# Patient Record
Sex: MALE | Race: WHITE | NOT HISPANIC OR LATINO | Employment: FULL TIME | URBAN - NONMETROPOLITAN AREA
[De-identification: names, ages, dates, MRNs, and addresses within clinical notes are randomized per-mention and may not be internally consistent; named-entity substitution may affect disease eponyms.]

---

## 2017-03-29 ENCOUNTER — APPOINTMENT (OUTPATIENT)
Dept: CT IMAGING | Facility: HOSPITAL | Age: 42
End: 2017-03-29

## 2017-03-29 ENCOUNTER — APPOINTMENT (OUTPATIENT)
Dept: GENERAL RADIOLOGY | Facility: HOSPITAL | Age: 42
End: 2017-03-29

## 2017-03-29 ENCOUNTER — HOSPITAL ENCOUNTER (EMERGENCY)
Facility: HOSPITAL | Age: 42
Discharge: HOME | End: 2017-03-29
Admitting: FAMILY MEDICINE

## 2017-03-29 VITALS
RESPIRATION RATE: 18 BRPM | HEIGHT: 73 IN | DIASTOLIC BLOOD PRESSURE: 82 MMHG | BODY MASS INDEX: 26.51 KG/M2 | WEIGHT: 200 LBS | OXYGEN SATURATION: 97 % | TEMPERATURE: 98 F | SYSTOLIC BLOOD PRESSURE: 127 MMHG | HEART RATE: 61 BPM

## 2017-03-29 DIAGNOSIS — S70.01XA CONTUSION OF RIGHT HIP, INITIAL ENCOUNTER: ICD-10-CM

## 2017-03-29 DIAGNOSIS — S39.012A LUMBAR STRAIN, INITIAL ENCOUNTER: Primary | ICD-10-CM

## 2017-03-29 PROCEDURE — 73502 X-RAY EXAM HIP UNI 2-3 VIEWS: CPT

## 2017-03-29 PROCEDURE — 99283 EMERGENCY DEPT VISIT LOW MDM: CPT

## 2017-03-29 PROCEDURE — 96372 THER/PROPH/DIAG INJ SC/IM: CPT

## 2017-03-29 PROCEDURE — 72131 CT LUMBAR SPINE W/O DYE: CPT

## 2017-03-29 PROCEDURE — 25010000002 ONDANSETRON PER 1 MG: Performed by: NURSE PRACTITIONER

## 2017-03-29 PROCEDURE — 25010000002 HYDROMORPHONE PER 4 MG: Performed by: NURSE PRACTITIONER

## 2017-03-29 PROCEDURE — 25010000002 ORPHENADRINE CITRATE PER 60 MG: Performed by: NURSE PRACTITIONER

## 2017-03-29 RX ORDER — ORPHENADRINE CITRATE 30 MG/ML
60 INJECTION INTRAMUSCULAR; INTRAVENOUS ONCE
Status: COMPLETED | OUTPATIENT
Start: 2017-03-29 | End: 2017-03-29

## 2017-03-29 RX ORDER — OXYCODONE AND ACETAMINOPHEN 7.5; 325 MG/1; MG/1
1 TABLET ORAL EVERY 4 HOURS PRN
Qty: 12 TABLET | Refills: 0 | Status: SHIPPED | OUTPATIENT
Start: 2017-03-29 | End: 2017-08-11

## 2017-03-29 RX ORDER — TIZANIDINE 4 MG/1
4 TABLET ORAL EVERY 8 HOURS PRN
Qty: 15 TABLET | Refills: 0 | Status: SHIPPED | OUTPATIENT
Start: 2017-03-29 | End: 2017-08-11

## 2017-03-29 RX ORDER — ONDANSETRON 2 MG/ML
4 INJECTION INTRAMUSCULAR; INTRAVENOUS ONCE
Status: COMPLETED | OUTPATIENT
Start: 2017-03-29 | End: 2017-03-29

## 2017-03-29 RX ORDER — METHYLPREDNISOLONE 4 MG/1
TABLET ORAL
Qty: 21 TABLET | Refills: 0 | Status: SHIPPED | OUTPATIENT
Start: 2017-03-29 | End: 2017-08-11

## 2017-03-29 RX ADMIN — ORPHENADRINE CITRATE 60 MG: 30 INJECTION INTRAMUSCULAR; INTRAVENOUS at 19:25

## 2017-03-29 RX ADMIN — HYDROMORPHONE HYDROCHLORIDE 1 MG: 1 INJECTION, SOLUTION INTRAMUSCULAR; INTRAVENOUS; SUBCUTANEOUS at 19:26

## 2017-03-29 RX ADMIN — ONDANSETRON 4 MG: 2 INJECTION INTRAMUSCULAR; INTRAVENOUS at 19:25

## 2017-03-30 NOTE — ED PROVIDER NOTES
"Subjective   HPI Comments: Pt presents after tripping and falling off of a trailer while at work today. He states that he hit concrete. He is complaining of pain to right hip and lower lumbar spine. He states that this occurred around 1500 today     Patient is a 41 y.o. male presenting with fall.   History provided by:  Patient   used: No    Fall       Review of Systems   Constitutional: Negative.    HENT: Negative.    Eyes: Negative.    Respiratory: Negative.    Cardiovascular: Negative.    Gastrointestinal: Negative.    Endocrine: Negative.    Genitourinary: Negative.    Musculoskeletal:        Pt presents after falling off of a 3ft trailer and landing on concrete. He states that he is having lower back pain and right hip pain. He states that this occurred around 1500 today. He denies any incont of urine or stool   Skin: Negative.    Allergic/Immunologic: Negative.    Neurological: Negative.    Hematological: Negative.    Psychiatric/Behavioral: Negative.    All other systems reviewed and are negative.      Past Medical History:   Diagnosis Date   • Fracture of lumbar spine 2002 and 2015    lower lumbar 2002; L1 2015       No Known Allergies    Past Surgical History:   Procedure Laterality Date   • FACIAL FRACTURE SURGERY     • SPLENECTOMY         History reviewed. No pertinent family history.    Social History     Social History   • Marital status: Single     Spouse name: N/A   • Number of children: N/A   • Years of education: N/A     Social History Main Topics   • Smoking status: Never Smoker   • Smokeless tobacco: None   • Alcohol use No   • Drug use: No   • Sexual activity: Defer     Other Topics Concern   • None     Social History Narrative   • None       Prior to Admission medications    Not on File       /82  Pulse 61  Temp 98 °F (36.7 °C)  Resp 18  Ht 73\" (185.4 cm)  Wt 200 lb (90.7 kg)  SpO2 97%  BMI 26.39 kg/m2    Objective   Physical Exam   Constitutional: He is oriented " to person, place, and time. He appears well-developed and well-nourished.   HENT:   Head: Normocephalic and atraumatic.   Eyes: Conjunctivae and EOM are normal. Pupils are equal, round, and reactive to light.   Neck: Normal range of motion. Neck supple.   Cardiovascular: Normal rate, regular rhythm, normal heart sounds and intact distal pulses.    Pulmonary/Chest: Effort normal and breath sounds normal.   Abdominal: Soft. Bowel sounds are normal.   Musculoskeletal:   Moderate tenderness on palp of lower lumbar spine. Palp spasm noted. Superficial abrasion to lower back. No stepoff or laxity noted. Foot push pull equal. Tenderness on palp right buttocks and right lateral hip.    Neurological: He is alert and oriented to person, place, and time. He has normal reflexes.   Skin: Skin is warm and dry.   Psychiatric: He has a normal mood and affect. His behavior is normal. Judgment and thought content normal.   Nursing note and vitals reviewed.      Procedures         Lab Results (last 24 hours)     ** No results found for the last 24 hours. **          XR Hip With or Without Pelvis 2 - 3 View Right   Final Result   . Normal exam of the pelvis and right hip.   This report was finalized on 03/29/2017 23:35 by Dr. Lucas Roberts MD.      CT Lumbar Spine Without Contrast   ED Interpretation   Old fx at l1- nothing acute. Bulging disc noted. Per radiologist   reading.      Final Result   1. No evidence of acute fracture. There is a chronic compression   deformity at L1.   2. There is bulging of the disc as well as facet and ligamentous   hypertrophy contributing to mild central and foraminal stenosis as   described above.   This report was finalized on 03/29/2017 21:25 by Dr. Lucas Roberts MD.          ED Course  ED Course   Comment By Time   Pending ct scan report at this time. Radiology called for further KYLER Amador 03/29 2030   Pending ct scan results at this time KYLER Amador 03/29  2050   Reviewed results with pt. States that pain is better now. Butch report completed#[id number]. No signs of suspicious activity noted. Will write for small amt of pain medication for acute pain. Reviewed side effects and potential for abuse. Madalyn Cobb, APRN 03/29 9828          MDM  Number of Diagnoses or Management Options  Contusion of right hip, initial encounter: minor  Lumbar strain, initial encounter: minor     Amount and/or Complexity of Data Reviewed  Tests in the radiology section of CPT®: ordered and reviewed  Independent visualization of images, tracings, or specimens: yes    Patient Progress  Patient progress: stable      Final diagnoses:   Lumbar strain, initial encounter   Contusion of right hip, initial encounter          Madalyn Cobb, APRN  03/31/17 0729

## 2017-08-04 ENCOUNTER — APPOINTMENT (OUTPATIENT)
Dept: CT IMAGING | Facility: HOSPITAL | Age: 42
End: 2017-08-04

## 2017-08-04 ENCOUNTER — HOSPITAL ENCOUNTER (EMERGENCY)
Facility: HOSPITAL | Age: 42
Discharge: HOME | End: 2017-08-04
Admitting: EMERGENCY MEDICINE

## 2017-08-04 VITALS
OXYGEN SATURATION: 100 % | WEIGHT: 200 LBS | DIASTOLIC BLOOD PRESSURE: 78 MMHG | TEMPERATURE: 97.3 F | HEART RATE: 74 BPM | RESPIRATION RATE: 18 BRPM | SYSTOLIC BLOOD PRESSURE: 132 MMHG | HEIGHT: 73 IN | BODY MASS INDEX: 26.51 KG/M2

## 2017-08-04 DIAGNOSIS — IMO0002 LACERATION: ICD-10-CM

## 2017-08-04 DIAGNOSIS — S00.03XA CONTUSION OF SCALP, INITIAL ENCOUNTER: Primary | ICD-10-CM

## 2017-08-04 PROCEDURE — 70450 CT HEAD/BRAIN W/O DYE: CPT

## 2017-08-04 PROCEDURE — 25010000002 HYDROMORPHONE PER 4 MG: Performed by: NURSE PRACTITIONER

## 2017-08-04 PROCEDURE — 99283 EMERGENCY DEPT VISIT LOW MDM: CPT

## 2017-08-04 PROCEDURE — 90471 IMMUNIZATION ADMIN: CPT | Performed by: NURSE PRACTITIONER

## 2017-08-04 PROCEDURE — 25010000002 TDAP 5-2.5-18.5 LF-MCG/0.5 SUSPENSION: Performed by: NURSE PRACTITIONER

## 2017-08-04 PROCEDURE — 25010000002 ONDANSETRON PER 1 MG: Performed by: NURSE PRACTITIONER

## 2017-08-04 PROCEDURE — 96372 THER/PROPH/DIAG INJ SC/IM: CPT

## 2017-08-04 PROCEDURE — 90715 TDAP VACCINE 7 YRS/> IM: CPT | Performed by: NURSE PRACTITIONER

## 2017-08-04 PROCEDURE — 25010000002 EPINEPHRINE PER 0.1 MG: Performed by: NURSE PRACTITIONER

## 2017-08-04 RX ORDER — ONDANSETRON 2 MG/ML
4 INJECTION INTRAMUSCULAR; INTRAVENOUS ONCE
Status: COMPLETED | OUTPATIENT
Start: 2017-08-04 | End: 2017-08-04

## 2017-08-04 RX ORDER — CEPHALEXIN 500 MG/1
500 CAPSULE ORAL 3 TIMES DAILY
Qty: 21 CAPSULE | Refills: 0 | Status: SHIPPED | OUTPATIENT
Start: 2017-08-04 | End: 2017-08-11

## 2017-08-04 RX ORDER — OXYCODONE HYDROCHLORIDE AND ACETAMINOPHEN 5; 325 MG/1; MG/1
1 TABLET ORAL EVERY 4 HOURS PRN
Qty: 12 TABLET | Refills: 0 | Status: SHIPPED | OUTPATIENT
Start: 2017-08-04 | End: 2017-08-11

## 2017-08-04 RX ORDER — LIDOCAINE HYDROCHLORIDE 10 MG/ML
10 INJECTION, SOLUTION INFILTRATION; PERINEURAL ONCE
Status: DISCONTINUED | OUTPATIENT
Start: 2017-08-04 | End: 2017-08-04 | Stop reason: HOSPADM

## 2017-08-04 RX ADMIN — ONDANSETRON 4 MG: 2 INJECTION INTRAMUSCULAR; INTRAVENOUS at 12:06

## 2017-08-04 RX ADMIN — EPINEPHRINE 3 ML: 1 INJECTION PARENTERAL at 12:02

## 2017-08-04 RX ADMIN — HYDROMORPHONE HYDROCHLORIDE 1 MG: 1 INJECTION, SOLUTION INTRAMUSCULAR; INTRAVENOUS; SUBCUTANEOUS at 12:14

## 2017-08-04 RX ADMIN — TETANUS TOXOID, REDUCED DIPHTHERIA TOXOID AND ACELLULAR PERTUSSIS VACCINE, ADSORBED 0.5 ML: 5; 2.5; 8; 8; 2.5 SUSPENSION INTRAMUSCULAR at 12:04

## 2017-08-04 NOTE — ED NOTES
Patient discharged home  with family at side ambulatory to personal car. No distress noted. Personal belongings with patient. Patient/family voice understanding to instructions given.        Lisa Daniel RN  08/04/17 3609
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
## 2017-08-04 NOTE — ED NOTES
C/o's 'I was at work, I was putting up a sign & the post  hit me in the heat, my melon hurts bad.' denies LOC     Misty Fallon RN  08/04/17 5972

## 2017-08-04 NOTE — DISCHARGE INSTRUCTIONS
Return to ER if symptoms worsen   Clean wound twice daily with soap and water and apply bacitiraicin  Ice to area

## 2017-08-04 NOTE — ED NOTES
Large band aide applied to scalp wound site after neosporin applied. No bleeding noted.     Lisa Daniel RN  08/04/17 1847

## 2017-08-08 ENCOUNTER — HOSPITAL ENCOUNTER (EMERGENCY)
Facility: HOSPITAL | Age: 42
Discharge: HOME | End: 2017-08-08
Attending: FAMILY MEDICINE | Admitting: FAMILY MEDICINE

## 2017-08-08 ENCOUNTER — APPOINTMENT (OUTPATIENT)
Dept: CT IMAGING | Facility: HOSPITAL | Age: 42
End: 2017-08-08

## 2017-08-08 VITALS
TEMPERATURE: 97.6 F | HEIGHT: 73 IN | OXYGEN SATURATION: 98 % | HEART RATE: 70 BPM | WEIGHT: 203 LBS | SYSTOLIC BLOOD PRESSURE: 132 MMHG | BODY MASS INDEX: 26.9 KG/M2 | RESPIRATION RATE: 18 BRPM | DIASTOLIC BLOOD PRESSURE: 80 MMHG

## 2017-08-08 DIAGNOSIS — R51.9 NONINTRACTABLE HEADACHE, UNSPECIFIED CHRONICITY PATTERN, UNSPECIFIED HEADACHE TYPE: Primary | ICD-10-CM

## 2017-08-08 DIAGNOSIS — F07.81 POST-CONCUSSION SYNDROME: ICD-10-CM

## 2017-08-08 PROCEDURE — 70450 CT HEAD/BRAIN W/O DYE: CPT

## 2017-08-08 PROCEDURE — 96375 TX/PRO/DX INJ NEW DRUG ADDON: CPT

## 2017-08-08 PROCEDURE — 96374 THER/PROPH/DIAG INJ IV PUSH: CPT

## 2017-08-08 PROCEDURE — 99283 EMERGENCY DEPT VISIT LOW MDM: CPT

## 2017-08-08 PROCEDURE — 25010000002 MORPHINE PER 10 MG: Performed by: FAMILY MEDICINE

## 2017-08-08 PROCEDURE — 25010000002 ONDANSETRON PER 1 MG: Performed by: FAMILY MEDICINE

## 2017-08-08 RX ORDER — ONDANSETRON 2 MG/ML
4 INJECTION INTRAMUSCULAR; INTRAVENOUS ONCE
Status: COMPLETED | OUTPATIENT
Start: 2017-08-08 | End: 2017-08-08

## 2017-08-08 RX ORDER — MORPHINE SULFATE 4 MG/ML
4 INJECTION, SOLUTION INTRAMUSCULAR; INTRAVENOUS ONCE
Status: COMPLETED | OUTPATIENT
Start: 2017-08-08 | End: 2017-08-08

## 2017-08-08 RX ORDER — IBUPROFEN 800 MG/1
800 TABLET ORAL EVERY 8 HOURS PRN
Qty: 25 TABLET | Refills: 0 | Status: SHIPPED | OUTPATIENT
Start: 2017-08-08 | End: 2017-10-28

## 2017-08-08 RX ADMIN — MORPHINE SULFATE 4 MG: 4 INJECTION, SOLUTION INTRAMUSCULAR; INTRAVENOUS at 20:56

## 2017-08-08 RX ADMIN — ONDANSETRON 4 MG: 2 INJECTION INTRAMUSCULAR; INTRAVENOUS at 20:56

## 2017-08-09 NOTE — DISCHARGE INSTRUCTIONS

## 2017-08-09 NOTE — ED PROVIDER NOTES
Subjective   Patient is a 42 y.o. male presenting with headaches.   History provided by:  Patient   used: No    Headache   Pain location:  R parietal  Quality:  Sharp  Radiates to:  Eyes  Severity currently:  7/10  Severity at highest:  7/10  Onset quality:  Gradual  Timing:  Constant  Progression:  Unchanged  Chronicity:  New  Similar to prior headaches: no    Context: not activity, not exposure to bright light, not caffeine, not coughing, not defecating, not eating, not stress, not exposure to cold air, not intercourse, not loud noise and not straining    Context comment:  Recent head injury requiring sutures  Relieved by:  Nothing  Worsened by:  Nothing  Ineffective treatments:  Acetaminophen and NSAIDs  Associated symptoms: no abdominal pain, no back pain, no blurred vision, no congestion, no cough, no diarrhea, no dizziness, no eye pain, no facial pain, no fatigue, no fever, no focal weakness, no hearing loss, no nausea, no near-syncope, no neck pain, no neck stiffness, no numbness, no paresthesias, no photophobia, no sore throat, no swollen glands, no syncope, no tingling, no URI, no visual change and no vomiting        Review of Systems   Constitutional: Negative for activity change, chills, fatigue and fever.   HENT: Negative for congestion, dental problem, hearing loss and sore throat.    Eyes: Negative for blurred vision, photophobia, pain, discharge and itching.   Respiratory: Negative for apnea, cough and chest tightness.    Cardiovascular: Negative for chest pain, palpitations, syncope and near-syncope.   Gastrointestinal: Negative for abdominal pain, diarrhea, nausea and vomiting.   Endocrine: Negative for polydipsia and polyuria.   Genitourinary: Negative for difficulty urinating and dysuria.   Musculoskeletal: Negative for arthralgias, back pain, neck pain and neck stiffness.   Neurological: Positive for headaches. Negative for dizziness, focal weakness, numbness and paresthesias.    Hematological: Negative for adenopathy. Does not bruise/bleed easily.   Psychiatric/Behavioral: Negative for agitation and behavioral problems.   All other systems reviewed and are negative.      Past Medical History:   Diagnosis Date   • Fracture of lumbar spine 2002 and 2015    lower lumbar 2002; L1 2015       No Known Allergies    Past Surgical History:   Procedure Laterality Date   • FACIAL FRACTURE SURGERY     • SPLENECTOMY         History reviewed. No pertinent family history.    Social History     Social History   • Marital status: Single     Spouse name: N/A   • Number of children: N/A   • Years of education: N/A     Social History Main Topics   • Smoking status: Never Smoker   • Smokeless tobacco: None   • Alcohol use No   • Drug use: No   • Sexual activity: Defer     Other Topics Concern   • None     Social History Narrative       Lab Results (last 24 hours)     ** No results found for the last 24 hours. **          Objective   Physical Exam   Constitutional: He is oriented to person, place, and time. He appears well-developed and well-nourished. No distress.   HENT:   Head: Normocephalic and atraumatic.   Eyes: Conjunctivae and EOM are normal. Pupils are equal, round, and reactive to light. Right eye exhibits no discharge. Left eye exhibits no discharge. No scleral icterus.   Neck: Normal range of motion. Neck supple. No JVD present. No tracheal deviation present. No thyromegaly present.   Cardiovascular: Normal rate and regular rhythm.    Pulmonary/Chest: Effort normal and breath sounds normal. No stridor. No respiratory distress. He has no wheezes.   Abdominal: Soft. Bowel sounds are normal. He exhibits no distension. There is no tenderness.   Musculoskeletal: He exhibits no tenderness or deformity.   Lymphadenopathy:     He has no cervical adenopathy.   Neurological: He is alert and oriented to person, place, and time. He has normal reflexes. He displays normal reflexes. No cranial nerve deficit. He  "exhibits normal muscle tone. Coordination normal.   Skin: Skin is warm and dry. He is not diaphoretic.   Nursing note and vitals reviewed.      Procedures         CT Head Without Contrast   Final Result   1.   No acute intracranial abnormality.   2.  Moderate obstructive mucosal disease in the ethmoid air cells.   This report was finalized on 08/08/2017 21:24 by Dr. Arabella Quintero MD.          /80 (BP Location: Left arm, Patient Position: Lying)  Pulse 70  Temp 97.6 °F (36.4 °C) (Tympanic)   Resp 18  Ht 73\" (185.4 cm)  Wt 203 lb (92.1 kg)  SpO2 98%  BMI 26.78 kg/m2    ED Course    ED Course       Medications   Morphine sulfate (PF) injection 4 mg (4 mg Intravenous Given 8/8/17 2056)   ondansetron (ZOFRAN) injection 4 mg (4 mg Intravenous Given 8/8/17 2056)            MDM  Number of Diagnoses or Management Options  Nonintractable headache, unspecified chronicity pattern, unspecified headache type: new and requires workup  Post-concussion syndrome: new and requires workup     Amount and/or Complexity of Data Reviewed  Clinical lab tests: ordered and reviewed  Tests in the radiology section of CPT®: reviewed and ordered  Tests in the medicine section of CPT®: ordered and reviewed  Decide to obtain previous medical records or to obtain history from someone other than the patient: yes  Review and summarize past medical records: yes  Independent visualization of images, tracings, or specimens: yes    Risk of Complications, Morbidity, and/or Mortality  Presenting problems: moderate  Diagnostic procedures: moderate  Management options: moderate    Patient Progress  Patient progress: improved      Final diagnoses:   Nonintractable headache, unspecified chronicity pattern, unspecified headache type   Post-concussion syndrome          Juhi Stacy,   08/08/17 2229    "

## 2017-08-11 ENCOUNTER — OFFICE VISIT (OUTPATIENT)
Dept: NEUROLOGY | Facility: CLINIC | Age: 42
End: 2017-08-11

## 2017-08-11 ENCOUNTER — HOSPITAL ENCOUNTER (EMERGENCY)
Facility: HOSPITAL | Age: 42
Discharge: HOME | End: 2017-08-11
Admitting: EMERGENCY MEDICINE

## 2017-08-11 VITALS
RESPIRATION RATE: 16 BRPM | HEART RATE: 85 BPM | SYSTOLIC BLOOD PRESSURE: 140 MMHG | BODY MASS INDEX: 27.17 KG/M2 | WEIGHT: 205 LBS | TEMPERATURE: 97.5 F | HEIGHT: 73 IN | OXYGEN SATURATION: 95 % | DIASTOLIC BLOOD PRESSURE: 78 MMHG

## 2017-08-11 VITALS
SYSTOLIC BLOOD PRESSURE: 128 MMHG | HEIGHT: 73 IN | BODY MASS INDEX: 27.17 KG/M2 | DIASTOLIC BLOOD PRESSURE: 84 MMHG | WEIGHT: 205 LBS | HEART RATE: 88 BPM

## 2017-08-11 DIAGNOSIS — F07.81 POST CONCUSSION SYNDROME: Primary | ICD-10-CM

## 2017-08-11 DIAGNOSIS — G44.311 INTRACTABLE ACUTE POST-TRAUMATIC HEADACHE: ICD-10-CM

## 2017-08-11 DIAGNOSIS — Z48.02 ENCOUNTER FOR STAPLE REMOVAL: Primary | ICD-10-CM

## 2017-08-11 PROCEDURE — 99201: CPT

## 2017-08-11 PROCEDURE — 99203 OFFICE O/P NEW LOW 30 MIN: CPT | Performed by: CLINICAL NURSE SPECIALIST

## 2017-08-11 RX ORDER — AMITRIPTYLINE HYDROCHLORIDE 25 MG/1
25 TABLET, FILM COATED ORAL NIGHTLY
Qty: 30 TABLET | Refills: 3 | Status: SHIPPED | OUTPATIENT
Start: 2017-08-11 | End: 2017-10-03 | Stop reason: DRUGHIGH
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
## 2017-08-11 NOTE — PATIENT INSTRUCTIONS
Post-Concussion Syndrome  Post-concussion syndrome is the symptoms that can occur after a head injury. These symptoms can last from weeks to months.  HOME CARE   · Take medicines only as told by your doctor.  Do not take aspirin.  · Sleep with your head raised to help with headaches.  · Avoid activities that can cause another head injury.  ¨ Do not play contact sports like football, hockey, soccer, or basketball.  ¨ Do not do other risky activities like downhill skiing, martial arts, or horseback riding until your doctor says it is okay.  · Keep all follow-up visits as told by your doctor. This is important.  GET HELP IF:   · You have a harder time:    Paying attention.    Focusing.    Remembering.    Learning new information.    Dealing with stress.  · You need more time to complete tasks.  · You are easily bothered (irritable).  · You have more symptoms.  Get help if you have any of these symptoms for more than two weeks after your injury:   · Long-lasting (chronic) headaches.  · Dizziness.  · Trouble balancing.  · Feeling sick to your stomach (nauseous).  · Trouble with your vision.  · Noise or light bothers you more.  · Depression.  · Mood swings.  · Feeling worried (anxious).  · Easily bothered.  · Memory problems.  · Trouble concentrating or paying attention.  · Sleep problems.  · Feeling tired all of the time.  GET HELP RIGHT AWAY IF:  · You feel confused.  · You feel very sleepy.  · You are hard to wake up.  · You feel sick to your stomach.  · You keep throwing up (vomiting).  · You feel like you are moving when you are not (vertigo).  · Your eyes move back and forth very quickly.  · You start shaking (convulsing) or pass out (faint).  · You have very bad headaches that do not get better with medicine.  · You cannot use your arms or legs like normal.  · One of the black centers of your eyes (pupils) is bigger than the other.  · You have clear or bloody fluid coming from your nose or ears.  · Your problems  get worse, not better.  MAKE SURE YOU:  · Understand these instructions.  · Will watch your condition.  · Will get help right away if you are not doing well or get worse.     This information is not intended to replace advice given to you by your health care provider. Make sure you discuss any questions you have with your health care provider.     Document Released: 01/25/2006 Document Revised: 01/08/2016 Document Reviewed: 03/25/2015  [employer] Interactive Patient Education ©2017 [employer]  BMI for Adults  Body mass index (BMI) is a number that is calculated from a person's weight and height. In most adults, the number is used to find how much of an adult's weight is made up of fat. BMI is not as accurate as a direct measure of body fat.  HOW IS BMI CALCULATED?  BMI is calculated by dividing weight in kilograms by height in meters squared. It can also be calculated by dividing weight in pounds by height in inches squared, then multiplying the resulting number by 703. Charts are available to help you find your BMI quickly and easily without doing this calculation.   HOW IS BMI INTERPRETED?  Health care professionals use BMI charts to identify whether an adult is underweight, at a normal weight, or overweight based on the following guidelines:  · Underweight: BMI less than 18.5.  · Normal weight: BMI between 18.5 and 24.9.  · Overweight: BMI between 25 and 29.9.  · Obese: BMI of 30 and above.  BMI is usually interpreted the same for males and females.  Weight includes both fat and muscle, so someone with a muscular build, such as an athlete, may have a BMI that is higher than 24.9. In cases like these, BMI may not accurately depict body fat. To determine if excess body fat is the cause of a BMI of 25 or higher, further assessments may need to be done by a health care provider.  WHY IS BMI A USEFUL TOOL?  BMI is used to identify a possible weight problem that may be related to a medical problem or may increase the  risk for medical problems. BMI can also be used to promote changes to reach a healthy weight.     This information is not intended to replace advice given to you by your health care provider. Make sure you discuss any questions you have with your health care provider.     Document Released: 08/29/2005 Document Revised: 01/08/2016 Document Reviewed: 05/15/2015  [employer] Interactive Patient Education ©2017 [employer]

## 2017-08-11 NOTE — ED NOTES
Patient discharged home  with family at side ambulatory to personal car. No distress noted. Personal belongings with patient. Patient voiced understanding to instructions given. Edges remain approximated to incision site.       Lisa Daniel RN  08/11/17 [id number]

## 2017-10-01 ENCOUNTER — HOSPITAL ENCOUNTER (EMERGENCY)
Age: 42
Discharge: HOME | End: 2017-10-01

## 2017-10-01 ENCOUNTER — APPOINTMENT (OUTPATIENT)
Dept: GENERAL RADIOLOGY | Age: 42
End: 2017-10-01

## 2017-10-01 VITALS
BODY MASS INDEX: 29.35 KG/M2 | OXYGEN SATURATION: 98 % | TEMPERATURE: 97.8 F | SYSTOLIC BLOOD PRESSURE: 124 MMHG | WEIGHT: 205 LBS | HEIGHT: 70 IN | DIASTOLIC BLOOD PRESSURE: 77 MMHG | RESPIRATION RATE: 18 BRPM | HEART RATE: 77 BPM

## 2017-10-01 DIAGNOSIS — M25.511 ACUTE PAIN OF RIGHT SHOULDER: Primary | ICD-10-CM

## 2017-10-01 PROCEDURE — 6360000002 HC RX W HCPCS: Performed by: NURSE PRACTITIONER

## 2017-10-01 PROCEDURE — 6370000000 HC RX 637 (ALT 250 FOR IP): Performed by: NURSE PRACTITIONER

## 2017-10-01 PROCEDURE — 73030 X-RAY EXAM OF SHOULDER: CPT

## 2017-10-01 PROCEDURE — 96372 THER/PROPH/DIAG INJ SC/IM: CPT

## 2017-10-01 PROCEDURE — 99283 EMERGENCY DEPT VISIT LOW MDM: CPT

## 2017-10-01 PROCEDURE — 99283 EMERGENCY DEPT VISIT LOW MDM: CPT | Performed by: NURSE PRACTITIONER

## 2017-10-01 RX ORDER — KETOROLAC TROMETHAMINE 30 MG/ML
60 INJECTION, SOLUTION INTRAMUSCULAR; INTRAVENOUS ONCE
Status: COMPLETED | OUTPATIENT
Start: 2017-10-01 | End: 2017-10-01

## 2017-10-01 RX ORDER — NAPROXEN 500 MG/1
500 TABLET ORAL 2 TIMES DAILY
Qty: 20 TABLET | Refills: 0 | Status: SHIPPED | OUTPATIENT
Start: 2017-10-01 | End: 2018-11-19

## 2017-10-01 RX ORDER — OXYCODONE HYDROCHLORIDE AND ACETAMINOPHEN 5; 325 MG/1; MG/1
2 TABLET ORAL ONCE
Status: COMPLETED | OUTPATIENT
Start: 2017-10-01 | End: 2017-10-01

## 2017-10-01 RX ORDER — HYDROCODONE BITARTRATE AND ACETAMINOPHEN 5; 325 MG/1; MG/1
1 TABLET ORAL EVERY 6 HOURS PRN
Qty: 15 TABLET | Refills: 0 | Status: SHIPPED | OUTPATIENT
Start: 2017-10-01 | End: 2017-10-08

## 2017-10-01 RX ORDER — CYCLOBENZAPRINE HCL 10 MG
10 TABLET ORAL 3 TIMES DAILY PRN
Qty: 30 TABLET | Refills: 0 | Status: SHIPPED | OUTPATIENT
Start: 2017-10-01 | End: 2017-10-11

## 2017-10-01 RX ORDER — ORPHENADRINE CITRATE 30 MG/ML
60 INJECTION INTRAMUSCULAR; INTRAVENOUS ONCE
Status: COMPLETED | OUTPATIENT
Start: 2017-10-01 | End: 2017-10-01

## 2017-10-01 RX ADMIN — KETOROLAC TROMETHAMINE 60 MG: 30 INJECTION, SOLUTION INTRAMUSCULAR at 18:10

## 2017-10-01 RX ADMIN — ORPHENADRINE CITRATE 60 MG: 30 INJECTION, SOLUTION INTRAMUSCULAR; INTRAVENOUS at 18:11

## 2017-10-01 RX ADMIN — OXYCODONE HYDROCHLORIDE AND ACETAMINOPHEN 2 TABLET: 5; 325 TABLET ORAL at 17:21

## 2017-10-01 ASSESSMENT — PAIN DESCRIPTION - LOCATION: LOCATION: SHOULDER

## 2017-10-01 ASSESSMENT — PAIN DESCRIPTION - ORIENTATION: ORIENTATION: RIGHT

## 2017-10-01 ASSESSMENT — PAIN SCALES - GENERAL: PAINLEVEL_OUTOF10: 10
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
## 2017-10-01 NOTE — ED AVS SNAPSHOT
After Visit Summary  (Discharge Instructions)    Medication List for Home    Based on the information you provided to us as well as any changes during this visit, the following is your updated medication list.  Compare this with your prescription bottles at home. If you have any questions or concerns, contact your primary care physician's office. Daily Medication List (This medication list can be shared with any Healthcare provider who is helping you manage your medications)      There are NEW medications for you. START taking them after you leave the hospital     cyclobenzaprine 10 MG tablet   Commonly known as:  FLEXERIL   Take 1 tablet by mouth 3 times daily as needed for Muscle spasms       HYDROcodone-acetaminophen 5-325 MG per tablet   Commonly known as:  NORCO   Take 1 tablet by mouth every 6 hours as needed for Pain . naproxen 500 MG tablet   Commonly known as:  NAPROSYN   Take 1 tablet by mouth 2 times daily            Where to Get Your Medications      You can get these medications from any pharmacy     Bring a paper prescription for each of these medications     cyclobenzaprine 10 MG tablet    HYDROcodone-acetaminophen 5-325 MG per tablet    naproxen 500 MG tablet               Allergies as of 10/1/2017     No Known Allergies      Immunizations as of 10/1/2017     No immunizations on file. After Visit Summary    This summary was created for you. Thank you for entrusting your care to us.   The following information includes details about your hospital/visit stay along with steps you should take to help with your recovery once you leave the hospital.  In this packet, you will find information about the topics listed below:    · Instructions about your medications including a list of your home medications  · A summary of your hospital visit  · Follow-up appointments once you have left the hospital  · Your care plan at home You may receive a survey regarding the care you received during your stay. Your input is valuable to us. We encourage you to complete and return your survey in the envelope provided. We hope you will choose us in the future for your healthcare needs. Patient Information     Patient Name YAMILA Lovelace 1975      Care Provided at:     Name Address Phone       [address] [phone]            Your Visit    Here you will find information about your visit, including the reason for your visit. Please take this sheet with you when you visit your doctor or other health care provider in the future. It will help determine the best possible medical care for you at that time. If you have any questions once you leave the hospital, please call the department phone number listed below. Diagnoses this visit     Your diagnosis was ACUTE PAIN OF RIGHT SHOULDER. Visit Information     Date of Visit Department Dept Phone    10/1/2017 [address] [facility] [phone]      You were seen by     You were seen by CHELSIE King. Follow-up Appointments    Below is a list of your follow-up and future appointments. This may not be a complete list as you may have made appointments directly with providers that we are not aware of or your providers may have made some for you. Please call your providers to confirm appointments. It is important to keep your appointments. Please bring your current insurance card, photo ID, co-pay, and all medication bottles to your appointment. If self-pay, payment is expected at the time of service.         Follow-up Information     Schedule an appointment as soon as possible for a visit with Lupillo Francis MD.    Specialty:  Orthopedic Surgery    Why:  If symptoms worsen    Contact information:    [address]  [place] [phone]        Preventive Care        Date Due HIV screening is recommended for all people regardless of risk factors  aged 15-65 years at least once (lifetime) who have never been HIV tested. 4/19/1991    Tetanus Combination Vaccine (1 - Tdap) 4/19/1995    Cholesterol Screening 4/19/2016    Diabetes Screening 4/19/2016    Yearly Flu Vaccine (1) 9/1/2017                 Care Plan Once You Return Home    This section includes instructions you will need to follow once you leave the hospital.  Your care team will discuss these with you, so you and those caring for you know how to best care for your health needs at home. This section may also include educational information about certain health topics that may be of help to you. Important Information if you smoke or are exposed to smoking       SMOKING: QUIT SMOKING. THIS IS THE MOST IMPORTANT ACTION YOU CAN TAKE TO IMPROVE YOUR CURRENT AND FUTURE HEALTH. Call the [address] [place] [address] [place] [facility] [place] NOW ([phone])    Smoking harms nonsmokers. When nonsmokers are around people who smoke, they absorb nicotine, carbon monoxide, and other ingredients of tobacco smoke. DO NOT SMOKE AROUND CHILDREN     Children exposed to secondhand smoke are at an increased risk of:  Sudden Infant Death Syndrome (SIDS), acute respiratory infections, inflammation of the middle ear, and severe asthma. Over a longer time, it causes heart disease and lung cancer. There is no safe level of exposure to secondhand smoke. [employer] Signup     [employer] allows you to send messages to your doctor, view your test results, renew your prescriptions, schedule appointments, view visit notes, and more. How Do I Sign Up? 1. In your Internet browser, go to [email or web address]  2. Click on the Sign Up Now link in the Sign In box. You will see the New Member Sign Up page. 3. Enter your [employer] Access Code exactly as it appears below.  You will not need to use this code after youve completed the sign-up process. If you do not sign up before the expiration date, you must request a new code. [employer] Access Code: [email or web address]  Expires: 11/30/2017  6:45 PM    4. Enter your Social Security Number ([id number]) and Date of Birth (mm/dd/yyyy) as indicated and click Submit. You will be taken to the next sign-up page. 5. Create a [employer] ID. This will be your [employer] login ID and cannot be changed, so think of one that is secure and easy to remember. 6. Create a [employer] password. You can change your password at any time. 7. Enter your Password Reset Question and Answer. This can be used at a later time if you forget your password. 8. Enter your e-mail address. You will receive e-mail notification when new information is available in [address]. 9. Click Sign Up. You can now view your medical record. Additional Information  If you have questions, please contact the physician practice where you receive care. Remember, [employer] is NOT to be used for urgent needs. For medical emergencies, dial 911. For questions regarding your [employer] account call [phone]. If you have a clinical question, please call your doctor's office. View your information online  ? Review your current list of  medications, immunization, and allergies. ? Review your future test results online . ? Review your discharge instructions provided by your caregivers at discharge    Certain functionality such as prescription refills, scheduling appointments or sending messages to your provider are not activated if your provider does not use [employer] in his/her office    For questions regarding your [employer] account call [phone]. If you have a clinical question, please call your doctor's office.          The information on all pages of the After Visit Summary has been reviewed with me, the patient and/or responsible adult, by my health care provider(s). I had the opportunity to ask questions regarding this information. I understand I should dispose of my armband safely at home to protect my health information. A complete copy of the After Visit Summary has been given to me, the patient and/or responsible adult. Patient Signature/Responsible Adult: ___________________________________    Nurse Signature: ___________________________________  Resident/MLP Signature: ___________________________________  Attending Signature: ___________________________________    Date:____________Time:____________              More Information           Shoulder Pain: Care Instructions  Your Care Instructions    You can hurt your shoulder by using it too much during an activity, such as fishing or baseball. It can also happen as part of the everyday wear and tear of getting older. Shoulder injuries can be slow to heal, but your shoulder should get better with time. Your doctor may recommend a sling to rest your shoulder. If you have injured your shoulder, you may need testing and treatment. Follow-up care is a key part of your treatment and safety. Be sure to make and go to all appointments, and call your doctor if you are having problems. It's also a good idea to know your test results and keep a list of the medicines you take. How can you care for yourself at home? · Take pain medicines exactly as directed. ¨ If the doctor gave you a prescription medicine for pain, take it as prescribed. ¨ If you are not taking a prescription pain medicine, ask your doctor if you can take an over-the-counter medicine. ¨ Do not take two or more pain medicines at the same time unless the doctor told you to. Many pain medicines contain acetaminophen, which is Tylenol. Too much acetaminophen (Tylenol) can be harmful. · If your doctor recommends that you wear a sling, use it as directed. Do not take it off before your doctor tells you to. If you do not have an account, please click on the \"Sign Up Now\" link. Current as of: March 21, 2017  Content Version: 11.3  © 2506-2659 [employer]. Care instructions adapted under license by [facility] ([facility]). If you have questions about a medical condition or this instruction, always ask your healthcare professional. [address] any warranty or liability for your use of this information.

## 2017-10-03 ENCOUNTER — OFFICE VISIT (OUTPATIENT)
Dept: NEUROLOGY | Facility: CLINIC | Age: 42
End: 2017-10-03

## 2017-10-03 VITALS
WEIGHT: 203 LBS | HEIGHT: 73 IN | DIASTOLIC BLOOD PRESSURE: 70 MMHG | SYSTOLIC BLOOD PRESSURE: 120 MMHG | HEART RATE: 80 BPM | BODY MASS INDEX: 26.9 KG/M2

## 2017-10-03 DIAGNOSIS — F07.81 POST CONCUSSION SYNDROME: Primary | ICD-10-CM

## 2017-10-03 DIAGNOSIS — G44.311 INTRACTABLE ACUTE POST-TRAUMATIC HEADACHE: ICD-10-CM

## 2017-10-03 PROCEDURE — 99213 OFFICE O/P EST LOW 20 MIN: CPT | Performed by: CLINICAL NURSE SPECIALIST

## 2017-10-03 RX ORDER — AMITRIPTYLINE HYDROCHLORIDE 50 MG/1
50 TABLET, FILM COATED ORAL NIGHTLY
Qty: 30 TABLET | Refills: 3 | Status: SHIPPED | OUTPATIENT
Start: 2017-10-03 | End: 2018-01-03 | Stop reason: SDUPTHER
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
## 2017-10-03 NOTE — PATIENT INSTRUCTIONS

## 2017-10-28 ENCOUNTER — APPOINTMENT (OUTPATIENT)
Dept: CT IMAGING | Facility: HOSPITAL | Age: 42
End: 2017-10-28

## 2017-10-28 ENCOUNTER — HOSPITAL ENCOUNTER (EMERGENCY)
Facility: HOSPITAL | Age: 42
Discharge: HOME | End: 2017-10-28
Admitting: PHYSICIAN ASSISTANT

## 2017-10-28 VITALS
DIASTOLIC BLOOD PRESSURE: 75 MMHG | SYSTOLIC BLOOD PRESSURE: 142 MMHG | HEIGHT: 73 IN | HEART RATE: 73 BPM | BODY MASS INDEX: 25.71 KG/M2 | WEIGHT: 194 LBS | OXYGEN SATURATION: 98 % | TEMPERATURE: 98.1 F | RESPIRATION RATE: 18 BRPM
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
## 2017-10-28 DIAGNOSIS — M54.2 NECK PAIN: ICD-10-CM

## 2017-10-28 PROCEDURE — 70450 CT HEAD/BRAIN W/O DYE: CPT

## 2017-10-28 PROCEDURE — 99283 EMERGENCY DEPT VISIT LOW MDM: CPT

## 2017-10-28 PROCEDURE — 96372 THER/PROPH/DIAG INJ SC/IM: CPT

## 2017-10-28 PROCEDURE — 72125 CT NECK SPINE W/O DYE: CPT

## 2017-10-28 PROCEDURE — 25010000002 KETOROLAC TROMETHAMINE PER 15 MG: Performed by: NURSE PRACTITIONER

## 2017-10-28 PROCEDURE — 25010000002 DEXAMETHASONE PER 1 MG: Performed by: NURSE PRACTITIONER

## 2017-10-28 RX ORDER — IBUPROFEN 800 MG/1
800 TABLET ORAL 3 TIMES DAILY
Qty: 30 TABLET | Refills: 0 | Status: SHIPPED | OUTPATIENT
Start: 2017-10-28 | End: 2017-11-07

## 2017-10-28 RX ORDER — METHYLPREDNISOLONE 4 MG/1
TABLET ORAL
Qty: 21 TABLET | Refills: 0 | Status: SHIPPED | OUTPATIENT
Start: 2017-10-28 | End: 2017-11-15

## 2017-10-28 RX ORDER — KETOROLAC TROMETHAMINE 30 MG/ML
60 INJECTION, SOLUTION INTRAMUSCULAR; INTRAVENOUS ONCE
Status: COMPLETED | OUTPATIENT
Start: 2017-10-28 | End: 2017-10-28

## 2017-10-28 RX ORDER — DEXAMETHASONE SODIUM PHOSPHATE 10 MG/ML
10 INJECTION INTRAMUSCULAR; INTRAVENOUS ONCE
Status: COMPLETED | OUTPATIENT
Start: 2017-10-28 | End: 2017-10-28

## 2017-10-28 RX ORDER — CYCLOBENZAPRINE HCL 10 MG
10 TABLET ORAL 3 TIMES DAILY PRN
Qty: 20 TABLET | Refills: 0 | Status: SHIPPED | OUTPATIENT
Start: 2017-10-28 | End: 2017-11-04

## 2017-10-28 RX ADMIN — KETOROLAC TROMETHAMINE 60 MG: 30 INJECTION, SOLUTION INTRAMUSCULAR at 15:33

## 2017-10-28 RX ADMIN — DEXAMETHASONE SODIUM PHOSPHATE 10 MG: 10 INJECTION, SOLUTION INTRAMUSCULAR; INTRAVENOUS at 15:35

## 2017-10-28 NOTE — DISCHARGE INSTRUCTIONS
Increase fluids. Medication as ordered. Moist heat to area throughout the day. Follow up with PCP Monday. Return to ED if condition does not improve or worsens
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
## 2017-11-15 ENCOUNTER — OFFICE VISIT (OUTPATIENT)
Dept: NEUROLOGY | Facility: CLINIC | Age: 42
End: 2017-11-15

## 2017-11-15 VITALS
HEIGHT: 73 IN | BODY MASS INDEX: 25.71 KG/M2 | SYSTOLIC BLOOD PRESSURE: 108 MMHG | WEIGHT: 194 LBS | HEART RATE: 74 BPM | DIASTOLIC BLOOD PRESSURE: 62 MMHG

## 2017-11-15 DIAGNOSIS — F07.81 POST CONCUSSION SYNDROME: ICD-10-CM

## 2017-11-15 DIAGNOSIS — G44.311 INTRACTABLE ACUTE POST-TRAUMATIC HEADACHE: Primary | ICD-10-CM

## 2017-11-15 DIAGNOSIS — M54.2 CERVICALGIA: ICD-10-CM

## 2017-11-15 PROCEDURE — 99214 OFFICE O/P EST MOD 30 MIN: CPT | Performed by: CLINICAL NURSE SPECIALIST

## 2017-11-15 RX ORDER — TOPIRAMATE 25 MG/1
TABLET ORAL
Qty: 72 TABLET | Refills: 2 | Status: SHIPPED | OUTPATIENT
Start: 2017-11-15 | End: 2018-01-03 | Stop reason: ALTCHOICE

## 2017-11-15 NOTE — PATIENT INSTRUCTIONS
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
## 2017-11-16 ENCOUNTER — HOSPITAL ENCOUNTER (OUTPATIENT)
Dept: PHYSICAL THERAPY | Facility: HOSPITAL | Age: 42
Setting detail: THERAPIES SERIES
Discharge: HOME | End: 2017-11-16

## 2017-11-16 DIAGNOSIS — M54.2 CERVICALGIA: Primary | ICD-10-CM

## 2017-11-16 PROCEDURE — 97161 PT EVAL LOW COMPLEX 20 MIN: CPT | Performed by: PHYSICAL THERAPIST
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
## 2017-11-20 ENCOUNTER — HOSPITAL ENCOUNTER (OUTPATIENT)
Dept: PHYSICAL THERAPY | Facility: HOSPITAL | Age: 42
Setting detail: THERAPIES SERIES
Discharge: HOME | End: 2017-11-20

## 2017-11-20 DIAGNOSIS — M54.2 CERVICALGIA: Primary | ICD-10-CM

## 2017-11-20 PROCEDURE — 97140 MANUAL THERAPY 1/> REGIONS: CPT | Performed by: PHYSICAL THERAPIST

## 2017-11-20 PROCEDURE — 97110 THERAPEUTIC EXERCISES: CPT | Performed by: PHYSICAL THERAPIST

## 2017-11-20 NOTE — THERAPY TREATMENT NOTE
Outpatient Physical Therapy Ortho Treatment Note  [facility]     Patient Name: Saulo Shepard  : 1975  MRN: [id number]  Today's Date: 2017      Visit Date: 2017    Visit Dx:    ICD-10-CM ICD-9-CM   1. Cervicalgia M54.2 723.1       Patient Active Problem List   Diagnosis   • Post concussion syndrome   • Intractable acute post-traumatic headache   • Cervicalgia        Past Medical History:   Diagnosis Date   • Back pain    • Fracture of lumbar spine  and     lower lumbar ; L1    • Neck pain         Past Surgical History:   Procedure Laterality Date   • FACIAL FRACTURE SURGERY     • SPLENECTOMY                               PT Assessment/Plan       17 4153       PT Assessment    Assessment Comments No goals met today, this was his first treatment since his initial evaluation. He was not completely compliant with his HEP, so we reiviewed this again. He struggled with scapular activatin and posture.   -KR     PT Plan    PT Plan Comments conitnue to work soft tissue restrictions and joint mobilizations. Progress with postural activities.   -KR       User Key  (r) = Recorded By, (t) = Taken By, (c) = Cosigned By    Initials Name Provider Type    TYE Loza, PT DPT Physical Therapist                    Exercises       17 1887          Subjective Comments    Subjective Comments He reports does noticing increased pain and pain down his arm when he was working and liting a lot over the weekend. He reports forgetting to use the towel roll, but doing his chin tucks. He does report some increased neck pain last night and had difficulty going to sleep. .He does report waking up with neck and arm hurting, he reports waking up and his L arm was OH, so he moved it.   -KR      Subjective Pain    Able to rate subjective pain? yes  -KR      Pre-Treatment Pain Level 3   neck and HA  -KR      Post-Treatment Pain Level 4   or 5 L shoulder blade  -KR      Subjective Pain  Comment R arm numbness at the end.   -KR      Exercise 1    Exercise Name 1 prone I's  -KR      Cueing 1 Verbal;Tactile  -KR      Sets 1 3  -KR      Reps 1 10  -KR      Exercise 2    Exercise Name 2 thoacic extension on towel roll   -KR      Cueing 2 Verbal  -KR      Time (Minutes) 2 5  -KR      Exercise 3    Exercise Name 3 chin tucks in supine  -KR      Cueing 3 Verbal  -KR      Sets 3 3  -KR      Reps 3 10  -KR      Exercise 4    Exercise Name 4 1/2 foam thoracic extension stretch with progressive pec stretch hands on forehear  -KR      Cueing 4 Verbal  -KR      Sets 4 3  -KR      Time (Seconds) 4 30  -KR      Exercise 5    Exercise Name 5 seated W's  -KR      Cueing 5 Verbal  -KR      Sets 5 3  -KR      Reps 5 10  -KR      Exercise 6    Exercise Name 6 unweighted cervical rotation with towel roll behind back.   -KR      Cueing 6 Verbal  -KR      Sets 6 2  -KR      Reps 6 10  -KR      Additional Comments decreased motion secondary to pt reporting feling pain down shoulder blade (L)   -KR        User Key  (r) = Recorded By, (t) = Taken By, (c) = Cosigned By    Initials Name Provider Type    YTE Loza, PT DPT Physical Therapist                        Manual Rx (last 36 hours)      Manual Treatments       11/20/17 1548          Manual Rx 1    Manual Rx 1 Location prone thoracic   -KR      Manual Rx 1 Grade 2/3   no cavitations  -KR      Manual Rx 1 Duration 5  -KR      Manual Rx 2    Manual Rx 2 Location prone STM UT/LS cervical paraspinals  -KR      Manual Rx 2 Grade min-mod  -KR      Manual Rx 2 Duration 5  -KR        User Key  (r) = Recorded By, (t) = Taken By, (c) = Cosigned By    Initials Name Provider Type    TYE Loza, PT DPT Physical Therapist                PT OP Goals       11/20/17 1548       PT Short Term Goals    STG Date to Achieve 12/16/17  -KR     STG 1 Pt will demonstrated 65 degrees or greater cervical rotation..   -KR     STG 1 Progress Ongoing  -KR     STG 2 Pt will  report sleeping thru the night without distal symptoms.   -KR     STG 2 Progress Ongoing  -KR     STG 2 Progress Comments reports difficulty falling asleep until late last night.   -KR     Long Term Goals    LTG Date to Achieve 01/15/18  -KR     LTG 1 Pt will demonstrated 75 degrees or greater cervical rotation.   -KR     LTG 1 Progress Ongoing  -KR     LTG 2 Pt will be independent with home exercise program for improved posture.   -KR     LTG 2 Progress Ongoing  -KR     LTG 3 Pt will score 10 or less on NDI.   -KR     LTG 3 Progress Ongoing  -KR     LTG 4 Pt will lift 20# with proper mechanics and pain no greater than 1-2/10.   -KR     LTG 4 Progress Ongoing  -KR     LTG 5 Pt will demonstrate R handed  strength within 10# on left.   -KR     LTG 5 Progress Ongoing  -KR     Time Calculation    PT Goal Re-Cert Due Date 12/16/17  -KR       User Key  (r) = Recorded By, (t) = Taken By, (c) = Cosigned By    Initials Name Provider Type    TYE Loza PT DPT Physical Therapist                Therapy Education       11/20/17 1548          Therapy Education    Given HEP;Symptoms/condition management;Posture/body mechanics  -KR      Program Reinforced  -KR      How Provided Verbal;Demonstration  -KR      Provided to Patient  -KR      Level of Understanding Verbalized;Demonstrated  -KR        User Key  (r) = Recorded By, (t) = Taken By, (c) = Cosigned By    Initials Name Provider Type    TYE Loza, PT DPT Physical Therapist                Time Calculation:   Start Time: 1548  Stop Time: 1630  Time Calculation (min): 42 min  Total Timed Code Minutes- PT: 42 minute(s)    Therapy Charges for Today     Code Description Service Date Service Provider Modifiers Qty    12870238695 HC PT THER PROC EA 15 MIN 11/20/2017 Lisseth Loza PT DPT GP 2    18158203290 HC PT MANUAL THERAPY EA 15 MIN 11/20/2017 Lisseth Loza PT DPT GP 1                    Lisseth Loza PT DPT  11/20/2017

## 2017-11-21 ENCOUNTER — HOSPITAL ENCOUNTER (OUTPATIENT)
Dept: PHYSICAL THERAPY | Facility: HOSPITAL | Age: 42
Setting detail: THERAPIES SERIES
Discharge: HOME | End: 2017-11-21

## 2017-11-21 DIAGNOSIS — M54.2 CERVICALGIA: Primary | ICD-10-CM

## 2017-11-21 PROCEDURE — 97140 MANUAL THERAPY 1/> REGIONS: CPT | Performed by: PHYSICAL THERAPIST

## 2017-11-21 PROCEDURE — 97110 THERAPEUTIC EXERCISES: CPT | Performed by: PHYSICAL THERAPIST

## 2017-11-21 NOTE — THERAPY TREATMENT NOTE
Outpatient Physical Therapy Ortho Treatment Note  [facility]     Patient Name: Saulo Shepard  : 1975  MRN: [id number]  Today's Date: 2017      Visit Date: 2017    Visit Dx:    ICD-10-CM ICD-9-CM   1. Cervicalgia M54.2 723.1       Patient Active Problem List   Diagnosis   • Post concussion syndrome   • Intractable acute post-traumatic headache   • Cervicalgia        Past Medical History:   Diagnosis Date   • Back pain    • Fracture of lumbar spine  and     lower lumbar ; L1    • Neck pain         Past Surgical History:   Procedure Laterality Date   • FACIAL FRACTURE SURGERY     • SPLENECTOMY                               PT Assessment/Plan       17 0747 17 1548    PT Assessment    Assessment Comments His cervical motion is still lacking, but has improved along with decreased pain. His L mid cervical is guarded and hypomobile along with his UT/S and thoracic spine.   -TYE No goals met today, this was his first treatment since his initial evaluation. He was not completely compliant with his HEP, so we reiviewed this again. He struggled with scapular activatin and posture.   -KR    PT Plan    PT Plan Comments conitnue to work cervicothoraccic mobiltiy. Progress with postural strengthening.   -KR conitnue to work soft tissue restrictions and joint mobilizations. Progress with postural activities.   -TYE      User Key  (r) = Recorded By, (t) = Taken By, (c) = Cosigned By    Initials Name Provider Type    TYE Loza, PT DPT Physical Therapist                    Exercises       17 0747 17 9571       Subjective Comments    Subjective Comments No R arm symptoms. He reports working on his exercises at home. He reports his neck isn't bothering him much at all. He reports his HA bothered him last night, but not the neck.   -KR He reports does noticing increased pain and pain down his arm when he was working and liting a lot over the weekend. He reports  "forgetting to use the towel roll, but doing his chin tucks. He does report some increased neck pain last night and had difficulty going to sleep. .He does report waking up with neck and arm hurting, he reports waking up and his L arm was OH, so he moved it.   -KR     Subjective Pain    Able to rate subjective pain? yes  -KR yes  -KR     Pre-Treatment Pain Level 1  -KR 3   neck and HA  -KR     Post-Treatment Pain Level 3   'feels like we are working it out\"  -KR 4   or 5 L shoulder blade  -KR     Subjective Pain Comment  R arm numbness at the end.   -KR     Exercise 1    Exercise Name 1 prone mid-lower trap activation; hands on the head  -KR prone I's  -KR     Cueing 1 Verbal  -KR Verbal;Tactile  -KR     Sets 1 3  -KR 3  -KR     Reps 1 10  -KR 10  -KR     Additional Comments reported some R shoulder discomfort after this- stating he felt like he couldn't move it as well.   -KR      Exercise 2    Exercise Name 2 chin tucks with rotation  -KR thoacic extension on towel roll   -KR     Cueing 2 Verbal  -KR Verbal  -KR     Sets 2 2  -KR      Reps 2 10  -KR      Time (Minutes) 2  5  -KR     Exercise 3    Exercise Name 3 1/2 foam thoracic extension stretch with progressive pec stretch hands on forehear  -KR chin tucks in supine  -KR     Cueing 3 Verbal  -KR Verbal  -KR     Sets 3 3  -KR 3  -KR     Reps 3  10  -KR     Time (Seconds) 3 30  -KR      Exercise 4    Exercise Name 4 seated W's   -KR 1/2 foam thoracic extension stretch with progressive pec stretch hands on forehear  -KR     Cueing 4 Verbal;Tactile  -KR Verbal  -KR     Sets 4 2  -KR 3  -KR     Reps 4 10  -KR      Time (Seconds) 4  30  -KR     Exercise 5    Exercise Name 5 educated on posture  -KR seated W's  -KR     Cueing 5  Verbal  -KR     Sets 5  3  -KR     Reps 5  10  -KR     Exercise 6    Exercise Name 6  unweighted cervical rotation with towel roll behind back.   -KR     Cueing 6  Verbal  -KR     Sets 6  2  -KR     Reps 6  10  -KR     Additional Comments  " decreased motion secondary to pt reporting feling pain down shoulder blade (L)   -KR       User Key  (r) = Recorded By, (t) = Taken By, (c) = Cosigned By    Initials Name Provider Type    TYE Loza, PT DPT Physical Therapist                        Manual Rx (last 36 hours)      Manual Treatments       11/21/17 0747 11/20/17 1548       Manual Rx 1    Manual Rx 1 Location prone thoracic   -KR prone thoracic   -KR     Manual Rx 1 Grade 2/3   no cavitations  -KR 2/3   no cavitations  -KR     Manual Rx 1 Duration 8  -KR 5  -KR     Manual Rx 2    Manual Rx 2 Location prone STM UT/LS cervical paraspinals  -KR prone STM UT/LS cervical paraspinals  -KR     Manual Rx 2 Grade min-mod  -KR min-mod  -KR     Manual Rx 2 Duration 13  -KR 5  -KR     Manual Rx 3    Manual Rx 3 Location L mid cervical UPAs  -KR      Manual Rx 3 Grade 2/3  -KR      Manual Rx 3 Duration 3  -KR        User Key  (r) = Recorded By, (t) = Taken By, (c) = Cosigned By    Initials Name Provider Type    TYE Loza, PT DPT Physical Therapist                PT OP Goals       11/21/17 0747 11/20/17 1548    PT Short Term Goals    STG Date to Achieve 12/16/17  -KR 12/16/17  -KR    STG 1 Pt will demonstrated 65 degrees or greater cervical rotation..   -KR Pt will demonstrated 65 degrees or greater cervical rotation..   -KR    STG 1 Progress Ongoing  -KR Ongoing  -KR    STG 1 Progress Comments R 67 L 56  -KR     STG 2 Pt will report sleeping thru the night without distal symptoms.   -KR Pt will report sleeping thru the night without distal symptoms.   -KR    STG 2 Progress Ongoing  -KR Ongoing  -KR    STG 2 Progress Comments  reports difficulty falling asleep until late last night.   -KR    Long Term Goals    LTG Date to Achieve 01/15/18  -KR 01/15/18  -KR    LTG 1 Pt will demonstrated 75 degrees or greater cervical rotation.   -KR Pt will demonstrated 75 degrees or greater cervical rotation.   -KR    LTG 1 Progress Ongoing  -KR Ongoing   -KR    LTG 2 Pt will be independent with home exercise program for improved posture.   -KR Pt will be independent with home exercise program for improved posture.   -KR    LTG 2 Progress Ongoing  -KR Ongoing  -KR    LTG 3 Pt will score 10 or less on NDI.   -KR Pt will score 10 or less on NDI.   -KR    LTG 3 Progress Ongoing  -KR Ongoing  -KR    LTG 4 Pt will lift 20# with proper mechanics and pain no greater than 1-2/10.   -KR Pt will lift 20# with proper mechanics and pain no greater than 1-2/10.   -KR    LTG 4 Progress Ongoing  -KR Ongoing  -KR    LTG 5 Pt will demonstrate R handed  strength within 10# on left.   -KR Pt will demonstrate R handed  strength within 10# on left.   -KR    LTG 5 Progress Ongoing  -KR Ongoing  -KR    Time Calculation    PT Goal Re-Cert Due Date 12/16/17  -KR 12/16/17  -KR      User Key  (r) = Recorded By, (t) = Taken By, (c) = Cosigned By    Initials Name Provider Type    TYE Loza PT DPT Physical Therapist                Therapy Education       11/21/17 0747 11/20/17 1548       Therapy Education    Given HEP;Posture/body mechanics  -KR HEP;Symptoms/condition management;Posture/body mechanics  -KR     Program Reinforced  -KR Reinforced  -KR     How Provided Verbal  -KR Verbal;Demonstration  -KR     Provided to Patient  -KR Patient  -KR     Level of Understanding Verbalized  -KR Verbalized;Demonstrated  -KR       User Key  (r) = Recorded By, (t) = Taken By, (c) = Cosigned By    Initials Name Provider Type    TYE Loza PT DPT Physical Therapist                Time Calculation:   Start Time: 0747  Stop Time: 0831  Time Calculation (min): 44 min  Total Timed Code Minutes- PT: 44 minute(s)    Therapy Charges for Today     Code Description Service Date Service Provider Modifiers Qty    03156161674 HC PT MANUAL THERAPY EA 15 MIN 11/21/2017 Lisseth Loza, PT DPT GP 2    24491056113 HC PT THER PROC EA 15 MIN 11/21/2017 Lisseth Loza, PT DPT GP 1                     Lisseth Loza, PT DPT  11/21/2017

## 2017-11-29 ENCOUNTER — HOSPITAL ENCOUNTER (OUTPATIENT)
Dept: PHYSICAL THERAPY | Facility: HOSPITAL | Age: 42
Setting detail: THERAPIES SERIES
Discharge: HOME | End: 2017-11-29

## 2017-11-29 DIAGNOSIS — M54.2 CERVICALGIA: Primary | ICD-10-CM

## 2017-11-29 PROCEDURE — 97140 MANUAL THERAPY 1/> REGIONS: CPT

## 2017-11-29 PROCEDURE — 97110 THERAPEUTIC EXERCISES: CPT

## 2017-11-29 NOTE — THERAPY TREATMENT NOTE
"    Outpatient Physical Therapy Ortho Treatment Note  [facility]     Patient Name: Saulo Shepard  : 1975  MRN: [id number]  Today's Date: 2017      Visit Date: 2017    Visit Dx:    ICD-10-CM ICD-9-CM   1. Cervicalgia M54.2 723.1       Patient Active Problem List   Diagnosis   • Post concussion syndrome   • Intractable acute post-traumatic headache   • Cervicalgia        Past Medical History:   Diagnosis Date   • Back pain    • Fracture of lumbar spine  and     lower lumbar ; L1    • Neck pain         Past Surgical History:   Procedure Laterality Date   • FACIAL FRACTURE SURGERY     • SPLENECTOMY                               PT Assessment/Plan       17 0947       PT Assessment    Assessment Comments Soft tissue restrictions in his B cervical region were minimal; however, prior to intervention his R shoulder was significantly elevated, which this resolved post intervention today. I was unable to have him perform any postural control therex in staanding due to time. Per his reports we continue to progress in regard to reducing his symptoms  -EC     PT Plan    PT Plan Comments Continue to work on postural control, consider suboccipital releases and educating him on performing self sub occipital releases.  -EC       User Key  (r) = Recorded By, (t) = Taken By, (c) = Cosigned By    Initials Name Provider Type    JANIE Boateng PTA Physical Therapy Assistant                    Exercises       17 0800          Subjective Comments    Subjective Comments He reports slight neck pain more on the L side, HA pain daily later in the day. States he has intermittent N/T in his R UE into his ring and pinky finger. States we have improved his average pain rating fro 6/10 to 2/10.  -EC      Subjective Pain    Able to rate subjective pain? yes  -EC      Pre-Treatment Pain Level 2  -EC      Post-Treatment Pain Level 2  -EC      Exercise 1    Exercise Name 1 prone modified \"W's\"  -EC " "     Cueing 1 Verbal  -EC      Exercise 2    Exercise Name 2 prone \"Y's\"  -EC      Cueing 2 Tactile;Verbal  -EC      Sets 2 2  -EC      Reps 2 10  -EC      Exercise 3    Exercise Name 3 B UE unweighted towewl roll along thoracic, B cervical rotation  -EC      Time (Minutes) 3 2  -EC        User Key  (r) = Recorded By, (t) = Taken By, (c) = Cosigned By    Initials Name Provider Type    EC Juanito Boateng PTA Physical Therapy Assistant                        Manual Rx (last 36 hours)      Manual Treatments       11/29/17 0700          Manual Rx 1    Manual Rx 1 Location B cervical paraspinals, levator scapulae, upper trap  -EC      Manual Rx 1 Type STM in prone  -EC      Manual Rx 1 Duration 11  -EC      Manual Rx 2    Manual Rx 2 Location thoracic   -EC      Manual Rx 2 Type prone extension mobilizations  -EC      Manual Rx 2 Grade 2 sustained  -EC      Manual Rx 2 Duration 12  -EC        User Key  (r) = Recorded By, (t) = Taken By, (c) = Cosigned By    Initials Name Provider Type    EC Juanito Boateng PTA Physical Therapy Assistant                PT OP Goals       11/29/17 0807       PT Short Term Goals    STG Date to Achieve 12/16/17  -EC     STG 1 Pt will demonstrated 65 degrees or greater cervical rotation..   -EC     STG 1 Progress Ongoing  -EC     STG 2 Pt will report sleeping thru the night without distal symptoms.   -EC     STG 2 Progress Ongoing  -EC     Long Term Goals    LTG Date to Achieve 01/15/18  -EC     LTG 1 Pt will demonstrated 75 degrees or greater cervical rotation.   -EC     LTG 1 Progress Ongoing  -EC     LTG 2 Pt will be independent with home exercise program for improved posture.   -EC     LTG 2 Progress Ongoing  -EC     LTG 2 Progress Comments reviewed B UE unweighted towel roll along thoracic region B cervical rotation due to he hadn't been performing  -EC     LTG 3 Pt will score 10 or less on NDI.   -EC     LTG 3 Progress Ongoing  -EC     LTG 4 Pt will lift 20# with proper mechanics and " pain no greater than 1-2/10.   -EC     LTG 4 Progress Ongoing  -EC     LTG 5 Pt will demonstrate R handed  strength within 10# on left.   -EC     LTG 5 Progress Ongoing  -EC     Time Calculation    PT Goal Re-Cert Due Date 12/16/17  -EC       User Key  (r) = Recorded By, (t) = Taken By, (c) = Cosigned By    Initials Name Provider Type    EC Juanito Boateng PTA Physical Therapy Assistant                Therapy Education       11/29/17 0947          Therapy Education    Given HEP  -EC      Program Reinforced  -EC      How Provided Verbal  -EC      Provided to Patient  -EC      Level of Understanding Verbalized  -EC        User Key  (r) = Recorded By, (t) = Taken By, (c) = Cosigned By    Initials Name Provider Type    EC Juanito Boateng PTA Physical Therapy Assistant                Time Calculation:   Start Time: 0804  Stop Time: 0843  Time Calculation (min): 39 min  Total Timed Code Minutes- PT: 39 minute(s)    Therapy Charges for Today     Code Description Service Date Service Provider Modifiers Qty    27930329272 HC PT THER PROC EA 15 MIN 11/29/2017 Juanito Boateng PTA GP 1    68878866876 HC PT MANUAL THERAPY EA 15 MIN 11/29/2017 Juanito Boateng PTA GP 2                    Juanito Boateng PTA  11/29/2017

## 2017-12-01 ENCOUNTER — HOSPITAL ENCOUNTER (OUTPATIENT)
Dept: PHYSICAL THERAPY | Facility: HOSPITAL | Age: 42
Setting detail: THERAPIES SERIES
Discharge: HOME | End: 2017-12-01

## 2017-12-01 DIAGNOSIS — M54.2 CERVICALGIA: Primary | ICD-10-CM

## 2017-12-01 PROCEDURE — 97110 THERAPEUTIC EXERCISES: CPT

## 2017-12-01 PROCEDURE — 97140 MANUAL THERAPY 1/> REGIONS: CPT

## 2017-12-01 NOTE — THERAPY TREATMENT NOTE
Outpatient Physical Therapy Ortho Treatment Note  [facility]     Patient Name: Saulo Shepard  : 1975  MRN: [id number]  Today's Date: 2017      Visit Date: 2017    Visit Dx:    ICD-10-CM ICD-9-CM   1. Cervicalgia M54.2 723.1       Patient Active Problem List   Diagnosis   • Post concussion syndrome   • Intractable acute post-traumatic headache   • Cervicalgia        Past Medical History:   Diagnosis Date   • Back pain    • Fracture of lumbar spine  and     lower lumbar ; L1    • Neck pain         Past Surgical History:   Procedure Laterality Date   • FACIAL FRACTURE SURGERY     • SPLENECTOMY                               PT Assessment/Plan       17 1300       PT Assessment    Assessment Comments (P)  Patient continues to present with decreased thoracic mobility as well as minimal soft tissue restictions in his cervical paraspinals. However, his B upper trap was significantly guarded today and was reduced with STM. He was instructed in proper sleeping position to decrease his radicular symptoms with two pillows. He said that position felt better and he would try it out tonight.  -LC     PT Plan    PT Plan Comments (P)  Continue to improve thoracic mobility with an active approach next session. Also, continue to improve posture in standing as well as core strengthening.   -       User Key  (r) = Recorded By, (t) = Taken By, (c) = Cosigned By    Initials Name Provider Type    RASHAUN Davalos PTA Student PTA Student                    Exercises       17 1300          Subjective Comments    Subjective Comments (P)  Patient reports he is feeling sore today, but overall not bad.  -      Subjective Pain    Able to rate subjective pain? (P)  yes  -      Pre-Treatment Pain Level (P)  3  -LC      Post-Treatment Pain Level (P)  2  -      Exercise 1    Exercise Name 1 (P)  progressive thoracic stretch on  foam roll  -      Cueing 1 (P)  Verbal  -      Time  (Minutes) 1 (P)  5  -LC      Exercise 2    Exercise Name 2 (P)  self occipital release with towel roll  -LC      Cueing 2 (P)  Verbal;Tactile  -LC      Sets 2 (P)  2  -LC      Reps 2 (P)  10  -LC      Additional Comments (P)  Added to HEP  -LC      Exercise 3    Exercise Name 3 (P)  Instruction in sleeping position in order to decrease UE radicular symptoms  -LC      Exercise 4    Exercise Name 4 (P)  Prone modified T's scapular retraction  -LC      Cueing 4 (P)  Verbal;Tactile  -LC      Sets 4 (P)  2  -LC      Reps 4 (P)  10  -LC      Exercise 5    Exercise Name 5 (P)  prone modified W's on 55cm physioball   -LC      Cueing 5 (P)  Tactile  -LC      Sets 5 (P)  2  -LC      Reps 5 (P)  10  -LC        User Key  (r) = Recorded By, (t) = Taken By, (c) = Cosigned By    Initials Name Provider Type    LC Riana Davalos PTA Student PTA Student                        Manual Rx (last 36 hours)      Manual Treatments       12/01/17 1300          Manual Rx 1    Manual Rx 1 Location (P)  B cervical paraspinals, levator scapulae, upper trap  -LC      Manual Rx 1 Type (P)  STM in prone  -LC      Manual Rx 1 Duration (P)  10  -LC      Manual Rx 2    Manual Rx 2 Location (P)  thoracic   -LC      Manual Rx 2 Type (P)  prone extension mobilizations  -LC      Manual Rx 2 Grade (P)  2 sustained  -LC      Manual Rx 2 Duration (P)  8  -LC      Manual Rx 3    Manual Rx 3 Location (P)  suboccipital realease  -LC      Manual Rx 3 Grade (P)  2  -LC      Manual Rx 3 Duration (P)  5  -LC        User Key  (r) = Recorded By, (t) = Taken By, (c) = Cosigned By    Initials Name Provider Type    RASHAUN Davalos PTA Student PTA Student                PT OP Goals       12/01/17 1300       PT Short Term Goals    STG Date to Achieve (P)  12/16/17  -LC     STG 1 (P)  Pt will demonstrated 65 degrees or greater cervical rotation..   -LC     STG 1 Progress (P)  Ongoing  -LC     STG 2 (P)  Pt will report sleeping thru the night without distal symptoms.   -LC      STG 2 Progress (P)  Ongoing  -LC     STG 2 Progress Comments (P)  Instruction sleeping position in order to reduce radicular symptoms  -LC     Long Term Goals    LTG Date to Achieve (P)  01/15/18  -LC     LTG 1 (P)  Pt will demonstrated 75 degrees or greater cervical rotation.   -LC     LTG 1 Progress (P)  Ongoing  -LC     LTG 2 (P)  Pt will be independent with home exercise program for improved posture.   -LC     LTG 2 Progress (P)  Ongoing  -LC     LTG 3 (P)  Pt will score 10 or less on NDI.   -LC     LTG 3 Progress (P)  Ongoing  -LC     LTG 4 (P)  Pt will lift 20# with proper mechanics and pain no greater than 1-2/10.   -LC     LTG 4 Progress (P)  Ongoing  -LC     LTG 5 (P)  Pt will demonstrate R handed  strength within 10# on left.   -LC     LTG 5 Progress (P)  Ongoing  -LC     Time Calculation    PT Goal Re-Cert Due Date (P)  12/16/17  -LC       User Key  (r) = Recorded By, (t) = Taken By, (c) = Cosigned By    Initials Name Provider Type    LC Riana Davalos PTA Student PTA Student                Therapy Education       12/01/17 1300          Therapy Education    Education Details (P)  Self occipital release with towel roll daily; instruction in proper sleeping position with 2 pillows  -LC      Given (P)  HEP;Symptoms/condition management;Posture/body mechanics  -LC      Program (P)  New  -LC      How Provided (P)  Verbal  -LC      Provided to (P)  Patient  -LC      Level of Understanding (P)  Verbalized;Demonstrated  -LC        User Key  (r) = Recorded By, (t) = Taken By, (c) = Cosigned By    Initials Name Provider Type    LC Riana Davalos PTA Student PTA Student                Time Calculation:   Start Time: (P) 1301  Stop Time: (P) 1348  Time Calculation (min): (P) 47 min  Total Timed Code Minutes- PT: (P) 47 minute(s)    Therapy Charges for Today     Code Description Service Date Service Provider Modifiers Qty    95253094713 HC PT THER PROC EA 15 MIN 12/1/2017 Riana Davalos PTA Student GP 1     [id number]  PT MANUAL THERAPY EA 15 MIN 12/1/2017 Riana Davalos, PTA Student GP 2                    Riana Davalos PTA Student  12/1/2017

## 2017-12-04 ENCOUNTER — HOSPITAL ENCOUNTER (OUTPATIENT)
Dept: PHYSICAL THERAPY | Facility: HOSPITAL | Age: 42
Setting detail: THERAPIES SERIES
Discharge: HOME | End: 2017-12-04

## 2017-12-04 DIAGNOSIS — M54.2 CERVICALGIA: Primary | ICD-10-CM

## 2017-12-04 PROCEDURE — 97140 MANUAL THERAPY 1/> REGIONS: CPT

## 2017-12-04 PROCEDURE — 97110 THERAPEUTIC EXERCISES: CPT

## 2017-12-04 NOTE — THERAPY TREATMENT NOTE
Outpatient Physical Therapy Ortho Treatment Note  [facility]     Patient Name: Saulo Shepard  : 1975  MRN: [id number]  Today's Date: 2017      Visit Date: 2017    Visit Dx:    ICD-10-CM ICD-9-CM   1. Cervicalgia M54.2 723.1       Patient Active Problem List   Diagnosis   • Post concussion syndrome   • Intractable acute post-traumatic headache   • Cervicalgia        Past Medical History:   Diagnosis Date   • Back pain    • Fracture of lumbar spine  and     lower lumbar ; L1    • Neck pain         Past Surgical History:   Procedure Laterality Date   • FACIAL FRACTURE SURGERY     • SPLENECTOMY                               PT Assessment/Plan       17 0800       PT Assessment    Assessment Comments Patient demonstrated poor endurance of the thoracic extensors today with the lower trap lift offs.  The patient exaggerates the posterior sway and assumes a more pronounced kyphosis to compensate.  No increase in headache symptoms noted.  -RS     PT Plan    PT Plan Comments Progress note and updated plan of care next session  -RS       User Key  (r) = Recorded By, (t) = Taken By, (c) = Cosigned By    Initials Name Provider Type    RS Terence Alcaraz, PT DPT Physical Therapist                    Exercises       17 0700          Subjective Comments    Subjective Comments Patient states that he is feeling better today; no increased soreness in the traps.  -RS      Subjective Pain    Able to rate subjective pain? yes  -RS      Pre-Treatment Pain Level 1  -RS      Post-Treatment Pain Level 1  -RS      Exercise 1    Exercise Name 1 prone thoracic mid/low trap level 1  -RS      Cueing 1 Verbal  -RS      Sets 1 3  -RS      Reps 1 15  -RS      Exercise 2    Exercise Name 2 prone blanca thoracic Y's using 1# dowel  -RS      Cueing 2 Demo  -RS      Sets 2 3  -RS      Reps 2 15  -RS      Exercise 3    Exercise Name 3 facing wall lower trap Y lift off  -RS      Cueing 3 Demo  -RS       Sets 3 3  -RS      Reps 3 12  -RS      Exercise 4    Exercise Name 4 reverse T's using towel roll behind the head  -RS      Cueing 4 Verbal  -RS      Sets 4 3  -RS      Reps 4 12  -RS        User Key  (r) = Recorded By, (t) = Taken By, (c) = Cosigned By    Initials Name Provider Type    RS Terence Alcaraz, PT DPT Physical Therapist                        Manual Rx (last 36 hours)      Manual Treatments       12/04/17 0700          Manual Rx 1    Manual Rx 1 Location B cervical paraspinals, levator scapulae, upper trap  -RS      Manual Rx 1 Type STM in prone  -RS      Manual Rx 1 Duration 6  -RS      Manual Rx 2    Manual Rx 2 Location thoracic   -RS      Manual Rx 2 Type prone extension mobilizations  -RS      Manual Rx 2 Grade 2 sustained  -RS      Manual Rx 2 Duration 10  -RS        User Key  (r) = Recorded By, (t) = Taken By, (c) = Cosigned By    Initials Name Provider Type    RS Terence Alcaraz, PT DPT Physical Therapist                PT OP Goals       12/04/17 0800       PT Short Term Goals    STG Date to Achieve 12/16/17  -RS     STG 1 Pt will demonstrated 65 degrees or greater cervical rotation..   -RS     STG 1 Progress Ongoing  -RS     STG 2 Pt will report sleeping thru the night without distal symptoms.   -RS     STG 2 Progress Ongoing  -RS     STG 2 Progress Comments improved with the alternation of sleeping position  -RS     Long Term Goals    LTG Date to Achieve 01/15/18  -RS     LTG 1 Pt will demonstrated 75 degrees or greater cervical rotation.   -RS     LTG 1 Progress Ongoing  -RS     LTG 2 Pt will be independent with home exercise program for improved posture.   -RS     LTG 2 Progress Ongoing  -RS     LTG 3 Pt will score 10 or less on NDI.   -RS     LTG 3 Progress Ongoing  -RS     LTG 4 Pt will lift 20# with proper mechanics and pain no greater than 1-2/10.   -RS     LTG 4 Progress Ongoing  -RS     LTG 5 Pt will demonstrate R handed  strength within 10# on left.   -RS     LTG 5  Progress Ongoing  -RS     Time Calculation    PT Goal Re-Cert Due Date 12/16/17  -RS       User Key  (r) = Recorded By, (t) = Taken By, (c) = Cosigned By    Initials Name Provider Type    RS Terence Alcaraz, PT DPT Physical Therapist                Therapy Education       12/04/17 0800          Therapy Education    Education Details facing wall lower trap Y's with lift off 20-30 reps  -RS      Given HEP  -RS      Program New  -RS      How Provided Verbal;Demonstration  -RS      Provided to Patient  -RS      Level of Understanding Verbalized;Demonstrated  -RS        User Key  (r) = Recorded By, (t) = Taken By, (c) = Cosigned By    Initials Name Provider Type    LUCHO Alcaraz, PT DPT Physical Therapist                Time Calculation:   Start Time: 0745  Stop Time: 0830  Time Calculation (min): 45 min  PT Non-Billable Time (min): 4 min  Total Timed Code Minutes- PT: 41 minute(s)    Therapy Charges for Today     Code Description Service Date Service Provider Modifiers Qty    16168847369 HC PT THER PROC EA 15 MIN 12/4/2017 Terence Alcaraz, PT DPT GP 2    85862690872 HC PT MANUAL THERAPY EA 15 MIN 12/4/2017 Terence Alcaraz, PT DPT GP 1                    SUSHMA Alcaraz, PT DPT  12/4/2017

## 2017-12-07 ENCOUNTER — APPOINTMENT (OUTPATIENT)
Dept: PHYSICAL THERAPY | Facility: HOSPITAL | Age: 42
End: 2017-12-07

## 2017-12-11 ENCOUNTER — APPOINTMENT (OUTPATIENT)
Dept: PHYSICAL THERAPY | Facility: HOSPITAL | Age: 42
End: 2017-12-11

## 2017-12-14 ENCOUNTER — APPOINTMENT (OUTPATIENT)
Dept: PHYSICAL THERAPY | Facility: HOSPITAL | Age: 42
End: 2017-12-14

## 2018-01-03 ENCOUNTER — OFFICE VISIT (OUTPATIENT)
Dept: NEUROLOGY | Facility: CLINIC | Age: 43
End: 2018-01-03

## 2018-01-03 VITALS
BODY MASS INDEX: 26.77 KG/M2 | DIASTOLIC BLOOD PRESSURE: 70 MMHG | HEIGHT: 73 IN | SYSTOLIC BLOOD PRESSURE: 110 MMHG | HEART RATE: 78 BPM | WEIGHT: 202 LBS

## 2018-01-03 DIAGNOSIS — M54.2 CERVICALGIA: Primary | ICD-10-CM

## 2018-01-03 DIAGNOSIS — G44.311 INTRACTABLE ACUTE POST-TRAUMATIC HEADACHE: ICD-10-CM

## 2018-01-03 DIAGNOSIS — R29.898 LEFT ARM WEAKNESS: ICD-10-CM

## 2018-01-03 DIAGNOSIS — F07.81 POST CONCUSSION SYNDROME: ICD-10-CM

## 2018-01-03 PROCEDURE — 99213 OFFICE O/P EST LOW 20 MIN: CPT | Performed by: CLINICAL NURSE SPECIALIST

## 2018-01-03 RX ORDER — TOPIRAMATE 50 MG/1
50 TABLET, FILM COATED ORAL 2 TIMES DAILY
Qty: 60 TABLET | Refills: 5 | Status: SHIPPED | OUTPATIENT
Start: 2018-01-03 | End: 2018-08-13 | Stop reason: SDUPTHER

## 2018-01-03 RX ORDER — AMITRIPTYLINE HYDROCHLORIDE 50 MG/1
50 TABLET, FILM COATED ORAL NIGHTLY
Qty: 30 TABLET | Refills: 5 | Status: SHIPPED | OUTPATIENT
Start: 2018-01-03 | End: 2018-08-13 | Stop reason: SDUPTHER
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
## 2018-01-03 NOTE — PATIENT INSTRUCTIONS

## 2018-01-12 ENCOUNTER — DOCUMENTATION (OUTPATIENT)
Dept: PHYSICAL THERAPY | Facility: HOSPITAL | Age: 43
End: 2018-01-12

## 2018-01-12 DIAGNOSIS — M54.2 CERVICALGIA: Primary | ICD-10-CM
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
## 2018-02-07 ENCOUNTER — HOSPITAL ENCOUNTER (OUTPATIENT)
Dept: NEUROLOGY | Facility: HOSPITAL | Age: 43
Discharge: HOME | End: 2018-02-07
Admitting: CLINICAL NURSE SPECIALIST

## 2018-02-07 DIAGNOSIS — M54.2 CERVICALGIA: ICD-10-CM

## 2018-02-07 DIAGNOSIS — R29.898 LEFT ARM WEAKNESS: ICD-10-CM

## 2018-02-07 PROCEDURE — 95886 MUSC TEST DONE W/N TEST COMP: CPT

## 2018-02-07 PROCEDURE — 95909 NRV CNDJ TST 5-6 STUDIES: CPT

## 2018-02-07 PROCEDURE — 95909 NRV CNDJ TST 5-6 STUDIES: CPT | Performed by: PSYCHIATRY & NEUROLOGY

## 2018-02-07 PROCEDURE — 95886 MUSC TEST DONE W/N TEST COMP: CPT | Performed by: PSYCHIATRY & NEUROLOGY

## 2018-02-15 ENCOUNTER — OFFICE VISIT (OUTPATIENT)
Dept: NEUROLOGY | Facility: CLINIC | Age: 43
End: 2018-02-15

## 2018-02-15 VITALS
HEART RATE: 80 BPM | DIASTOLIC BLOOD PRESSURE: 70 MMHG | BODY MASS INDEX: 26.77 KG/M2 | SYSTOLIC BLOOD PRESSURE: 110 MMHG | WEIGHT: 202 LBS | HEIGHT: 73 IN

## 2018-02-15 DIAGNOSIS — F07.81 POST CONCUSSION SYNDROME: ICD-10-CM

## 2018-02-15 DIAGNOSIS — G44.311 INTRACTABLE ACUTE POST-TRAUMATIC HEADACHE: ICD-10-CM

## 2018-02-15 DIAGNOSIS — M54.2 CERVICALGIA: Primary | ICD-10-CM

## 2018-02-15 DIAGNOSIS — R20.0 RIGHT ARM NUMBNESS: ICD-10-CM

## 2018-02-15 PROCEDURE — 99213 OFFICE O/P EST LOW 20 MIN: CPT | Performed by: CLINICAL NURSE SPECIALIST

## 2018-03-23 ENCOUNTER — HOSPITAL ENCOUNTER (EMERGENCY)
Facility: HOSPITAL | Age: 43
Discharge: HOME | End: 2018-03-23
Admitting: EMERGENCY MEDICINE

## 2018-03-23 ENCOUNTER — APPOINTMENT (OUTPATIENT)
Dept: GENERAL RADIOLOGY | Facility: HOSPITAL | Age: 43
End: 2018-03-23

## 2018-03-23 VITALS
BODY MASS INDEX: 27.17 KG/M2 | OXYGEN SATURATION: 96 % | WEIGHT: 205 LBS | RESPIRATION RATE: 18 BRPM | DIASTOLIC BLOOD PRESSURE: 70 MMHG | SYSTOLIC BLOOD PRESSURE: 115 MMHG | HEIGHT: 73 IN | HEART RATE: 77 BPM | TEMPERATURE: 98.1 F

## 2018-03-23 DIAGNOSIS — M54.5 LOW BACK PAIN, UNSPECIFIED BACK PAIN LATERALITY, UNSPECIFIED CHRONICITY, WITH SCIATICA PRESENCE UNSPECIFIED: ICD-10-CM

## 2018-03-23 DIAGNOSIS — S32.19XA OTHER FRACTURE OF SACRUM, INITIAL ENCOUNTER FOR CLOSED FRACTURE (HCC): ICD-10-CM
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
## 2018-03-23 PROCEDURE — 99283 EMERGENCY DEPT VISIT LOW MDM: CPT

## 2018-03-23 PROCEDURE — 72110 X-RAY EXAM L-2 SPINE 4/>VWS: CPT

## 2018-03-23 PROCEDURE — 96372 THER/PROPH/DIAG INJ SC/IM: CPT

## 2018-03-23 PROCEDURE — 72220 X-RAY EXAM SACRUM TAILBONE: CPT

## 2018-03-23 PROCEDURE — 25010000002 KETOROLAC TROMETHAMINE PER 15 MG: Performed by: NURSE PRACTITIONER

## 2018-03-23 RX ORDER — KETOROLAC TROMETHAMINE 10 MG/1
10 TABLET, FILM COATED ORAL EVERY 6 HOURS PRN
Qty: 12 TABLET | Refills: 0 | Status: SHIPPED | OUTPATIENT
Start: 2018-03-23 | End: 2018-03-26

## 2018-03-23 RX ORDER — CYCLOBENZAPRINE HCL 10 MG
10 TABLET ORAL 3 TIMES DAILY PRN
Qty: 9 TABLET | Refills: 0 | Status: SHIPPED | OUTPATIENT
Start: 2018-03-23 | End: 2018-03-26

## 2018-03-23 RX ORDER — KETOROLAC TROMETHAMINE 30 MG/ML
30 INJECTION, SOLUTION INTRAMUSCULAR; INTRAVENOUS ONCE
Status: COMPLETED | OUTPATIENT
Start: 2018-03-23 | End: 2018-03-23

## 2018-03-23 RX ADMIN — KETOROLAC TROMETHAMINE 30 MG: 30 INJECTION, SOLUTION INTRAMUSCULAR at 15:23

## 2018-03-23 NOTE — ED PROVIDER NOTES
Subjective   Patient is a 42-year-old  male presents ER today with complaint of low back pain.  Patient reports that approximately 1 PM today he was walking out of his trailer back steps outside his door.  He states that he slipped and fell landing on his buttocks.  He states that he fell down approximately 3 small steps.  He states he did not hit his head, he did have not have any loss of consciousness.  At this time the patient reports low back pain to the buttock area.  The patient denies any low back pain, denies any saddle anesthesias.  He denies any numbness or tingling to the lower jaw.  He presents ER today        History provided by:  Patient   used: No    Back Pain   Location:  Lumbar spine  Quality:  Aching and burning  Radiates to:  Does not radiate  Pain severity:  Mild  Onset quality:  Sudden  Duration:  2 hours  Timing:  Constant  Progression:  Unchanged  Chronicity:  New  Context: falling    Context: not emotional stress, not jumping from heights, not lifting heavy objects, not MCA, not MVA, not occupational injury, not pedestrian accident, not physical stress, not recent illness, not recent injury and not twisting    Relieved by:  Nothing  Worsened by:  Nothing  Ineffective treatments:  None tried  Associated symptoms: no abdominal pain, no abdominal swelling, no bladder incontinence, no bowel incontinence, no chest pain, no dysuria, no fever, no headaches, no leg pain, no numbness, no paresthesias, no pelvic pain, no perianal numbness, no tingling, no weakness and no weight loss    Risk factors: no hx of cancer, no hx of osteoporosis, no lack of exercise, no menopause, not obese, not pregnant, no recent surgery, no steroid use and no vascular disease        Review of Systems   Constitutional: Negative for fever and weight loss.   Cardiovascular: Negative for chest pain.   Gastrointestinal: Negative for abdominal pain and bowel incontinence.   Genitourinary: Negative for  bladder incontinence, dysuria and pelvic pain.   Musculoskeletal: Positive for back pain.   Neurological: Negative for tingling, weakness, numbness, headaches and paresthesias.   All other systems reviewed and are negative.      Past Medical History:   Diagnosis Date   • Back pain    • Fracture of lumbar spine 2002 and 2015    lower lumbar 2002; L1 2015   • Head injury    • Neck pain        No Known Allergies    Past Surgical History:   Procedure Laterality Date   • FACIAL FRACTURE SURGERY     • SPLENECTOMY         Family History   Problem Relation Age of Onset   • Fibromyalgia Mother    • Lung cancer Father        Social History     Social History   • Marital status: Legally      Social History Main Topics   • Smoking status: Never Smoker   • Smokeless tobacco: Never Used   • Alcohol use No   • Drug use: No   • Sexual activity: Yes     Partners: Female     Other Topics Concern   • Not on file           Objective   Physical Exam   Constitutional: He is oriented to person, place, and time. He appears well-developed and well-nourished.   HENT:   Head: Normocephalic and atraumatic.   Cardiovascular: Normal rate, regular rhythm and normal heart sounds.    Pulmonary/Chest: Effort normal and breath sounds normal.   Musculoskeletal:        Back:    Neurological: He is alert and oriented to person, place, and time.   Skin: Skin is warm and dry.   Psychiatric: He has a normal mood and affect.   Nursing note and vitals reviewed.      Procedures         ED Course  ED Course   Comment By Time   [facility] report # [id number] reviewed; no suspicious findings noted. Tricia Kamara, APRN 03/23 1450   X-ray lumbar spine shows no acute findings. Tricia Kamara, APRN 03/23 1612   X-ray of the sacrum and coccyx shows a suspected age-indeterminate fracture of the distal sacrum, no significant displacement is noted.  Patient Be Discharged Home in Stable Condition.  I Will Give a RX for a Nonnarcotic Pain Medication.  I Will  Also Give the Patient Muscle Relaxers.  I Will Give Him a Prescription for a donut to use for his discomfort.  He Is Advised to Follow up His Primary Care Provider or Orthopedics in One to 2 Days for Recheck.  Respiratory the ER.  He Will Be Discharged Home in Stable Condition. Tricia Kamara, APRN 03/23 1612          XR Sacrum & Coccyx   Final Result   1. Suspected age-indeterminate fracture of the distal sacrum. No   significant displacement is noted   This report was finalized on 03/23/2018 15:46 by Dr. Nathan Ricks MD.      XR Spine Lumbar 4+ View   Final Result   1. Chronic L1 compression deformity. Stable mild degenerative change. No   acute radiographic abnormality.   This report was finalized on 03/23/2018 15:45 by Dr. Bobbi Mendiola MD.        Labs Reviewed - No data to display          MDM  Number of Diagnoses or Management Options  Fall, initial encounter: new and requires workup  Low back pain, unspecified back pain laterality, unspecified chronicity, with sciatica presence unspecified: new and requires workup  Other fracture of sacrum, initial encounter for closed fracture: new and requires workup     Amount and/or Complexity of Data Reviewed  Tests in the radiology section of CPT®: ordered and reviewed    Patient Progress  Patient progress: stable      Final diagnoses:   Fall, initial encounter   Low back pain, unspecified back pain laterality, unspecified chronicity, with sciatica presence unspecified   Other fracture of sacrum, initial encounter for closed fracture            Tricia Kamara, APRN  03/23/18 1616

## 2018-03-23 NOTE — ED NOTES
C/o's 'back pain, stepped out a door, my buttock hit the edge of a concrete step, happened about 2pm today.' Ambulating w/o difficulty     Misty Fallon RN  03/23/18 [id number]

## 2018-03-23 NOTE — DISCHARGE INSTRUCTIONS
Please follow up with your PCP in 1-2 days for a recheck or with orthopedics  R/t to the ER as needed    Back Pain, Adult  Back pain is very common. The pain often gets better over time. The cause of back pain is usually not dangerous. Most people can learn to manage their back pain on their own.  Follow these instructions at home:  Watch your back pain for any changes. The following actions may help to lessen any pain you are feeling:  · Stay active. Start with short walks on flat ground if you can. Try to walk farther each day.  · Exercise regularly as told by your doctor. Exercise helps your back heal faster. It also helps avoid future injury by keeping your muscles strong and flexible.  · Do not sit, drive, or  one place for more than 30 minutes.  · Do not stay in bed. Resting more than 1-2 days can slow down your recovery.  · Be careful when you bend or lift an object. Use good form when lifting:  ¨ Bend at your knees.  ¨ Keep the object close to your body.  ¨ Do not twist.  · Sleep on a firm mattress. Lie on your side, and bend your knees. If you lie on your back, put a pillow under your knees.  · Take medicines only as told by your doctor.  · Put ice on the injured area.  ¨ Put ice in a plastic bag.  ¨ Place a towel between your skin and the bag.  ¨ Leave the ice on for 20 minutes, 2-3 times a day for the first 2-3 days. After that, you can switch between ice and heat packs.  · Avoid feeling anxious or stressed. Find good ways to deal with stress, such as exercise.  · Maintain a healthy weight. Extra weight puts stress on your back.  Contact a doctor if:  · You have pain that does not go away with rest or medicine.  · You have worsening pain that goes down into your legs or buttocks.  · You have pain that does not get better in one week.  · You have pain at night.  · You lose weight.  · You have a fever or chills.  Get help right away if:  · You cannot control when you poop (bowel movement) or pee  (urinate).  · Your arms or legs feel weak.  · Your arms or legs lose feeling (numbness).  · You feel sick to your stomach (nauseous) or throw up (vomit).  · You have belly (abdominal) pain.  · You feel like you may pass out (faint).  This information is not intended to replace advice given to you by your health care provider. Make sure you discuss any questions you have with your health care provider.  Document Released: 06/05/2009 Document Revised: 05/25/2017 Document Reviewed: 04/21/2015  [employer] Interactive Patient Education © 2017 [employer]

## 2018-06-05 ENCOUNTER — APPOINTMENT (OUTPATIENT)
Dept: GENERAL RADIOLOGY | Facility: HOSPITAL | Age: 43
End: 2018-06-05

## 2018-06-05 ENCOUNTER — HOSPITAL ENCOUNTER (EMERGENCY)
Facility: HOSPITAL | Age: 43
Discharge: HOME | End: 2018-06-05
Admitting: EMERGENCY MEDICINE

## 2018-06-05 VITALS
WEIGHT: 200 LBS | BODY MASS INDEX: 26.51 KG/M2 | DIASTOLIC BLOOD PRESSURE: 75 MMHG | OXYGEN SATURATION: 98 % | SYSTOLIC BLOOD PRESSURE: 127 MMHG | HEART RATE: 80 BPM | TEMPERATURE: 98 F | HEIGHT: 73 IN | RESPIRATION RATE: 16 BRPM

## 2018-06-05 DIAGNOSIS — M25.511 ACUTE PAIN OF RIGHT SHOULDER: Primary | ICD-10-CM

## 2018-06-05 PROCEDURE — 73030 X-RAY EXAM OF SHOULDER: CPT

## 2018-06-05 PROCEDURE — 99283 EMERGENCY DEPT VISIT LOW MDM: CPT

## 2018-06-05 RX ORDER — KETOPROFEN 50 MG/1
50 CAPSULE ORAL 3 TIMES DAILY PRN
Qty: 9 CAPSULE | Refills: 0 | Status: SHIPPED | OUTPATIENT
Start: 2018-06-05 | End: 2018-06-08
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
## 2018-06-06 NOTE — DISCHARGE INSTRUCTIONS
Return to the ER as needed  Please follow up with your PCP in 1-2 days for a rechekc    Joint Pain  Joint pain can be caused by many things. The joint can be bruised, infected, weak from aging, or sore from exercise. The pain will probably go away if you follow your doctor's instructions for home care. If your joint pain continues, more tests may be needed to help find the cause of your condition.  Follow these instructions at home:  Watch your condition for any changes. Follow these instructions as told to lessen the pain that you are feeling:  · Take medicines only as told by your doctor.  · Rest the sore joint for as long as told by your doctor. If your doctor tells you to, raise (elevate) the painful joint above the level of your heart while you are sitting or lying down.  · Do not do things that cause pain or make the pain worse.  · If told, put ice on the painful area:  ¨ Put ice in a plastic bag.  ¨ Place a towel between your skin and the bag.  ¨ Leave the ice on for 20 minutes, 2-3 times per day.  · Wear an elastic bandage, splint, or sling as told by your doctor. Loosen the bandage or splint if your fingers or toes lose feeling (become numb) and tingle, or if they turn cold and blue.  · Begin exercising or stretching the joint as told by your doctor. Ask your doctor what types of exercise are safe for you.  · Keep all follow-up visits as told by your doctor. This is important.  Contact a doctor if:  · Your pain gets worse and medicine does not help it.  · Your joint pain does not get better in 3 days.  · You have more bruising or swelling.  · You have a fever.  · You lose 10 pounds (4.5 kg) or more without trying.  Get help right away if:  · You are not able to move the joint.  · Your fingers or toes become numb or they turn cold and blue.  This information is not intended to replace advice given to you by your health care provider. Make sure you discuss any questions you have with your health care  provider.  Document Released: 12/06/2010 Document Revised: 05/25/2017 Document Reviewed: 09/29/2015  [employer] Interactive Patient Education © 2017 [employer].

## 2018-07-26 ENCOUNTER — HOSPITAL ENCOUNTER (EMERGENCY)
Facility: HOSPITAL | Age: 43
Discharge: HOME | End: 2018-07-26
Admitting: EMERGENCY MEDICINE

## 2018-07-26 ENCOUNTER — APPOINTMENT (OUTPATIENT)
Dept: GENERAL RADIOLOGY | Facility: HOSPITAL | Age: 43
End: 2018-07-26

## 2018-07-26 VITALS
OXYGEN SATURATION: 98 % | BODY MASS INDEX: 26.51 KG/M2 | WEIGHT: 200 LBS | HEIGHT: 73 IN | DIASTOLIC BLOOD PRESSURE: 72 MMHG | SYSTOLIC BLOOD PRESSURE: 121 MMHG | RESPIRATION RATE: 16 BRPM | HEART RATE: 82 BPM | TEMPERATURE: 98.5 F

## 2018-07-26 DIAGNOSIS — S29.019A THORACIC MYOFASCIAL STRAIN, INITIAL ENCOUNTER: Primary | ICD-10-CM

## 2018-07-26 DIAGNOSIS — S32.010A CLOSED COMPRESSION FRACTURE OF FIRST LUMBAR VERTEBRA, INITIAL ENCOUNTER: ICD-10-CM

## 2018-07-26 PROCEDURE — 72072 X-RAY EXAM THORAC SPINE 3VWS: CPT

## 2018-07-26 PROCEDURE — 96372 THER/PROPH/DIAG INJ SC/IM: CPT

## 2018-07-26 PROCEDURE — 72110 X-RAY EXAM L-2 SPINE 4/>VWS: CPT

## 2018-07-26 PROCEDURE — 25010000002 KETOROLAC TROMETHAMINE PER 15 MG: Performed by: PHYSICIAN ASSISTANT

## 2018-07-26 PROCEDURE — 99283 EMERGENCY DEPT VISIT LOW MDM: CPT

## 2018-07-26 RX ORDER — DICLOFENAC SODIUM 75 MG/1
75 TABLET, DELAYED RELEASE ORAL 2 TIMES DAILY
Qty: 14 TABLET | Refills: 0 | Status: SHIPPED | OUTPATIENT
Start: 2018-07-26 | End: 2018-12-01

## 2018-07-26 RX ORDER — KETOROLAC TROMETHAMINE 30 MG/ML
30 INJECTION, SOLUTION INTRAMUSCULAR; INTRAVENOUS EVERY 6 HOURS PRN
Status: DISCONTINUED | OUTPATIENT
Start: 2018-07-26 | End: 2018-07-26 | Stop reason: HOSPADM

## 2018-07-26 RX ADMIN — KETOROLAC TROMETHAMINE 30 MG: 30 INJECTION, SOLUTION INTRAMUSCULAR; INTRAVENOUS at 17:19

## 2018-08-13 ENCOUNTER — OFFICE VISIT (OUTPATIENT)
Dept: NEUROLOGY | Facility: CLINIC | Age: 43
End: 2018-08-13

## 2018-08-13 VITALS
WEIGHT: 198 LBS | HEIGHT: 73 IN | DIASTOLIC BLOOD PRESSURE: 78 MMHG | HEART RATE: 80 BPM | BODY MASS INDEX: 26.24 KG/M2 | SYSTOLIC BLOOD PRESSURE: 130 MMHG

## 2018-08-13 DIAGNOSIS — R20.0 RIGHT ARM NUMBNESS: ICD-10-CM

## 2018-08-13 DIAGNOSIS — G44.311 INTRACTABLE ACUTE POST-TRAUMATIC HEADACHE: Primary | ICD-10-CM

## 2018-08-13 DIAGNOSIS — M54.2 CERVICALGIA: ICD-10-CM

## 2018-08-13 PROBLEM — F07.81 POST CONCUSSION SYNDROME: Status: RESOLVED | Noted: 2017-08-11 | Resolved: 2018-08-13

## 2018-08-13 PROCEDURE — 99213 OFFICE O/P EST LOW 20 MIN: CPT | Performed by: CLINICAL NURSE SPECIALIST

## 2018-08-13 RX ORDER — TOPIRAMATE 50 MG/1
50 TABLET, FILM COATED ORAL 2 TIMES DAILY
Qty: 60 TABLET | Refills: 5 | Status: SHIPPED | OUTPATIENT
Start: 2018-08-13 | End: 2018-12-01

## 2018-08-13 RX ORDER — AMITRIPTYLINE HYDROCHLORIDE 50 MG/1
50 TABLET, FILM COATED ORAL NIGHTLY
Qty: 30 TABLET | Refills: 5 | Status: SHIPPED | OUTPATIENT
Start: 2018-08-13 | End: 2018-12-01
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
## 2018-10-27 ENCOUNTER — HOSPITAL ENCOUNTER (EMERGENCY)
Facility: HOSPITAL | Age: 43
Discharge: HOME | End: 2018-10-27
Attending: EMERGENCY MEDICINE | Admitting: EMERGENCY MEDICINE

## 2018-10-27 VITALS
HEIGHT: 73 IN | TEMPERATURE: 98.1 F | OXYGEN SATURATION: 98 % | DIASTOLIC BLOOD PRESSURE: 72 MMHG | RESPIRATION RATE: 15 BRPM | HEART RATE: 68 BPM | WEIGHT: 205 LBS | BODY MASS INDEX: 27.17 KG/M2 | SYSTOLIC BLOOD PRESSURE: 127 MMHG

## 2018-10-27 DIAGNOSIS — L23.1 ALLERGIC CONTACT DERMATITIS DUE TO ADHESIVES: Primary | ICD-10-CM

## 2018-10-27 PROCEDURE — 63710000001 PREDNISONE PER 1 MG: Performed by: EMERGENCY MEDICINE

## 2018-10-27 PROCEDURE — 99283 EMERGENCY DEPT VISIT LOW MDM: CPT

## 2018-10-27 RX ORDER — METHYLPREDNISOLONE 4 MG/1
TABLET ORAL
Qty: 21 EACH | Refills: 0 | Status: SHIPPED | OUTPATIENT
Start: 2018-10-27 | End: 2018-11-21

## 2018-10-27 RX ORDER — PREDNISONE 20 MG/1
40 TABLET ORAL ONCE
Status: COMPLETED | OUTPATIENT
Start: 2018-10-27 | End: 2018-10-27

## 2018-10-27 RX ADMIN — PREDNISONE 40 MG: 20 TABLET ORAL at 22:21

## 2018-11-06 ENCOUNTER — HOSPITAL ENCOUNTER (EMERGENCY)
Facility: HOSPITAL | Age: 43
Discharge: HOME | End: 2018-11-06

## 2018-11-06 ENCOUNTER — APPOINTMENT (OUTPATIENT)
Dept: GENERAL RADIOLOGY | Facility: HOSPITAL | Age: 43
End: 2018-11-06

## 2018-11-06 ENCOUNTER — HOSPITAL ENCOUNTER (EMERGENCY)
Facility: HOSPITAL | Age: 43
Discharge: HOME | End: 2018-11-06
Admitting: EMERGENCY MEDICINE

## 2018-11-06 VITALS
WEIGHT: 205 LBS | TEMPERATURE: 98 F | DIASTOLIC BLOOD PRESSURE: 84 MMHG | HEART RATE: 81 BPM | HEIGHT: 73 IN | SYSTOLIC BLOOD PRESSURE: 135 MMHG | BODY MASS INDEX: 27.17 KG/M2 | OXYGEN SATURATION: 97 % | RESPIRATION RATE: 18 BRPM

## 2018-11-06 DIAGNOSIS — S39.012A STRAIN OF LUMBAR REGION, INITIAL ENCOUNTER: Primary | ICD-10-CM

## 2018-11-06 PROCEDURE — 96372 THER/PROPH/DIAG INJ SC/IM: CPT

## 2018-11-06 PROCEDURE — 72110 X-RAY EXAM L-2 SPINE 4/>VWS: CPT

## 2018-11-06 PROCEDURE — 25010000002 METHYLPREDNISOLONE PER 125 MG: Performed by: PHYSICIAN ASSISTANT

## 2018-11-06 PROCEDURE — 25010000002 KETOROLAC TROMETHAMINE PER 15 MG: Performed by: PHYSICIAN ASSISTANT

## 2018-11-06 PROCEDURE — 99283 EMERGENCY DEPT VISIT LOW MDM: CPT

## 2018-11-06 PROCEDURE — 25010000002 ORPHENADRINE CITRATE PER 60 MG: Performed by: PHYSICIAN ASSISTANT

## 2018-11-06 RX ORDER — ORPHENADRINE CITRATE 30 MG/ML
60 INJECTION INTRAMUSCULAR; INTRAVENOUS ONCE
Status: COMPLETED | OUTPATIENT
Start: 2018-11-06 | End: 2018-11-06

## 2018-11-06 RX ORDER — METHYLPREDNISOLONE SODIUM SUCCINATE 125 MG/2ML
125 INJECTION, POWDER, LYOPHILIZED, FOR SOLUTION INTRAMUSCULAR; INTRAVENOUS ONCE
Status: COMPLETED | OUTPATIENT
Start: 2018-11-06 | End: 2018-11-06

## 2018-11-06 RX ORDER — METHYLPREDNISOLONE 4 MG/1
TABLET ORAL
Qty: 21 EACH | Refills: 0 | Status: SHIPPED | OUTPATIENT
Start: 2018-11-06 | End: 2018-11-21

## 2018-11-06 RX ORDER — ETODOLAC 200 MG/1
200 CAPSULE ORAL EVERY 8 HOURS
Qty: 15 CAPSULE | Refills: 0 | Status: SHIPPED | OUTPATIENT
Start: 2018-11-06 | End: 2018-12-01

## 2018-11-06 RX ORDER — KETOROLAC TROMETHAMINE 30 MG/ML
30 INJECTION, SOLUTION INTRAMUSCULAR; INTRAVENOUS ONCE
Status: COMPLETED | OUTPATIENT
Start: 2018-11-06 | End: 2018-11-06

## 2018-11-06 RX ORDER — BACLOFEN 10 MG/1
10 TABLET ORAL 3 TIMES DAILY
Qty: 15 TABLET | Refills: 0 | Status: SHIPPED | OUTPATIENT
Start: 2018-11-06 | End: 2018-12-01

## 2018-11-06 RX ADMIN — METHYLPREDNISOLONE SODIUM SUCCINATE 125 MG: 125 INJECTION, POWDER, FOR SOLUTION INTRAMUSCULAR; INTRAVENOUS at 15:33

## 2018-11-06 RX ADMIN — ORPHENADRINE CITRATE 60 MG: 30 INJECTION INTRAMUSCULAR; INTRAVENOUS at 15:33

## 2018-11-06 RX ADMIN — KETOROLAC TROMETHAMINE 30 MG: 30 INJECTION, SOLUTION INTRAMUSCULAR at 15:34

## 2018-11-06 NOTE — ED PROVIDER NOTES
Subjective   43-year-old male presents with a chief complaint of low back pain.  The patient reports prior to arrival 2 hours ago he was walking down the stairs and had twisted towards his right causing left-sided low back pain.  The patient reports feels like a sharp and tightness in his low back.  Pain is worse with movement better with rest.  She does not have pain issues down into his legs or numbness or tingling or loss of range of motion or strength.            Review of Systems   All other systems reviewed and are negative.      Past Medical History:   Diagnosis Date   • Back pain    • Fracture of lumbar spine (CMS/HCC) 2002 and 2015    lower lumbar 2002; L1 2015   • Head injury    • Neck pain        No Known Allergies    Past Surgical History:   Procedure Laterality Date   • FACIAL FRACTURE SURGERY     • SPLENECTOMY         Family History   Problem Relation Age of Onset   • Fibromyalgia Mother    • Lung cancer Father        Social History     Social History   • Marital status: Legally      Social History Main Topics   • Smoking status: Never Smoker   • Smokeless tobacco: Never Used   • Alcohol use No   • Drug use: No   • Sexual activity: Yes     Partners: Female     Other Topics Concern   • Not on file           Objective   Physical Exam   Constitutional: He is oriented to person, place, and time. He appears well-developed and well-nourished.   HENT:   Head: Normocephalic and atraumatic.   Eyes: Pupils are equal, round, and reactive to light. EOM are normal.   Neck: Normal range of motion. Neck supple.   Cardiovascular: Normal rate and regular rhythm.    Pulmonary/Chest: Effort normal and breath sounds normal.   Abdominal: Soft. Bowel sounds are normal.   Musculoskeletal: Normal range of motion.   Mild tenderness left paraspinal lumbar    Negative straight leg raise left leg   Neurological: He is alert and oriented to person, place, and time. No cranial nerve deficit. Coordination normal.   Skin:  Skin is warm and dry.   Psychiatric: He has a normal mood and affect. His behavior is normal.   Nursing note and vitals reviewed.      Procedures           ED Course        XR Spine Lumbar 4+ View   Final Result   1. No acute bony abnormality.   2. Mild spondylosis changes.   3. Chronic superior endplate compression of the L1 vertebra   This report was finalized on 11/06/2018 16:02 by Dr. Tano Nj MD.        Labs Reviewed - No data to display            MDM  Number of Diagnoses or Management Options  Diagnosis management comments: Negative plain film, will dc in stable condition, improved with medications here        Amount and/or Complexity of Data Reviewed  Tests in the radiology section of CPT®: ordered and reviewed    Risk of Complications, Morbidity, and/or Mortality  Presenting problems: moderate  Diagnostic procedures: moderate  Management options: moderate    Patient Progress  Patient progress: stable        Final diagnoses:   Strain of lumbar region, initial encounter            Chino Handley PA-C  11/06/18 2234

## 2018-11-06 NOTE — DISCHARGE INSTRUCTIONS
Follow up with one of the [facility] physician groups below to setup primary care. If you have trouble following up, please call the [facility] Nurse Line at [phone]    (Dr. Vivek Stewart DO, Dr. Tonny Moreno DO,  KYLER Lam, and KYLER Ayers)  [facility], Primary Care   [address] [address], [address], [place], [place] [address] [phone]     (Dr. Khalida Dickerson MD, KYLER Kelley, and KYLER Arevalo)  [facility], Primary Care   [address] [address], [place] [address] [phone]    (Dr. Ronny Proctor MD and Dr. Ross Cadena MD)  [facility], Primary Care  [address], [address] [phone]    (Dr. Edgard Booker MD)  [facility], Primary Care  [address] [facility], [address], [place], [place], [address] [phone]

## 2018-11-18 ENCOUNTER — HOSPITAL ENCOUNTER (EMERGENCY)
Facility: HOSPITAL | Age: 43
Discharge: HOME | End: 2018-11-18
Admitting: EMERGENCY MEDICINE

## 2018-11-18 VITALS
WEIGHT: 204.5 LBS | HEART RATE: 94 BPM | TEMPERATURE: 97.6 F | BODY MASS INDEX: 27.1 KG/M2 | SYSTOLIC BLOOD PRESSURE: 147 MMHG | HEIGHT: 73 IN | DIASTOLIC BLOOD PRESSURE: 89 MMHG | OXYGEN SATURATION: 98 % | RESPIRATION RATE: 16 BRPM

## 2018-11-18 DIAGNOSIS — T63.301A SPIDER BITE WOUND, ACCIDENTAL OR UNINTENTIONAL, INITIAL ENCOUNTER: Primary | ICD-10-CM

## 2018-11-18 PROCEDURE — 99283 EMERGENCY DEPT VISIT LOW MDM: CPT

## 2018-11-18 RX ORDER — KETOROLAC TROMETHAMINE 10 MG/1
10 TABLET, FILM COATED ORAL EVERY 6 HOURS PRN
Qty: 15 TABLET | Refills: 0 | Status: SHIPPED | OUTPATIENT
Start: 2018-11-18 | End: 2018-12-26

## 2018-11-18 RX ORDER — SULFAMETHOXAZOLE AND TRIMETHOPRIM 800; 160 MG/1; MG/1
1 TABLET ORAL 2 TIMES DAILY
Qty: 20 TABLET | Refills: 0 | Status: SHIPPED | OUTPATIENT
Start: 2018-11-18 | End: 2018-11-21

## 2018-11-19 ENCOUNTER — HOSPITAL ENCOUNTER (EMERGENCY)
Age: 43
Discharge: HOME | End: 2018-11-19
Attending: EMERGENCY MEDICINE

## 2018-11-19 VITALS
HEART RATE: 94 BPM | HEIGHT: 73 IN | WEIGHT: 205 LBS | SYSTOLIC BLOOD PRESSURE: 147 MMHG | OXYGEN SATURATION: 96 % | TEMPERATURE: 97.6 F | BODY MASS INDEX: 27.17 KG/M2 | RESPIRATION RATE: 18 BRPM | DIASTOLIC BLOOD PRESSURE: 90 MMHG

## 2018-11-19 DIAGNOSIS — L03.115 CELLULITIS OF RIGHT LOWER EXTREMITY: ICD-10-CM

## 2018-11-19 DIAGNOSIS — T63.301D SPIDER BITE WOUND, ACCIDENTAL OR UNINTENTIONAL, SUBSEQUENT ENCOUNTER: Primary | ICD-10-CM

## 2018-11-19 PROCEDURE — 99281 EMR DPT VST MAYX REQ PHY/QHP: CPT

## 2018-11-19 PROCEDURE — 99283 EMERGENCY DEPT VISIT LOW MDM: CPT | Performed by: EMERGENCY MEDICINE

## 2018-11-19 RX ORDER — SULFAMETHOXAZOLE AND TRIMETHOPRIM 800; 160 MG/1; MG/1
1 TABLET ORAL 2 TIMES DAILY
Qty: 20 TABLET | Refills: 0
Start: 2018-11-19 | End: 2018-11-29

## 2018-11-19 ASSESSMENT — ENCOUNTER SYMPTOMS
COUGH: 0
RHINORRHEA: 0
ROS SKIN COMMENTS: RIGHT INNER THIGH
SORE THROAT: 0
ABDOMINAL PAIN: 0
NAUSEA: 0
CONSTIPATION: 0
VOMITING: 0
DIARRHEA: 0
TROUBLE SWALLOWING: 0
SHORTNESS OF BREATH: 0

## 2018-11-19 ASSESSMENT — PAIN SCALES - GENERAL: PAINLEVEL_OUTOF10: 8

## 2018-11-19 ASSESSMENT — PAIN DESCRIPTION - PAIN TYPE: TYPE: ACUTE PAIN

## 2018-11-20 NOTE — ED PROVIDER NOTES
findings:        Interpretation per the Radiologist below, if available at the time of this note:    No orders to display         ED BEDSIDE ULTRASOUND:   Performed by ED Physician - none    LABS:  Labs Reviewed - No data to display    All other labs were within normal range or not returned as of this dictation. EMERGENCY DEPARTMENT COURSE and DIFFERENTIALDIAGNOSIS/MDM:   Vitals:    Vitals:    11/19/18 1803   BP: (!) 147/90   Pulse: 94   Resp: 18   Temp: 97.6 °F (36.4 °C)   TempSrc: Oral   SpO2: 96%   Weight: 205 lb (93 kg)   Height: 6' 1\" (1.854 m)       MDM  This most likely is a spider bite with some cellulitis. Going to treat empirically with Bactrim DS twice daily for 10 days. Patient was advised to apply hot compresses and return for any worsening of symptoms. CONSULTS:  None    PROCEDURES:  Unless otherwise notedbelow, none     Procedures    FINAL IMPRESSION     1. Spider bite wound, accidental or unintentional, subsequent encounter    2.  Cellulitis of right lower extremity          DISPOSITION/PLAN   DISPOSITION Decision To Discharge 11/19/2018 06:43:25 PM      PATIENT REFERRED TO:      DISCHARGE MEDICATIONS:  New Prescriptions    SULFAMETHOXAZOLE-TRIMETHOPRIM (BACTRIM DS) 800-160 MG PER TABLET    Take 1 tablet by mouth 2 times daily for 10 days   (This was called to [facility] pharmacy)       (Please note that portions of this note were completed with a voice recognition program.  Efforts were made to edit the dictations butoccasionally words are mis-transcribed.)    CHELSIE De Leon (electronically signed)  AttendingEmergency Physician         CHELSIE De Leon  11/19/18 6748

## 2018-11-21 ENCOUNTER — HOSPITAL ENCOUNTER (EMERGENCY)
Facility: HOSPITAL | Age: 43
Discharge: HOME | End: 2018-11-21
Admitting: EMERGENCY MEDICINE

## 2018-11-21 VITALS
TEMPERATURE: 98 F | RESPIRATION RATE: 16 BRPM | OXYGEN SATURATION: 99 % | DIASTOLIC BLOOD PRESSURE: 65 MMHG | HEIGHT: 73 IN | SYSTOLIC BLOOD PRESSURE: 130 MMHG | WEIGHT: 204 LBS | HEART RATE: 85 BPM | BODY MASS INDEX: 27.04 KG/M2

## 2018-11-21 DIAGNOSIS — L03.115 CELLULITIS AND ABSCESS OF RIGHT LOWER EXTREMITY: Primary | ICD-10-CM

## 2018-11-21 DIAGNOSIS — L02.415 CELLULITIS AND ABSCESS OF RIGHT LOWER EXTREMITY: Primary | ICD-10-CM

## 2018-11-21 PROCEDURE — 99283 EMERGENCY DEPT VISIT LOW MDM: CPT

## 2018-11-21 RX ORDER — CLINDAMYCIN HYDROCHLORIDE 300 MG/1
300 CAPSULE ORAL 3 TIMES DAILY
Qty: 21 CAPSULE | Refills: 0 | Status: SHIPPED | OUTPATIENT
Start: 2018-11-21 | End: 2018-11-28

## 2018-11-21 RX ORDER — LIDOCAINE HYDROCHLORIDE 10 MG/ML
10 INJECTION, SOLUTION EPIDURAL; INFILTRATION; INTRACAUDAL; PERINEURAL ONCE
Status: DISCONTINUED | OUTPATIENT
Start: 2018-11-21 | End: 2018-11-21 | Stop reason: HOSPADM

## 2018-11-21 NOTE — ED PROVIDER NOTES
Subjective     Insect Bite   Contact animal:  Insect  Location:  Leg  Leg injury location:  R upper leg  Pain details:     Severity:  No pain  Incident location:  Unable to specify  Tetanus status:  Up to date  Relieved by:  None tried  Ineffective treatments:  None tried  Associated symptoms: no fever, no numbness, no rash and no swelling    Associated symptoms comment:  Localized area indicative of spider bite to the right inner aspect of the thigh      Review of Systems   Constitutional: Negative for activity change and fever.   HENT: Negative for congestion.    Respiratory: Negative for cough, shortness of breath and wheezing.    Cardiovascular: Negative for chest pain and palpitations.   Gastrointestinal: Negative for abdominal pain, diarrhea, nausea and vomiting.   Musculoskeletal: Negative for gait problem, joint swelling, myalgias, neck pain and neck stiffness.   Skin: Negative for color change, pallor, rash and wound.        Positive for insect bite   Neurological: Negative for numbness.   All other systems reviewed and are negative.      Past Medical History:   Diagnosis Date   • Back pain    • Fracture of lumbar spine (CMS/HCC) 2002 and 2015    lower lumbar 2002; L1 2015   • Head injury    • Neck pain        No Known Allergies    Past Surgical History:   Procedure Laterality Date   • FACIAL FRACTURE SURGERY     • SPLENECTOMY         Family History   Problem Relation Age of Onset   • Fibromyalgia Mother    • Lung cancer Father        Social History     Socioeconomic History   • Marital status: Legally      Spouse name: Not on file   • Number of children: Not on file   • Years of education: Not on file   • Highest education level: Not on file   Tobacco Use   • Smoking status: Never Smoker   • Smokeless tobacco: Never Used   Substance and Sexual Activity   • Alcohol use: No   • Drug use: No   • Sexual activity: Yes     Partners: Female           Objective   Physical Exam   Constitutional: He is  oriented to person, place, and time. He appears well-developed and well-nourished. No distress.   HENT:   Head: Normocephalic and atraumatic.   Right Ear: External ear normal.   Left Ear: External ear normal.   Nose: Nose normal.   Eyes: Conjunctivae are normal. Pupils are equal, round, and reactive to light. No scleral icterus.   Neck: Normal range of motion. Neck supple. No JVD present. No thyromegaly present.   Cardiovascular: Normal rate, regular rhythm, normal heart sounds and intact distal pulses.   No murmur heard.  Pulmonary/Chest: Effort normal and breath sounds normal. No respiratory distress. He has no wheezes. He has no rales. He exhibits no tenderness.   Abdominal: Soft. Bowel sounds are normal. He exhibits no distension and no mass. There is no tenderness. There is no rebound and no guarding.   Musculoskeletal: Normal range of motion. He exhibits no edema.   Lymphadenopathy:     He has no cervical adenopathy.   Neurological: He is alert and oriented to person, place, and time. He has normal reflexes. No cranial nerve deficit. Coordination normal.   Skin: Skin is warm and dry. Capillary refill takes less than 2 seconds. No rash noted. He is not diaphoretic. No erythema. No pallor.   approx 1 cm erythematous area to the right inner aspect of the thigh with darkened scab in the center indicative of spider bite; no streaking of the skin noted; no fluctuance or drainage noted   Psychiatric: He has a normal mood and affect. His behavior is normal. Judgment and thought content normal.   Nursing note and vitals reviewed.      Procedures           ED Course                  MDM  Number of Diagnoses or Management Options  Spider bite wound, accidental or unintentional, initial encounter: new and does not require workup     Amount and/or Complexity of Data Reviewed  Discuss the patient with other providers: yes    Risk of Complications, Morbidity, and/or Mortality  Presenting problems: low  Diagnostic procedures:  low  Management options: low    Patient Progress  Patient progress: improved        Final diagnoses:   Spider bite wound, accidental or unintentional, initial encounter            Rica Crump, APRN  11/21/18 0715

## 2018-11-22 NOTE — DISCHARGE INSTRUCTIONS
Please take the antibiotics as prescribed.  Rotate the blue dressing 1 time per day and recover with dry dressing forms option of drainage.  Return to the ER in 3 days for wound recheck and removal of the dressing.  Return sooner if he developed fever, nausea, vomiting or any other new, worsening or other concerning symptoms.  It is very important to discontinue the Bactrim and start the clindamycin.

## 2018-11-22 NOTE — ED PROVIDER NOTES
Subjective   43-year-old  male with possible spider bite/abscess of the right upper thigh.  Patient reports onset of symptoms approximate 5 days ago.  Patient was seen in this emergency department on November 18 and was prescribed antibiotics, Bactrim at that time.  Patient reports symptoms have progressively worsened since onset and receiving antibiotics.  Patient reports moderate pain.  Patient denies fever, chills, active drainage from the site, red streaking of the area.         History provided by:  Patient   used: No    Abscess   Location:  Leg  Leg abscess location:  R upper leg  Abscess quality: fluctuance, induration, painful and redness    Red streaking: no    Duration:  5 days  Progression:  Worsening  Pain details:     Quality:  Throbbing    Severity:  Moderate    Duration:  5 days    Timing:  Constant    Progression:  Worsening  Chronicity:  New  Context: insect bite/sting    Relieved by:  Nothing  Worsened by:  Nothing  Ineffective treatments:  Oral antibiotics  Associated symptoms: no anorexia, no fatigue, no fever, no headaches, no nausea and no vomiting        Review of Systems   Constitutional: Negative for chills, diaphoresis, fatigue and fever.   HENT: Negative for congestion and trouble swallowing.    Respiratory: Negative for shortness of breath and wheezing.    Cardiovascular: Negative for chest pain and palpitations.   Gastrointestinal: Negative for abdominal pain, anorexia, diarrhea, nausea and vomiting.   Genitourinary: Negative for dysuria.   Musculoskeletal: Negative for arthralgias and myalgias.   Skin: Positive for wound (abscess).   Neurological: Negative for dizziness, numbness and headaches.   Hematological: Negative for adenopathy. Does not bruise/bleed easily.       Past Medical History:   Diagnosis Date   • Back pain    • Fracture of lumbar spine (CMS/[facility]) 2002 and 2015    lower lumbar 2002; L1 2015   • Head injury    • Neck pain        No Known  Allergies    Past Surgical History:   Procedure Laterality Date   • FACIAL FRACTURE SURGERY     • SPLENECTOMY         Family History   Problem Relation Age of Onset   • Fibromyalgia Mother    • Lung cancer Father        Social History     Socioeconomic History   • Marital status: Legally      Spouse name: Not on file   • Number of children: Not on file   • Years of education: Not on file   • Highest education level: Not on file   Tobacco Use   • Smoking status: Never Smoker   • Smokeless tobacco: Never Used   Substance and Sexual Activity   • Alcohol use: No   • Drug use: No   • Sexual activity: Yes     Partners: Female       Lab Results (last 24 hours)     ** No results found for the last 24 hours. **          Objective   Physical Exam   Constitutional: He is oriented to person, place, and time. He appears well-developed and well-nourished. No distress.   HENT:   Head: Normocephalic and atraumatic.   Right Ear: External ear normal.   Left Ear: External ear normal.   Eyes: Conjunctivae and EOM are normal. Pupils are equal, round, and reactive to light. Right eye exhibits no discharge. Left eye exhibits no discharge. No scleral icterus.   Neck: Normal range of motion. Neck supple. No tracheal deviation present. No thyromegaly present.   Cardiovascular: Normal rate, regular rhythm, normal heart sounds and intact distal pulses. Exam reveals no friction rub.   No murmur heard.  Pulmonary/Chest: Effort normal and breath sounds normal. No respiratory distress. He has no wheezes.   Abdominal: Soft. Bowel sounds are normal. He exhibits no distension. There is no tenderness. There is no guarding.   Neurological: He is alert and oriented to person, place, and time.   Skin: Skin is warm and dry. Capillary refill takes less than 2 seconds. He is not diaphoretic.        Psychiatric: He has a normal mood and affect. His behavior is normal.   Nursing note and vitals reviewed.      Incision & Drainage  Date/Time:  "11/21/2018 8:29 PM  Performed by: Don Swanson PA-C  Authorized by: Don Swanson PA-C     Consent:     Consent obtained:  Verbal    Consent given by:  Patient    Risks discussed:  Bleeding, incomplete drainage, infection and pain    Alternatives discussed:  No treatment  Location:     Type:  Abscess    Location:  Lower extremity    Lower extremity location:  Leg    Leg location:  R upper leg  Pre-procedure details:     Skin preparation:  Betadine  Anesthesia (see MAR for exact dosages):     Anesthesia method:  Local infiltration    Local anesthetic:  Lidocaine 1% w/o epi  Procedure type:     Complexity:  Simple  Procedure details:     Incision types:  Stab incision    Incision depth:  Dermal    Scalpel blade:  11    Wound management:  Probed and deloculated    Drainage:  Bloody and purulent    Drainage amount:  Moderate    Wound treatment:  Drain placed and wound left open    Packing material: blue ribbon tubing.  Post-procedure details:     Patient tolerance of procedure:  Tolerated well, no immediate complications             No orders to display       /65   Pulse 85   Temp 98 °F (36.7 °C)   Resp 16   Ht 185.4 cm (73\")   Wt 92.5 kg (204 lb)   SpO2 99%   BMI 26.91 kg/m²     ED Course         Medications   lidocaine PF 1% (XYLOCAINE) injection 10 mL (not administered)            MDM  Number of Diagnoses or Management Options  Cellulitis and abscess of right lower extremity: established and worsening  Diagnosis management comments: 43-year-old with possible spider bite to right upper inner thigh.  Currently being treated with Bactrim with worsening symptoms.  Today I did incise and drain the area with mild amount of purulent and bloody drainage.  This was after Dr. Maguire evaluated the patient and agreed I/d is the best plan. Pt tolerated I&d well, blue ribbon tubing placed. Pt will return to the ER in 3 days for re-check and tubing removal. Discussed strict return precautions. " Will take off bactrim and start clindamycin. Pt agreeable to plan and return precautions.       Final diagnoses:   Cellulitis and abscess of right lower extremity          Don Swanson PA-C  11/21/18 2030

## 2018-11-24 ENCOUNTER — HOSPITAL ENCOUNTER (EMERGENCY)
Facility: HOSPITAL | Age: 43
Discharge: HOME | End: 2018-11-24
Attending: EMERGENCY MEDICINE | Admitting: EMERGENCY MEDICINE

## 2018-11-24 VITALS
SYSTOLIC BLOOD PRESSURE: 136 MMHG | RESPIRATION RATE: 12 BRPM | OXYGEN SATURATION: 98 % | DIASTOLIC BLOOD PRESSURE: 75 MMHG | HEART RATE: 77 BPM | WEIGHT: 203.8 LBS | HEIGHT: 73 IN | TEMPERATURE: 97.8 F | BODY MASS INDEX: 27.01 KG/M2

## 2018-11-24 DIAGNOSIS — L03.115 CELLULITIS OF RIGHT THIGH: Primary | ICD-10-CM

## 2018-11-24 PROCEDURE — 99201: CPT

## 2018-11-24 PROCEDURE — 99282 EMERGENCY DEPT VISIT SF MDM: CPT

## 2018-11-24 NOTE — ED PROVIDER NOTES
Subjective   This is a 43-year-old male who presents to the emergency department for a wound evaluation.  Patient indicates that he was here 3 days ago and had an incision and drainage on his right inner thigh.  Reports intermittent drainage/bleeding from the wound since but notes that overall his redness and swelling have improved.  No fever.  He does not feel short of breath.  No cough, wheezing, or acute pulmonary concerns.  No chest pain or heart racing.  No abdominal discomfort.  No nausea/vomiting.  No stool or urine changes.  He has no additional concerns at this time.              Review of Systems   All other systems reviewed and are negative.      Past Medical History:   Diagnosis Date   • Back pain    • Fracture of lumbar spine (CMS/HCC) 2002 and 2015    lower lumbar 2002; L1 2015   • Head injury    • Neck pain        No Known Allergies    Past Surgical History:   Procedure Laterality Date   • FACIAL FRACTURE SURGERY     • SPLENECTOMY         Family History   Problem Relation Age of Onset   • Fibromyalgia Mother    • Lung cancer Father        Social History     Socioeconomic History   • Marital status: Legally      Spouse name: Not on file   • Number of children: Not on file   • Years of education: Not on file   • Highest education level: Not on file   Tobacco Use   • Smoking status: Never Smoker   • Smokeless tobacco: Never Used   Substance and Sexual Activity   • Alcohol use: No   • Drug use: No   • Sexual activity: Yes     Partners: Female           Objective   Physical Exam   Constitutional: He is oriented to person, place, and time. He appears well-developed and well-nourished.   HENT:   Head: Normocephalic and atraumatic.   Eyes: EOM are normal. Pupils are equal, round, and reactive to light.   Neck: Normal range of motion. Neck supple. No JVD present. No tracheal deviation present.   Cardiovascular: Normal rate, regular rhythm and normal heart sounds.   Pulmonary/Chest: Effort normal and  breath sounds normal. No respiratory distress. He has no wheezes. He has no rales. He exhibits no tenderness.   Musculoskeletal: He exhibits no edema or deformity.   On the right inner thigh patient does have an open wound with a loop drain in place.  There is surrounding erythema and induration.  No fluctuance.  No purulent drainage or bleeding at this time   Neurological: He is alert and oriented to person, place, and time.   Skin: Skin is warm and dry. Capillary refill takes less than 2 seconds.   Psychiatric: He has a normal mood and affect. His behavior is normal.   Nursing note and vitals reviewed.      Procedures           ED Course      Recent charts reviewed  Drain removed  Patient has been instructed to restart the Bactrim   Discharge instructions provided            MDM  Patient reports that the swelling and erythema have been improving  His drain has been removed and he has been instructed to restart the Bactrim  Labs or imaging not clinically indicated        Final diagnoses:   Cellulitis of right thigh            Nabeel Huff, DO  11/24/18 7201

## 2018-11-24 NOTE — DISCHARGE INSTRUCTIONS
Please read and follow all directions.  Tylenol or ibuprofen for discomfort as needed.  Restart Bactrim.  Take all home medications as previously prescribed.  Please contact your primary care provider in 2-3 days to arrange for outpatient follow-up.  If you do not have one you may use the list below.  Return to the emergency department sooner if symptoms worsen or for any additional concerns.    Follow up with one of the [facility] physician groups below to setup primary care. If you have trouble following up, please call the [facility] Nurse Line at [phone]    (Dr. Vivek Stewart DO, Dr. Tonny Moreno DO,  Melissa Cuevas APRN, and Virginie Petersen APRJAYE)  [facility], Primary Care   [address] [place] [address], [address] [address] [phone]     (Dr. Khalida Dickerson MD, Ella Anaya APRJAYE, and Delmar Trujillo APRJAYE)  [facility], Primary Care   [address] [place] [address], [place] [address] [phone]    (Dr. Ronny Proctor MD and Dr. Ross Cadena MD)  [facility], Primary Care  [address], [address] [phone]    (Dr. Edgard Booker MD)  [facility], Primary Care  [address] [facility] [facility] [address], [place], [place], [address] [phone]

## 2018-12-01 ENCOUNTER — HOSPITAL ENCOUNTER (INPATIENT)
Facility: HOSPITAL | Age: 43
LOS: 6 days | Discharge: HOME | End: 2018-12-07
Attending: SPECIALIST | Admitting: SPECIALIST

## 2018-12-01 ENCOUNTER — ANESTHESIA EVENT (OUTPATIENT)
Dept: PERIOP | Facility: HOSPITAL | Age: 43
End: 2018-12-01

## 2018-12-01 ENCOUNTER — APPOINTMENT (OUTPATIENT)
Dept: GENERAL RADIOLOGY | Facility: HOSPITAL | Age: 43
End: 2018-12-01

## 2018-12-01 ENCOUNTER — ANESTHESIA (OUTPATIENT)
Dept: PERIOP | Facility: HOSPITAL | Age: 43
End: 2018-12-01

## 2018-12-01 DIAGNOSIS — T14.8XXA OPEN WOUND: ICD-10-CM

## 2018-12-01 DIAGNOSIS — L02.415 ABSCESS OF RIGHT THIGH: ICD-10-CM

## 2018-12-01 DIAGNOSIS — L02.91 ABSCESS: Primary | ICD-10-CM

## 2018-12-01 PROBLEM — L02.416: Status: ACTIVE | Noted: 2018-12-01

## 2018-12-01 LAB
ALBUMIN SERPL-MCNC: 4.1 G/DL (ref 3.5–5)
ALBUMIN/GLOB SERPL: 1.1 G/DL (ref 1.1–2.5)
ALP SERPL-CCNC: 79 U/L (ref 24–120)
ALT SERPL W P-5'-P-CCNC: 18 U/L (ref 0–54)
ANION GAP SERPL CALCULATED.3IONS-SCNC: 13 MMOL/L (ref 4–13)
AST SERPL-CCNC: 40 U/L (ref 7–45)
BASOPHILS # BLD AUTO: 0.15 10*3/MM3 (ref 0–0.2)
BASOPHILS NFR BLD AUTO: 0.9 % (ref 0–2)
BILIRUB SERPL-MCNC: 0.5 MG/DL (ref 0.1–1)
BUN BLD-MCNC: 9 MG/DL (ref 5–21)
BUN/CREAT SERPL: 7.9 (ref 7–25)
CALCIUM SPEC-SCNC: 9.9 MG/DL (ref 8.4–10.4)
CHLORIDE SERPL-SCNC: 102 MMOL/L (ref 98–110)
CO2 SERPL-SCNC: 25 MMOL/L (ref 24–31)
CREAT BLD-MCNC: 1.14 MG/DL (ref 0.5–1.4)
D-LACTATE SERPL-SCNC: 1.8 MMOL/L (ref 0.5–2)
DEPRECATED RDW RBC AUTO: 42.4 FL (ref 40–54)
EOSINOPHIL # BLD AUTO: 0.39 10*3/MM3 (ref 0–0.7)
EOSINOPHIL NFR BLD AUTO: 2.3 % (ref 0–4)
ERYTHROCYTE [DISTWIDTH] IN BLOOD BY AUTOMATED COUNT: 13.5 % (ref 12–15)
GFR SERPL CREATININE-BSD FRML MDRD: 70 ML/MIN/1.73
GLOBULIN UR ELPH-MCNC: 3.8 GM/DL
GLUCOSE BLD-MCNC: 102 MG/DL (ref 70–100)
HCT VFR BLD AUTO: 44.5 % (ref 40–52)
HGB BLD-MCNC: 15 G/DL (ref 14–18)
IMM GRANULOCYTES # BLD: 0.07 10*3/MM3 (ref 0–0.03)
IMM GRANULOCYTES NFR BLD: 0.4 % (ref 0–5)
LYMPHOCYTES # BLD AUTO: 3.61 10*3/MM3 (ref 0.72–4.86)
LYMPHOCYTES NFR BLD AUTO: 21.7 % (ref 15–45)
MCH RBC QN AUTO: 28.9 PG (ref 28–32)
MCHC RBC AUTO-ENTMCNC: 33.7 G/DL (ref 33–36)
MCV RBC AUTO: 85.7 FL (ref 82–95)
MONOCYTES # BLD AUTO: 1.76 10*3/MM3 (ref 0.19–1.3)
MONOCYTES NFR BLD AUTO: 10.6 % (ref 4–12)
NEUTROPHILS # BLD AUTO: 10.66 10*3/MM3 (ref 1.87–8.4)
NEUTROPHILS NFR BLD AUTO: 64.1 % (ref 39–78)
NRBC BLD MANUAL-RTO: 0 /100 WBC (ref 0–0)
PLATELET # BLD AUTO: 473 10*3/MM3 (ref 130–400)
PMV BLD AUTO: 10.5 FL (ref 6–12)
POTASSIUM BLD-SCNC: 4.2 MMOL/L (ref 3.5–5.3)
PROT SERPL-MCNC: 7.9 G/DL (ref 6.3–8.7)
RBC # BLD AUTO: 5.19 10*6/MM3 (ref 4.8–5.9)
SODIUM BLD-SCNC: 140 MMOL/L (ref 135–145)
WBC NRBC COR # BLD: 16.64 10*3/MM3 (ref 4.8–10.8)

## 2018-12-01 PROCEDURE — 25010000002 VANCOMYCIN 10 G RECONSTITUTED SOLUTION: Performed by: SPECIALIST

## 2018-12-01 PROCEDURE — 80053 COMPREHEN METABOLIC PANEL: CPT | Performed by: NURSE PRACTITIONER

## 2018-12-01 PROCEDURE — 94760 N-INVAS EAR/PLS OXIMETRY 1: CPT

## 2018-12-01 PROCEDURE — 83605 ASSAY OF LACTIC ACID: CPT | Performed by: NURSE PRACTITIONER

## 2018-12-01 PROCEDURE — 25010000002 KETOROLAC TROMETHAMINE PER 15 MG: Performed by: NURSE PRACTITIONER

## 2018-12-01 PROCEDURE — 25010000002 MORPHINE PER 10 MG: Performed by: SPECIALIST

## 2018-12-01 PROCEDURE — 25010000002 VANCOMYCIN 1 G RECONSTITUTED SOLUTION: Performed by: NURSE ANESTHETIST, CERTIFIED REGISTERED

## 2018-12-01 PROCEDURE — 25010000002 PROPOFOL 10 MG/ML EMULSION: Performed by: NURSE ANESTHETIST, CERTIFIED REGISTERED

## 2018-12-01 PROCEDURE — 87205 SMEAR GRAM STAIN: CPT | Performed by: SPECIALIST

## 2018-12-01 PROCEDURE — 87040 BLOOD CULTURE FOR BACTERIA: CPT | Performed by: NURSE PRACTITIONER

## 2018-12-01 PROCEDURE — 94799 UNLISTED PULMONARY SVC/PX: CPT

## 2018-12-01 PROCEDURE — 25010000002 MIDAZOLAM PER 1 MG: Performed by: NURSE ANESTHETIST, CERTIFIED REGISTERED

## 2018-12-01 PROCEDURE — 25010000002 FENTANYL CITRATE (PF) 250 MCG/5ML SOLUTION: Performed by: NURSE ANESTHETIST, CERTIFIED REGISTERED

## 2018-12-01 PROCEDURE — 25010000002 DEXAMETHASONE PER 1 MG: Performed by: NURSE ANESTHETIST, CERTIFIED REGISTERED

## 2018-12-01 PROCEDURE — 88304 TISSUE EXAM BY PATHOLOGIST: CPT | Performed by: SPECIALIST

## 2018-12-01 PROCEDURE — 85025 COMPLETE CBC W/AUTO DIFF WBC: CPT | Performed by: NURSE PRACTITIONER

## 2018-12-01 PROCEDURE — 87185 SC STD ENZYME DETCJ PER NZM: CPT | Performed by: SPECIALIST

## 2018-12-01 PROCEDURE — 0JBL0ZZ EXCISION OF RIGHT UPPER LEG SUBCUTANEOUS TISSUE AND FASCIA, OPEN APPROACH: ICD-10-PCS | Performed by: SPECIALIST

## 2018-12-01 PROCEDURE — 71045 X-RAY EXAM CHEST 1 VIEW: CPT

## 2018-12-01 PROCEDURE — 25010000002 ONDANSETRON PER 1 MG: Performed by: NURSE ANESTHETIST, CERTIFIED REGISTERED

## 2018-12-01 PROCEDURE — 87147 CULTURE TYPE IMMUNOLOGIC: CPT | Performed by: SPECIALIST

## 2018-12-01 PROCEDURE — 93010 ELECTROCARDIOGRAM REPORT: CPT | Performed by: INTERNAL MEDICINE

## 2018-12-01 PROCEDURE — 25010000002 SUCCINYLCHOLINE PER 20 MG: Performed by: NURSE ANESTHETIST, CERTIFIED REGISTERED

## 2018-12-01 PROCEDURE — 93005 ELECTROCARDIOGRAM TRACING: CPT | Performed by: SPECIALIST

## 2018-12-01 PROCEDURE — 99284 EMERGENCY DEPT VISIT MOD MDM: CPT

## 2018-12-01 PROCEDURE — 87186 SC STD MICRODIL/AGAR DIL: CPT | Performed by: SPECIALIST

## 2018-12-01 PROCEDURE — 87070 CULTURE OTHR SPECIMN AEROBIC: CPT | Performed by: SPECIALIST

## 2018-12-01 RX ORDER — SUCRALFATE ORAL 1 G/10ML
1 SUSPENSION ORAL EVERY 6 HOURS SCHEDULED
Status: DISCONTINUED | OUTPATIENT
Start: 2018-12-01 | End: 2018-12-02 | Stop reason: SDUPTHER

## 2018-12-01 RX ORDER — OXYCODONE AND ACETAMINOPHEN 10; 325 MG/1; MG/1
1 TABLET ORAL ONCE AS NEEDED
Status: DISCONTINUED | OUTPATIENT
Start: 2018-12-01 | End: 2018-12-01 | Stop reason: HOSPADM

## 2018-12-01 RX ORDER — KETOROLAC TROMETHAMINE 30 MG/ML
30 INJECTION, SOLUTION INTRAMUSCULAR; INTRAVENOUS ONCE
Status: COMPLETED | OUTPATIENT
Start: 2018-12-01 | End: 2018-12-01

## 2018-12-01 RX ORDER — ONDANSETRON 8 MG/1
8 TABLET, ORALLY DISINTEGRATING ORAL EVERY 6 HOURS PRN
Status: DISCONTINUED | OUTPATIENT
Start: 2018-12-01 | End: 2018-12-07 | Stop reason: HOSPADM

## 2018-12-01 RX ORDER — MIDAZOLAM HYDROCHLORIDE 1 MG/ML
INJECTION INTRAMUSCULAR; INTRAVENOUS AS NEEDED
Status: DISCONTINUED | OUTPATIENT
Start: 2018-12-01 | End: 2018-12-01 | Stop reason: SURG

## 2018-12-01 RX ORDER — NALOXONE HCL 0.4 MG/ML
0.4 VIAL (ML) INJECTION AS NEEDED
Status: DISCONTINUED | OUTPATIENT
Start: 2018-12-01 | End: 2018-12-01 | Stop reason: HOSPADM

## 2018-12-01 RX ORDER — AMITRIPTYLINE HYDROCHLORIDE 25 MG/1
50 TABLET, FILM COATED ORAL NIGHTLY PRN
Status: DISCONTINUED | OUTPATIENT
Start: 2018-12-01 | End: 2018-12-07 | Stop reason: HOSPADM

## 2018-12-01 RX ORDER — METOCLOPRAMIDE HYDROCHLORIDE 5 MG/ML
5 INJECTION INTRAMUSCULAR; INTRAVENOUS
Status: DISCONTINUED | OUTPATIENT
Start: 2018-12-01 | End: 2018-12-01 | Stop reason: HOSPADM

## 2018-12-01 RX ORDER — SIMETHICONE 80 MG
80 TABLET,CHEWABLE ORAL 4 TIMES DAILY PRN
Status: DISCONTINUED | OUTPATIENT
Start: 2018-12-01 | End: 2018-12-02 | Stop reason: SDUPTHER

## 2018-12-01 RX ORDER — SODIUM CHLORIDE, SODIUM LACTATE, POTASSIUM CHLORIDE, CALCIUM CHLORIDE 600; 310; 30; 20 MG/100ML; MG/100ML; MG/100ML; MG/100ML
100 INJECTION, SOLUTION INTRAVENOUS CONTINUOUS
Status: DISCONTINUED | OUTPATIENT
Start: 2018-12-01 | End: 2018-12-02 | Stop reason: HOSPADM

## 2018-12-01 RX ORDER — AMITRIPTYLINE HYDROCHLORIDE 50 MG/1
50 TABLET, FILM COATED ORAL NIGHTLY PRN
COMMUNITY
End: 2019-09-06

## 2018-12-01 RX ORDER — ONDANSETRON 4 MG/1
8 TABLET, ORALLY DISINTEGRATING ORAL EVERY 6 HOURS PRN
Status: DISCONTINUED | OUTPATIENT
Start: 2018-12-01 | End: 2018-12-02 | Stop reason: SDUPTHER

## 2018-12-01 RX ORDER — VANCOMYCIN HYDROCHLORIDE 1 G/20ML
INJECTION, POWDER, LYOPHILIZED, FOR SOLUTION INTRAVENOUS AS NEEDED
Status: DISCONTINUED | OUTPATIENT
Start: 2018-12-01 | End: 2018-12-01 | Stop reason: SURG

## 2018-12-01 RX ORDER — CLINDAMYCIN PHOSPHATE 900 MG/50ML
900 INJECTION INTRAVENOUS ONCE
Status: COMPLETED | OUTPATIENT
Start: 2018-12-01 | End: 2018-12-01

## 2018-12-01 RX ORDER — ONDANSETRON 2 MG/ML
INJECTION INTRAMUSCULAR; INTRAVENOUS AS NEEDED
Status: DISCONTINUED | OUTPATIENT
Start: 2018-12-01 | End: 2018-12-01 | Stop reason: SURG

## 2018-12-01 RX ORDER — MIDAZOLAM HYDROCHLORIDE 1 MG/ML
2 INJECTION INTRAMUSCULAR; INTRAVENOUS
Status: DISCONTINUED | OUTPATIENT
Start: 2018-12-01 | End: 2018-12-01 | Stop reason: HOSPADM

## 2018-12-01 RX ORDER — DEXAMETHASONE SODIUM PHOSPHATE 4 MG/ML
INJECTION, SOLUTION INTRA-ARTICULAR; INTRALESIONAL; INTRAMUSCULAR; INTRAVENOUS; SOFT TISSUE AS NEEDED
Status: DISCONTINUED | OUTPATIENT
Start: 2018-12-01 | End: 2018-12-01 | Stop reason: SURG

## 2018-12-01 RX ORDER — PROPOFOL 10 MG/ML
VIAL (ML) INTRAVENOUS AS NEEDED
Status: DISCONTINUED | OUTPATIENT
Start: 2018-12-01 | End: 2018-12-01 | Stop reason: SURG

## 2018-12-01 RX ORDER — IBUPROFEN 600 MG/1
600 TABLET ORAL ONCE AS NEEDED
Status: DISCONTINUED | OUTPATIENT
Start: 2018-12-01 | End: 2018-12-01 | Stop reason: HOSPADM

## 2018-12-01 RX ORDER — SODIUM CHLORIDE 0.9 % (FLUSH) 0.9 %
1-10 SYRINGE (ML) INJECTION AS NEEDED
Status: DISCONTINUED | OUTPATIENT
Start: 2018-12-01 | End: 2018-12-02 | Stop reason: SDUPTHER

## 2018-12-01 RX ORDER — ROCURONIUM BROMIDE 10 MG/ML
INJECTION, SOLUTION INTRAVENOUS AS NEEDED
Status: DISCONTINUED | OUTPATIENT
Start: 2018-12-01 | End: 2018-12-01 | Stop reason: SURG

## 2018-12-01 RX ORDER — SODIUM CHLORIDE, SODIUM LACTATE, POTASSIUM CHLORIDE, CALCIUM CHLORIDE 600; 310; 30; 20 MG/100ML; MG/100ML; MG/100ML; MG/100ML
100 INJECTION, SOLUTION INTRAVENOUS CONTINUOUS
Status: DISCONTINUED | OUTPATIENT
Start: 2018-12-01 | End: 2018-12-02 | Stop reason: SDUPTHER

## 2018-12-01 RX ORDER — SUCCINYLCHOLINE CHLORIDE 20 MG/ML
INJECTION INTRAMUSCULAR; INTRAVENOUS AS NEEDED
Status: DISCONTINUED | OUTPATIENT
Start: 2018-12-01 | End: 2018-12-01 | Stop reason: SURG

## 2018-12-01 RX ORDER — ONDANSETRON 2 MG/ML
4 INJECTION INTRAMUSCULAR; INTRAVENOUS ONCE AS NEEDED
Status: DISCONTINUED | OUTPATIENT
Start: 2018-12-01 | End: 2018-12-01 | Stop reason: HOSPADM

## 2018-12-01 RX ORDER — FENTANYL CITRATE 50 UG/ML
INJECTION, SOLUTION INTRAMUSCULAR; INTRAVENOUS AS NEEDED
Status: DISCONTINUED | OUTPATIENT
Start: 2018-12-01 | End: 2018-12-01 | Stop reason: SURG

## 2018-12-01 RX ORDER — MEPERIDINE HYDROCHLORIDE 25 MG/ML
12.5 INJECTION INTRAMUSCULAR; INTRAVENOUS; SUBCUTANEOUS
Status: DISCONTINUED | OUTPATIENT
Start: 2018-12-01 | End: 2018-12-01 | Stop reason: HOSPADM

## 2018-12-01 RX ORDER — SODIUM CHLORIDE 0.9 % (FLUSH) 0.9 %
3 SYRINGE (ML) INJECTION EVERY 12 HOURS SCHEDULED
Status: DISCONTINUED | OUTPATIENT
Start: 2018-12-01 | End: 2018-12-03

## 2018-12-01 RX ORDER — FAMOTIDINE 20 MG/1
20 TABLET, FILM COATED ORAL 2 TIMES DAILY
Status: DISCONTINUED | OUTPATIENT
Start: 2018-12-01 | End: 2018-12-02 | Stop reason: SDUPTHER

## 2018-12-01 RX ORDER — DEXTROSE AND SODIUM CHLORIDE 5; .45 G/100ML; G/100ML
125 INJECTION, SOLUTION INTRAVENOUS CONTINUOUS
Status: DISCONTINUED | OUTPATIENT
Start: 2018-12-01 | End: 2018-12-07 | Stop reason: HOSPADM

## 2018-12-01 RX ORDER — ONDANSETRON HCL IN 0.9 % NACL 8 MG/50 ML
8 INTRAVENOUS SOLUTION, PIGGYBACK (ML) INTRAVENOUS EVERY 6 HOURS PRN
Status: DISCONTINUED | OUTPATIENT
Start: 2018-12-01 | End: 2018-12-02 | Stop reason: SDUPTHER

## 2018-12-01 RX ORDER — MAGNESIUM HYDROXIDE 1200 MG/15ML
LIQUID ORAL AS NEEDED
Status: DISCONTINUED | OUTPATIENT
Start: 2018-12-01 | End: 2018-12-01 | Stop reason: HOSPADM

## 2018-12-01 RX ORDER — SODIUM CHLORIDE 0.9 % (FLUSH) 0.9 %
3 SYRINGE (ML) INJECTION EVERY 12 HOURS SCHEDULED
Status: DISCONTINUED | OUTPATIENT
Start: 2018-12-01 | End: 2018-12-02 | Stop reason: SDUPTHER

## 2018-12-01 RX ORDER — FENTANYL CITRATE 50 UG/ML
25 INJECTION, SOLUTION INTRAMUSCULAR; INTRAVENOUS AS NEEDED
Status: DISCONTINUED | OUTPATIENT
Start: 2018-12-01 | End: 2018-12-01 | Stop reason: HOSPADM

## 2018-12-01 RX ORDER — MIDAZOLAM HYDROCHLORIDE 1 MG/ML
1 INJECTION INTRAMUSCULAR; INTRAVENOUS
Status: DISCONTINUED | OUTPATIENT
Start: 2018-12-01 | End: 2018-12-01 | Stop reason: HOSPADM

## 2018-12-01 RX ORDER — SODIUM CHLORIDE 0.9 % (FLUSH) 0.9 %
1-10 SYRINGE (ML) INJECTION AS NEEDED
Status: DISCONTINUED | OUTPATIENT
Start: 2018-12-01 | End: 2018-12-07 | Stop reason: HOSPADM

## 2018-12-01 RX ORDER — IPRATROPIUM BROMIDE AND ALBUTEROL SULFATE 2.5; .5 MG/3ML; MG/3ML
3 SOLUTION RESPIRATORY (INHALATION) ONCE AS NEEDED
Status: DISCONTINUED | OUTPATIENT
Start: 2018-12-01 | End: 2018-12-01 | Stop reason: HOSPADM

## 2018-12-01 RX ORDER — LIDOCAINE HYDROCHLORIDE 20 MG/ML
INJECTION, SOLUTION INFILTRATION; PERINEURAL AS NEEDED
Status: DISCONTINUED | OUTPATIENT
Start: 2018-12-01 | End: 2018-12-01 | Stop reason: SURG

## 2018-12-01 RX ORDER — SULFAMETHOXAZOLE AND TRIMETHOPRIM 800; 160 MG/1; MG/1
1 TABLET ORAL 2 TIMES DAILY
COMMUNITY
End: 2018-12-07 | Stop reason: HOSPADM

## 2018-12-01 RX ORDER — OXYCODONE AND ACETAMINOPHEN 7.5; 325 MG/1; MG/1
2 TABLET ORAL ONCE AS NEEDED
Status: COMPLETED | OUTPATIENT
Start: 2018-12-01 | End: 2018-12-01

## 2018-12-01 RX ORDER — LABETALOL HYDROCHLORIDE 5 MG/ML
5 INJECTION, SOLUTION INTRAVENOUS
Status: DISCONTINUED | OUTPATIENT
Start: 2018-12-01 | End: 2018-12-01 | Stop reason: HOSPADM

## 2018-12-01 RX ADMIN — SODIUM CHLORIDE, POTASSIUM CHLORIDE, SODIUM LACTATE AND CALCIUM CHLORIDE 100 ML/HR: 600; 310; 30; 20 INJECTION, SOLUTION INTRAVENOUS at 11:16

## 2018-12-01 RX ADMIN — DEXAMETHASONE SODIUM PHOSPHATE 4 MG: 4 INJECTION, SOLUTION INTRAMUSCULAR; INTRAVENOUS at 12:10

## 2018-12-01 RX ADMIN — ROCURONIUM BROMIDE 10 MG: 10 INJECTION INTRAVENOUS at 11:58

## 2018-12-01 RX ADMIN — ONDANSETRON HYDROCHLORIDE 4 MG: 2 SOLUTION INTRAMUSCULAR; INTRAVENOUS at 12:10

## 2018-12-01 RX ADMIN — SODIUM CHLORIDE, PRESERVATIVE FREE 3 ML: 5 INJECTION INTRAVENOUS at 15:22

## 2018-12-01 RX ADMIN — OXYCODONE HYDROCHLORIDE AND ACETAMINOPHEN 2 TABLET: 7.5; 325 TABLET ORAL at 13:10

## 2018-12-01 RX ADMIN — VANCOMYCIN HYDROCHLORIDE 1500 MG: 10 INJECTION, POWDER, LYOPHILIZED, FOR SOLUTION INTRAVENOUS at 15:19

## 2018-12-01 RX ADMIN — CLINDAMYCIN IN 5 PERCENT DEXTROSE 900 MG: 18 INJECTION, SOLUTION INTRAVENOUS at 08:41

## 2018-12-01 RX ADMIN — SUCCINYLCHOLINE CHLORIDE 140 MG: 20 INJECTION, SOLUTION INTRAMUSCULAR; INTRAVENOUS at 11:59

## 2018-12-01 RX ADMIN — LIDOCAINE HYDROCHLORIDE 80 MG: 20 INJECTION, SOLUTION INFILTRATION; PERINEURAL at 11:58

## 2018-12-01 RX ADMIN — VANCOMYCIN HYDROCHLORIDE 1 G: 1 INJECTION, POWDER, LYOPHILIZED, FOR SOLUTION INTRAVENOUS at 12:02

## 2018-12-01 RX ADMIN — MORPHINE SULFATE 4 MG: 4 INJECTION INTRAVENOUS at 17:27

## 2018-12-01 RX ADMIN — FENTANYL CITRATE 100 MCG: 50 INJECTION INTRAMUSCULAR; INTRAVENOUS at 12:15

## 2018-12-01 RX ADMIN — SUCRALFATE 1 G: 1 SUSPENSION ORAL at 17:27

## 2018-12-01 RX ADMIN — DEXTROSE AND SODIUM CHLORIDE 125 ML/HR: 5; 450 INJECTION, SOLUTION INTRAVENOUS at 14:25

## 2018-12-01 RX ADMIN — KETOROLAC TROMETHAMINE 30 MG: 30 INJECTION, SOLUTION INTRAMUSCULAR at 08:42

## 2018-12-01 RX ADMIN — FAMOTIDINE 20 MG: 20 TABLET, FILM COATED ORAL at 21:32

## 2018-12-01 RX ADMIN — FAMOTIDINE 20 MG: 20 TABLET, FILM COATED ORAL at 15:19

## 2018-12-01 RX ADMIN — SODIUM CHLORIDE, PRESERVATIVE FREE 3 ML: 5 INJECTION INTRAVENOUS at 21:33

## 2018-12-01 RX ADMIN — PROPOFOL 180 MG: 10 INJECTION, EMULSION INTRAVENOUS at 11:58

## 2018-12-01 RX ADMIN — FENTANYL CITRATE 150 MCG: 50 INJECTION INTRAMUSCULAR; INTRAVENOUS at 11:58

## 2018-12-01 RX ADMIN — SODIUM CHLORIDE, PRESERVATIVE FREE 3 ML: 5 INJECTION INTRAVENOUS at 21:32

## 2018-12-01 RX ADMIN — MIDAZOLAM HYDROCHLORIDE 3 MG: 1 INJECTION, SOLUTION INTRAMUSCULAR; INTRAVENOUS at 11:54

## 2018-12-01 NOTE — PROGRESS NOTES
Continued Stay Note  [facility]     Patient Name: Saulo Shepard  MRN: [id number]  Today's Date: 12/1/2018    Admit Date: 12/1/2018    Discharge Plan     Row Name 12/01/18 1325       Plan    Plan  Pt in surgery.  SW will attempt to screen later.  JESSE Reyes.    Patient/Family in Agreement with Plan  unable to assess        Discharge Codes    No documentation.             JESSE Mccoy
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
## 2018-12-01 NOTE — PROGRESS NOTES
Discharge Planning Assessment  [facility]     Patient Name: Saulo Shepard  MRN: [id number]  Today's Date: 12/1/2018    Admit Date: 12/1/2018    Discharge Needs Assessment     Row Name 12/01/18 1720       Living Environment    Lives With  alone    Current Living Arrangements  home/apartment/condo    Primary Care Provided by  self    Provides Primary Care For  no one    Able to Return to Prior Arrangements  yes       Resource/Environmental Concerns    Resource/Environmental Concerns  financial    Financial Concerns  insurance, none;medicine, unable to afford       Transition Planning    Patient/Family Anticipates Transition to  home    Patient/Family Anticipated Services at Transition  none    Transportation Anticipated  family or friend will provide       Discharge Needs Assessment    Readmission Within the Last 30 Days  no previous admission in last 30 days    Concerns to be Addressed  no discharge needs identified    Equipment Currently Used at Home  none    Anticipated Changes Related to Illness  none    Equipment Needed After Discharge  -- Wound Vac    Discharge Coordination/Progress  Pt has no PCP nor health insurance.  SW has left a message for [employer] to screen pt to see if he qualifies for Medicaid.  Pt will need help afford medications.  Pt needs a wound vac.  SW will work on wound vac application (hopefully they will give as christiana case).        Discharge Plan    No documentation.       Destination      No service coordination in this encounter.      Durable Medical Equipment      No service coordination in this encounter.      Dialysis/Infusion      No service coordination in this encounter.      Home Medical Care      No service coordination in this encounter.      Community Resources      No service coordination in this encounter.          Demographic Summary    No documentation.       Functional Status    No documentation.       Psychosocial    No documentation.       Abuse/Neglect    No  documentation.       Legal    No documentation.       Substance Abuse    No documentation.       Patient Forms    No documentation.           MILA MccoyW
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
## 2018-12-01 NOTE — PLAN OF CARE
Problem: Patient Care Overview  Goal: Plan of Care Review  Outcome: Ongoing (interventions implemented as appropriate)   12/01/18 1649   Coping/Psychosocial   Plan of Care Reviewed With patient   Plan of Care Review   Progress improving   OTHER   Outcome Summary Pt admitted to  from OR following a RUE I&D with wound vac placement. IVF, abx. regular diet. Voiding. VSS. Will continue to monitor.       Problem: Surgery Nonspecified (Adult)  Goal: Signs and Symptoms of Listed Potential Problems Will be Absent, Minimized or Managed (Surgery Nonspecified)  Outcome: Ongoing (interventions implemented as appropriate)   12/01/18 1649   Goal/Outcome Evaluation   Problems Assessed (Surgery) all   Problems Present (Surgery) none       Problem: Wound (Includes Pressure Injury) (Adult)  Goal: Signs and Symptoms of Listed Potential Problems Will be Absent, Minimized or Managed (Wound)  Outcome: Ongoing (interventions implemented as appropriate)   12/01/18 1649   Goal/Outcome Evaluation   Problems Assessed (Wound) all   Problems Present (Wound) none

## 2018-12-01 NOTE — ANESTHESIA PROCEDURE NOTES
ANESTHESIA INTUBATION  Urgency: elective    Airway not difficult    General Information and Staff    Patient location during procedure: OR  CRNA: Елена Saavedra CRNA    Indications and Patient Condition  Indications for airway management: airway protection    Preoxygenated: yes  Mask difficulty assessment: 0 - not attempted    Final Airway Details  Final airway type: endotracheal airway      Successful airway: ETT  Cuffed: yes   Successful intubation technique: direct laryngoscopy and RSI  Facilitating devices/methods: intubating stylet and cricoid pressure  Endotracheal tube insertion site: oral  Blade: Altamirano  Blade size: 4  ETT size (mm): 7.5  Cormack-Lehane Classification: grade I - full view of glottis  Placement verified by: chest auscultation and capnometry   Cuff volume (mL): 6  Measured from: lips  ETT to lips (cm): 23  Number of attempts at approach: 1    Additional Comments  Atraumatic. Dentition unchanged.

## 2018-12-01 NOTE — ED PROVIDER NOTES
Subjective   Patient is a 44 yo male presents to the ER with worsening sxs of cellulitis secondary to probable insect bite. He was initially evaluated in this ER on 11/18/18 and prescribed bactrim for possible spider bite. At that time he noted a localized insect bite to the inner aspect of the right thigh. He returned to the ER on 11/121/18 with worsening redness/cellulitis. The area was I&D at that time with drain placed. He noted improvement of the cellulitis and returned on 11/21/18 for drain removal. He was instructed to complete the bactrim initially prescribed. He returns today with worsening induration of the area and cellulitis. He denies any recorded fevers.         Insect Bite   Contact animal:  Insect  Time since incident:  2 weeks  Pain details:     Severity:  Moderate  Provoked: unprovoked    Worsened by:  Nothing  Ineffective treatments:  Prescription drugs  Associated symptoms: rash    Associated symptoms: no fever        Review of Systems   Constitutional: Negative for fever.   HENT: Negative for congestion.    Respiratory: Negative for cough and shortness of breath.    Gastrointestinal: Negative for abdominal pain, diarrhea and vomiting.   Musculoskeletal: Negative for back pain, gait problem, joint swelling, myalgias, neck pain and neck stiffness.   Skin: Positive for rash.        Positive for cellulitis to the right leg       Past Medical History:   Diagnosis Date   • Back pain    • Fracture of lumbar spine (CMS/HCC) 2002 and 2015    lower lumbar 2002; L1 2015   • Head injury    • Neck pain        No Known Allergies    Past Surgical History:   Procedure Laterality Date   • FACIAL FRACTURE SURGERY     • SPLENECTOMY         Family History   Problem Relation Age of Onset   • Fibromyalgia Mother    • Lung cancer Father        Social History     Socioeconomic History   • Marital status: Legally      Spouse name: Not on file   • Number of children: Not on file   • Years of education: Not on  file   • Highest education level: Not on file   Tobacco Use   • Smoking status: Never Smoker   • Smokeless tobacco: Never Used   Substance and Sexual Activity   • Alcohol use: No   • Drug use: No   • Sexual activity: Yes     Partners: Female           Objective   Physical Exam   Constitutional: He is oriented to person, place, and time. He appears well-developed and well-nourished. No distress.   HENT:   Head: Normocephalic and atraumatic.   Right Ear: External ear normal.   Left Ear: External ear normal.   Nose: Nose normal.   Eyes: Conjunctivae are normal. Pupils are equal, round, and reactive to light. No scleral icterus.   Neck: Normal range of motion. Neck supple. No JVD present. No thyromegaly present.   Cardiovascular: Normal rate, regular rhythm, normal heart sounds and intact distal pulses.   No murmur heard.  Pulmonary/Chest: Effort normal and breath sounds normal. No respiratory distress. He has no wheezes. He has no rales. He exhibits no tenderness.   Abdominal: Soft. Bowel sounds are normal. He exhibits no distension and no mass. There is no tenderness. There is no rebound and no guarding.   Musculoskeletal: Normal range of motion. He exhibits no edema.   Lymphadenopathy:     He has no cervical adenopathy.   Neurological: He is alert and oriented to person, place, and time. He has normal reflexes. No cranial nerve deficit. Coordination normal.   Skin: Skin is warm and dry. Capillary refill takes less than 2 seconds. No rash noted. He is not diaphoretic. No erythema. No pallor.   Erythema/cellulitis noted to the inner aspect of the right thigh measuring approx 15 cm in diameter with indurated area measuring approx 6-8 cm; darkened scab noted to the center without drainage or fluctuance noted; very tender to touch   Psychiatric: He has a normal mood and affect. His behavior is normal. Judgment and thought content normal.   Nursing note and vitals reviewed.      Procedures           ED Course  ED Course as  of Dec 01 1047   Sat Dec 01, 2018   0953 Dr. Dallas at bedside. Plan is to take patient to OR this afternoon. See his note for details.  [TW]      ED Course User Index  [TW] Rica Crump, APRN                  MDM  Number of Diagnoses or Management Options  Abscess: new and requires workup     Amount and/or Complexity of Data Reviewed  Clinical lab tests: ordered and reviewed  Discussion of test results with the performing providers: yes  Discuss the patient with other providers: yes    Risk of Complications, Morbidity, and/or Mortality  Presenting problems: moderate  Diagnostic procedures: moderate  Management options: moderate    Patient Progress  Patient progress: improved        Final diagnoses:   Abscess            Rica Crump, APRN  12/01/18 1047

## 2018-12-01 NOTE — PROGRESS NOTES
"Pharmacy Dosing Service  Pharmacokinetics  Vancomycin Initial Evaluation    Assessment/Action/Plan:  Initiated Vancomycin 1500 mg IVPB every 12 hours. Vancomycin levels not ordered at this time. Pharmacy will monitor renal function and adjust dose accordingly.     Subjective:  Saulo Shepard is a 43 y.o. male initiated on Vancomycin 1500 mg IV every 12 hours by prescriber for the treatment of SSTI .    Objective:  Ht: 185.4 cm (73\"); Wt: 93.9 kg (207 lb)  Estimated Creatinine Clearance: 112.1 mL/min (by C-G formula based on SCr of 1.14 mg/dL).   Lab Results   Component Value Date    CREATININE 1.14 12/01/2018    CREATININE 1.13 07/26/2016    CREATININE 1.05 07/28/2014    CREATININE 1.12 07/05/2014      Lab Results   Component Value Date    WBC 16.64 (H) 12/01/2018    WBC 10.76 07/26/2016    WBC 16.66 (H) 07/28/2014      Baseline culture results:  Microbiology Results (last 10 days)       ** No results found for the last 240 hours. **            Juhi Mariano, PharmD  12/01/18 2:30 PM    "

## 2018-12-01 NOTE — OP NOTE
INCISION AND DRAINAGE LOWER EXTREMITY  Procedure Note    Saulo Shepard  12/1/2018    Pre-op Diagnosis:   ABSCESS RT THIGH    Post-op Diagnosis:     Postop infected spider bite right upper thigh    Procedure/CPT® Codes:      Procedure(s):  INCISION AND DRAINAGE LOWER EXTREMITY and placement of a wound vacuum    Surgeon(s):  René Dallas MD    Anesthesia: General    Staff:   Circulator: Noni Jin RN  Scrub Person: Diane Gardner  Assistant: Kilo Sigala    Estimated Blood Loss: core  removed from the right upper thigh and sent for culture    Specimens:                Core of tissue from the right upper thigh excised      Drains:  Wound VAC over the cavity the cavity is 2-1/2 cm long one and a half centimeters wide, 1 cm deep.    Indications:Saulo Shepard  is a 43 y.o. male presents with a history of having a spider bite on November 18 he presented to the emergency room he was given antibiotics and returned 3 days later with an abscess that was drained.  The drain was removed 2 days later and since this time he has had continued and progressive erythema of the area and a firm hard central induration.  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  He is aware the procedure the risk and benefits and gives his informed consent for surgery.           Findings: Full-thickness excision of this spider bite and core of necrotic material from the right upper leg, full excision completed, the cavity was 2-1/2 cm long one and a half centimeters wide and 1 cm deep.  It was excised and a wound vacuum was applied.      Complications: There were no complications    Procedure: Patient was placed in lithotomy position and the right upper medial thigh was scrubbed prepped and draped with alcohol and Betadine.  Once this been  accomplished a full thickness excision around the core of necrotic tissue was completed excising the area down to the deep subcutaneous tissue.  There is no undrained pus remaining the wound was irrigated and subsequently a wound vacuum was applied.  The cavity is 1 cm deep, one and a half centimeters wide and 2-1/2 cm long.  The wound vacuum was placed without difficulty blood loss was less than 25 mL.    René Dallas MD     Date: 12/1/2018  Time: 12:43 PM    EMR Dragon/Transcription disclaimer: Much of this encounter note is an electronic transcription/translation of spoken language to printed text. The electronic translation of spoken language may permit erroneous, or at times, nonsensical words or phrases to be inadvertently transcribed; although I have reviewed the note for such errors, some may still exist.

## 2018-12-02 LAB
ALBUMIN SERPL-MCNC: 3.5 G/DL (ref 3.5–5)
ALBUMIN/GLOB SERPL: 1.1 G/DL (ref 1.1–2.5)
ALP SERPL-CCNC: 86 U/L (ref 24–120)
ALT SERPL W P-5'-P-CCNC: 24 U/L (ref 0–54)
ANION GAP SERPL CALCULATED.3IONS-SCNC: 11 MMOL/L (ref 4–13)
AST SERPL-CCNC: 16 U/L (ref 7–45)
BILIRUB SERPL-MCNC: 0.4 MG/DL (ref 0.1–1)
BUN BLD-MCNC: 12 MG/DL (ref 5–21)
BUN/CREAT SERPL: 12 (ref 7–25)
CALCIUM SPEC-SCNC: 10 MG/DL (ref 8.4–10.4)
CHLORIDE SERPL-SCNC: 102 MMOL/L (ref 98–110)
CO2 SERPL-SCNC: 25 MMOL/L (ref 24–31)
CREAT BLD-MCNC: 1 MG/DL (ref 0.5–1.4)
GFR SERPL CREATININE-BSD FRML MDRD: 82 ML/MIN/1.73
GLOBULIN UR ELPH-MCNC: 3.2 GM/DL
GLUCOSE BLD-MCNC: 116 MG/DL (ref 70–100)
POTASSIUM BLD-SCNC: 4.6 MMOL/L (ref 3.5–5.3)
PROT SERPL-MCNC: 6.7 G/DL (ref 6.3–8.7)
SODIUM BLD-SCNC: 138 MMOL/L (ref 135–145)

## 2018-12-02 PROCEDURE — 25010000002 VANCOMYCIN 10 G RECONSTITUTED SOLUTION: Performed by: SPECIALIST

## 2018-12-02 PROCEDURE — 80053 COMPREHEN METABOLIC PANEL: CPT | Performed by: SPECIALIST

## 2018-12-02 PROCEDURE — 25010000002 MORPHINE PER 10 MG: Performed by: SPECIALIST

## 2018-12-02 PROCEDURE — 94760 N-INVAS EAR/PLS OXIMETRY 1: CPT

## 2018-12-02 PROCEDURE — 25010000002 ENOXAPARIN PER 10 MG: Performed by: SPECIALIST

## 2018-12-02 PROCEDURE — 94799 UNLISTED PULMONARY SVC/PX: CPT

## 2018-12-02 RX ORDER — ONDANSETRON HCL IN 0.9 % NACL 8 MG/50 ML
8 INTRAVENOUS SOLUTION, PIGGYBACK (ML) INTRAVENOUS EVERY 6 HOURS PRN
Status: DISCONTINUED | OUTPATIENT
Start: 2018-12-02 | End: 2018-12-07 | Stop reason: HOSPADM

## 2018-12-02 RX ORDER — HYDROCODONE BITARTRATE AND ACETAMINOPHEN 7.5; 325 MG/1; MG/1
1 TABLET ORAL EVERY 4 HOURS PRN
Status: DISCONTINUED | OUTPATIENT
Start: 2018-12-02 | End: 2018-12-02 | Stop reason: SDUPTHER

## 2018-12-02 RX ORDER — LORAZEPAM 1 MG/1
1 TABLET ORAL EVERY 6 HOURS PRN
Status: DISCONTINUED | OUTPATIENT
Start: 2018-12-02 | End: 2018-12-07 | Stop reason: HOSPADM

## 2018-12-02 RX ORDER — ONDANSETRON 8 MG/1
8 TABLET, ORALLY DISINTEGRATING ORAL EVERY 6 HOURS PRN
Status: DISCONTINUED | OUTPATIENT
Start: 2018-12-02 | End: 2018-12-02 | Stop reason: SDUPTHER

## 2018-12-02 RX ORDER — SIMETHICONE 80 MG
80 TABLET,CHEWABLE ORAL 4 TIMES DAILY PRN
Status: DISCONTINUED | OUTPATIENT
Start: 2018-12-02 | End: 2018-12-07 | Stop reason: HOSPADM

## 2018-12-02 RX ORDER — OXYCODONE HYDROCHLORIDE AND ACETAMINOPHEN 5; 325 MG/1; MG/1
1 TABLET ORAL EVERY 4 HOURS PRN
Status: DISCONTINUED | OUTPATIENT
Start: 2018-12-02 | End: 2018-12-02 | Stop reason: SDUPTHER

## 2018-12-02 RX ORDER — OXYCODONE HYDROCHLORIDE AND ACETAMINOPHEN 5; 325 MG/1; MG/1
1 TABLET ORAL EVERY 4 HOURS PRN
Status: DISCONTINUED | OUTPATIENT
Start: 2018-12-02 | End: 2018-12-07 | Stop reason: HOSPADM

## 2018-12-02 RX ORDER — FAMOTIDINE 20 MG/1
20 TABLET, FILM COATED ORAL 2 TIMES DAILY
Status: DISCONTINUED | OUTPATIENT
Start: 2018-12-02 | End: 2018-12-07 | Stop reason: HOSPADM

## 2018-12-02 RX ORDER — SUCRALFATE ORAL 1 G/10ML
1 SUSPENSION ORAL EVERY 6 HOURS SCHEDULED
Status: DISCONTINUED | OUTPATIENT
Start: 2018-12-02 | End: 2018-12-07 | Stop reason: HOSPADM

## 2018-12-02 RX ADMIN — FAMOTIDINE 20 MG: 20 TABLET, FILM COATED ORAL at 20:32

## 2018-12-02 RX ADMIN — SODIUM CHLORIDE, PRESERVATIVE FREE 3 ML: 5 INJECTION INTRAVENOUS at 20:32

## 2018-12-02 RX ADMIN — OXYCODONE HYDROCHLORIDE AND ACETAMINOPHEN 1 TABLET: 5; 325 TABLET ORAL at 13:17

## 2018-12-02 RX ADMIN — DEXTROSE AND SODIUM CHLORIDE 125 ML/HR: 5; 450 INJECTION, SOLUTION INTRAVENOUS at 15:06

## 2018-12-02 RX ADMIN — VANCOMYCIN HYDROCHLORIDE 1500 MG: 10 INJECTION, POWDER, LYOPHILIZED, FOR SOLUTION INTRAVENOUS at 04:52

## 2018-12-02 RX ADMIN — OXYCODONE HYDROCHLORIDE AND ACETAMINOPHEN 1 TABLET: 5; 325 TABLET ORAL at 18:42

## 2018-12-02 RX ADMIN — MORPHINE SULFATE 4 MG: 4 INJECTION INTRAVENOUS at 04:52

## 2018-12-02 RX ADMIN — Medication 1000 MG: at 08:15

## 2018-12-02 RX ADMIN — Medication 1000 MG: at 20:32

## 2018-12-02 RX ADMIN — SUCRALFATE 1 G: 1 SUSPENSION ORAL at 17:20

## 2018-12-02 RX ADMIN — SUCRALFATE 1 G: 1 SUSPENSION ORAL at 00:13

## 2018-12-02 RX ADMIN — ENOXAPARIN SODIUM 30 MG: 30 INJECTION SUBCUTANEOUS at 17:20

## 2018-12-02 RX ADMIN — VANCOMYCIN HYDROCHLORIDE 1500 MG: 10 INJECTION, POWDER, LYOPHILIZED, FOR SOLUTION INTRAVENOUS at 15:06

## 2018-12-02 RX ADMIN — FAMOTIDINE 20 MG: 20 TABLET, FILM COATED ORAL at 08:15

## 2018-12-02 RX ADMIN — SUCRALFATE 1 G: 1 SUSPENSION ORAL at 11:59

## 2018-12-02 RX ADMIN — SUCRALFATE 1 G: 1 SUSPENSION ORAL at 05:00

## 2018-12-02 RX ADMIN — DEXTROSE AND SODIUM CHLORIDE 125 ML/HR: 5; 450 INJECTION, SOLUTION INTRAVENOUS at 02:38

## 2018-12-02 NOTE — PLAN OF CARE
Problem: Patient Care Overview  Goal: Plan of Care Review  Outcome: Ongoing (interventions implemented as appropriate)   12/02/18 0324   Coping/Psychosocial   Plan of Care Reviewed With patient   Plan of Care Review   Progress improving   OTHER   Outcome Summary Pt has minimal c/o pain this shift. Wound vac remains in place to R upper extremity I/D site. IVF cont as ordered. IV abx cont. SCD's in place. VSS. Will cont to monitor.      Goal: Individualization and Mutuality  Outcome: Ongoing (interventions implemented as appropriate)   12/02/18 0324   Individualization   Patient Specific Goals (Include Timeframe) Wound healing; pain control    Patient Specific Interventions IV abx; wound vac in place      Goal: Discharge Needs Assessment  Outcome: Ongoing (interventions implemented as appropriate)   12/01/18 1720   Discharge Needs Assessment   Readmission Within the Last 30 Days no previous admission in last 30 days   Concerns to be Addressed no discharge needs identified   Patient/Family Anticipates Transition to home   Patient/Family Anticipated Services at Transition none   Transportation Anticipated family or friend will provide   Anticipated Changes Related to Illness none   Equipment Needed After Discharge (Wound Vac)   Discharge Coordination/Progress Pt has no PCP nor health insurance. SW has left a message for [employer] to screen pt to see if he qualifies for Medicaid. Pt will need help afford medications. Pt needs a wound vac. SW will work on wound vac application (hopefully they will give as christiana case).   Disability   Equipment Currently Used at Home none     Goal: Interprofessional Rounds/Family Conf  Outcome: Ongoing (interventions implemented as appropriate)   12/02/18 0324   Interdisciplinary Rounds/Family Conf   Participants nursing;patient       Problem: Surgery Nonspecified (Adult)  Goal: Signs and Symptoms of Listed Potential Problems Will be Absent, Minimized or Managed (Surgery  Nonspecified)  Outcome: Ongoing (interventions implemented as appropriate)   12/01/18 1649 12/02/18 0324   Goal/Outcome Evaluation   Problems Assessed (Surgery) all --    Problems Present (Surgery) --  pain     Goal: Anesthesia/Sedation Recovery  Outcome: Outcome(s) achieved Date Met: 12/02/18 12/02/18 0324   Goal/Outcome Evaluation   Anesthesia/Sedation Recovery criteria met for discharge       Problem: Wound (Includes Pressure Injury) (Adult)  Goal: Signs and Symptoms of Listed Potential Problems Will be Absent, Minimized or Managed (Wound)  Outcome: Ongoing (interventions implemented as appropriate)   12/01/18 1649   Goal/Outcome Evaluation   Problems Assessed (Wound) all   Problems Present (Wound) none

## 2018-12-02 NOTE — ANESTHESIA POSTPROCEDURE EVALUATION
"Patient: Saulo Shepard    Procedure Summary     Date:  12/01/18 Room / Location:   [facility] OR  /  [facility]    Anesthesia Start:  1154 Anesthesia Stop:  1238    Procedure:  INCISION AND DRAINAGE OF RIGHT UPPER INNER THIGH, PLACEMENT OF WOUND VAC (Right Leg Lower) Diagnosis:  (ABSCESS RT THIGH)    Surgeon:  René Dallas MD Provider:  Елена Saavedra CRNA    Anesthesia Type:  general ASA Status:  1          Anesthesia Type: general  Last vitals  BP   107/64 (12/01/18 1500)   Temp   97.5 °F (36.4 °C) (12/01/18 1500)   Pulse   75 (12/01/18 1500)   Resp   16 (12/01/18 1500)     SpO2   96 % (12/01/18 1500)     Post Anesthesia Care and Evaluation    Patient location during evaluation: PACU  Patient participation: complete - patient participated  Level of consciousness: awake and alert  Pain management: adequate  Airway patency: patent  Anesthetic complications: No anesthetic complications    Cardiovascular status: acceptable  Respiratory status: acceptable  Hydration status: acceptable    Comments: Blood pressure 107/64, pulse 75, temperature 97.5 °F (36.4 °C), temperature source Oral, resp. rate 16, height 185.4 cm (73\"), weight 93.9 kg (207 lb), SpO2 96 %.    Pt discharged from PACU based on murphy score >8  No anesthesia care post op    "

## 2018-12-02 NOTE — PROGRESS NOTES
"Pharmacy Dosing Service  Pharmacokinetics  Vancomycin Follow-up Evaluation    Assessment/Action/Plan:  Vancomycin trough ordered before dose due on 12/3 at 0400.  Continue Vancomycin 1500mg iv q12h.  Pharmacy will continue to monitor renal function and adjust dose accordingly.     Subjective:  Saulo Shepard is a 43 y.o. male currently on Vancomycin 1500 mg IV every 12 hours for the treatment of skin and soft tissue infection, day 2 of therapy.    Objective:  Ht: 185.4 cm (73\"); Wt: 93.9 kg (207 lb)  Estimated Creatinine Clearance: 127.8 mL/min (by C-G formula based on SCr of 1 mg/dL).   Lab Results   Component Value Date    CREATININE 1.00 12/02/2018    CREATININE 1.14 12/01/2018    CREATININE 1.13 07/26/2016    CREATININE 1.05 07/28/2014    CREATININE 1.12 07/05/2014      Lab Results   Component Value Date    WBC 16.64 (H) 12/01/2018    WBC 10.76 07/26/2016    WBC 16.66 (H) 07/28/2014       No results found for: VANCOPEAK, VANCOTROUGH, VANCORANDOM    Culture Results:  Microbiology Results (last 10 days)       Procedure Component Value - Date/Time    Wound Culture - Wound, Thigh, Right [[id number]]  (Abnormal) Collected:  12/01/18 1220    Lab Status:  Preliminary result Specimen:  Wound from Thigh, Right Updated:  12/02/18 0738     Wound Culture Light growth (2+) Staphylococcus aureus, MSSA     BETA LACTAMASE Positive    Blood Culture With JENNIFER - Blood, Arm, Right [[id number]] Collected:  12/01/18 0841    Lab Status:  Preliminary result Specimen:  Blood from Arm, Right Updated:  12/01/18 2330     Blood Culture No growth at less than 24 hours    Blood Culture With JENNIFER - Blood, Hand, Right [[id number]] Collected:  12/01/18 0841    Lab Status:  Preliminary result Specimen:  Blood from Hand, Right Updated:  12/01/18 2330     Blood Culture No growth at less than 24 hours            Kieran Jones RPH   12/02/18 11:23 AM    "

## 2018-12-02 NOTE — PLAN OF CARE
Problem: Patient Care Overview  Goal: Plan of Care Review  Outcome: Outcome(s) achieved Date Met: 12/02/18 12/02/18 9455   Plan of Care Review   Progress improving   OTHER   Outcome Summary Received orders for eval and assist with wound VAC. Pt reports improvements in his pain and erythema since before surgery. Reports no problems and this therapist answered question re: NPWT for eval and dressing change. Will follow up on 12/3/18 due to NPWT just initiated on 12/3/18.

## 2018-12-02 NOTE — PLAN OF CARE
Problem: Patient Care Overview  Goal: Plan of Care Review  Outcome: Ongoing (interventions implemented as appropriate)   12/02/18 0324 12/02/18 3377   Coping/Psychosocial   Plan of Care Reviewed With --  patient   Plan of Care Review   Progress --  no change   OTHER   Outcome Summary Pt has minimal c/o pain this shift. Wound vac remains in place to R upper extremity I/D site. IVF cont as ordered. IV abx cont. VSS. Will cont to monitor.  --        Problem: Surgery Nonspecified (Adult)  Goal: Signs and Symptoms of Listed Potential Problems Will be Absent, Minimized or Managed (Surgery Nonspecified)  Outcome: Ongoing (interventions implemented as appropriate)      Problem: Wound (Includes Pressure Injury) (Adult)  Goal: Signs and Symptoms of Listed Potential Problems Will be Absent, Minimized or Managed (Wound)  Outcome: Ongoing (interventions implemented as appropriate)

## 2018-12-02 NOTE — PROGRESS NOTES
LOS: 1 day   Patient Care Team:  Provider, No Known as PCP - General  Provider, No Known as PCP - Family Medicine  Lynnette Torres APRN as Nurse Practitioner (Neurology)    Chief Complaint:  Right medial thigh abscess   Subjective     Subjective     Postoperative day 1 status post I&D of right medial thigh abscess and wound VAC application he is done well from his surgery he has much less tenderness the erythema has improved but still not resolved.    Objective      Objective     Vital Signs  Temp:  [97.3 °F (36.3 °C)-98 °F (36.7 °C)] 97.5 °F (36.4 °C)  Heart Rate:  [] 69  Resp:  [14-16] 16  BP: (107-129)/(56-81) 127/71    Intake & Output (last 3 days)       11/29 0701 - 11/30 0700 11/30 0701 - 12/01 0700 12/01 0701 - 12/02 0700 12/02 0701 - 12/03 0700    I.V. (mL/kg)   2011 (21.4)     IV Piggyback   50     Total Intake(mL/kg)   2061 (21.9)     Urine (mL/kg/hr)   1050 725 (1.3)    Total Output   1050 725    Net   +1011 -725                  Physical Exam:     General Appearance:    Alert, cooperative, in no acute distress   Lungs:     Clear to auscultation,respirations regular, even and                  unlabored    Heart:    Regular rhythm and normal rate, normal S1 and S2, no            murmur, no gallop, no rub, no click   Chest Wall:    No abnormalities observed   Abdomen:        RIGHT UPPER THIGHS-the wound VAC is in place there is no leakage, the redness has improved but still not resolved, the wound VAC is in place without problems.           Results Review:     I reviewed the patient's new clinical results.  I reviewed the patient's new imaging results and agree with the interpretation.    Results from last 7 days   Lab Units  12/01/18   0841   WBC 10*3/mm3  16.64*   HEMOGLOBIN g/dL  15.0   HEMATOCRIT %  44.5   PLATELETS 10*3/mm3  473*        Results from last 7 days   Lab Units  12/02/18   0510  12/01/18   0841   SODIUM mmol/L  138  140   POTASSIUM mmol/L  4.6  4.2   CHLORIDE mmol/L  102  102    CO2 mmol/L  25.0  25.0   BUN mg/dL  12  9   CREATININE mg/dL  1.00  1.14   CALCIUM mg/dL  10.0  9.9   BILIRUBIN mg/dL  0.4  0.5   ALK PHOS U/L  86  79   ALT (SGPT) U/L  24  18   AST (SGOT) U/L  16  40   GLUCOSE mg/dL  116*  102*       Assessment/Plan     Assessment/Plan       Abscess of left lower extremity excluding foot    Abscess      Status post I&D of a infection in his right upper thigh, presently doing well.  We will continue IV vancomycin, try to obtain a wound VAC for home and discharge him to his home on Tuesday or Wednesday.    René Dallas MD  12/02/18  12:58 PM      Time: time spent with patient 15 minutes     EMR Dragon/Transcription disclaimer: Much of this encounter note is an electronic transcription/translation of spoken language to printed text. The electronic translation of spoken language may permit erroneous, or at times, nonsensical words or phrases to be inadvertently transcribed; although I have reviewed the note for such errors, some may still exist.

## 2018-12-02 NOTE — PROGRESS NOTES
Continued Stay Note   [place]     Patient Name: Saulo Shepard  MRN: [id number]  Today's Date: 12/1/2018    Admit Date: 12/1/2018    Discharge Plan     Row Name 12/01/18 1801       Plan    Plan  FARIBA has submitted [employer] wound vac application.  Application is in hard chart for MD to sign.  It then needs to be faxed to [phone].  FARIBA will follow.  JESSE Reyes.    Patient/Family in Agreement with Plan  yes        Discharge Codes    No documentation.             JESSE Mccoy

## 2018-12-03 LAB
B-LACTAMASE USUAL SUSC ISLT: POSITIVE
BACTERIA SPEC AEROBE CULT: ABNORMAL
ERYTHROCYTE [SEDIMENTATION RATE] IN BLOOD: 12 MM/HR (ref 0–15)
GRAM STN SPEC: ABNORMAL
GRAM STN SPEC: ABNORMAL
VANCOMYCIN TROUGH SERPL-MCNC: 11.84 MCG/ML (ref 10–20)

## 2018-12-03 PROCEDURE — 97605 NEG PRS WND THER DME<=50SQCM: CPT | Performed by: PHYSICAL THERAPIST

## 2018-12-03 PROCEDURE — 85651 RBC SED RATE NONAUTOMATED: CPT | Performed by: SPECIALIST

## 2018-12-03 PROCEDURE — 80202 ASSAY OF VANCOMYCIN: CPT | Performed by: SPECIALIST

## 2018-12-03 PROCEDURE — 94799 UNLISTED PULMONARY SVC/PX: CPT

## 2018-12-03 PROCEDURE — G8991 OTHER PT/OT GOAL STATUS: HCPCS | Performed by: PHYSICAL THERAPIST

## 2018-12-03 PROCEDURE — 97161 PT EVAL LOW COMPLEX 20 MIN: CPT | Performed by: PHYSICAL THERAPIST

## 2018-12-03 PROCEDURE — 94760 N-INVAS EAR/PLS OXIMETRY 1: CPT

## 2018-12-03 PROCEDURE — G8990 OTHER PT/OT CURRENT STATUS: HCPCS | Performed by: PHYSICAL THERAPIST

## 2018-12-03 PROCEDURE — 25010000002 ENOXAPARIN PER 10 MG: Performed by: SPECIALIST

## 2018-12-03 PROCEDURE — 25010000002 VANCOMYCIN 10 G RECONSTITUTED SOLUTION: Performed by: SPECIALIST

## 2018-12-03 RX ADMIN — OXYCODONE HYDROCHLORIDE AND ACETAMINOPHEN 1 TABLET: 5; 325 TABLET ORAL at 08:18

## 2018-12-03 RX ADMIN — SUCRALFATE 1 G: 1 SUSPENSION ORAL at 17:32

## 2018-12-03 RX ADMIN — DEXTROSE AND SODIUM CHLORIDE 125 ML/HR: 5; 450 INJECTION, SOLUTION INTRAVENOUS at 13:54

## 2018-12-03 RX ADMIN — VANCOMYCIN HYDROCHLORIDE 1500 MG: 10 INJECTION, POWDER, LYOPHILIZED, FOR SOLUTION INTRAVENOUS at 15:51

## 2018-12-03 RX ADMIN — SODIUM CHLORIDE, PRESERVATIVE FREE 3 ML: 5 INJECTION INTRAVENOUS at 08:19

## 2018-12-03 RX ADMIN — SUCRALFATE 1 G: 1 SUSPENSION ORAL at 11:02

## 2018-12-03 RX ADMIN — OXYCODONE HYDROCHLORIDE AND ACETAMINOPHEN 1 TABLET: 5; 325 TABLET ORAL at 00:32

## 2018-12-03 RX ADMIN — VANCOMYCIN HYDROCHLORIDE 1500 MG: 10 INJECTION, POWDER, LYOPHILIZED, FOR SOLUTION INTRAVENOUS at 05:00

## 2018-12-03 RX ADMIN — ENOXAPARIN SODIUM 30 MG: 30 INJECTION SUBCUTANEOUS at 17:32

## 2018-12-03 RX ADMIN — FAMOTIDINE 20 MG: 20 TABLET, FILM COATED ORAL at 08:18

## 2018-12-03 RX ADMIN — SUCRALFATE 1 G: 1 SUSPENSION ORAL at 05:00

## 2018-12-03 RX ADMIN — DEXTROSE AND SODIUM CHLORIDE 125 ML/HR: 5; 450 INJECTION, SOLUTION INTRAVENOUS at 01:53

## 2018-12-03 RX ADMIN — OXYCODONE HYDROCHLORIDE AND ACETAMINOPHEN 1 TABLET: 5; 325 TABLET ORAL at 12:27

## 2018-12-03 RX ADMIN — OXYCODONE HYDROCHLORIDE AND ACETAMINOPHEN 1 TABLET: 5; 325 TABLET ORAL at 17:32

## 2018-12-03 RX ADMIN — FAMOTIDINE 20 MG: 20 TABLET, FILM COATED ORAL at 20:49

## 2018-12-03 RX ADMIN — OXYCODONE HYDROCHLORIDE AND ACETAMINOPHEN 1 TABLET: 5; 325 TABLET ORAL at 22:21

## 2018-12-03 RX ADMIN — Medication 1000 MG: at 08:18

## 2018-12-03 RX ADMIN — Medication 1000 MG: at 20:49

## 2018-12-03 RX ADMIN — ENOXAPARIN SODIUM 30 MG: 30 INJECTION SUBCUTANEOUS at 05:02

## 2018-12-03 RX ADMIN — SUCRALFATE 1 G: 1 SUSPENSION ORAL at 00:32

## 2018-12-03 RX ADMIN — DEXTROSE AND SODIUM CHLORIDE 125 ML/HR: 5; 450 INJECTION, SOLUTION INTRAVENOUS at 20:49

## 2018-12-03 NOTE — PROGRESS NOTES
LOS: 2 days   Patient Care Team:  Provider, No Known as PCP - General  Provider, No Known as PCP - Family Medicine  Lynnette Torres APRN as Nurse Practitioner (Neurology)    Chief Complaint:  Right upper leg cellulitis   Subjective     Subjective     Status post I&D of a right upper leg cellulitis and abscess, it is growing MRSA, he states his feels improved, there is much less redness much less induration.    Objective      Objective     Vital Signs  Temp:  [97.4 °F (36.3 °C)-97.7 °F (36.5 °C)] 97.5 °F (36.4 °C)  Heart Rate:  [69-90] 70  Resp:  [16-18] 16  BP: (112-127)/(57-81) 127/67    Intake & Output (last 3 days)       11/30 0701 - 12/01 0700 12/01 0701 - 12/02 0700 12/02 0701 - 12/03 0700 12/03 0701 - 12/04 0700    I.V. (mL/kg)  2011 (21.4) 2609.3 (27.8)     IV Piggyback  50      Total Intake(mL/kg)  2061 (21.9) 2609.3 (27.8)     Urine (mL/kg/hr)  1050 2875 (1.3)     Total Output  1050 2875     Net  +1011 -265.7                   Physical Exam:     General Appearance:    Alert, cooperative, in no acute distress   Lungs:     Clear to auscultation,respirations regular, even and                  unlabored    Heart:    Regular rhythm and normal rate, normal S1 and S2, no            murmur, no gallop, no rub, no click   Chest Wall:    No abnormalities observed   Abdomen:    Right upper leg wound VAC in place, the erythema has reduced still there is some anterior superior, the inferior and superior aspect has resolved.  Cultures returned as MRSA.           Results Review:     I reviewed the patient's new clinical results.  I reviewed the patient's new imaging results and agree with the interpretation.    Results from last 7 days   Lab Units  12/01/18   0841   WBC 10*3/mm3  16.64*   HEMOGLOBIN g/dL  15.0   HEMATOCRIT %  44.5   PLATELETS 10*3/mm3  473*        Results from last 7 days   Lab Units  12/02/18   0510  12/01/18   0841   SODIUM mmol/L  138  140   POTASSIUM mmol/L  4.6  4.2   CHLORIDE mmol/L  102  102    CO2 mmol/L  25.0  25.0   BUN mg/dL  12  9   CREATININE mg/dL  1.00  1.14   CALCIUM mg/dL  10.0  9.9   BILIRUBIN mg/dL  0.4  0.5   ALK PHOS U/L  86  79   ALT (SGPT) U/L  24  18   AST (SGOT) U/L  16  40   GLUCOSE mg/dL  116*  102*       Assessment/Plan     Assessment/Plan       Abscess of left lower extremity excluding foot    Abscess      Presently doing well following drainage and wound VAC placement over a MRSA abscess right upper leg, spider bite, presently doing well, wound VAC changed today, we will get set up for home wound VAC, discharge on Tuesday?  Check labs on Tuesday.    René Dallas MD  12/03/18  7:35 AM      Time: time spent with patient 15 minutes     EMR Dragon/Transcription disclaimer: Much of this encounter note is an electronic transcription/translation of spoken language to printed text. The electronic translation of spoken language may permit erroneous, or at times, nonsensical words or phrases to be inadvertently transcribed; although I have reviewed the note for such errors, some may still exist.

## 2018-12-03 NOTE — PLAN OF CARE
Problem: Patient Care Overview  Goal: Plan of Care Review  Outcome: Ongoing (interventions implemented as appropriate)   12/03/18 0403   Coping/Psychosocial   Plan of Care Reviewed With patient   Plan of Care Review   Progress improving   OTHER   Outcome Summary Pt c/o pain once this shift at incision site, prn po pain meds given. Wound vac remains in place to RLE. IVF cont as ordered. SCD's in place. Voiding per urinal. Resting well between care. VSS. Will cont to monitor.      Goal: Individualization and Mutuality  Outcome: Ongoing (interventions implemented as appropriate)   12/02/18 0324   Individualization   Patient Specific Goals (Include Timeframe) Wound healing; pain control    Patient Specific Interventions IV abx; wound vac in place      Goal: Discharge Needs Assessment  Outcome: Ongoing (interventions implemented as appropriate)   12/01/18 1720   Discharge Needs Assessment   Readmission Within the Last 30 Days no previous admission in last 30 days   Concerns to be Addressed no discharge needs identified   Patient/Family Anticipates Transition to home   Patient/Family Anticipated Services at Transition none   Transportation Anticipated family or friend will provide   Anticipated Changes Related to Illness none   Equipment Needed After Discharge (Wound Vac)   Discharge Coordination/Progress Pt has no PCP nor health insurance. SW has left a message for [employer] to screen pt to see if he qualifies for Medicaid. Pt will need help afford medications. Pt needs a wound vac. SW will work on wound vac application (hopefully they will give as christiana case).   Disability   Equipment Currently Used at Home none     Goal: Interprofessional Rounds/Family Conf  Outcome: Ongoing (interventions implemented as appropriate)   12/03/18 0403   Interdisciplinary Rounds/Family Conf   Participants patient;nursing       Problem: Surgery Nonspecified (Adult)  Goal: Signs and Symptoms of Listed Potential Problems Will be Absent,  Minimized or Managed (Surgery Nonspecified)  Outcome: Ongoing (interventions implemented as appropriate)   12/02/18 1550   Goal/Outcome Evaluation   Problems Assessed (Surgery) all   Problems Present (Surgery) pain       Problem: Wound (Includes Pressure Injury) (Adult)  Goal: Signs and Symptoms of Listed Potential Problems Will be Absent, Minimized or Managed (Wound)  Outcome: Ongoing (interventions implemented as appropriate)   12/02/18 1550 12/03/18 0403   Goal/Outcome Evaluation   Problems Assessed (Wound) all --    Problems Present (Wound) --  pain

## 2018-12-03 NOTE — PROGRESS NOTES
"Pharmacy Dosing Service  Pharmacokinetics  Vancomycin Follow-up Evaluation     Assessment/Action/Plan:  12/3 0330 Vancomycin trough = 11.84 (12.5 hours post 1500 mg dose)  Continue Vancomycin 1500mg iv q12h.  Pharmacy will continue to monitor renal function and adjust dose accordingly.      Subjective:  Saulo Shepard is a 43 y.o. male currently on Vancomycin 1500 mg IV every 12 hours for the treatment of skin and soft tissue infection, day 3 of therapy.    Objective:  Ht: 185.4 cm (73\"); Wt: 93.9 kg (207 lb)  Estimated Creatinine Clearance: 127.8 mL/min (by C-G formula based on SCr of 1 mg/dL).   Lab Results   Component Value Date    CREATININE 1.00 12/02/2018    CREATININE 1.14 12/01/2018    CREATININE 1.13 07/26/2016    CREATININE 1.05 07/28/2014    CREATININE 1.12 07/05/2014      Lab Results   Component Value Date    WBC 16.64 (H) 12/01/2018    WBC 10.76 07/26/2016    WBC 16.66 (H) 07/28/2014         Lab Results   Component Value Date    VANCOTROUGH 11.84 12/03/2018       Culture Results:  Microbiology Results (last 10 days)       Procedure Component Value - Date/Time    Wound Culture - Wound, Thigh, Right [[id number]]  (Abnormal)  (Susceptibility) Collected:  12/01/18 1220    Lab Status:  Final result Specimen:  Wound from Thigh, Right Updated:  12/03/18 0659     Wound Culture Light growth (2+) Staphylococcus aureus, MRSA     Comment:   Methicillin resistant Staphylococcus aureus, Patient may be an isolation risk.        BETA LACTAMASE Positive     Gram Stain Rare (1+) WBCs seen      No organisms seen    Susceptibility        Staphylococcus aureus, MRSA     JOHN     Clindamycin Susceptible     Erythromycin Resistant     Gentamicin Susceptible     Inducible Clindamycin Resistance Negative     Levofloxacin Intermediate [1]      Oxacillin Resistant     Penicillin G Resistant     Tetracycline Susceptible     Trimethoprim + Sulfamethoxazole Susceptible     Vancomycin Susceptible              [1]   Staphylococcus " species may develop resistance during prolonged therapy with quinolones. Isolates that are initially susceptible may become resistant within three to four days after initiation of therapy. Testing of repeat isolates may be warranted.                Susceptibility Comments       Staphylococcus aureus, MRSA    This isolate does not demonstrate inducible clindamycin resistance in vitro.                 Blood Culture With JENNIFER - Blood, Arm, Right [[id number]] Collected:  12/01/18 0841    Lab Status:  Preliminary result Specimen:  Blood from Arm, Right Updated:  12/03/18 1130     Blood Culture No growth at 2 days    Blood Culture With JENNIFER - Blood, Hand, Right [[id number]] Collected:  12/01/18 0841    Lab Status:  Preliminary result Specimen:  Blood from Hand, Right Updated:  12/03/18 1130     Blood Culture No growth at 2 days            Kieran Jones RPH   12/03/18 2:04 PM

## 2018-12-03 NOTE — PROGRESS NOTES
Continued Stay Note  [facility]     Patient Name: Saulo Shepard  MRN: [id number]  Today's Date: 12/3/2018    Admit Date: 12/1/2018    Discharge Plan     Row Name 12/03/18 1552       Plan    Plan  Home    Patient/Family in Agreement with Plan  yes    Plan Comments  [employer] form has been faxed back to [facility] at [phone]. If approved, will need to set up an appointment with [facility] if they will take pt as a self pay.     Row Name 12/03/18 1349       Plan    Plan  Home    Patient/Family in Agreement with Plan  yes    Plan Comments  Rec'd christiana form and pt is filling it out now. Will fax it to [facility] when it is completed. Have faxed the signed rx to [facility] at [phone].        Discharge Codes    No documentation.             TESHA Thacker
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
## 2018-12-03 NOTE — PROGRESS NOTES
Continued Stay Note  [facility]     Patient Name: Saulo Shepard  MRN: [id number]  Today's Date: 12/3/2018    Admit Date: 12/1/2018    Discharge Plan     Row Name 12/03/18 1349       Plan    Plan  Home    Patient/Family in Agreement with Plan  yes    Plan Comments  Rec'd christiana form and pt is filling it out now. Will fax it to [facility] when it is completed. Have faxed the signed rx to [facility] at [phone].    Row Name 12/03/18 1143       Plan    Plan  Home    Patient/Family in Agreement with Plan  yes    Plan Comments  [facility] express would vac application was submitted two days ago. Hard copy on chart for MD to sign. Pt is a self pay so [facility] is faxing a christiana form for pt to fill out to see if he qualifies.         Discharge Codes    No documentation.             TESHA Thacker

## 2018-12-03 NOTE — PROGRESS NOTES
Continued Stay Note   [place]     Patient Name: Saulo Shepard  MRN: [id number]  Today's Date: 12/3/2018    Admit Date: 12/1/2018    Discharge Plan     Row Name 12/03/18 1143       Plan    Plan  Home    Patient/Family in Agreement with Plan  yes    Plan Comments  [facility] express would vac application was submitted two days ago. Hard copy on chart for MD to sign. Pt is a self pay so [facility] is faxing a christiana form for pt to fill out to see if he qualifies.         Discharge Codes    No documentation.             TESHA Thacker

## 2018-12-03 NOTE — PLAN OF CARE
Problem: Patient Care Overview  Goal: Plan of Care Review  Outcome: Ongoing (interventions implemented as appropriate)   12/03/18 1426   Coping/Psychosocial   Plan of Care Reviewed With patient;family   Plan of Care Review   Progress improving   OTHER   Outcome Summary Wound vac changed this am by PT. IVF, abx continue. C/o incisional pain, medicated x2 with aequate results. Voiding, Up ad rosemary. VSS. Will continue to monitor.       Problem: Surgery Nonspecified (Adult)  Goal: Signs and Symptoms of Listed Potential Problems Will be Absent, Minimized or Managed (Surgery Nonspecified)  Outcome: Ongoing (interventions implemented as appropriate)   12/03/18 1426   Goal/Outcome Evaluation   Problems Assessed (Surgery) all   Problems Present (Surgery) pain       Problem: Wound (Includes Pressure Injury) (Adult)  Goal: Signs and Symptoms of Listed Potential Problems Will be Absent, Minimized or Managed (Wound)  Outcome: Ongoing (interventions implemented as appropriate)   12/03/18 1426   Goal/Outcome Evaluation   Problems Assessed (Wound) all   Problems Present (Wound) pain

## 2018-12-04 LAB
ALBUMIN SERPL-MCNC: 3.5 G/DL (ref 3.5–5)
ALBUMIN/GLOB SERPL: 1 G/DL (ref 1.1–2.5)
ALP SERPL-CCNC: 64 U/L (ref 24–120)
ALT SERPL W P-5'-P-CCNC: 21 U/L (ref 0–54)
ANION GAP SERPL CALCULATED.3IONS-SCNC: 8 MMOL/L (ref 4–13)
AST SERPL-CCNC: 29 U/L (ref 7–45)
BILIRUB SERPL-MCNC: 0.3 MG/DL (ref 0.1–1)
BUN BLD-MCNC: 10 MG/DL (ref 5–21)
BUN/CREAT SERPL: 9.1 (ref 7–25)
CALCIUM SPEC-SCNC: 9.4 MG/DL (ref 8.4–10.4)
CHLORIDE SERPL-SCNC: 101 MMOL/L (ref 98–110)
CO2 SERPL-SCNC: 29 MMOL/L (ref 24–31)
CREAT BLD-MCNC: 1.1 MG/DL (ref 0.5–1.4)
CYTO UR: NORMAL
DEPRECATED RDW RBC AUTO: 43.4 FL (ref 40–54)
ERYTHROCYTE [DISTWIDTH] IN BLOOD BY AUTOMATED COUNT: 13.6 % (ref 12–15)
ERYTHROCYTE [SEDIMENTATION RATE] IN BLOOD: 12 MM/HR (ref 0–15)
GFR SERPL CREATININE-BSD FRML MDRD: 73 ML/MIN/1.73
GLOBULIN UR ELPH-MCNC: 3.6 GM/DL
GLUCOSE BLD-MCNC: 91 MG/DL (ref 70–100)
HCT VFR BLD AUTO: 41.3 % (ref 40–52)
HGB BLD-MCNC: 13.9 G/DL (ref 14–18)
LAB AP CASE REPORT: NORMAL
MCH RBC QN AUTO: 29.1 PG (ref 28–32)
MCHC RBC AUTO-ENTMCNC: 33.7 G/DL (ref 33–36)
MCV RBC AUTO: 86.4 FL (ref 82–95)
PATH REPORT.FINAL DX SPEC: NORMAL
PATH REPORT.GROSS SPEC: NORMAL
PLATELET # BLD AUTO: 517 10*3/MM3 (ref 130–400)
PMV BLD AUTO: 9.9 FL (ref 6–12)
POTASSIUM BLD-SCNC: 4 MMOL/L (ref 3.5–5.3)
PROT SERPL-MCNC: 7.1 G/DL (ref 6.3–8.7)
RBC # BLD AUTO: 4.78 10*6/MM3 (ref 4.8–5.9)
SODIUM BLD-SCNC: 138 MMOL/L (ref 135–145)
WBC NRBC COR # BLD: 18.92 10*3/MM3 (ref 4.8–10.8)

## 2018-12-04 PROCEDURE — 94760 N-INVAS EAR/PLS OXIMETRY 1: CPT

## 2018-12-04 PROCEDURE — 85027 COMPLETE CBC AUTOMATED: CPT | Performed by: SPECIALIST

## 2018-12-04 PROCEDURE — 85651 RBC SED RATE NONAUTOMATED: CPT | Performed by: SPECIALIST

## 2018-12-04 PROCEDURE — 25010000002 VANCOMYCIN 10 G RECONSTITUTED SOLUTION: Performed by: SPECIALIST

## 2018-12-04 PROCEDURE — 80053 COMPREHEN METABOLIC PANEL: CPT | Performed by: SPECIALIST

## 2018-12-04 PROCEDURE — 94799 UNLISTED PULMONARY SVC/PX: CPT

## 2018-12-04 PROCEDURE — 25010000002 ENOXAPARIN PER 10 MG: Performed by: SPECIALIST

## 2018-12-04 RX ADMIN — SUCRALFATE 1 G: 1 SUSPENSION ORAL at 00:29

## 2018-12-04 RX ADMIN — SUCRALFATE 1 G: 1 SUSPENSION ORAL at 12:42

## 2018-12-04 RX ADMIN — VANCOMYCIN HYDROCHLORIDE 1500 MG: 10 INJECTION, POWDER, LYOPHILIZED, FOR SOLUTION INTRAVENOUS at 15:32

## 2018-12-04 RX ADMIN — Medication 1000 MG: at 21:06

## 2018-12-04 RX ADMIN — FAMOTIDINE 20 MG: 20 TABLET, FILM COATED ORAL at 21:06

## 2018-12-04 RX ADMIN — OXYCODONE HYDROCHLORIDE AND ACETAMINOPHEN 1 TABLET: 5; 325 TABLET ORAL at 08:26

## 2018-12-04 RX ADMIN — VANCOMYCIN HYDROCHLORIDE 1500 MG: 10 INJECTION, POWDER, LYOPHILIZED, FOR SOLUTION INTRAVENOUS at 04:35

## 2018-12-04 RX ADMIN — SUCRALFATE 1 G: 1 SUSPENSION ORAL at 18:10

## 2018-12-04 RX ADMIN — SUCRALFATE 1 G: 1 SUSPENSION ORAL at 23:50

## 2018-12-04 RX ADMIN — DEXTROSE AND SODIUM CHLORIDE 125 ML/HR: 5; 450 INJECTION, SOLUTION INTRAVENOUS at 21:06

## 2018-12-04 RX ADMIN — OXYCODONE HYDROCHLORIDE AND ACETAMINOPHEN 1 TABLET: 5; 325 TABLET ORAL at 15:36

## 2018-12-04 RX ADMIN — SUCRALFATE 1 G: 1 SUSPENSION ORAL at 05:13

## 2018-12-04 RX ADMIN — FAMOTIDINE 20 MG: 20 TABLET, FILM COATED ORAL at 08:26

## 2018-12-04 RX ADMIN — ENOXAPARIN SODIUM 30 MG: 30 INJECTION SUBCUTANEOUS at 18:10

## 2018-12-04 RX ADMIN — DEXTROSE AND SODIUM CHLORIDE 125 ML/HR: 5; 450 INJECTION, SOLUTION INTRAVENOUS at 14:14

## 2018-12-04 RX ADMIN — ENOXAPARIN SODIUM 30 MG: 30 INJECTION SUBCUTANEOUS at 05:13

## 2018-12-04 RX ADMIN — Medication 1000 MG: at 08:26

## 2018-12-04 NOTE — PROGRESS NOTES
LOS: 3 days   Patient Care Team:  Provider, No Known as PCP - General  Provider, No Known as PCP - Family Medicine  Lynnette Torres APRN as Nurse Practitioner (Neurology)    Chief Complaint: Right medial thigh abscess   Subjective     Subjective     Right medial thigh abscesses improved still somewhat still with some erythema and some tenderness more inferiorly.  The wound VAC was changed still it is tender but less so but still significant amount of cellulitis exist.    Objective      Objective     Vital Signs  Temp:  [96.1 °F (35.6 °C)-98.1 °F (36.7 °C)] 98 °F (36.7 °C)  Heart Rate:  [72-94] 78  Resp:  [16-17] 16  BP: (118-136)/(64-80) 128/80    Intake & Output (last 3 days)       12/01 0701 - 12/02 0700 12/02 0701 - 12/03 0700 12/03 0701 - 12/04 0700 12/04 0701 - 12/05 0700    P.O.    360    I.V. (mL/kg) 2011 (21.4) 2609.3 (27.8) 2463 (26.2)     IV Piggyback 50       Total Intake(mL/kg) 2061 (21.9) 2609.3 (27.8) 2463 (26.2) 360 (3.8)    Urine (mL/kg/hr) 1050 2875 (1.3) 2600 (1.2) 1725 (2.6)    Stool   0     Wound   25 0    Total Output 1050 2875 2625 1725    Net +1011 -265.7 -162 -1365                  Physical Exam:     General Appearance:    Alert, cooperative, in no acute distress   Lungs:     Clear to auscultation,respirations regular, even and                  unlabored    Heart:    Regular rhythm and normal rate, normal S1 and S2, no            murmur, no gallop, no rub, no click   Chest Wall:    No abnormalities observed   Abdomen:    Right proximal thigh still with some cellulitis, the distal aspect so has some tenderness not resolved etiology unknown is growing MRSA and is on appropriate antibiotics for this.  Of vancomycin.        Results Review:     I reviewed the patient's new clinical results.  I reviewed the patient's new imaging results and agree with the interpretation.    Results from last 7 days   Lab Units  12/04/18   0534  12/01/18   0841   WBC 10*3/mm3  18.92*  16.64*   HEMOGLOBIN g/dL   13.9*  15.0   HEMATOCRIT %  41.3  44.5   PLATELETS 10*3/mm3  517*  473*        Results from last 7 days   Lab Units  12/04/18   0534  12/02/18   0510  12/01/18   0841   SODIUM mmol/L  138  138  140   POTASSIUM mmol/L  4.0  4.6  4.2   CHLORIDE mmol/L  101  102  102   CO2 mmol/L  29.0  25.0  25.0   BUN mg/dL  10  12  9   CREATININE mg/dL  1.10  1.00  1.14   CALCIUM mg/dL  9.4  10.0  9.9   BILIRUBIN mg/dL  0.3  0.4  0.5   ALK PHOS U/L  64  86  79   ALT (SGPT) U/L  21  24  18   AST (SGOT) U/L  29  16  40   GLUCOSE mg/dL  91  116*  102*       Assessment/Plan     Assessment/Plan       Abscess of left lower extremity excluding foot    Abscess       Patient with a abscess in the right upper thigh, some improvement but not full resolution if no further improvement occurs potentially reexplore the wound on Wednesday afternoon and repeat a wound VAC replacement at present do not know why his cellulitis has not fully resolved over the past 48 hours.    René Dallas MD  12/04/18  2:07 PM      Time: time spent with patient 15 minutes     EMR Dragon/Transcription disclaimer: Much of this encounter note is an electronic transcription/translation of spoken language to printed text. The electronic translation of spoken language may permit erroneous, or at times, nonsensical words or phrases to be inadvertently transcribed; although I have reviewed the note for such errors, some may still exist.

## 2018-12-04 NOTE — PLAN OF CARE
Problem: Patient Care Overview  Goal: Plan of Care Review  Outcome: Ongoing (interventions implemented as appropriate)   12/04/18 1512   Coping/Psychosocial   Plan of Care Reviewed With patient   Plan of Care Review   Progress no change   OTHER   Outcome Summary Wound vac continues. IV ABX continue. Oral pain meds given.      Goal: Individualization and Mutuality  Outcome: Ongoing (interventions implemented as appropriate)    Goal: Discharge Needs Assessment  Outcome: Ongoing (interventions implemented as appropriate)      Problem: Surgery Nonspecified (Adult)  Goal: Signs and Symptoms of Listed Potential Problems Will be Absent, Minimized or Managed (Surgery Nonspecified)  Outcome: Ongoing (interventions implemented as appropriate)      Problem: Wound (Includes Pressure Injury) (Adult)  Goal: Signs and Symptoms of Listed Potential Problems Will be Absent, Minimized or Managed (Wound)  Outcome: Ongoing (interventions implemented as appropriate)

## 2018-12-04 NOTE — PLAN OF CARE
Problem: Patient Care Overview  Goal: Plan of Care Review  Outcome: Ongoing (interventions implemented as appropriate)   12/04/18 0057   Coping/Psychosocial   Plan of Care Reviewed With patient   Plan of Care Review   Progress no change   OTHER   Outcome Summary Wound vac still in place, IVF/abx cont, medicated with prn pain meds, voiding clear ylw per urinal, up ad rosemary, will cont to monitor for any changes.

## 2018-12-05 ENCOUNTER — APPOINTMENT (OUTPATIENT)
Dept: ULTRASOUND IMAGING | Facility: HOSPITAL | Age: 43
End: 2018-12-05

## 2018-12-05 LAB
ALBUMIN SERPL-MCNC: 3.6 G/DL (ref 3.5–5)
ALBUMIN/GLOB SERPL: 1.2 G/DL (ref 1.1–2.5)
ALP SERPL-CCNC: 70 U/L (ref 24–120)
ALT SERPL W P-5'-P-CCNC: 38 U/L (ref 0–54)
ANION GAP SERPL CALCULATED.3IONS-SCNC: 9 MMOL/L (ref 4–13)
AST SERPL-CCNC: 31 U/L (ref 7–45)
BILIRUB SERPL-MCNC: 0.3 MG/DL (ref 0.1–1)
BUN BLD-MCNC: 12 MG/DL (ref 5–21)
BUN/CREAT SERPL: 11.8 (ref 7–25)
CALCIUM SPEC-SCNC: 10 MG/DL (ref 8.4–10.4)
CHLORIDE SERPL-SCNC: 102 MMOL/L (ref 98–110)
CO2 SERPL-SCNC: 28 MMOL/L (ref 24–31)
CREAT BLD-MCNC: 1.02 MG/DL (ref 0.5–1.4)
DEPRECATED RDW RBC AUTO: 41.1 FL (ref 40–54)
ERYTHROCYTE [DISTWIDTH] IN BLOOD BY AUTOMATED COUNT: 13.2 % (ref 12–15)
GFR SERPL CREATININE-BSD FRML MDRD: 80 ML/MIN/1.73
GLOBULIN UR ELPH-MCNC: 3 GM/DL
GLUCOSE BLD-MCNC: 100 MG/DL (ref 70–100)
HCT VFR BLD AUTO: 42.8 % (ref 40–52)
HGB BLD-MCNC: 14.4 G/DL (ref 14–18)
MCH RBC QN AUTO: 28.9 PG (ref 28–32)
MCHC RBC AUTO-ENTMCNC: 33.6 G/DL (ref 33–36)
MCV RBC AUTO: 85.9 FL (ref 82–95)
PLATELET # BLD AUTO: 541 10*3/MM3 (ref 130–400)
PMV BLD AUTO: 10.1 FL (ref 6–12)
POTASSIUM BLD-SCNC: 5 MMOL/L (ref 3.5–5.3)
PROT SERPL-MCNC: 6.6 G/DL (ref 6.3–8.7)
RBC # BLD AUTO: 4.98 10*6/MM3 (ref 4.8–5.9)
SODIUM BLD-SCNC: 139 MMOL/L (ref 135–145)
WBC NRBC COR # BLD: 12.45 10*3/MM3 (ref 4.8–10.8)

## 2018-12-05 PROCEDURE — 25010000002 ENOXAPARIN PER 10 MG: Performed by: SPECIALIST

## 2018-12-05 PROCEDURE — 25010000002 VANCOMYCIN 10 G RECONSTITUTED SOLUTION: Performed by: SPECIALIST

## 2018-12-05 PROCEDURE — 76882 US LMTD JT/FCL EVL NVASC XTR: CPT

## 2018-12-05 PROCEDURE — 80053 COMPREHEN METABOLIC PANEL: CPT | Performed by: SPECIALIST

## 2018-12-05 PROCEDURE — 85027 COMPLETE CBC AUTOMATED: CPT | Performed by: SPECIALIST

## 2018-12-05 RX ADMIN — SUCRALFATE 1 G: 1 SUSPENSION ORAL at 18:28

## 2018-12-05 RX ADMIN — ENOXAPARIN SODIUM 30 MG: 30 INJECTION SUBCUTANEOUS at 06:06

## 2018-12-05 RX ADMIN — FAMOTIDINE 20 MG: 20 TABLET, FILM COATED ORAL at 22:10

## 2018-12-05 RX ADMIN — SUCRALFATE 1 G: 1 SUSPENSION ORAL at 23:05

## 2018-12-05 RX ADMIN — SUCRALFATE 1 G: 1 SUSPENSION ORAL at 06:06

## 2018-12-05 RX ADMIN — DEXTROSE AND SODIUM CHLORIDE 125 ML/HR: 5; 450 INJECTION, SOLUTION INTRAVENOUS at 15:27

## 2018-12-05 RX ADMIN — Medication 1000 MG: at 22:10

## 2018-12-05 RX ADMIN — OXYCODONE HYDROCHLORIDE AND ACETAMINOPHEN 1 TABLET: 5; 325 TABLET ORAL at 22:21

## 2018-12-05 RX ADMIN — OXYCODONE HYDROCHLORIDE AND ACETAMINOPHEN 1 TABLET: 5; 325 TABLET ORAL at 11:14

## 2018-12-05 RX ADMIN — VANCOMYCIN HYDROCHLORIDE 1500 MG: 10 INJECTION, POWDER, LYOPHILIZED, FOR SOLUTION INTRAVENOUS at 16:25

## 2018-12-05 RX ADMIN — VANCOMYCIN HYDROCHLORIDE 1500 MG: 10 INJECTION, POWDER, LYOPHILIZED, FOR SOLUTION INTRAVENOUS at 03:48

## 2018-12-05 RX ADMIN — ENOXAPARIN SODIUM 30 MG: 30 INJECTION SUBCUTANEOUS at 18:28

## 2018-12-05 NOTE — PROGRESS NOTES
Patient's right medial leg ultrasound shows some edema of the tissue there is no abscess noted no necrotic material no need for any further debridement.PACS Images      Radiology Images   Study Result     EXAMINATION:   US NONVASCULAR EXTREMITY LIMITED-  12/5/2018 2:05 PM CST     HISTORY: Ultrasound of right upper thigh     Images are obtained transverse longitudinal direction usual manner.  There is no evidence of fluid collection. There is some edema in the  subcutaneous tissues.     IMPRESSION specifically, there is no evidence of a drainable fluid  collection.  2. Mild edema is present  This report was finalized on 12/05/2018 14:07 by Dr. Galileo Ocasio MD.   Imaging

## 2018-12-05 NOTE — THERAPY TREATMENT NOTE
Acute Care - Physical Therapy Treatment Note  [facility]     Patient Name: Saulo Shepard  : 1975  MRN: [id number]  Today's Date: 2018  Onset of Illness/Injury or Date of Surgery: 18  Date of Referral to PT: 18  Referring Physician: Dr. Dallas    Admit Date: 2018    Visit Dx:    ICD-10-CM ICD-9-CM   1. Abscess L02.91 682.9   2. Abscess of right thigh L02.415 682.6   3. Open wound T14.8XXA 879.8     Patient Active Problem List   Diagnosis   • Intractable acute post-traumatic headache   • Abscess of left lower extremity excluding foot   • Abscess       Therapy Treatment    Rehabilitation Treatment Summary     Row Name 18 1450             Treatment Time/Intention    Discipline  physical therapy assistant  -KJ      Document Type  wound care  -KJ      Subjective Information  no complaints  -KJ      Comment  still not for sure if Dr. Dallas taking pt to sx, will change vac tomorrow. Wound vac checked and intact. Spoke with Kathrin CHRIS and she agrees.  -KJ      Recorded by [KJ] Clara Wagner PTA 18 1516      Row Name 18 1450             Wound 18 1301 Right leg incision    Wound - Properties Group Date first assessed: 18 [LC] Time first assessed: 1301 [LC] Side: Right [LC] Location: leg [LC] Type: incision [LC] Recorded by:  [LC] Noni Jin RN 18 1301    Periwound  redness;warm  -KJ      Periwound Temperature  warm edema and reddness improved  -KJ      Periwound Skin Turgor  firm  -KJ      Wound Output (mL)  5 30ml total  -KJ      Recorded by [KJ] Clara Wagner PTA 18 1516      Row Name 18 1450             NPWT (Negative Pressure Wound Therapy) 18 1228 rt. inner thigh    NPWT (Negative Pressure Wound Therapy) - Properties Group Placement Date: 18 [LC] Placement Time: 1228 [LC] Location: rt. inner thigh [LC] Recorded by:  [LC] Noni Jin RN 18 1243    Therapy Setting  continuous therapy  -KJ      Pressure Setting   125 mmHg  -KJ      Recorded by [KJ] Clara Wagner, CHELSIE 12/05/18 1516        User Key  (r) = Recorded By, (t) = Taken By, (c) = Cosigned By    Initials Name Effective Dates Discipline    Clara Acuna, PTA 08/02/16 -  PT    LC Noni Jin RN 08/02/16 -  Nurse          Wound 12/01/18 1301 Right leg incision (Active)   Dressing Appearance intact 12/4/2018  9:06 PM   Closure SHALINI 12/4/2018  9:06 PM   Base dressing in place, unable to visualize 12/5/2018  9:00 AM   Periwound redness;warm 12/5/2018  2:50 PM   Periwound Temperature warm 12/5/2018  2:50 PM   Periwound Skin Turgor firm 12/5/2018  2:50 PM   Drainage Characteristics/Odor serosanguineous 12/4/2018  9:06 PM   Drainage Amount small 12/4/2018  9:06 PM   Care, Wound negative pressure wound therapy 12/4/2018  9:06 PM   Dressing Care, Wound transparent film 12/5/2018  9:00 AM   Wound Output (mL) 5 12/5/2018  2:50 PM       NPWT (Negative Pressure Wound Therapy) 12/01/18 1228 rt. inner thigh (Active)   Therapy Setting continuous therapy 12/5/2018  2:50 PM   Dressing foam, black 12/4/2018  9:06 PM   Pressure Setting 125 mmHg 12/5/2018  2:50 PM           Physical Therapy Education     Title: PT OT SLP Therapies (Done)     Topic: Physical Therapy (Done)     Point: Mobility training (Done)     Learning Progress Summary           Patient Acceptance, E, VU by SD at 12/3/2018 10:01 AM    Comment:  pt educated on NPWT procedure, POC and d/c disposition                   Point: Home exercise program (Done)     Learning Progress Summary           Patient Acceptance, E, VU by SD at 12/3/2018 10:01 AM    Comment:  pt educated on NPWT procedure, POC and d/c disposition                   Point: Body mechanics (Done)     Learning Progress Summary           Patient Acceptance, E, VU by SD at 12/3/2018 10:01 AM    Comment:  pt educated on NPWT procedure, POC and d/c disposition                   Point: Precautions (Done)     Learning Progress Summary           Patient  Acceptance, E, VU by SD at 12/3/2018 10:01 AM    Comment:  pt educated on NPWT procedure, POC and d/c disposition                               User Key     Initials Effective Dates Name Provider Type Discipline    SD 08/31/18 -  Ese Pedro, PT Physical Therapist PT                PT Recommendation and Plan     Plan of Care Reviewed With: patient  Progress: improving  Outcome Summary: PT checked wound vac. US performed today. Waiting on Dr. Dallas for decision of possibility of sx. Will go ahead and keep wound vac on today and change tomorrow, so it is not changed twice today possbilly.     Time Calculation:     Therapy Suggested Charges     Code   Minutes Charges    None               PT G-Codes  PT Professional Judgement Used?: Yes(w/v)  Other PT Primary Current Status (): 0 percent impaired, limited or restricted  Other PT Primary Goal Status (): 0 percent impaired, limited or restricted    Clara Wagner, PTA  12/5/2018

## 2018-12-05 NOTE — PLAN OF CARE
Problem: Patient Care Overview  Goal: Plan of Care Review  Outcome: Ongoing (interventions implemented as appropriate)   12/05/18 9009   Coping/Psychosocial   Plan of Care Reviewed With patient   Plan of Care Review   Progress improving   OTHER   Outcome Summary PT checked wound vac. US performed today. Waiting on Dr. Dallas for decision of possibility of sx. Will go ahead and keep wound vac on today and change tomorrow, so it is not changed twice today possbilly.

## 2018-12-05 NOTE — PROGRESS NOTES
LOS: 4 days   Patient Care Team:  Provider, No Known as PCP - General  Provider, No Known as PCP - Family Medicine  Lynnette Torres APRN as Nurse Practitioner (Neurology)    Chief Complaint: Postoperative day 4   Subjective     Subjective     Postoperative day 4 status post I&D of right medial thigh abscess his temperature has resolved a lot of the erythema has resolved but still there is some and there is still a firm hard area in the right below the drainage tube.  His white count has slowly improved admission was 17,000 that is reduced to 12 he is afebrile electrolytes within normal limits.    Objective      Objective     Vital Signs  Temp:  [96.8 °F (36 °C)-98.7 °F (37.1 °C)] 96.8 °F (36 °C)  Heart Rate:  [64-78] 74  Resp:  [16-18] 16  BP: (110-128)/(68-74) 118/70    Intake & Output (last 3 days)       12/02 0701 - 12/03 0700 12/03 0701 - 12/04 0700 12/04 0701 - 12/05 0700 12/05 0701 - 12/06 0700    P.O.   840     I.V. (mL/kg) 2609.3 (27.8) 2463 (26.2) 2265 (24.1)     IV Piggyback        Total Intake(mL/kg) 2609.3 (27.8) 2463 (26.2) 3105 (33.1)     Urine (mL/kg/hr) 2875 (1.3) 2600 (1.2) 3525 (1.6) 300 (1.8)    Stool  0      Wound  25 0     Total Output 2875 2625 3525 300    Net -265.7 -162 -420 -300                  Physical Exam:     General Appearance:    Alert, cooperative, in no acute distress   Lungs:     Clear to auscultation,respirations regular, even and                  unlabored    Heart:    Regular rhythm and normal rate, normal S1 and S2, no            murmur, no gallop, no rub, no click   Chest Wall:    No abnormalities observed   Abdomen:    Right proximal thigh with a wound VAC in place.  The initial erythema is improved but still there is persistent erythema just inferior to the wound VAC.  And tenderness just below that area.  Currently we will have an ultrasound evaluation of this area to assure there is no abscess underlying this area.  If there is we will need to take him to surgery  for a second debridement of this area are extend this area to this firm erythematous area and open up the cavity I am unsure why the infection would travel inferior but that is what I am concerned about we will check an ultrasound if there is necrotic material in this firm area we will look toward debridement this afternoon and replacement of a wound VAC over the area.          Results Review:     I reviewed the patient's new clinical results.  I reviewed the patient's new imaging results and agree with the interpretation.    Results from last 7 days   Lab Units  12/05/18   0711  12/04/18   0534  12/01/18   0841   WBC 10*3/mm3  12.45*  18.92*  16.64*   HEMOGLOBIN g/dL  14.4  13.9*  15.0   HEMATOCRIT %  42.8  41.3  44.5   PLATELETS 10*3/mm3  541*  517*  473*        Results from last 7 days   Lab Units  12/05/18   0711  12/04/18   0534  12/02/18   0510   SODIUM mmol/L  139  138  138   POTASSIUM mmol/L  5.0  4.0  4.6   CHLORIDE mmol/L  102  101  102   CO2 mmol/L  28.0  29.0  25.0   BUN mg/dL  12  10  12   CREATININE mg/dL  1.02  1.10  1.00   CALCIUM mg/dL  10.0  9.4  10.0   BILIRUBIN mg/dL  0.3  0.3  0.4   ALK PHOS U/L  70  64  86   ALT (SGPT) U/L  38  21  24   AST (SGOT) U/L  31  29  16   GLUCOSE mg/dL  100  91  116*       Assessment/Plan     Assessment/Plan       Abscess of left lower extremity excluding foot    Abscess      Ultrasound of the right proximal leg to assure no abscess.    René Dallas MD  12/05/18  8:46 AM      Time: time spent with patient 15 minutes     EMR Dragon/Transcription disclaimer: Much of this encounter note is an electronic transcription/translation of spoken language to printed text. The electronic translation of spoken language may permit erroneous, or at times, nonsensical words or phrases to be inadvertently transcribed; although I have reviewed the note for such errors, some may still exist.

## 2018-12-05 NOTE — PLAN OF CARE
Problem: Patient Care Overview  Goal: Plan of Care Review  Outcome: Ongoing (interventions implemented as appropriate)   12/05/18 1412   Plan of Care Review   Progress no change   OTHER   Outcome Summary Oral pain med given. Up ad rosemary in [place]. Wound vac continues. In ultrasound at this time to r/o undrained abscess.      Goal: Individualization and Mutuality  Outcome: Ongoing (interventions implemented as appropriate)    Goal: Discharge Needs Assessment  Outcome: Ongoing (interventions implemented as appropriate)      Problem: Surgery Nonspecified (Adult)  Goal: Signs and Symptoms of Listed Potential Problems Will be Absent, Minimized or Managed (Surgery Nonspecified)  Outcome: Ongoing (interventions implemented as appropriate)      Problem: Wound (Includes Pressure Injury) (Adult)  Goal: Signs and Symptoms of Listed Potential Problems Will be Absent, Minimized or Managed (Wound)  Outcome: Ongoing (interventions implemented as appropriate)

## 2018-12-05 NOTE — PLAN OF CARE
Problem: Patient Care Overview  Goal: Plan of Care Review  Outcome: Ongoing (interventions implemented as appropriate)   12/05/18 0010   Coping/Psychosocial   Plan of Care Reviewed With patient   Plan of Care Review   Progress no change   OTHER   Outcome Summary IV abx & ivf cont per dr orders, area around incision marked to watch for increased redness, prn pain med available to pt as needed, voiding per urinal, wound vac still in place, will cont to monitor for any changes.

## 2018-12-05 NOTE — PROGRESS NOTES
Continued Stay Note   [facility]     Patient Name: Saulo Shepard  MRN: [id number]  Today's Date: 12/5/2018    Admit Date: 12/1/2018    Discharge Plan     Row Name 12/05/18 1041       Plan    Plan  Home    Patient/Family in Agreement with Plan  yes    Plan Comments  [employer] wound vac has been approved and can be delivered to pt's room on day of d/c.         Discharge Codes    No documentation.             TESHA Thacker

## 2018-12-06 LAB
BACTERIA SPEC AEROBE CULT: NORMAL
BACTERIA SPEC AEROBE CULT: NORMAL

## 2018-12-06 PROCEDURE — 97605 NEG PRS WND THER DME<=50SQCM: CPT

## 2018-12-06 PROCEDURE — 25010000002 VANCOMYCIN 10 G RECONSTITUTED SOLUTION: Performed by: SPECIALIST

## 2018-12-06 PROCEDURE — 25010000002 DIPHENHYDRAMINE PER 50 MG: Performed by: SPECIALIST

## 2018-12-06 PROCEDURE — 25010000002 ENOXAPARIN PER 10 MG: Performed by: SPECIALIST

## 2018-12-06 RX ADMIN — VANCOMYCIN HYDROCHLORIDE 1500 MG: 10 INJECTION, POWDER, LYOPHILIZED, FOR SOLUTION INTRAVENOUS at 16:11

## 2018-12-06 RX ADMIN — DEXTROSE AND SODIUM CHLORIDE 125 ML/HR: 5; 450 INJECTION, SOLUTION INTRAVENOUS at 22:37

## 2018-12-06 RX ADMIN — SUCRALFATE 1 G: 1 SUSPENSION ORAL at 12:22

## 2018-12-06 RX ADMIN — FAMOTIDINE 20 MG: 20 TABLET, FILM COATED ORAL at 09:44

## 2018-12-06 RX ADMIN — OXYCODONE HYDROCHLORIDE AND ACETAMINOPHEN 1 TABLET: 5; 325 TABLET ORAL at 10:48

## 2018-12-06 RX ADMIN — OXYCODONE HYDROCHLORIDE AND ACETAMINOPHEN 1 TABLET: 5; 325 TABLET ORAL at 17:37

## 2018-12-06 RX ADMIN — SUCRALFATE 1 G: 1 SUSPENSION ORAL at 22:38

## 2018-12-06 RX ADMIN — SUCRALFATE 1 G: 1 SUSPENSION ORAL at 05:53

## 2018-12-06 RX ADMIN — DIPHENHYDRAMINE HYDROCHLORIDE 25 MG: 50 INJECTION, SOLUTION INTRAMUSCULAR; INTRAVENOUS at 22:39

## 2018-12-06 RX ADMIN — ENOXAPARIN SODIUM 30 MG: 30 INJECTION SUBCUTANEOUS at 05:52

## 2018-12-06 RX ADMIN — Medication 1000 MG: at 09:44

## 2018-12-06 RX ADMIN — Medication 1000 MG: at 22:17

## 2018-12-06 RX ADMIN — DEXTROSE AND SODIUM CHLORIDE 125 ML/HR: 5; 450 INJECTION, SOLUTION INTRAVENOUS at 02:38

## 2018-12-06 RX ADMIN — VANCOMYCIN HYDROCHLORIDE 1500 MG: 10 INJECTION, POWDER, LYOPHILIZED, FOR SOLUTION INTRAVENOUS at 03:30

## 2018-12-06 RX ADMIN — ENOXAPARIN SODIUM 30 MG: 30 INJECTION SUBCUTANEOUS at 17:22

## 2018-12-06 RX ADMIN — SUCRALFATE 1 G: 1 SUSPENSION ORAL at 17:22

## 2018-12-06 RX ADMIN — DEXTROSE AND SODIUM CHLORIDE 125 ML/HR: 5; 450 INJECTION, SOLUTION INTRAVENOUS at 10:43

## 2018-12-06 RX ADMIN — FAMOTIDINE 20 MG: 20 TABLET, FILM COATED ORAL at 22:17

## 2018-12-06 NOTE — PROGRESS NOTES
LOS: 5 days   Patient Care Team:  Provider, No Known as PCP - General  Provider, No Known as PCP - Family Medicine  Lynnette Torres APRN as Nurse Practitioner (Neurology)    Chief Complaint:  Right leg abscess   Subjective     Subjective     5 days out from treatment of I&D of a spider bite from the right upper thigh.  Ultrasound yesterday shows no further abscess, just edema finally the erythema and edema seems to be improving and white count has reduced to 12,000.  He is feeling better and finally the edema is also resolving.    Objective      Objective     Vital Signs  Temp:  [96.8 °F (36 °C)-97.4 °F (36.3 °C)] 96.8 °F (36 °C)  Heart Rate:  [60-70] 60  Resp:  [16-18] 16  BP: (110-130)/(70-78) 112/70    Intake & Output (last 3 days)       12/03 0701 - 12/04 0700 12/04 0701 - 12/05 0700 12/05 0701 - 12/06 0700 12/06 0701 - 12/07 0700    P.O.  840      I.V. (mL/kg) 2463 (26.2) 2265 (24.1) 3642 (38.8)     Total Intake(mL/kg) 2463 (26.2) 3105 (33.1) 3642 (38.8)     Urine (mL/kg/hr) 2600 (1.2) 3525 (1.6) 1275 (0.6) 550 (3.2)    Stool 0       Wound 25 0 5     Total Output 2625 3525 1280 550    Net -162 -420 +2362 -550                  Physical Exam:     General Appearance:    Alert, cooperative, in no acute distress   Lungs:     Clear to auscultation,respirations regular, even and                  unlabored    Heart:    Regular rhythm and normal rate, normal S1 and S2, no            murmur, no gallop, no rub, no click   Chest Wall:    No abnormalities observed   Abdomen:    Left leg edema and erythema finally resolving, ultrasound shows no further abscess, white count is on the downward trend.        Results Review:     I reviewed the patient's new clinical results.  I reviewed the patient's new imaging results and agree with the interpretation.    Results from last 7 days   Lab Units  12/05/18   0711  12/04/18   0534  12/01/18   0841   WBC 10*3/mm3  12.45*  18.92*  16.64*   HEMOGLOBIN g/dL  14.4  13.9*  15.0    HEMATOCRIT %  42.8  41.3  44.5   PLATELETS 10*3/mm3  541*  517*  473*        Results from last 7 days   Lab Units  12/05/18   0711  12/04/18   0534  12/02/18   0510   SODIUM mmol/L  139  138  138   POTASSIUM mmol/L  5.0  4.0  4.6   CHLORIDE mmol/L  102  101  102   CO2 mmol/L  28.0  29.0  25.0   BUN mg/dL  12  10  12   CREATININE mg/dL  1.02  1.10  1.00   CALCIUM mg/dL  10.0  9.4  10.0   BILIRUBIN mg/dL  0.3  0.3  0.4   ALK PHOS U/L  70  64  86   ALT (SGPT) U/L  38  21  24   AST (SGOT) U/L  31  29  16   GLUCOSE mg/dL  100  91  116*       Assessment/Plan     Assessment/Plan       Abscess of left lower extremity excluding foot    Abscess      Continue IV antibiotics, we will check white count in the morning, if it continues to reduce as well as the edema we will look toward discharging him on Friday.    René Dallas MD  12/06/18  8:51 AM      Time: time spent with patient 15 minutes     EMR Dragon/Transcription disclaimer: Much of this encounter note is an electronic transcription/translation of spoken language to printed text. The electronic translation of spoken language may permit erroneous, or at times, nonsensical words or phrases to be inadvertently transcribed; although I have reviewed the note for such errors, some may still exist.

## 2018-12-06 NOTE — PLAN OF CARE
Problem: Patient Care Overview  Goal: Plan of Care Review  Outcome: Ongoing (interventions implemented as appropriate)   12/06/18 2742   Coping/Psychosocial   Plan of Care Reviewed With patient   Plan of Care Review   Progress improving   OTHER   Outcome Summary Physical therapy changed wound vac dressing today. Oral pain meds given. Up ad rosemary in halls. IV antibiotics continue.      Goal: Individualization and Mutuality  Outcome: Ongoing (interventions implemented as appropriate)    Goal: Discharge Needs Assessment  Outcome: Ongoing (interventions implemented as appropriate)      Problem: Surgery Nonspecified (Adult)  Goal: Signs and Symptoms of Listed Potential Problems Will be Absent, Minimized or Managed (Surgery Nonspecified)  Outcome: Ongoing (interventions implemented as appropriate)      Problem: Wound (Includes Pressure Injury) (Adult)  Goal: Signs and Symptoms of Listed Potential Problems Will be Absent, Minimized or Managed (Wound)  Outcome: Ongoing (interventions implemented as appropriate)

## 2018-12-06 NOTE — PLAN OF CARE
Problem: Patient Care Overview  Goal: Plan of Care Review  Outcome: Ongoing (interventions implemented as appropriate)  Ivf/ iv abx cont. Wound vac in place to right inner thigh. Redness noted, but now within the marked perimeter. Tolerates diet and activity well. Voiding without difficulty. Po pain med effective. Cont to monitor.   12/06/18 0153   Coping/Psychosocial   Plan of Care Reviewed With patient   Plan of Care Review   Progress no change     Goal: Individualization and Mutuality  Outcome: Ongoing (interventions implemented as appropriate)    Goal: Discharge Needs Assessment  Outcome: Ongoing (interventions implemented as appropriate)      Problem: Surgery Nonspecified (Adult)  Goal: Signs and Symptoms of Listed Potential Problems Will be Absent, Minimized or Managed (Surgery Nonspecified)  Outcome: Ongoing (interventions implemented as appropriate)      Problem: Wound (Includes Pressure Injury) (Adult)  Goal: Signs and Symptoms of Listed Potential Problems Will be Absent, Minimized or Managed (Wound)  Outcome: Ongoing (interventions implemented as appropriate)

## 2018-12-06 NOTE — THERAPY WOUND CARE TREATMENT
Acute Care - Wound/Debridement Treatment Note  [facility]     Patient Name: Saulo Shepard  : 1975  MRN: [id number]  Today's Date: 2018  Onset of Illness/Injury or Date of Surgery: 18   Date of Referral to PT: 18   Referring Physician: Dr. Dallas       Admit Date: 2018    Visit Dx:    ICD-10-CM ICD-9-CM   1. Abscess L02.91 682.9   2. Abscess of right thigh L02.415 682.6   3. Open wound T14.8XXA 879.8       Patient Active Problem List   Diagnosis   • Intractable acute post-traumatic headache   • Abscess of left lower extremity excluding foot   • Abscess           Wound 18 1301 Right leg incision (Active)   Dressing Appearance intact 2018 11:26 AM   Base bleeding 2018 11:26 AM   Red (%), Wound Tissue Color 100 2018 11:26 AM   Periwound warm;redness 2018 11:26 AM   Periwound Temperature warm 2018 11:26 AM   Periwound Skin Turgor firm 2018 11:26 AM   Wound Length (cm) 1.7 cm 2018 11:26 AM   Wound Width (cm) 1.5 cm 2018 11:26 AM   Wound Depth (cm) 0.7 cm 2018 11:26 AM   Drainage Characteristics/Odor serosanguineous 2018 11:26 AM   Drainage Amount none 2018 11:26 AM   Care, Wound negative pressure wound therapy 2018 11:26 AM   Dressing Care, Wound border dressing 2018 11:26 AM   Periwound Care, Wound barrier ointment applied 2018 11:26 AM   Wound Output (mL) 0 2018 11:26 AM       NPWT (Negative Pressure Wound Therapy) 18 1228 rt. inner thigh (Active)   Therapy Setting continuous therapy 2018 11:26 AM   Dressing foam, black 2018 11:26 AM   Pressure Setting 125 mmHg 2018 11:26 AM   Sponges Inserted 1 2018 11:26 AM         WOUND DEBRIDEMENT                  Therapy Treatment    Rehabilitation Treatment Summary     Row Name 18 1126             Treatment Time/Intention    Discipline  physical therapy assistant  -KJ      Document Type  therapy note (daily note)  -KJ2      Subjective  Information  complains of;pain  -KJ2      Mode of Treatment  physical therapy  -KJ2      Patient Effort  good  -KJ2      Comment  wound vac change  -KJ2      Recorded by [KJ] Clara Wagner, CHELSIE 12/06/18 1137  [KJ2] Clara Wagner PTA 12/06/18 1153      Row Name 12/06/18 1126             Pain Scale: Numbers Pre/Post-Treatment    Pain Scale: Numbers, Post-Treatment  2/10  -KJ      Pain Location - Side  Right  -KJ      Pain Location - Orientation  proximal  -KJ      Pain Location  -- medial thight  -KJ      Recorded by [KJ] Clara Wagner, PTA 12/06/18 1153      Row Name 12/06/18 1126             Wound 12/01/18 1301 Right leg incision    Wound - Properties Group Date first assessed: 12/01/18 [LC] Time first assessed: 1301 [LC] Side: Right [LC] Location: leg [LC] Type: incision [LC] Recorded by:  [LC] Noni Jin RN 12/01/18 1301    Dressing Appearance  intact  -KJ      Base  bleeding  -KJ      Red (%), Wound Tissue Color  100  -KJ      Periwound  warm;redness  -KJ      Periwound Temperature  warm  -KJ      Periwound Skin Turgor  firm  -KJ      Wound Length (cm)  1.7 cm  -KJ      Wound Width (cm)  1.5 cm  -KJ      Wound Depth (cm)  0.7 cm  -KJ      Drainage Characteristics/Odor  serosanguineous  -KJ      Drainage Amount  none  -KJ      Care, Wound  negative pressure wound therapy  -KJ      Dressing Care, Wound  border dressing  -KJ      Periwound Care, Wound  barrier ointment applied  -KJ      Wound Output (mL)  0 30ML total  -KJ      Recorded by [KJ] Clara Wagner PTA 12/06/18 1153      Row Name 12/06/18 1126             NPWT (Negative Pressure Wound Therapy) 12/01/18 1228 rt. inner thigh    NPWT (Negative Pressure Wound Therapy) - Properties Group Placement Date: 12/01/18 [LC] Placement Time: 1228 [LC] Location: rt. inner thigh [LC] Recorded by:  [LC] Noni Jin RN 12/01/18 1243    Therapy Setting  continuous therapy  -KJ      Dressing  foam, black  -KJ      Pressure Setting  125 mmHg  -KJ       Sponges Inserted  1  -KJ      Recorded by [KJ] Clara Wagner, PTA 12/06/18 1155        User Key  (r) = Recorded By, (t) = Taken By, (c) = Cosigned By    Initials Name Effective Dates Discipline    Clara Acuna, PTA 08/02/16 -  PT    Noni Will RN 08/02/16 -  Nurse          Physical Therapy Education     Title: PT OT SLP Therapies (Done)     Topic: Physical Therapy (Done)     Point: Mobility training (Done)     Learning Progress Summary           Patient Acceptance, E, VU by SD at 12/3/2018 10:01 AM    Comment:  pt educated on NPWT procedure, POC and d/c disposition                   Point: Home exercise program (Done)     Learning Progress Summary           Patient Acceptance, E, VU by SD at 12/3/2018 10:01 AM    Comment:  pt educated on NPWT procedure, POC and d/c disposition                   Point: Body mechanics (Done)     Learning Progress Summary           Patient Acceptance, E, VU by SD at 12/3/2018 10:01 AM    Comment:  pt educated on NPWT procedure, POC and d/c disposition                   Point: Precautions (Done)     Learning Progress Summary           Patient Acceptance, E, VU by SD at 12/3/2018 10:01 AM    Comment:  pt educated on NPWT procedure, POC and d/c disposition                               User Key     Initials Effective Dates Name Provider Type Discipline    SD 08/31/18 -  Ese Pedro, PT Physical Therapist PT                  PT Recommendation and Plan  Anticipated Discharge Disposition (PT): home with home health, home with OP services  Planned Therapy Interventions (PT Eval): other (see comments)(NPWT)  Therapy Frequency (PT Clinical Impression): 3 times/wk(wound care 3x/wk; check daily)        Progress: improving      Progress: improving  Outcome Summary: Wound vac dressing completed. Measurements 1.7x1.5x.7 cm. Wound bed 100% red viable tissue.   Plan of Care Reviewed With: patient            Time Calculation  PT Charges     Row Name 12/06/18 4485             Time  Calculation    Start Time  1126  -KJ      Stop Time  1153  -KJ      Time Calculation (min)  27 min  -KJ      PT Received On  12/06/18  -KJ      PT Goal Re-Cert Due Date  12/13/18  -KJ         Time Calculation- PT    Total Timed Code Minutes- PT  27 minute(s)  -KJ        User Key  (r) = Recorded By, (t) = Taken By, (c) = Cosigned By    Initials Name Provider Type    Clara Acuna, CHELSIE Physical Therapy Assistant         Therapy Suggested Charges     Code   Minutes Charges    None             Therapy Charges for Today     Code Description Service Date Service Provider Modifiers Qty    [id number] HC PT NEG PRESS WOUND TO 50SQCM DME2 12/6/2018 Clara Wagner PTA GP 1            PT G-Codes  PT Professional Judgement Used?: Yes(w/v)  Other PT Primary Current Status (): 0 percent impaired, limited or restricted  Other PT Primary Goal Status (): 0 percent impaired, limited or restricted        Clara Wagner PTA  12/6/2018

## 2018-12-06 NOTE — PLAN OF CARE
Problem: Patient Care Overview  Goal: Plan of Care Review  Outcome: Ongoing (interventions implemented as appropriate)   12/06/18 0865   Coping/Psychosocial   Plan of Care Reviewed With patient   Plan of Care Review   Progress improving   OTHER   Outcome Summary Wound vac dressing completed. Measurements 1.7x1.5x.7 cm. Wound bed 100% red viable tissue.

## 2018-12-07 VITALS
HEIGHT: 73 IN | OXYGEN SATURATION: 95 % | BODY MASS INDEX: 27.43 KG/M2 | SYSTOLIC BLOOD PRESSURE: 140 MMHG | HEART RATE: 80 BPM | WEIGHT: 207 LBS | TEMPERATURE: 96.5 F | RESPIRATION RATE: 16 BRPM | DIASTOLIC BLOOD PRESSURE: 84 MMHG

## 2018-12-07 LAB
ALBUMIN SERPL-MCNC: 3.5 G/DL (ref 3.5–5)
ALBUMIN/GLOB SERPL: 1.1 G/DL (ref 1.1–2.5)
ALP SERPL-CCNC: 65 U/L (ref 24–120)
ALT SERPL W P-5'-P-CCNC: 42 U/L (ref 0–54)
ANION GAP SERPL CALCULATED.3IONS-SCNC: 10 MMOL/L (ref 4–13)
AST SERPL-CCNC: 32 U/L (ref 7–45)
BILIRUB SERPL-MCNC: 0.3 MG/DL (ref 0.1–1)
BUN BLD-MCNC: 11 MG/DL (ref 5–21)
BUN/CREAT SERPL: 11.1 (ref 7–25)
CALCIUM SPEC-SCNC: 10.1 MG/DL (ref 8.4–10.4)
CHLORIDE SERPL-SCNC: 102 MMOL/L (ref 98–110)
CO2 SERPL-SCNC: 27 MMOL/L (ref 24–31)
CREAT BLD-MCNC: 0.99 MG/DL (ref 0.5–1.4)
DEPRECATED RDW RBC AUTO: 41.1 FL (ref 40–54)
ERYTHROCYTE [DISTWIDTH] IN BLOOD BY AUTOMATED COUNT: 13.2 % (ref 12–15)
ERYTHROCYTE [SEDIMENTATION RATE] IN BLOOD: 15 MM/HR (ref 0–15)
GFR SERPL CREATININE-BSD FRML MDRD: 83 ML/MIN/1.73
GLOBULIN UR ELPH-MCNC: 3.1 GM/DL
GLUCOSE BLD-MCNC: 93 MG/DL (ref 70–100)
HCT VFR BLD AUTO: 42.9 % (ref 40–52)
HGB BLD-MCNC: 14.5 G/DL (ref 14–18)
MCH RBC QN AUTO: 29.1 PG (ref 28–32)
MCHC RBC AUTO-ENTMCNC: 33.8 G/DL (ref 33–36)
MCV RBC AUTO: 86.1 FL (ref 82–95)
PLATELET # BLD AUTO: 583 10*3/MM3 (ref 130–400)
PMV BLD AUTO: 10.2 FL (ref 6–12)
POTASSIUM BLD-SCNC: 4.7 MMOL/L (ref 3.5–5.3)
PROT SERPL-MCNC: 6.6 G/DL (ref 6.3–8.7)
RBC # BLD AUTO: 4.98 10*6/MM3 (ref 4.8–5.9)
SODIUM BLD-SCNC: 139 MMOL/L (ref 135–145)
WBC NRBC COR # BLD: 11.01 10*3/MM3 (ref 4.8–10.8)

## 2018-12-07 PROCEDURE — 85027 COMPLETE CBC AUTOMATED: CPT | Performed by: SPECIALIST

## 2018-12-07 PROCEDURE — 25010000002 ENOXAPARIN PER 10 MG: Performed by: SPECIALIST

## 2018-12-07 PROCEDURE — 80053 COMPREHEN METABOLIC PANEL: CPT | Performed by: SPECIALIST

## 2018-12-07 PROCEDURE — 97605 NEG PRS WND THER DME<=50SQCM: CPT

## 2018-12-07 PROCEDURE — 85651 RBC SED RATE NONAUTOMATED: CPT | Performed by: SPECIALIST

## 2018-12-07 PROCEDURE — 97530 THERAPEUTIC ACTIVITIES: CPT

## 2018-12-07 RX ORDER — SULFAMETHOXAZOLE AND TRIMETHOPRIM 800; 160 MG/1; MG/1
1 TABLET ORAL 2 TIMES DAILY
Qty: 28 TABLET | Refills: 0 | Status: SHIPPED | OUTPATIENT
Start: 2018-12-07 | End: 2018-12-21

## 2018-12-07 RX ORDER — ONDANSETRON HCL 8 MG
8 TABLET ORAL EVERY 8 HOURS PRN
Qty: 10 TABLET | Refills: 1 | OUTPATIENT
Start: 2018-12-07 | End: 2018-12-26

## 2018-12-07 RX ORDER — HYDROCODONE BITARTRATE AND ACETAMINOPHEN 7.5; 325 MG/1; MG/1
1 TABLET ORAL EVERY 4 HOURS PRN
Qty: 40 TABLET | Refills: 0 | OUTPATIENT
Start: 2018-12-07 | End: 2018-12-26

## 2018-12-07 RX ORDER — METHYLCELLULOSE 2 G/19G
2 POWDER, FOR SOLUTION ORAL DAILY
Qty: 60 G | Refills: 6 | OUTPATIENT
Start: 2018-12-07 | End: 2018-12-26

## 2018-12-07 RX ADMIN — SUCRALFATE 1 G: 1 SUSPENSION ORAL at 06:05

## 2018-12-07 RX ADMIN — ENOXAPARIN SODIUM 30 MG: 30 INJECTION SUBCUTANEOUS at 06:05

## 2018-12-07 RX ADMIN — OXYCODONE HYDROCHLORIDE AND ACETAMINOPHEN 1 TABLET: 5; 325 TABLET ORAL at 11:53

## 2018-12-07 RX ADMIN — FAMOTIDINE 20 MG: 20 TABLET, FILM COATED ORAL at 09:31

## 2018-12-07 RX ADMIN — Medication 1000 MG: at 09:31

## 2018-12-07 RX ADMIN — SUCRALFATE 1 G: 1 SUSPENSION ORAL at 11:42

## 2018-12-07 NOTE — PLAN OF CARE
Problem: Patient Care Overview  Goal: Plan of Care Review  Outcome: Ongoing (interventions implemented as appropriate)  Ivf/ iv abx cont. Tolerating diet and activity. Voids without difficulty. Wd vac in place to right inner thigh. Decreased redness and firmness of affected area. Noted red raised rash to back and sides, c/o itching. Informed md and received orders. Later noticed some rash to chest and abd, but not as much. Resting now, no scratching noted.    12/07/18 0120   Coping/Psychosocial   Plan of Care Reviewed With patient   Plan of Care Review   Progress improving     Goal: Individualization and Mutuality  Outcome: Ongoing (interventions implemented as appropriate)    Goal: Discharge Needs Assessment  Outcome: Ongoing (interventions implemented as appropriate)      Problem: Surgery Nonspecified (Adult)  Goal: Signs and Symptoms of Listed Potential Problems Will be Absent, Minimized or Managed (Surgery Nonspecified)  Outcome: Ongoing (interventions implemented as appropriate)      Problem: Wound (Includes Pressure Injury) (Adult)  Goal: Signs and Symptoms of Listed Potential Problems Will be Absent, Minimized or Managed (Wound)  Outcome: Ongoing (interventions implemented as appropriate)

## 2018-12-07 NOTE — THERAPY TREATMENT NOTE
Acute Care - Physical Therapy Treatment Note  [facility]     Patient Name: Saulo Shepard  : 1975  MRN: [id number]  Today's Date: 2018  Onset of Illness/Injury or Date of Surgery: 18  Date of Referral to PT: 18  Referring Physician: Dr. Dallas    Admit Date: 2018    Visit Dx:    ICD-10-CM ICD-9-CM   1. Abscess L02.91 682.9   2. Abscess of right thigh L02.415 682.6   3. Open wound T14.8XXA 879.8     Patient Active Problem List   Diagnosis   • Intractable acute post-traumatic headache   • Abscess of left lower extremity excluding foot   • Abscess       Therapy Treatment    Rehabilitation Treatment Summary     Row Name 18 1316             Treatment Time/Intention    Discipline  physical therapy assistant  -CW      Document Type  therapy note (daily note)  -CW2      Subjective Information  complains of;pain  -CW2      Mode of Treatment  physical therapy  -CW2      Comment  Changed wound vac - connected to Home Unit  -CW2      Existing Precautions/Restrictions  other (see comments) wound vac  -CW2      Recorded by [CW] Kayla Lan PTA 18 1317  [CW2] Kayla Lan, CHELSIE 18 1429      Row Name 18 1316             Positioning and Restraints    Pre-Treatment Position  in bed  -CW      Post Treatment Position  other  -CW      Other Position  return to room independently at nurses station  -CW      Recorded by [CW] Kayla Lan PTA 18 1429      Row Name 18 1316             Pain Assessment    Additional Documentation  Pain Scale: Numbers Pre/Post-Treatment (Group)  -CW      Recorded by [CW] Kayla Lan PTA 18 1429      Row Name 18 1316             Pain Scale: Numbers Pre/Post-Treatment    Pain Scale: Numbers, Pretreatment  0/10 - no pain  -CW      Pain Scale: Numbers, Post-Treatment  6/10  -CW      Pain Location - Side  Right  -CW      Pain Location - Orientation  proximal  -CW      Pain Location  -- medial thigh  -CW       Pain Intervention(s)  Medication (See MAR)  -CW      Recorded by [CW] Kayla Lan PTA 12/07/18 1429      Row Name 12/07/18 1316             Wound 12/01/18 1301 Right leg incision    Wound - Properties Group Date first assessed: 12/01/18 [LC] Time first assessed: 1301 [LC] Side: Right [LC] Location: leg [LC] Type: incision [LC] Recorded by:  [LC] Noni Jin RN 12/01/18 1301    Dressing Appearance  intact  -CW      Base  red/granulating  -CW      Red (%), Wound Tissue Color  100  -CW      Periwound  redness  -CW      Periwound Temperature  warm  -CW      Periwound Skin Turgor  firm  -CW      Edges  open  -CW      Wound Length (cm)  1.7 cm  -CW      Wound Width (cm)  1.5 cm  -CW      Wound Depth (cm)  0.7 cm  -CW      Drainage Amount  none  -CW      Care, Wound  negative pressure wound therapy  -CW      Dressing Care, Wound  dressing changed;foam  -CW      Periwound Care, Wound  barrier film applied  -CW      Recorded by [CW] Kayla Lan PTA 12/07/18 1429      Row Name 12/07/18 1316             NPWT (Negative Pressure Wound Therapy) 12/01/18 1228 rt. inner thigh    NPWT (Negative Pressure Wound Therapy) - Properties Group Placement Date: 12/01/18 [LC] Placement Time: 1228 [LC] Location: rt. inner thigh [LC] Recorded by:  [LC] Noni Jin RN 12/01/18 1243    Therapy Setting  continuous therapy  -CW      Dressing  foam, black  -CW      Pressure Setting  125 mmHg  -CW      Sponges Inserted  1 plus 1 for trac pad  -CW      Sponges Removed  1  -CW      Recorded by [CW] Kayla Lan PTA 12/07/18 1429        User Key  (r) = Recorded By, (t) = Taken By, (c) = Cosigned By    Initials Name Effective Dates Discipline    CW Kayla Lan PTA 06/22/15 -  PT    LC Noni Jin RN 08/02/16 -  Nurse          Wound 12/01/18 1301 Right leg incision (Active)   Dressing Appearance intact 12/7/2018  1:16 PM   Closure SHALINI 12/7/2018 10:29 AM   Base red/granulating 12/7/2018  1:16 PM   Red (%),  Wound Tissue Color 100 12/7/2018  1:16 PM   Periwound redness 12/7/2018  1:16 PM   Periwound Temperature warm 12/7/2018  1:16 PM   Periwound Skin Turgor firm 12/7/2018  1:16 PM   Edges open 12/7/2018  1:16 PM   Wound Length (cm) 1.7 cm 12/7/2018  1:16 PM   Wound Width (cm) 1.5 cm 12/7/2018  1:16 PM   Wound Depth (cm) 0.7 cm 12/7/2018  1:16 PM   Drainage Amount none 12/7/2018  1:16 PM   Care, Wound negative pressure wound therapy 12/7/2018  1:16 PM   Dressing Care, Wound dressing changed;foam 12/7/2018  1:16 PM   Periwound Care, Wound barrier film applied 12/7/2018  1:16 PM       NPWT (Negative Pressure Wound Therapy) 12/01/18 1228 rt. inner thigh (Active)   Therapy Setting continuous therapy 12/7/2018  1:16 PM   Dressing foam, black 12/7/2018  1:16 PM   Pressure Setting 125 mmHg 12/7/2018  1:16 PM   Sponges Inserted 1 12/7/2018  1:16 PM   Sponges Removed 1 12/7/2018  1:16 PM           Physical Therapy Education     Title: PT OT SLP Therapies (Resolved)     Topic: Physical Therapy (Resolved)     Point: Mobility training (Resolved)     Learning Progress Summary           Patient Acceptance, E, VU by SD at 12/3/2018 10:01 AM    Comment:  pt educated on NPWT procedure, POC and d/c disposition                   Point: Home exercise program (Resolved)     Learning Progress Summary           Patient Acceptance, E, VU by SD at 12/3/2018 10:01 AM    Comment:  pt educated on NPWT procedure, POC and d/c disposition                   Point: Body mechanics (Resolved)     Learning Progress Summary           Patient Acceptance, E, VU by SD at 12/3/2018 10:01 AM    Comment:  pt educated on NPWT procedure, POC and d/c disposition                   Point: Precautions (Resolved)     Learning Progress Summary           Patient Acceptance, E, VU by SD at 12/3/2018 10:01 AM    Comment:  pt educated on NPWT procedure, POC and d/c disposition                               User Key     Initials Effective Dates Name Provider Type Discipline     SD 08/31/18 -  Ese Pedro, PT Physical Therapist PT                PT Recommendation and Plan           Time Calculation:   PT Charges     Row Name 12/07/18 1430             Time Calculation    Start Time  1316  -CW      Stop Time  1401  -CW      Time Calculation (min)  45 min  -CW      PT Received On  12/07/18  -CW      PT Goal Re-Cert Due Date  12/13/18  -CW         Time Calculation- PT    Total Timed Code Minutes- PT  45 minute(s)  -CW         Timed Charges    04299 - PT Therapeutic Activity Minutes  15  -CW        User Key  (r) = Recorded By, (t) = Taken By, (c) = Cosigned By    Initials Name Provider Type    CW Kayla Lan PTA Physical Therapy Assistant        Therapy Suggested Charges     Code   Minutes Charges    44867 (CPT®) Hc Pt Neuromusc Re Education Ea 15 Min      82540 (CPT®) Hc Pt Ther Proc Ea 15 Min      10878 (CPT®) Hc Gait Training Ea 15 Min      92159 (CPT®) Hc Pt Therapeutic Act Ea 15 Min 15 1    42720 (CPT®) Hc Pt Manual Therapy Ea 15 Min      01663 (CPT®) Hc Pt Iontophoresis Ea 15 Min      15968 (CPT®) Hc Pt Elec Stim Ea-Per 15 Min      57046 (CPT®) Hc Pt Ultrasound Ea 15 Min      42373 (CPT®) Hc Pt Self Care/Mgmt/Train Ea 15 Min      34304 (CPT®) Hc Pt Prosthetic (S) Train Initial Encounter, Each 15 Min      89410 (CPT®) Hc Pt Orthotic(S)/Prosthetic(S) Encounter, Each 15 Min      32329 (CPT®) Hc Orthotic(S) Mgmt/Train Initial Encounter, Each 15min      Total  15 1        Therapy Charges for Today     Code Description Service Date Service Provider Modifiers Qty    92991614101 HC PT THERAPEUTIC ACT EA 15 MIN 12/7/2018 Kayla Lan, PTA GP 1    43350248972 HC PT NEG PRESS WOUND TO 50SQCM DME2 12/7/2018 Kayla Lan PTA GP 1          PT G-Codes  PT Professional Judgement Used?: Yes(w/v)  Other PT Primary Current Status (): 0 percent impaired, limited or restricted  Other PT Primary Goal Status (): 0 percent impaired, limited or restricted    Kayla DAY  Riky, PTA  12/7/2018

## 2018-12-07 NOTE — DISCHARGE SUMMARY
Date of Discharge:  12/7/2018    Discharge Diagnosis: Right upper thigh cellulitis and abscess Presenting Problem/History of Present Illness    Saulo Shepard  is a 43 y.o. male presents with a history of having a spider bite on November 18 he presented to the emergency room he was given antibiotics and returned 3 days later with an abscess that was drained.  The drain was removed 2 days later and since this time he has had continued and progressive erythema of the area and a firm hard central induration.  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  He is aware the procedure the risk and benefits and gives his informed consent for surgery.               Findings: Full-thickness excision of this spider bite and core of necrotic material from the right upper leg, full excision completed, the cavity was 2-1/2 cm long one and a half centimeters wide and 1 cm deep.  It was excised and a wound vacuum was applied.          Patient was admitted had debridement completed, and a wound vacuum placed.  The cellulitis to create a long time to fully resolve over 4 days.  The cultures grew MRSA was sensitive to vancomycin as well as Bactrim.  We checked on hospital day #3 with an ultrasound to assure no undrained abscess existed as he was taking a inordinate amount of time for resolution but over the past 24 hours it has improved greatly it is much less uncomfortable there continues to be a good granulation tissue beginning and much less pain.  Currently he will be discharged to follow-up with me in 2 weeks for follow-up sooner if questions or problems arise.  Wound Culture - Wound, Thigh, Right   Order: [id number]   Collected:  12/1/2018 12:20 Status:  Final result   Visible to patient:  No (Not Released) Dx:  Abscess of right thigh   Specimen  Information: Thigh, Right; Wound        Wound Culture Light growth (2+) Staphylococcus aureus, MRSA Abnormal       Methicillin resistant Staphylococcus aureus, Patient may be an isolation risk.   BETA LACTAMASE Positive              Gram Stain  Rare (1+) WBCs seen      No organisms seen                    Tissue Pathology Exam: [id number]   Order: [id number]   Collected:  12/1/2018 12:32 Status:  Final result   Visible to patient:  No (Not Released) Dx:  Abscess of right thigh   Component    Final Diagnosis   Skin, right inner thigh, excision:  Skin ulceration with mixed inflammation and necrosis extending into the deep subcutis.   Electronically signed by Evan Whitmore MD on 12/4/2018 at 1401   Gross Description              Procedures Performed  Procedure(s):  INCISION AND DRAINAGE OF RIGHT UPPER INNER THIGH, PLACEMENT OF WOUND VAC       Consults:   Consults     No orders found from 11/2/2018 to 12/2/2018.          Pertinent Test Results:   Lab Results (last 24 hours)     Procedure Component Value Units Date/Time    Sedimentation Rate [[id number]]  (Normal) Collected:  12/07/18 0516    Specimen:  Blood Updated:  12/07/18 0635     Sed Rate 15 mm/hr     Comprehensive Metabolic Panel [[id number]] Collected:  12/07/18 0516    Specimen:  Blood Updated:  12/07/18 0623     Glucose 93 mg/dL      BUN 11 mg/dL      Creatinine 0.99 mg/dL      Sodium 139 mmol/L      Potassium 4.7 mmol/L      Chloride 102 mmol/L      CO2 27.0 mmol/L      Calcium 10.1 mg/dL      Total Protein 6.6 g/dL      Albumin 3.50 g/dL      ALT (SGPT) 42 U/L      AST (SGOT) 32 U/L      Alkaline Phosphatase 65 U/L      Total Bilirubin 0.3 mg/dL      eGFR Non African Amer 83 mL/min/1.73      Globulin 3.1 gm/dL      A/G Ratio 1.1 g/dL      BUN/Creatinine Ratio 11.1     Anion Gap 10.0 mmol/L     CBC (No Diff) [[id number]]  (Abnormal) Collected:  12/07/18 0516    Specimen:  Blood Updated:  12/07/18 0606     WBC 11.01 10*3/mm3      RBC 4.98 10*6/mm3       Hemoglobin 14.5 g/dL      Hematocrit 42.9 %      MCV 86.1 fL      MCH 29.1 pg      MCHC 33.8 g/dL      RDW 13.2 %      RDW-SD 41.1 fl      MPV 10.2 fL      Platelets 583 10*3/mm3     Blood Culture With JENNIFER - Blood, Arm, Right [[id number]] Collected:  12/01/18 0841    Specimen:  Blood from Arm, Right Updated:  12/06/18 1131     Blood Culture No growth at 5 days    Blood Culture With JENNIFER - Blood, Hand, Right [[id number]] Collected:  12/01/18 0841    Specimen:  Blood from Hand, Right Updated:  12/06/18 1131     Blood Culture No growth at 5 days            Condition on Discharge: Good condition  Discharge Disposition discharged to home      Discharge Medications     Discharge Medications      ASK your doctor about these medications      Instructions Start Date   amitriptyline 50 MG tablet  Commonly known as:  ELAVIL   50 mg, Oral, Nightly PRN      ketorolac 10 MG tablet  Commonly known as:  TORADOL   10 mg, Oral, Every 6 Hours PRN      sulfamethoxazole-trimethoprim 800-160 MG per tablet  Commonly known as:  BACTRIM DS,SEPTRA DS   1 tablet, Oral, 2 Times Daily, 10 day therapy. Start on 11/23.              Discharge Diet:  regular diet    Activity at Discharge:  wound VAC will limit some activity once the wound VAC is off his return to normal    Follow-up Appointments  Future Appointments   Date Time Provider Department Center   2/14/2019  8:40 AM Lynnette Torres APRN [facility] None         Test Results Pending at Discharge       René Dallas MD  12/07/18  8:02 AM    Time: Time spent at discharge 30 minutes     EMR Dragon/Transcription disclaimer: Much of this encounter note is an electronic transcription/translation of spoken language to printed text. The electronic translation of spoken language may permit erroneous, or at times, nonsensical words or phrases to be inadvertently transcribed; although I have reviewed the note for such errors, some may still exist.

## 2018-12-07 NOTE — THERAPY DISCHARGE NOTE
Acute Care - Physical Therapy Discharge Summary  [facility]       Patient Name: Saulo Shepard  : 1975  MRN: [id number]    Today's Date: 2018  Onset of Illness/Injury or Date of Surgery: 18    Date of Referral to PT: 18  Referring Physician: Dr. Dallas      Admit Date: 2018      PT Recommendation and Plan    Visit Dx:    ICD-10-CM ICD-9-CM   1. Abscess L02.91 682.9   2. Abscess of right thigh L02.415 682.6   3. Open wound T14.8XXA 879.8           PT Charges     Row Name 18 1430             Time Calculation    Start Time  1316  -CW      Stop Time  1401  -CW      Time Calculation (min)  45 min  -CW      PT Received On  18  -CW      PT Goal Re-Cert Due Date  18  -CW         Time Calculation- PT    Total Timed Code Minutes- PT  45 minute(s)  -CW         Timed Charges    00482 - PT Therapeutic Activity Minutes  15  -CW        User Key  (r) = Recorded By, (t) = Taken By, (c) = Cosigned By    Initials Name Provider Type    Kayla Murdock, PTA Physical Therapy Assistant        Therapy Suggested Charges     Code   Minutes Charges    42712 (CPT®) Hc Pt Neuromusc Re Education Ea 15 Min      01776 (CPT®) Hc Pt Ther Proc Ea 15 Min      19764 (CPT®) Hc Gait Training Ea 15 Min      20510 (CPT®) Hc Pt Therapeutic Act Ea 15 Min 15 1    95497 (CPT®) Hc Pt Manual Therapy Ea 15 Min      02583 (CPT®) Hc Pt Iontophoresis Ea 15 Min      43803 (CPT®) Hc Pt Elec Stim Ea-Per 15 Min      28615 (CPT®) Hc Pt Ultrasound Ea 15 Min      61647 (CPT®) Hc Pt Self Care/Mgmt/Train Ea 15 Min      62214 (CPT®) Hc Pt Prosthetic (S) Train Initial Encounter, Each 15 Min      80467 (CPT®) Hc Pt Orthotic(S)/Prosthetic(S) Encounter, Each 15 Min      34948 (CPT®) Hc Orthotic(S) Mgmt/Train Initial Encounter, Each 15min      Total  15 1            Row Name 18 0830      18  1557               Physical Therapy Goals     Wound Care Goal Selection (PT)  wound care, PT goal 1;wound care, PT goal  2;wound care, PT goal 3  -SD       Additional Documentation  Wound Care Goal Selection (PT) (Row)  -SD  goal not met                 Row Name 12/03/18 0830  12/07/18   1557               Wound Care Goal 1 (PT)     Wound Care Goal 1 (PT)  wound length will decrease 1 cm  -SD       Time Frame (Wound Care Goal 1, PT)  by discharge  -SD       Progress/Outcome (Wound Care Goal 1, PT)  goal ongoing  -SD  goal not met                Row Name 12/03/18 0830  12/07/18   1557               Wound Care Goal 2 (PT)     Wound Care Goal 2 (PT)  wound width will decrease 1 cm  -SD       Time Frame (Wound Care Goal 2, PT)  by discharge  -SD       Progress/Outcome (Wound Care Goal 2, PT)  goal ongoing  -SD  goal  Not met                Row Name 12/03/18 0830     12/7/2018                 Wound Care Goal 3 (PT)     Wound Care Goal 3 (PT)  wound drainage will dec from small to scant or none  -SD       Time Frame (Wound Care Goal 3, PT)  by discharge  -SD       Progress/Outcome (Wound Care Goal 3, PT)  goal ongoing  -SD Goal  Not met                 PT Discharge Summary  Anticipated Discharge Disposition (PT): home  Reason for Discharge: Discharge from facility  Outcomes Achieved: Unable to make functional progress toward goals at this time  Discharge Destination: Home      Cheyanne Kamara, PTA   12/7/2018

## 2018-12-07 NOTE — PROGRESS NOTES
Continued Stay Note  [facility]     Patient Name: Saulo Shepard  MRN: [id number]  Today's Date: 12/7/2018    Admit Date: 12/1/2018    Discharge Plan     Row Name 12/07/18 1137       Plan    Plan  Home    Patient/Family in Agreement with Plan  yes    Final Discharge Disposition Code  01 - home or self-care    Final Note  Pt is going home today. Wound vac is in pt's room and appointments for outpatient dressing changes at [facility] [phone] starting Monday, Dec 10 at 1315.         Discharge Codes    No documentation.       Expected Discharge Date and Time     Expected Discharge Date Expected Discharge Time    Dec 7, 2018             TESHA Thacker

## 2018-12-08 ENCOUNTER — READMISSION MANAGEMENT (OUTPATIENT)
Dept: CALL CENTER | Facility: HOSPITAL | Age: 43
End: 2018-12-08

## 2018-12-08 NOTE — OUTREACH NOTE
Prep Survey      Responses   Facility patient discharged from?  [place]   Is patient eligible?  Yes   Discharge diagnosis  Right upper thigh cellulitis and abscess, INCISION AND DRAINAGE OF RIGHT UPPER INNER THIGH, PLACEMENT OF WOUND VAC   Does the patient have one of the following disease processes/diagnoses(primary or secondary)?  General Surgery   Does the patient have Home health ordered?  No   Is there a DME ordered?  No   Prep survey completed?  Yes          Renea Woodruff RN

## 2018-12-10 ENCOUNTER — OFFICE VISIT (OUTPATIENT)
Dept: WOUND CARE | Facility: HOSPITAL | Age: 43
End: 2018-12-10
Attending: NURSE PRACTITIONER

## 2018-12-10 PROCEDURE — G0463 HOSPITAL OUTPT CLINIC VISIT: HCPCS

## 2018-12-11 ENCOUNTER — READMISSION MANAGEMENT (OUTPATIENT)
Dept: CALL CENTER | Facility: HOSPITAL | Age: 43
End: 2018-12-11

## 2018-12-11 NOTE — OUTREACH NOTE
General Surgery Week 1 Survey      Responses   Facility patient discharged from?  [place]   Does the patient have one of the following disease processes/diagnoses(primary or secondary)?  General Surgery   Is there a successful TCM telephone encounter documented?  No   Week 1 attempt successful?  Yes   Call start time  1109   Call end time  1111   Discharge diagnosis  Right upper thigh cellulitis and abscess, INCISION AND DRAINAGE OF RIGHT UPPER INNER THIGH, PLACEMENT OF WOUND VAC   Meds reviewed with patient/caregiver?  Yes   Is the patient having any side effects they believe may be caused by any medication additions or changes?  No   Does the patient have all medications related to this admission filled (includes all antibiotics, pain medications, etc.)  Yes   Is the patient taking all medications as directed (includes completed medication regime)?  Yes   Does the patient have a follow up appointment scheduled with their surgeon?  Yes   Has the patient kept scheduled appointments due by today?  Yes   Psychosocial issues?  No   Did the patient receive a copy of their discharge instructions?  Yes   Nursing interventions  Reviewed instructions with patient   What is the patient's perception of their health status since discharge?  Improving   Nursing interventions  Nurse provided patient education   Is the patient /caregiver able to teach back basic post-op care?  Continue use of incentive spirometry at least 1 week post discharge, Practice 'cough and deep breath', Lifting as instructed by MD in discharge instructions, Keep incision areas clean,dry and protected   Is the patient/caregiver able to teach back signs and symptoms of incisional infection?  Fever, Pus or odor from incision, Incisional warmth, Increased drainage or bleeding, Increased redness, swelling or pain at the incisonal site   Is the patient/caregiver able to teach back steps to recovery at home?  Set small, achievable goals for return to baseline  health, Rest and rebuild strength, gradually increase activity, Make a list of questions for surgeon's appointment   Is the patient/caregiver able to teach back the hierarchy of who to call/visit for symptoms/problems? PCP, Specialist, Home health nurse, Urgent Care, ED, 911  Yes   Week 1 call completed?  Yes          Prudence Rivera, RN

## 2018-12-17 ENCOUNTER — OFFICE VISIT (OUTPATIENT)
Dept: WOUND CARE | Facility: HOSPITAL | Age: 43
End: 2018-12-17
Attending: NURSE PRACTITIONER

## 2018-12-18 ENCOUNTER — READMISSION MANAGEMENT (OUTPATIENT)
Dept: CALL CENTER | Facility: HOSPITAL | Age: 43
End: 2018-12-18

## 2018-12-18 NOTE — OUTREACH NOTE
General Surgery Week 2 Survey      Responses   Facility patient discharged from?  [place]   Does the patient have one of the following disease processes/diagnoses(primary or secondary)?  General Surgery   Week 2 attempt successful?  No   Unsuccessful attempts  Attempt 1          Roberta Owens RN

## 2018-12-19 ENCOUNTER — READMISSION MANAGEMENT (OUTPATIENT)
Dept: CALL CENTER | Facility: HOSPITAL | Age: 43
End: 2018-12-19

## 2018-12-19 NOTE — OUTREACH NOTE
General Surgery Week 2 Survey      Responses   Facility patient discharged from?  [place]   Does the patient have one of the following disease processes/diagnoses(primary or secondary)?  General Surgery   Week 2 attempt successful?  Yes   Call start time  1137   Rescheduled  Rescheduled-pt requested [He is at the wound clinic office call back tommorow. ]   Call end time  1137   Discharge diagnosis  Right upper thigh cellulitis and abscess, INCISION AND DRAINAGE OF RIGHT UPPER INNER THIGH, PLACEMENT OF WOUND VAC          Roberta Owens RN

## 2018-12-20 ENCOUNTER — READMISSION MANAGEMENT (OUTPATIENT)
Dept: CALL CENTER | Facility: HOSPITAL | Age: 43
End: 2018-12-20

## 2018-12-20 NOTE — OUTREACH NOTE
General Surgery Week 2 Survey      Responses   Facility patient discharged from?  [place]   Does the patient have one of the following disease processes/diagnoses(primary or secondary)?  General Surgery   Week 2 attempt successful?  Yes   Call start time  0846   Call end time  0849   Discharge diagnosis  Right upper thigh cellulitis and abscess, INCISION AND DRAINAGE OF RIGHT UPPER INNER THIGH, PLACEMENT OF WOUND VAC   Is patient permission given to speak with other caregiver?  No   Meds reviewed with patient/caregiver?  Yes   Is the patient taking all medications as directed (includes completed medication regime)?  Yes   Has the patient kept scheduled appointments due by today?  Yes   What is the patient's perception of their health status since discharge?  Improving   Week 2 call completed?  Yes   Wrap up additional comments  Pt feels confident wound is doing better.           Soco Zuluaga, RN

## 2018-12-25 ENCOUNTER — HOSPITAL ENCOUNTER (EMERGENCY)
Facility: HOSPITAL | Age: 43
Discharge: HOME | End: 2018-12-26
Attending: EMERGENCY MEDICINE | Admitting: EMERGENCY MEDICINE

## 2018-12-25 VITALS
RESPIRATION RATE: 17 BRPM | SYSTOLIC BLOOD PRESSURE: 144 MMHG | OXYGEN SATURATION: 98 % | HEIGHT: 72 IN | HEART RATE: 91 BPM | DIASTOLIC BLOOD PRESSURE: 83 MMHG | WEIGHT: 210.8 LBS | TEMPERATURE: 97.8 F | BODY MASS INDEX: 28.55 KG/M2

## 2018-12-25 DIAGNOSIS — N49.9 CELLULITIS OF MALE GENITALIA: Primary | ICD-10-CM

## 2018-12-25 PROCEDURE — 99282 EMERGENCY DEPT VISIT SF MDM: CPT

## 2018-12-26 ENCOUNTER — OFFICE VISIT (OUTPATIENT)
Dept: WOUND CARE | Facility: HOSPITAL | Age: 43
End: 2018-12-26
Attending: SURGERY

## 2018-12-26 PROCEDURE — G0463 HOSPITAL OUTPT CLINIC VISIT: HCPCS

## 2018-12-26 RX ORDER — CEPHALEXIN 500 MG/1
500 CAPSULE ORAL 4 TIMES DAILY
Qty: 40 CAPSULE | Refills: 0 | Status: SHIPPED | OUTPATIENT
Start: 2018-12-26 | End: 2018-12-27

## 2018-12-26 RX ORDER — HYDROCODONE BITARTRATE AND ACETAMINOPHEN 5; 325 MG/1; MG/1
1 TABLET ORAL 4 TIMES DAILY PRN
Qty: 8 TABLET | Refills: 0 | Status: SHIPPED | OUTPATIENT
Start: 2018-12-26 | End: 2019-02-03 | Stop reason: SDUPTHER

## 2018-12-26 RX ORDER — ONDANSETRON 4 MG/1
4 TABLET, ORALLY DISINTEGRATING ORAL 4 TIMES DAILY PRN
Qty: 15 TABLET | Refills: 0 | OUTPATIENT
Start: 2018-12-26 | End: 2019-03-27 | Stop reason: HOSPADM

## 2018-12-26 RX ORDER — SULFAMETHOXAZOLE AND TRIMETHOPRIM 800; 160 MG/1; MG/1
1 TABLET ORAL 2 TIMES DAILY
Qty: 20 TABLET | Refills: 0 | OUTPATIENT
Start: 2018-12-26 | End: 2018-12-27

## 2018-12-27 ENCOUNTER — HOSPITAL ENCOUNTER (OUTPATIENT)
Dept: ULTRASOUND IMAGING | Facility: HOSPITAL | Age: 43
Discharge: HOME | End: 2018-12-27
Admitting: NURSE PRACTITIONER

## 2018-12-27 PROCEDURE — 85025 COMPLETE CBC W/AUTO DIFF WBC: CPT | Performed by: NURSE PRACTITIONER

## 2018-12-27 PROCEDURE — 76870 US EXAM SCROTUM: CPT

## 2018-12-29 ENCOUNTER — READMISSION MANAGEMENT (OUTPATIENT)
Dept: CALL CENTER | Facility: HOSPITAL | Age: 43
End: 2018-12-29

## 2018-12-29 NOTE — OUTREACH NOTE
General Surgery Week 3 Survey      Responses   Facility patient discharged from?  [facility]   Does the patient have one of the following disease processes/diagnoses(primary or secondary)?  General Surgery   Call end time  1000   Meds reviewed with patient/caregiver?  Yes   Is the patient taking all medications as directed (includes completed medication regime)?  Yes   Has the patient kept scheduled appointments due by today?  Yes   What is the patient's perception of their health status since discharge?  Improving   Week 3 call completed?  Yes          Odalis Perla RN

## 2018-12-31 ENCOUNTER — OFFICE VISIT (OUTPATIENT)
Dept: WOUND CARE | Facility: HOSPITAL | Age: 43
End: 2018-12-31
Attending: NURSE PRACTITIONER

## 2018-12-31 PROCEDURE — 17250 CHEM CAUT OF GRANLTJ TISSUE: CPT

## 2019-01-07 ENCOUNTER — OFFICE VISIT (OUTPATIENT)
Dept: WOUND CARE | Facility: HOSPITAL | Age: 44
End: 2019-01-07
Attending: NURSE PRACTITIONER

## 2019-01-07 ENCOUNTER — READMISSION MANAGEMENT (OUTPATIENT)
Dept: CALL CENTER | Facility: HOSPITAL | Age: 44
End: 2019-01-07

## 2019-01-07 PROCEDURE — 97602 WOUND(S) CARE NON-SELECTIVE: CPT

## 2019-01-07 NOTE — OUTREACH NOTE
"General Surgery Week 4 Survey      Responses   Facility patient discharged from?  [facility]   Does the patient have one of the following disease processes/diagnoses(primary or secondary)?  General Surgery   Week 4 attempt successful?  Yes   Call start time  1200   Call end time  1202   Discharge diagnosis  Right upper thigh cellulitis and abscess, INCISION AND DRAINAGE OF RIGHT UPPER INNER THIGH, PLACEMENT OF WOUND VAC   Is the patient taking all medications as directed (includes completed medication regime)?  Yes   Has the patient kept scheduled appointments due by today?  Yes   Comments  pt f/u weekly with  wound clinic   Is the patient still receiving Home Health Services?  No   Psychosocial issues?  No   What is the patient's perception of their health status since discharge?  Improving   Nursing interventions  Nurse provided patient education   Is the patient/caregiver able to teach back steps to recovery at home?  Set small, achievable goals for return to baseline health, Rest and rebuild strength, gradually increase activity   Is the patient/caregiver able to teach back the hierarchy of who to call/visit for symptoms/problems? PCP, Specialist, Home health nurse, Urgent Care, ED, 911  Yes   Additional teach back comments  Pt states he is \"doing great\" and area has healed well.   Week 4 call completed?  Yes   Would the patient like one additional call?  No   Graduated  Yes   Did the patient feel the follow up calls were helpful during their recovery period?  Yes   Was the number of calls appropriate?  Yes          Kathy Dimas RN  "

## 2019-01-14 ENCOUNTER — OFFICE VISIT (OUTPATIENT)
Dept: WOUND CARE | Facility: HOSPITAL | Age: 44
End: 2019-01-14
Attending: NURSE PRACTITIONER

## 2019-01-14 PROCEDURE — 17250 CHEM CAUT OF GRANLTJ TISSUE: CPT

## 2019-01-25 ENCOUNTER — OFFICE VISIT (OUTPATIENT)
Dept: WOUND CARE | Facility: HOSPITAL | Age: 44
End: 2019-01-25
Attending: NURSE PRACTITIONER

## 2019-01-25 PROCEDURE — G0463 HOSPITAL OUTPT CLINIC VISIT: HCPCS

## 2019-02-01 ENCOUNTER — OFFICE VISIT (OUTPATIENT)
Dept: WOUND CARE | Facility: HOSPITAL | Age: 44
End: 2019-02-01
Attending: NURSE PRACTITIONER

## 2019-02-01 PROCEDURE — 17250 CHEM CAUT OF GRANLTJ TISSUE: CPT

## 2019-02-01 PROCEDURE — G0463 HOSPITAL OUTPT CLINIC VISIT: HCPCS

## 2019-02-03 ENCOUNTER — HOSPITAL ENCOUNTER (EMERGENCY)
Facility: HOSPITAL | Age: 44
Discharge: HOME | End: 2019-02-03
Admitting: EMERGENCY MEDICINE

## 2019-02-03 VITALS
WEIGHT: 202.2 LBS | TEMPERATURE: 98.3 F | SYSTOLIC BLOOD PRESSURE: 113 MMHG | BODY MASS INDEX: 26.8 KG/M2 | OXYGEN SATURATION: 98 % | HEIGHT: 73 IN | RESPIRATION RATE: 16 BRPM | HEART RATE: 96 BPM | DIASTOLIC BLOOD PRESSURE: 70 MMHG

## 2019-02-03 DIAGNOSIS — L02.415 ABSCESS OF RIGHT THIGH: Primary | ICD-10-CM

## 2019-02-03 LAB
ALBUMIN SERPL-MCNC: 4.3 G/DL (ref 3.5–5)
ALBUMIN/GLOB SERPL: 1.2 G/DL (ref 1.1–2.5)
ALP SERPL-CCNC: 70 U/L (ref 24–120)
ALT SERPL W P-5'-P-CCNC: 29 U/L (ref 0–54)
ANION GAP SERPL CALCULATED.3IONS-SCNC: 10 MMOL/L (ref 4–13)
AST SERPL-CCNC: 32 U/L (ref 7–45)
BASOPHILS # BLD AUTO: 0.1 10*3/MM3 (ref 0–0.2)
BASOPHILS NFR BLD AUTO: 0.7 % (ref 0–2)
BILIRUB SERPL-MCNC: 0.7 MG/DL (ref 0.1–1)
BUN BLD-MCNC: 12 MG/DL (ref 5–21)
BUN/CREAT SERPL: 11.9 (ref 7–25)
CALCIUM SPEC-SCNC: 10.1 MG/DL (ref 8.4–10.4)
CHLORIDE SERPL-SCNC: 100 MMOL/L (ref 98–110)
CO2 SERPL-SCNC: 25 MMOL/L (ref 24–31)
CREAT BLD-MCNC: 1.01 MG/DL (ref 0.5–1.4)
D-LACTATE SERPL-SCNC: 1 MMOL/L (ref 0.5–2)
DEPRECATED RDW RBC AUTO: 43.9 FL (ref 40–54)
EOSINOPHIL # BLD AUTO: 0 10*3/MM3 (ref 0–0.7)
EOSINOPHIL NFR BLD AUTO: 0 % (ref 0–4)
ERYTHROCYTE [DISTWIDTH] IN BLOOD BY AUTOMATED COUNT: 14.6 % (ref 12–15)
GFR SERPL CREATININE-BSD FRML MDRD: 81 ML/MIN/1.73
GLOBULIN UR ELPH-MCNC: 3.5 GM/DL
GLUCOSE BLD-MCNC: 103 MG/DL (ref 70–100)
HCT VFR BLD AUTO: 41.8 % (ref 40–52)
HGB BLD-MCNC: 14.6 G/DL (ref 14–18)
IMM GRANULOCYTES # BLD AUTO: 0.05 10*3/MM3 (ref 0–0.03)
IMM GRANULOCYTES NFR BLD AUTO: 0.4 % (ref 0–5)
LYMPHOCYTES # BLD AUTO: 2.41 10*3/MM3 (ref 0.72–4.86)
LYMPHOCYTES NFR BLD AUTO: 17.7 % (ref 15–45)
MCH RBC QN AUTO: 28.9 PG (ref 28–32)
MCHC RBC AUTO-ENTMCNC: 34.9 G/DL (ref 33–36)
MCV RBC AUTO: 82.8 FL (ref 82–95)
MONOCYTES # BLD AUTO: 2.92 10*3/MM3 (ref 0.19–1.3)
MONOCYTES NFR BLD AUTO: 21.4 % (ref 4–12)
NEUTROPHILS # BLD AUTO: 8.14 10*3/MM3 (ref 1.87–8.4)
NEUTROPHILS NFR BLD AUTO: 59.8 % (ref 39–78)
NRBC BLD AUTO-RTO: 0 /100 WBC (ref 0–0)
PLATELET # BLD AUTO: 297 10*3/MM3 (ref 130–400)
PMV BLD AUTO: 11.4 FL (ref 6–12)
POTASSIUM BLD-SCNC: 4.1 MMOL/L (ref 3.5–5.3)
PROT SERPL-MCNC: 7.8 G/DL (ref 6.3–8.7)
RBC # BLD AUTO: 5.05 10*6/MM3 (ref 4.8–5.9)
SODIUM BLD-SCNC: 135 MMOL/L (ref 135–145)
WBC NRBC COR # BLD: 13.62 10*3/MM3 (ref 4.8–10.8)

## 2019-02-03 PROCEDURE — 80053 COMPREHEN METABOLIC PANEL: CPT | Performed by: NURSE PRACTITIONER

## 2019-02-03 PROCEDURE — 87185 SC STD ENZYME DETCJ PER NZM: CPT | Performed by: NURSE PRACTITIONER

## 2019-02-03 PROCEDURE — 87205 SMEAR GRAM STAIN: CPT | Performed by: NURSE PRACTITIONER

## 2019-02-03 PROCEDURE — 87186 SC STD MICRODIL/AGAR DIL: CPT | Performed by: NURSE PRACTITIONER

## 2019-02-03 PROCEDURE — 87040 BLOOD CULTURE FOR BACTERIA: CPT | Performed by: NURSE PRACTITIONER

## 2019-02-03 PROCEDURE — 99283 EMERGENCY DEPT VISIT LOW MDM: CPT

## 2019-02-03 PROCEDURE — 83605 ASSAY OF LACTIC ACID: CPT | Performed by: NURSE PRACTITIONER

## 2019-02-03 PROCEDURE — 96365 THER/PROPH/DIAG IV INF INIT: CPT

## 2019-02-03 PROCEDURE — 87070 CULTURE OTHR SPECIMN AEROBIC: CPT | Performed by: NURSE PRACTITIONER

## 2019-02-03 PROCEDURE — 85025 COMPLETE CBC W/AUTO DIFF WBC: CPT | Performed by: NURSE PRACTITIONER

## 2019-02-03 PROCEDURE — 96375 TX/PRO/DX INJ NEW DRUG ADDON: CPT

## 2019-02-03 PROCEDURE — 87147 CULTURE TYPE IMMUNOLOGIC: CPT | Performed by: NURSE PRACTITIONER

## 2019-02-03 RX ORDER — MEPERIDINE HYDROCHLORIDE 25 MG/ML
25 INJECTION INTRAMUSCULAR; INTRAVENOUS; SUBCUTANEOUS ONCE
Status: COMPLETED | OUTPATIENT
Start: 2019-02-03 | End: 2019-02-03

## 2019-02-03 RX ORDER — CLINDAMYCIN PHOSPHATE 600 MG/50ML
600 INJECTION INTRAVENOUS ONCE
Status: COMPLETED | OUTPATIENT
Start: 2019-02-03 | End: 2019-02-03

## 2019-02-03 RX ORDER — LIDOCAINE HYDROCHLORIDE AND EPINEPHRINE 10; 10 MG/ML; UG/ML
10 INJECTION, SOLUTION INFILTRATION; PERINEURAL ONCE
Status: COMPLETED | OUTPATIENT
Start: 2019-02-03 | End: 2019-02-03

## 2019-02-03 RX ORDER — CLINDAMYCIN HYDROCHLORIDE 300 MG/1
300 CAPSULE ORAL 3 TIMES DAILY
Qty: 30 CAPSULE | Refills: 0 | Status: SHIPPED | OUTPATIENT
Start: 2019-02-03 | End: 2019-02-13

## 2019-02-03 RX ORDER — HYDROCODONE BITARTRATE AND ACETAMINOPHEN 5; 325 MG/1; MG/1
1 TABLET ORAL EVERY 6 HOURS PRN
Qty: 8 TABLET | Refills: 0 | OUTPATIENT
Start: 2019-02-03 | End: 2019-02-06

## 2019-02-03 RX ORDER — SULFAMETHOXAZOLE AND TRIMETHOPRIM 400; 80 MG/1; MG/1
1 TABLET ORAL 2 TIMES DAILY
COMMUNITY
Start: 2019-02-01 | End: 2019-02-03

## 2019-02-03 RX ORDER — SODIUM CHLORIDE 0.9 % (FLUSH) 0.9 %
10 SYRINGE (ML) INJECTION AS NEEDED
Status: DISCONTINUED | OUTPATIENT
Start: 2019-02-03 | End: 2019-02-03 | Stop reason: HOSPADM

## 2019-02-03 RX ADMIN — CLINDAMYCIN IN 5 PERCENT DEXTROSE 600 MG: 12 INJECTION, SOLUTION INTRAVENOUS at 09:04

## 2019-02-03 RX ADMIN — LIDOCAINE HYDROCHLORIDE,EPINEPHRINE BITARTRATE 10 ML: 10; .01 INJECTION, SOLUTION INFILTRATION; PERINEURAL at 09:40

## 2019-02-03 RX ADMIN — MEPERIDINE HYDROCHLORIDE 25 MG: 25 INJECTION INTRAMUSCULAR; INTRAVENOUS; SUBCUTANEOUS at 08:58

## 2019-02-03 NOTE — DISCHARGE INSTRUCTIONS
Follow up with wound care clinic tomorrow. If you are unable to be seen within the next  48 hours, return to the ED for evaluation.  Keep area clean and dry, change dressing as needed for drainage.  Do not allow her to Stoop cover drain holes and rotate the drainage tubing with clean dry gauze as directed.

## 2019-02-03 NOTE — ED PROVIDER NOTES
Subjective   Mr. Shepard is a 43 year old male patient who presents today with complaints of a new abscess on the lateral aspect of his right thigh.  Patient is currently being seen in wound care for a abscess of the upper medial right thigh that has previous debrided by Dr. Dallas and undergone wound vac therapy.  Patient states that he first noticed the red area a couple of weeks ago while he was vacationing in [place].  Patient was seen in wound care center  2 days ago where picture was taken and he was restarted on Bactrim.  Patient states the area has continued to spread and is larger and size.  He reports having fever last night, controlled by Tylenol.  He states area is very sensitive and tender.  He denies any drainage from the area.  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         History provided by:  Patient   used: No        Review of Systems   Constitutional: Negative for chills, fatigue and fever.   HENT: Negative for congestion, rhinorrhea, sore throat and voice change.    Eyes: Negative for discharge and visual disturbance.   Respiratory: Negative for cough, shortness of breath and wheezing.    Cardiovascular: Negative for chest pain.   Gastrointestinal: Negative for abdominal pain, diarrhea, nausea and vomiting.   Endocrine: Negative.    Genitourinary: Negative.  Negative for decreased urine volume and difficulty urinating.   Musculoskeletal: Negative.  Negative for neck pain.   Skin: Positive for wound (right thigh abscess).   Allergic/Immunologic: Negative.    Neurological: Negative.  Negative for weakness and headaches.   Hematological: Negative.    Psychiatric/Behavioral: Negative.        Past Medical History:   Diagnosis Date   • Back pain    • Fracture of lumbar spine (CMS/HCC) 2002 and 2015    lower lumbar 2002; L1 2015   • Head injury    • Neck pain        No Known Allergies    Past Surgical History:   Procedure Laterality Date   • FACIAL FRACTURE SURGERY     •  "INCISION AND DRAINAGE LEG Right 12/1/2018    Procedure: INCISION AND DRAINAGE OF RIGHT UPPER INNER THIGH, PLACEMENT OF WOUND VAC;  Surgeon: René Dallas MD;  Location: [facility] OR;  Service: General   • SPLENECTOMY         Family History   Problem Relation Age of Onset   • Fibromyalgia Mother    • Lung cancer Father        Social History     Socioeconomic History   • Marital status: Legally      Spouse name: Not on file   • Number of children: Not on file   • Years of education: Not on file   • Highest education level: Not on file   Tobacco Use   • Smoking status: Never Smoker   • Smokeless tobacco: Never Used   Substance and Sexual Activity   • Alcohol use: No   • Drug use: No   • Sexual activity: Yes     Partners: Female       /70   Pulse 96   Temp 98.3 °F (36.8 °C) (Oral)   Resp 16   Ht 185.4 cm (73\")   Wt 91.7 kg (202 lb 3.2 oz)   SpO2 98%   BMI 26.68 kg/m²       Objective   Physical Exam   Constitutional: He is oriented to person, place, and time. He appears well-developed and well-nourished.   HENT:   Head: Normocephalic and atraumatic.   Right Ear: External ear normal.   Left Ear: External ear normal.   Nose: Nose normal.   Mouth/Throat: Oropharynx is clear and moist.   Eyes: EOM are normal. Pupils are equal, round, and reactive to light.   Neck: Normal range of motion. Neck supple.   Cardiovascular: Normal rate and regular rhythm.   Pulmonary/Chest: Effort normal and breath sounds normal.   Abdominal: Soft. Bowel sounds are normal.   Musculoskeletal: Normal range of motion.   Neurological: He is alert and oriented to person, place, and time.   Skin: Skin is warm and dry. Lesion (abscess lesion with delio wound erythema and bianka central apex) noted.        Nursing note and vitals reviewed.      Incision & Drainage  Date/Time: 2/3/2019 10:15 AM  Performed by: Sweta Velez APRN  Authorized by: Sweta Velez APRN     Consent:     Consent obtained:  Verbal    Consent " given by:  Patient    Risks discussed:  Incomplete drainage    Alternatives discussed:  Delayed treatment  Location:     Type:  Abscess    Size:  3.0 x 4.0    Location:  Lower extremity    Lower extremity location:  Leg    Leg location:  R upper leg  Pre-procedure details:     Skin preparation:  Betadine  Anesthesia (see MAR for exact dosages):     Anesthesia method:  Local infiltration    Local anesthetic:  Lidocaine 1% WITH epi  Procedure type:     Complexity:  Simple  Procedure details:     Needle aspiration: no      Incision types:  Single straight    Incision depth:  Dermal    Scalpel blade:  11    Wound management:  Probed and deloculated and irrigated with saline    Drainage:  Bloody and purulent    Drainage amount: Small.    Wound treatment:  Wound left open and drain placed    Packing material: elastic loop cord.      Labs Reviewed   WOUND CULTURE - Abnormal; Notable for the following components:       Result Value    Wound Culture Heavy growth (4+) Staphylococcus aureus, MRSA (*)     All other components within normal limits   COMPREHENSIVE METABOLIC PANEL - Abnormal; Notable for the following components:    Glucose 103 (*)     All other components within normal limits   CBC WITH AUTO DIFFERENTIAL - Abnormal; Notable for the following components:    WBC 13.62 (*)     Monocyte % 21.4 (*)     Monocytes, Absolute 2.92 (*)     Immature Grans, Absolute 0.05 (*)     All other components within normal limits   LACTIC ACID, PLASMA - Normal   BLOOD CULTURE WITH JENNIFER   BLOOD CULTURE WITH JENNIFER   CBC AND DIFFERENTIAL    Narrative:     The following orders were created for panel order CBC & Differential.  Procedure                               Abnormality         Status                     ---------                               -----------         ------                     Manual Differential[[id number]]                                                         CBC Auto Differential[[id number]]        Abnormal             Final result                 Please view results for these tests on the individual orders.     Medications   lidocaine-EPINEPHrine (XYLOCAINE W/EPI) 1 %-1:588172 injection 10 mL (10 mL Other Given 2/3/19 0940)   clindamycin (CLEOCIN) 600 mg in dextrose 5% 50 mL IVPB (premix) (0 mg Intravenous Stopped 2/3/19 0984)   meperidine (DEMEROL) injection 25 mg (25 mg Intravenous Given 2/3/19 1603)                ED Course  ED Course as of Feb 06 2129   Sun Feb 03, 2019   1000 Patient abscess has continued to progress despite having taking the bactrim. Abscess lanced today and start clindamycin based on previous sensitivities. Wound care instructions given. Pt to follow up with WCC tomorrow. Return precautions given.   [BA]      ED Course User Index  [BA] Sweta Velez APRN                  [facility]      Final diagnoses:   Abscess of right thigh            Sweta Velez APRN  02/06/19 2130

## 2019-02-04 ENCOUNTER — OFFICE VISIT (OUTPATIENT)
Dept: WOUND CARE | Facility: HOSPITAL | Age: 44
End: 2019-02-04
Attending: NURSE PRACTITIONER

## 2019-02-04 PROCEDURE — 17250 CHEM CAUT OF GRANLTJ TISSUE: CPT

## 2019-02-05 LAB
B-LACTAMASE USUAL SUSC ISLT: POSITIVE
BACTERIA SPEC AEROBE CULT: ABNORMAL
GRAM STN SPEC: ABNORMAL
GRAM STN SPEC: ABNORMAL

## 2019-02-06 ENCOUNTER — HOSPITAL ENCOUNTER (EMERGENCY)
Facility: HOSPITAL | Age: 44
Discharge: HOME | End: 2019-02-06
Admitting: NURSE PRACTITIONER

## 2019-02-06 VITALS
BODY MASS INDEX: 26.43 KG/M2 | HEIGHT: 73 IN | HEART RATE: 73 BPM | OXYGEN SATURATION: 97 % | WEIGHT: 199.4 LBS | TEMPERATURE: 97.7 F | DIASTOLIC BLOOD PRESSURE: 72 MMHG | RESPIRATION RATE: 17 BRPM | SYSTOLIC BLOOD PRESSURE: 122 MMHG

## 2019-02-06 DIAGNOSIS — Z51.89 WOUND CHECK, ABSCESS: Primary | ICD-10-CM

## 2019-02-06 PROCEDURE — 99282 EMERGENCY DEPT VISIT SF MDM: CPT

## 2019-02-06 RX ORDER — HYDROCODONE BITARTRATE AND ACETAMINOPHEN 7.5; 325 MG/1; MG/1
1 TABLET ORAL EVERY 4 HOURS PRN
Qty: 15 TABLET | Refills: 0 | OUTPATIENT
Start: 2019-02-06 | End: 2019-09-02

## 2019-02-06 NOTE — DISCHARGE INSTRUCTIONS
F/u with wound care on Monday as scheduled; continue antibiotics as prescribed; return with any acute or worsening sxs

## 2019-02-06 NOTE — ED NOTES
Patient's wound on right outer thigh dressed with bacitracin and gauze.      Clara Larios RN  02/06/19 1854

## 2019-02-07 NOTE — ED PROVIDER NOTES
Subjective     Wound Check   Severity:  Mild  Chronicity:  New  Context:  Patient has an abscess that was drained in this emergency department on Sunday; he was changed from Bactrim to clindamycin.  A drain was placed and he was instructed to follow-up with wound care on Monday; patient returns for wound check as instructed  Associated symptoms: no abdominal pain, no congestion, no cough, no fever, no myalgias, no nausea and no vomiting        Review of Systems   Constitutional: Negative for fever.   HENT: Negative for congestion.    Respiratory: Negative for cough.    Gastrointestinal: Negative for abdominal pain, nausea and vomiting.   Musculoskeletal: Negative for joint swelling, myalgias, neck pain and neck stiffness.   Skin: Positive for wound. Negative for color change and pallor.        Positive for healing abscess to the right leg   All other systems reviewed and are negative.      Past Medical History:   Diagnosis Date   • Back pain    • Fracture of lumbar spine (CMS/HCC) 2002 and 2015    lower lumbar 2002; L1 2015   • Head injury    • Neck pain        No Known Allergies    Past Surgical History:   Procedure Laterality Date   • FACIAL FRACTURE SURGERY     • INCISION AND DRAINAGE LEG Right 12/1/2018    Procedure: INCISION AND DRAINAGE OF RIGHT UPPER INNER THIGH, PLACEMENT OF WOUND VAC;  Surgeon: René Dallas MD;  Location: [facility] OR;  Service: General   • SPLENECTOMY         Family History   Problem Relation Age of Onset   • Fibromyalgia Mother    • Lung cancer Father        Social History     Socioeconomic History   • Marital status: Legally      Spouse name: Not on file   • Number of children: Not on file   • Years of education: Not on file   • Highest education level: Not on file   Tobacco Use   • Smoking status: Never Smoker   • Smokeless tobacco: Never Used   Substance and Sexual Activity   • Alcohol use: No   • Drug use: No   • Sexual activity: Yes     Partners: Female           Objective    Physical Exam   Constitutional: He is oriented to person, place, and time. He appears well-developed and well-nourished. No distress.   HENT:   Head: Normocephalic and atraumatic.   Right Ear: External ear normal.   Left Ear: External ear normal.   Nose: Nose normal.   Eyes: Conjunctivae are normal. Pupils are equal, round, and reactive to light. No scleral icterus.   Neck: Normal range of motion. Neck supple. No JVD present. No thyromegaly present.   Cardiovascular: Normal rate, regular rhythm, normal heart sounds and intact distal pulses.   No murmur heard.  Pulmonary/Chest: Effort normal and breath sounds normal. No respiratory distress. He has no wheezes. He has no rales. He exhibits no tenderness.   Abdominal: Soft. Bowel sounds are normal. He exhibits no distension and no mass. There is no tenderness. There is no rebound and no guarding.   Musculoskeletal: Normal range of motion. He exhibits no edema.   Lymphadenopathy:     He has no cervical adenopathy.   Neurological: He is alert and oriented to person, place, and time. He has normal reflexes. No cranial nerve deficit. Coordination normal.   Skin: Skin is warm and dry. Capillary refill takes less than 2 seconds. No rash noted. He is not diaphoretic. No erythema. No pallor.   Patient has a Native drawn on his leg (thigh area) or cellulitis originally was located on the leg.  He has great improvement on reexam with much lesser of a erythematous area; blue drain remains intact; there is no fluctuance noted; it remains somewhat indurated and tender to the touch however no streaking of the skin noted; overall appears much improved   Psychiatric: He has a normal mood and affect. His behavior is normal. Judgment and thought content normal.   Nursing note and vitals reviewed.      Procedures           ED Course patient will need to follow-up with wound care as instructed on Monday and/or return with any acute or worsening symptoms.                  MDM  Number of  Diagnoses or Management Options  Wound check, abscess: new and does not require workup     Amount and/or Complexity of Data Reviewed  Discuss the patient with other providers: yes    Risk of Complications, Morbidity, and/or Mortality  Presenting problems: low  Diagnostic procedures: low  Management options: low          Final diagnoses:   Wound check, abscess            Rica Crump, APRN  02/07/19 105

## 2019-02-08 LAB
BACTERIA SPEC AEROBE CULT: NORMAL
BACTERIA SPEC AEROBE CULT: NORMAL

## 2019-02-11 ENCOUNTER — OFFICE VISIT (OUTPATIENT)
Dept: WOUND CARE | Facility: HOSPITAL | Age: 44
End: 2019-02-11

## 2019-02-11 PROCEDURE — G0463 HOSPITAL OUTPT CLINIC VISIT: HCPCS

## 2019-02-14 NOTE — ED NOTES
"ED Call Back Questions    1. How are you doing since leaving the Emergency Department?  Doing much better saw the doctor yesterday    2. Do you have any questions about your discharge instructions? No     3. Have you filled your new prescriptions yet? Yes   a. Do you have any questions about those medications? N/A    4. Were you able to make a follow-up appointment with the physician? Yes     5. Do you have a primary care physician? Yes   a. If No, would you like for me to set you up with one? N/A  i. If Yes, “I will have our ED  give you a call right back at this number to work with you on the best time for an appointment.”    6. We are always looking to get better at what we do. Do you have any suggestions for what we can do to be even better? N/A  a. If Yes, \"Thank you for sharing your concerns. I apologize. I will follow up with our manager and patient . Would you like someone to call you back?\" N/A    7. Is there anything else I can do for you? N/A I appreciate everything y'all did for me       Carl Henriquez  02/14/19 1217    "

## 2019-02-22 ENCOUNTER — OFFICE VISIT (OUTPATIENT)
Dept: WOUND CARE | Facility: HOSPITAL | Age: 44
End: 2019-02-22

## 2019-02-22 PROCEDURE — G0463 HOSPITAL OUTPT CLINIC VISIT: HCPCS

## 2019-03-01 ENCOUNTER — OFFICE VISIT (OUTPATIENT)
Dept: WOUND CARE | Facility: HOSPITAL | Age: 44
End: 2019-03-01

## 2019-03-01 PROCEDURE — G0463 HOSPITAL OUTPT CLINIC VISIT: HCPCS

## 2019-03-27 ENCOUNTER — HOSPITAL ENCOUNTER (EMERGENCY)
Facility: HOSPITAL | Age: 44
Discharge: HOME | End: 2019-03-27
Admitting: EMERGENCY MEDICINE

## 2019-03-27 ENCOUNTER — APPOINTMENT (OUTPATIENT)
Dept: GENERAL RADIOLOGY | Facility: HOSPITAL | Age: 44
End: 2019-03-27

## 2019-03-27 VITALS
SYSTOLIC BLOOD PRESSURE: 137 MMHG | WEIGHT: 208 LBS | DIASTOLIC BLOOD PRESSURE: 84 MMHG | TEMPERATURE: 98 F | RESPIRATION RATE: 18 BRPM | OXYGEN SATURATION: 99 % | HEART RATE: 79 BPM | HEIGHT: 73 IN | BODY MASS INDEX: 27.57 KG/M2

## 2019-03-27 DIAGNOSIS — R19.7 DIARRHEA, UNSPECIFIED TYPE: ICD-10-CM

## 2019-03-27 DIAGNOSIS — K52.9 GASTROENTERITIS: Primary | ICD-10-CM

## 2019-03-27 LAB
ALBUMIN SERPL-MCNC: 4.3 G/DL (ref 3.5–5)
ALBUMIN/GLOB SERPL: 1.5 G/DL (ref 1.1–2.5)
ALP SERPL-CCNC: 90 U/L (ref 24–120)
ALT SERPL W P-5'-P-CCNC: 26 U/L (ref 0–54)
AMYLASE SERPL-CCNC: 101 U/L (ref 30–110)
ANION GAP SERPL CALCULATED.3IONS-SCNC: 9 MMOL/L (ref 4–13)
AST SERPL-CCNC: 26 U/L (ref 7–45)
BACTERIA UR QL AUTO: ABNORMAL /HPF
BASOPHILS # BLD AUTO: 0.1 10*3/MM3 (ref 0–0.2)
BASOPHILS NFR BLD AUTO: 0.9 % (ref 0–2)
BILIRUB SERPL-MCNC: 0.6 MG/DL (ref 0.1–1)
BILIRUB UR QL STRIP: NEGATIVE
BUN BLD-MCNC: 11 MG/DL (ref 5–21)
BUN/CREAT SERPL: 13.3 (ref 7–25)
CALCIUM SPEC-SCNC: 9.5 MG/DL (ref 8.4–10.4)
CHLORIDE SERPL-SCNC: 105 MMOL/L (ref 98–110)
CLARITY UR: CLEAR
CO2 SERPL-SCNC: 24 MMOL/L (ref 24–31)
COLOR UR: YELLOW
CREAT BLD-MCNC: 0.83 MG/DL (ref 0.5–1.4)
DEPRECATED RDW RBC AUTO: 45.6 FL (ref 40–54)
EOSINOPHIL # BLD AUTO: 0.58 10*3/MM3 (ref 0–0.7)
EOSINOPHIL NFR BLD AUTO: 5.4 % (ref 0–4)
ERYTHROCYTE [DISTWIDTH] IN BLOOD BY AUTOMATED COUNT: 15.1 % (ref 12–15)
GFR SERPL CREATININE-BSD FRML MDRD: 102 ML/MIN/1.73
GLOBULIN UR ELPH-MCNC: 2.9 GM/DL
GLUCOSE BLD-MCNC: 88 MG/DL (ref 70–100)
GLUCOSE UR STRIP-MCNC: NEGATIVE MG/DL
HCT VFR BLD AUTO: 46.2 % (ref 40–52)
HGB BLD-MCNC: 15.7 G/DL (ref 14–18)
HGB UR QL STRIP.AUTO: ABNORMAL
HOLD SPECIMEN: NORMAL
HOLD SPECIMEN: NORMAL
HYALINE CASTS UR QL AUTO: ABNORMAL /LPF
KETONES UR QL STRIP: NEGATIVE
LEUKOCYTE ESTERASE UR QL STRIP.AUTO: NEGATIVE
LIPASE SERPL-CCNC: 70 U/L (ref 23–203)
LYMPHOCYTES # BLD AUTO: 3.58 10*3/MM3 (ref 0.72–4.86)
LYMPHOCYTES NFR BLD AUTO: 33.2 % (ref 15–45)
MCH RBC QN AUTO: 28.8 PG (ref 28–32)
MCHC RBC AUTO-ENTMCNC: 34 G/DL (ref 33–36)
MCV RBC AUTO: 84.8 FL (ref 82–95)
MONOCYTES # BLD AUTO: 1.48 10*3/MM3 (ref 0.19–1.3)
MONOCYTES NFR BLD AUTO: 13.7 % (ref 4–12)
NEUTROPHILS # BLD AUTO: 5 10*3/MM3 (ref 1.87–8.4)
NEUTROPHILS NFR BLD AUTO: 46.5 % (ref 39–78)
NITRITE UR QL STRIP: NEGATIVE
PH UR STRIP.AUTO: 5.5 [PH] (ref 5–8)
PLATELET # BLD AUTO: 361 10*3/MM3 (ref 130–400)
PMV BLD AUTO: 10.8 FL (ref 6–12)
POTASSIUM BLD-SCNC: 4 MMOL/L (ref 3.5–5.3)
PROT SERPL-MCNC: 7.2 G/DL (ref 6.3–8.7)
PROT UR QL STRIP: NEGATIVE
RBC # BLD AUTO: 5.45 10*6/MM3 (ref 4.8–5.9)
RBC # UR: ABNORMAL /HPF
REF LAB TEST METHOD: ABNORMAL
SODIUM BLD-SCNC: 138 MMOL/L (ref 135–145)
SP GR UR STRIP: 1.03 (ref 1–1.03)
SQUAMOUS #/AREA URNS HPF: ABNORMAL /HPF
UROBILINOGEN UR QL STRIP: ABNORMAL
WBC NRBC COR # BLD: 10.77 10*3/MM3 (ref 4.8–10.8)
WBC UR QL AUTO: ABNORMAL /HPF
WHOLE BLOOD HOLD SPECIMEN: NORMAL
WHOLE BLOOD HOLD SPECIMEN: NORMAL

## 2019-03-27 PROCEDURE — 74022 RADEX COMPL AQT ABD SERIES: CPT

## 2019-03-27 PROCEDURE — 96375 TX/PRO/DX INJ NEW DRUG ADDON: CPT

## 2019-03-27 PROCEDURE — 80053 COMPREHEN METABOLIC PANEL: CPT | Performed by: NURSE PRACTITIONER

## 2019-03-27 PROCEDURE — 96374 THER/PROPH/DIAG INJ IV PUSH: CPT

## 2019-03-27 PROCEDURE — 81001 URINALYSIS AUTO W/SCOPE: CPT | Performed by: NURSE PRACTITIONER

## 2019-03-27 PROCEDURE — 25010000002 ONDANSETRON PER 1 MG: Performed by: NURSE PRACTITIONER

## 2019-03-27 PROCEDURE — 83690 ASSAY OF LIPASE: CPT | Performed by: NURSE PRACTITIONER

## 2019-03-27 PROCEDURE — 99284 EMERGENCY DEPT VISIT MOD MDM: CPT

## 2019-03-27 PROCEDURE — 82150 ASSAY OF AMYLASE: CPT | Performed by: NURSE PRACTITIONER

## 2019-03-27 PROCEDURE — 85025 COMPLETE CBC W/AUTO DIFF WBC: CPT | Performed by: NURSE PRACTITIONER

## 2019-03-27 RX ORDER — ONDANSETRON 4 MG/1
4 TABLET, ORALLY DISINTEGRATING ORAL EVERY 6 HOURS PRN
Qty: 10 TABLET | Refills: 0 | Status: SHIPPED | OUTPATIENT
Start: 2019-03-27 | End: 2019-09-06

## 2019-03-27 RX ORDER — DICYCLOMINE HCL 20 MG
20 TABLET ORAL EVERY 8 HOURS PRN
Qty: 20 TABLET | Refills: 0 | Status: SHIPPED | OUTPATIENT
Start: 2019-03-27 | End: 2019-09-06

## 2019-03-27 RX ORDER — FAMOTIDINE 10 MG/ML
20 INJECTION, SOLUTION INTRAVENOUS ONCE
Status: COMPLETED | OUTPATIENT
Start: 2019-03-27 | End: 2019-03-27

## 2019-03-27 RX ORDER — ONDANSETRON 2 MG/ML
4 INJECTION INTRAMUSCULAR; INTRAVENOUS ONCE
Status: COMPLETED | OUTPATIENT
Start: 2019-03-27 | End: 2019-03-27

## 2019-03-27 RX ADMIN — FAMOTIDINE 20 MG: 10 INJECTION, SOLUTION INTRAVENOUS at 20:09

## 2019-03-27 RX ADMIN — ONDANSETRON HYDROCHLORIDE 4 MG: 2 SOLUTION INTRAMUSCULAR; INTRAVENOUS at 20:09

## 2019-03-27 RX ADMIN — SODIUM CHLORIDE 1000 ML: 9 INJECTION, SOLUTION INTRAVENOUS at 20:09

## 2019-04-23 ENCOUNTER — HOSPITAL ENCOUNTER (EMERGENCY)
Facility: HOSPITAL | Age: 44
Discharge: HOME | End: 2019-04-23
Admitting: EMERGENCY MEDICINE

## 2019-04-23 ENCOUNTER — APPOINTMENT (OUTPATIENT)
Dept: CT IMAGING | Facility: HOSPITAL | Age: 44
End: 2019-04-23

## 2019-04-23 VITALS
HEIGHT: 73 IN | OXYGEN SATURATION: 97 % | HEART RATE: 60 BPM | BODY MASS INDEX: 27.62 KG/M2 | RESPIRATION RATE: 18 BRPM | TEMPERATURE: 97.7 F | WEIGHT: 208.4 LBS | SYSTOLIC BLOOD PRESSURE: 132 MMHG | DIASTOLIC BLOOD PRESSURE: 86 MMHG

## 2019-04-23 DIAGNOSIS — N20.0 RENAL STONE: Primary | ICD-10-CM

## 2019-04-23 LAB
ALBUMIN SERPL-MCNC: 4.5 G/DL (ref 3.5–5)
ALBUMIN/GLOB SERPL: 1.3 G/DL (ref 1.1–2.5)
ALP SERPL-CCNC: 90 U/L (ref 24–120)
ALT SERPL W P-5'-P-CCNC: 27 U/L (ref 0–54)
ANION GAP SERPL CALCULATED.3IONS-SCNC: 12 MMOL/L (ref 4–13)
AST SERPL-CCNC: 29 U/L (ref 7–45)
BACTERIA UR QL AUTO: ABNORMAL /HPF
BILIRUB SERPL-MCNC: 0.7 MG/DL (ref 0.1–1)
BILIRUB UR QL STRIP: NEGATIVE
BUN BLD-MCNC: 11 MG/DL (ref 5–21)
BUN/CREAT SERPL: 10 (ref 7–25)
BURR CELLS BLD QL SMEAR: ABNORMAL
CALCIUM SPEC-SCNC: 10.4 MG/DL (ref 8.4–10.4)
CHLORIDE SERPL-SCNC: 106 MMOL/L (ref 98–110)
CLARITY UR: CLEAR
CLUMPED PLATELETS: PRESENT
CO2 SERPL-SCNC: 24 MMOL/L (ref 24–31)
COLOR UR: YELLOW
CREAT BLD-MCNC: 1.1 MG/DL (ref 0.5–1.4)
D-LACTATE SERPL-SCNC: 1 MMOL/L (ref 0.5–2)
DEPRECATED RDW RBC AUTO: 44.5 FL (ref 40–54)
EOSINOPHIL # BLD MANUAL: 0.28 10*3/MM3 (ref 0–0.7)
EOSINOPHIL NFR BLD MANUAL: 2 % (ref 0–4)
ERYTHROCYTE [DISTWIDTH] IN BLOOD BY AUTOMATED COUNT: 14.6 % (ref 12–15)
GFR SERPL CREATININE-BSD FRML MDRD: 73 ML/MIN/1.73
GIANT PLATELETS: ABNORMAL
GLOBULIN UR ELPH-MCNC: 3.4 GM/DL
GLUCOSE BLD-MCNC: 117 MG/DL (ref 70–100)
GLUCOSE UR STRIP-MCNC: NEGATIVE MG/DL
HCT VFR BLD AUTO: 45.3 % (ref 40–52)
HGB BLD-MCNC: 15.3 G/DL (ref 14–18)
HGB UR QL STRIP.AUTO: ABNORMAL
HYALINE CASTS UR QL AUTO: ABNORMAL /LPF
KETONES UR QL STRIP: NEGATIVE
LEUKOCYTE ESTERASE UR QL STRIP.AUTO: NEGATIVE
LIPASE SERPL-CCNC: 77 U/L (ref 23–203)
LYMPHOCYTES # BLD MANUAL: 5.15 10*3/MM3 (ref 0.72–4.86)
LYMPHOCYTES NFR BLD MANUAL: 36.4 % (ref 15–45)
LYMPHOCYTES NFR BLD MANUAL: 7.1 % (ref 4–12)
MCH RBC QN AUTO: 28.4 PG (ref 28–32)
MCHC RBC AUTO-ENTMCNC: 33.8 G/DL (ref 33–36)
MCV RBC AUTO: 84.2 FL (ref 82–95)
MONOCYTES # BLD AUTO: 1.01 10*3/MM3 (ref 0.19–1.3)
NEUTROPHILS # BLD AUTO: 6.29 10*3/MM3 (ref 1.7–7)
NEUTROPHILS NFR BLD MANUAL: 44.4 % (ref 39–78)
NITRITE UR QL STRIP: NEGATIVE
PH UR STRIP.AUTO: 7.5 [PH] (ref 5–8)
PLATELET # BLD AUTO: 362 10*3/MM3 (ref 130–400)
PMV BLD AUTO: 10.8 FL (ref 6–12)
POIKILOCYTOSIS BLD QL SMEAR: ABNORMAL
POTASSIUM BLD-SCNC: 4.1 MMOL/L (ref 3.5–5.3)
PROT SERPL-MCNC: 7.9 G/DL (ref 6.3–8.7)
PROT UR QL STRIP: NEGATIVE
RBC # BLD AUTO: 5.38 10*6/MM3 (ref 4.8–5.9)
RBC # UR: ABNORMAL /HPF
REF LAB TEST METHOD: ABNORMAL
SMUDGE CELLS BLD QL SMEAR: ABNORMAL
SODIUM BLD-SCNC: 142 MMOL/L (ref 135–145)
SP GR UR STRIP: >1.03 (ref 1–1.03)
SQUAMOUS #/AREA URNS HPF: ABNORMAL /HPF
UROBILINOGEN UR QL STRIP: ABNORMAL
VARIANT LYMPHS NFR BLD MANUAL: 10.1 % (ref 0–5)
WBC NRBC COR # BLD: 14.16 10*3/MM3 (ref 4.8–10.8)
WBC UR QL AUTO: ABNORMAL /HPF

## 2019-04-23 PROCEDURE — 96375 TX/PRO/DX INJ NEW DRUG ADDON: CPT

## 2019-04-23 PROCEDURE — 85007 BL SMEAR W/DIFF WBC COUNT: CPT | Performed by: NURSE PRACTITIONER

## 2019-04-23 PROCEDURE — 80053 COMPREHEN METABOLIC PANEL: CPT | Performed by: NURSE PRACTITIONER

## 2019-04-23 PROCEDURE — 25010000002 ONDANSETRON PER 1 MG: Performed by: NURSE PRACTITIONER

## 2019-04-23 PROCEDURE — 83605 ASSAY OF LACTIC ACID: CPT | Performed by: NURSE PRACTITIONER

## 2019-04-23 PROCEDURE — 25010000002 IOPAMIDOL 61 % SOLUTION: Performed by: NURSE PRACTITIONER

## 2019-04-23 PROCEDURE — 85025 COMPLETE CBC W/AUTO DIFF WBC: CPT | Performed by: NURSE PRACTITIONER

## 2019-04-23 PROCEDURE — 81001 URINALYSIS AUTO W/SCOPE: CPT | Performed by: NURSE PRACTITIONER

## 2019-04-23 PROCEDURE — 99283 EMERGENCY DEPT VISIT LOW MDM: CPT

## 2019-04-23 PROCEDURE — 83690 ASSAY OF LIPASE: CPT | Performed by: NURSE PRACTITIONER

## 2019-04-23 PROCEDURE — 96374 THER/PROPH/DIAG INJ IV PUSH: CPT

## 2019-04-23 PROCEDURE — 74177 CT ABD & PELVIS W/CONTRAST: CPT

## 2019-04-23 RX ORDER — ONDANSETRON 4 MG/1
4 TABLET, ORALLY DISINTEGRATING ORAL EVERY 6 HOURS PRN
Qty: 12 TABLET | Refills: 0 | Status: SHIPPED | OUTPATIENT
Start: 2019-04-23 | End: 2019-04-26

## 2019-04-23 RX ORDER — KETOROLAC TROMETHAMINE 10 MG/1
10 TABLET, FILM COATED ORAL EVERY 6 HOURS PRN
Qty: 12 TABLET | Refills: 0 | Status: SHIPPED | OUTPATIENT
Start: 2019-04-23 | End: 2019-04-26

## 2019-04-23 RX ORDER — SODIUM CHLORIDE 0.9 % (FLUSH) 0.9 %
10 SYRINGE (ML) INJECTION AS NEEDED
Status: DISCONTINUED | OUTPATIENT
Start: 2019-04-23 | End: 2019-04-23 | Stop reason: HOSPADM

## 2019-04-23 RX ORDER — ONDANSETRON 2 MG/ML
4 INJECTION INTRAMUSCULAR; INTRAVENOUS ONCE
Status: COMPLETED | OUTPATIENT
Start: 2019-04-23 | End: 2019-04-23

## 2019-04-23 RX ADMIN — HYDROMORPHONE HYDROCHLORIDE 1 MG: 1 INJECTION, SOLUTION INTRAMUSCULAR; INTRAVENOUS; SUBCUTANEOUS at 16:08

## 2019-04-23 RX ADMIN — SODIUM CHLORIDE 1000 ML: 9 INJECTION, SOLUTION INTRAVENOUS at 15:14

## 2019-04-23 RX ADMIN — ONDANSETRON 4 MG: 2 INJECTION INTRAMUSCULAR; INTRAVENOUS at 16:07

## 2019-04-23 RX ADMIN — IOPAMIDOL 100 ML: 612 INJECTION, SOLUTION INTRAVENOUS at 15:58

## 2019-06-04 ENCOUNTER — APPOINTMENT (OUTPATIENT)
Dept: GENERAL RADIOLOGY | Facility: HOSPITAL | Age: 44
End: 2019-06-04

## 2019-06-04 ENCOUNTER — HOSPITAL ENCOUNTER (EMERGENCY)
Facility: HOSPITAL | Age: 44
Discharge: HOME | End: 2019-06-04
Admitting: EMERGENCY MEDICINE

## 2019-06-04 VITALS
HEIGHT: 74 IN | RESPIRATION RATE: 16 BRPM | WEIGHT: 209.8 LBS | HEART RATE: 80 BPM | OXYGEN SATURATION: 99 % | TEMPERATURE: 98 F | BODY MASS INDEX: 26.92 KG/M2 | DIASTOLIC BLOOD PRESSURE: 74 MMHG | SYSTOLIC BLOOD PRESSURE: 138 MMHG

## 2019-06-04 DIAGNOSIS — M54.12 CERVICAL RADICULOPATHY: Primary | ICD-10-CM

## 2019-06-04 PROCEDURE — 25010000002 KETOROLAC TROMETHAMINE PER 15 MG: Performed by: PHYSICIAN ASSISTANT

## 2019-06-04 PROCEDURE — 99283 EMERGENCY DEPT VISIT LOW MDM: CPT

## 2019-06-04 PROCEDURE — 72050 X-RAY EXAM NECK SPINE 4/5VWS: CPT

## 2019-06-04 PROCEDURE — 25010000002 METHYLPREDNISOLONE PER 125 MG: Performed by: PHYSICIAN ASSISTANT

## 2019-06-04 PROCEDURE — 96372 THER/PROPH/DIAG INJ SC/IM: CPT

## 2019-06-04 PROCEDURE — 25010000002 ORPHENADRINE CITRATE PER 60 MG: Performed by: PHYSICIAN ASSISTANT

## 2019-06-04 RX ORDER — KETOROLAC TROMETHAMINE 30 MG/ML
30 INJECTION, SOLUTION INTRAMUSCULAR; INTRAVENOUS ONCE
Status: COMPLETED | OUTPATIENT
Start: 2019-06-04 | End: 2019-06-04

## 2019-06-04 RX ORDER — ETODOLAC 200 MG/1
200 CAPSULE ORAL EVERY 8 HOURS
Qty: 15 CAPSULE | Refills: 0 | Status: SHIPPED | OUTPATIENT
Start: 2019-06-04 | End: 2019-09-06

## 2019-06-04 RX ORDER — ORPHENADRINE CITRATE 30 MG/ML
60 INJECTION INTRAMUSCULAR; INTRAVENOUS ONCE
Status: COMPLETED | OUTPATIENT
Start: 2019-06-04 | End: 2019-06-04

## 2019-06-04 RX ORDER — METHYLPREDNISOLONE 4 MG/1
TABLET ORAL
Qty: 21 EACH | Refills: 0 | Status: SHIPPED | OUTPATIENT
Start: 2019-06-04 | End: 2019-09-06

## 2019-06-04 RX ORDER — METHYLPREDNISOLONE SODIUM SUCCINATE 125 MG/2ML
125 INJECTION, POWDER, LYOPHILIZED, FOR SOLUTION INTRAMUSCULAR; INTRAVENOUS ONCE
Status: COMPLETED | OUTPATIENT
Start: 2019-06-04 | End: 2019-06-04

## 2019-06-04 RX ORDER — BACLOFEN 10 MG/1
10 TABLET ORAL 3 TIMES DAILY
Qty: 15 TABLET | Refills: 0 | Status: SHIPPED | OUTPATIENT
Start: 2019-06-04 | End: 2019-09-06

## 2019-06-04 RX ADMIN — ORPHENADRINE CITRATE 60 MG: 30 INJECTION INTRAMUSCULAR; INTRAVENOUS at 16:23

## 2019-06-04 RX ADMIN — METHYLPREDNISOLONE SODIUM SUCCINATE 125 MG: 125 INJECTION, POWDER, FOR SOLUTION INTRAMUSCULAR; INTRAVENOUS at 16:23

## 2019-06-04 RX ADMIN — KETOROLAC TROMETHAMINE 30 MG: 30 INJECTION, SOLUTION INTRAMUSCULAR at 16:24

## 2019-06-04 NOTE — ED NOTES
[place] young son.  While running after son, pt had head down and did not see tree in front of him, pt states he hit the top of his head on the tree and felt a jarring pain in his neck, down his right arm and down the right side of his back.  Pt states the pain of hitting his head on the tree made him need to sit down, and now c/o neck and back pain.     Pat Trujillo RN  06/04/19 1535       Pat Trujillo RN  06/04/19 1530

## 2019-06-04 NOTE — DISCHARGE INSTRUCTIONS
Follow up with one of the [facility] physician groups below to setup primary care. If you have trouble making an appointment, please call the [facility] Nurse Line at [phone]    Dr. Vivek Stewart DO, Dr. Tonny Moreno DO,  KYLER Lam, and KYLER Ayers  [facility] - [address], [address], [place], [place] [address] [phone]     Dr. Jose Angel Taylor MD  [facility] - [address], [address], [place], [place] [address] [phone]    Dr. Khalida Dickerson MD, and KYLER Kelley  [facility] [address], [place], [place] [address] [phone]    Dr. Ronny Proctor MD and Dr. Ross Cadena MD  [facility] [address], [address]  [phone]    Dr. Edgard Booker MD  [facility] [place]  [address], [place], [place], [address] [phone]    Dr. Danny Clark MD  [facility] - [place]  [address], [place], [address] [phone]

## 2019-06-04 NOTE — ED PROVIDER NOTES
Subjective   History of Present Illness  44-year-old male presents with chief complaint of neck pain that radiates down his right arm.  The patient [place] 2 [place] branch with the top of his head.  Patient did not lose consciousness.  He said he felt instant pain shoot down his right arm.  He reports he is having progressive neck tightness.  No numbness or tingling but does have a shooting sharp pain down his arm intermittently.  Review of Systems   All other systems reviewed and are negative.      Past Medical History:   Diagnosis Date   • Back pain    • Fracture of lumbar spine (CMS/HCC) 2002 and 2015    lower lumbar 2002; L1 2015   • Head injury    • Neck pain        No Known Allergies    Past Surgical History:   Procedure Laterality Date   • FACIAL FRACTURE SURGERY     • INCISION AND DRAINAGE LEG Right 12/1/2018    Procedure: INCISION AND DRAINAGE OF RIGHT UPPER INNER THIGH, PLACEMENT OF WOUND VAC;  Surgeon: René Dallas MD;  Location: [facility] OR;  Service: General   • SPLENECTOMY         Family History   Problem Relation Age of Onset   • Fibromyalgia Mother    • Lung cancer Father        Social History     Socioeconomic History   • Marital status: Legally      Spouse name: Not on file   • Number of children: Not on file   • Years of education: Not on file   • Highest education level: Not on file   Tobacco Use   • Smoking status: Never Smoker   • Smokeless tobacco: Never Used   Substance and Sexual Activity   • Alcohol use: No   • Drug use: No   • Sexual activity: Yes     Partners: Female           Objective   Physical Exam   Constitutional: He is oriented to person, place, and time. He appears well-developed and well-nourished.   HENT:   Head: Normocephalic and atraumatic.   Eyes: EOM are normal. Pupils are equal, round, and reactive to light.   Neck: Normal range of motion. Neck supple.   Paraspinal cervical tenderness   Cardiovascular:  Normal rate and regular rhythm.   Pulmonary/Chest: Effort normal and breath sounds normal.   Abdominal: Soft. Bowel sounds are normal.   Musculoskeletal: Normal range of motion.   Neurological: He is alert and oriented to person, place, and time. No cranial nerve deficit. Coordination normal.   Skin: Skin is warm and dry. Capillary refill takes less than 2 seconds.   Psychiatric: He has a normal mood and affect. His behavior is normal.   Nursing note and vitals reviewed.      Procedures           ED Course      Labs Reviewed - No data to display  XR Spine Cervical Complete 4 or 5 View   Final Result   No acute findings.   This report was finalized on 06/04/2019 16:22 by Dr Job Sim, .                    MDM  Number of Diagnoses or Management Options  Diagnosis management comments: Negative plain film, will dc in stable condition, improved here, strong return precautions given        Amount and/or Complexity of Data Reviewed  Tests in the radiology section of CPT®: ordered and reviewed    Risk of Complications, Morbidity, and/or Mortality  Presenting problems: moderate  Diagnostic procedures: moderate  Management options: moderate    Patient Progress  Patient progress: stable        Final diagnoses:   Cervical radiculopathy            Chino Handley PA-C  06/04/19 6402

## 2019-09-01 ENCOUNTER — APPOINTMENT (OUTPATIENT)
Dept: CT IMAGING | Facility: HOSPITAL | Age: 44
End: 2019-09-01

## 2019-09-01 ENCOUNTER — HOSPITAL ENCOUNTER (EMERGENCY)
Facility: HOSPITAL | Age: 44
Discharge: HOME | End: 2019-09-02
Admitting: EMERGENCY MEDICINE

## 2019-09-01 DIAGNOSIS — S39.012A STRAIN OF LUMBAR REGION, INITIAL ENCOUNTER: Primary | ICD-10-CM

## 2019-09-01 PROCEDURE — 25010000002 METHYLPREDNISOLONE PER 125 MG: Performed by: EMERGENCY MEDICINE

## 2019-09-01 PROCEDURE — 96372 THER/PROPH/DIAG INJ SC/IM: CPT

## 2019-09-01 PROCEDURE — 72131 CT LUMBAR SPINE W/O DYE: CPT

## 2019-09-01 PROCEDURE — 99283 EMERGENCY DEPT VISIT LOW MDM: CPT

## 2019-09-01 RX ORDER — OXYCODONE AND ACETAMINOPHEN 7.5; 325 MG/1; MG/1
1 TABLET ORAL ONCE
Status: COMPLETED | OUTPATIENT
Start: 2019-09-01 | End: 2019-09-01

## 2019-09-01 RX ORDER — METHYLPREDNISOLONE SODIUM SUCCINATE 125 MG/2ML
80 INJECTION, POWDER, LYOPHILIZED, FOR SOLUTION INTRAMUSCULAR; INTRAVENOUS ONCE
Status: COMPLETED | OUTPATIENT
Start: 2019-09-01 | End: 2019-09-01

## 2019-09-01 RX ADMIN — OXYCODONE HYDROCHLORIDE AND ACETAMINOPHEN 1 TABLET: 7.5; 325 TABLET ORAL at 22:36

## 2019-09-01 RX ADMIN — METHYLPREDNISOLONE SODIUM SUCCINATE 80 MG: 125 INJECTION, POWDER, FOR SOLUTION INTRAMUSCULAR; INTRAVENOUS at 22:36

## 2019-09-02 VITALS
BODY MASS INDEX: 27.83 KG/M2 | DIASTOLIC BLOOD PRESSURE: 80 MMHG | RESPIRATION RATE: 16 BRPM | SYSTOLIC BLOOD PRESSURE: 132 MMHG | TEMPERATURE: 97.9 F | HEART RATE: 76 BPM | OXYGEN SATURATION: 98 % | WEIGHT: 210 LBS | HEIGHT: 73 IN

## 2019-09-02 PROCEDURE — 25010000002 MORPHINE PER 10 MG: Performed by: PHYSICIAN ASSISTANT

## 2019-09-02 PROCEDURE — 96372 THER/PROPH/DIAG INJ SC/IM: CPT

## 2019-09-02 RX ORDER — CYCLOBENZAPRINE HCL 10 MG
10 TABLET ORAL 3 TIMES DAILY PRN
Qty: 12 TABLET | Refills: 0 | Status: SHIPPED | OUTPATIENT
Start: 2019-09-02 | End: 2019-09-06

## 2019-09-02 RX ORDER — HYDROCODONE BITARTRATE AND ACETAMINOPHEN 7.5; 325 MG/1; MG/1
1 TABLET ORAL EVERY 4 HOURS PRN
Qty: 12 TABLET | Refills: 0 | Status: SHIPPED | OUTPATIENT
Start: 2019-09-02 | End: 2019-09-06

## 2019-09-02 RX ADMIN — MORPHINE SULFATE 4 MG: 4 INJECTION INTRAVENOUS at 00:46

## 2019-09-02 NOTE — ED PROVIDER NOTES
Subjective     History provided by:  Patient   used: No    Back Pain   Location:  Lumbar spine  Quality:  Aching  Radiates to:  Does not radiate  Pain severity:  Moderate  Pain is:  Unable to specify  Onset quality:  Sudden  Duration:  2 days  Timing:  Constant  Progression:  Worsening  Chronicity:  Chronic  Context: falling    Relieved by:  None tried  Worsened by:  Bending  Ineffective treatments:  NSAIDs  Associated symptoms: no abdominal pain, no abdominal swelling, no bladder incontinence, no bowel incontinence, no fever, no headaches, no leg pain, no numbness, no paresthesias, no pelvic pain, no perianal numbness, no tingling, no weakness and no weight loss    Risk factors: no hx of cancer, no menopause and no recent surgery        Review of Systems   Constitutional: Negative for fever and weight loss.   Gastrointestinal: Negative for abdominal pain and bowel incontinence.   Genitourinary: Negative for bladder incontinence and pelvic pain.   Musculoskeletal: Positive for back pain.   Neurological: Negative for tingling, weakness, numbness, headaches and paresthesias.       Past Medical History:   Diagnosis Date   • Back pain    • Fracture of lumbar spine (CMS/HCC) 2002 and 2015    lower lumbar 2002; L1 2015   • Head injury    • Neck pain        No Known Allergies    Past Surgical History:   Procedure Laterality Date   • FACIAL FRACTURE SURGERY     • INCISION AND DRAINAGE LEG Right 12/1/2018    Procedure: INCISION AND DRAINAGE OF RIGHT UPPER INNER THIGH, PLACEMENT OF WOUND VAC;  Surgeon: René Dallas MD;  Location: [facility] OR;  Service: General   • SPLENECTOMY         Family History   Problem Relation Age of Onset   • Fibromyalgia Mother    • Lung cancer Father        Social History     Socioeconomic History   • Marital status: Legally      Spouse name: Not on file   • Number of children: Not on file   • Years of education: Not on file   • Highest education level: Not on file    Tobacco Use   • Smoking status: Never Smoker   • Smokeless tobacco: Never Used   Substance and Sexual Activity   • Alcohol use: No   • Drug use: No   • Sexual activity: Yes     Partners: Female       Lab Results (last 24 hours)     ** No results found for the last 24 hours. **          Objective   Physical Exam   Constitutional: He is oriented to person, place, and time. He appears well-developed and well-nourished. No distress.   HENT:   Head: Normocephalic and atraumatic.   Right Ear: External ear normal.   Left Ear: External ear normal.   Eyes: Conjunctivae and EOM are normal. Pupils are equal, round, and reactive to light. Right eye exhibits no discharge. Left eye exhibits no discharge. No scleral icterus.   Neck: Normal range of motion. Neck supple. No tracheal deviation present. No thyromegaly present.   Cardiovascular: Normal rate, regular rhythm, normal heart sounds and intact distal pulses. Exam reveals no friction rub.   No murmur heard.  Pulmonary/Chest: Effort normal and breath sounds normal. No respiratory distress. He has no wheezes.   Abdominal: Soft. Bowel sounds are normal. He exhibits no distension. There is no tenderness. There is no guarding.   Musculoskeletal:        Lumbar back: He exhibits decreased range of motion, tenderness, bony tenderness and spasm. He exhibits no deformity, no laceration and no pain.   Low back tenderness of the midline along with associated paraspinal muscular area.  Positive straight leg raises at 20 degrees bilaterally.  Neurovascular intact distally.  No obvious deformities or step-offs of the lumbar area.   Neurological: He is alert and oriented to person, place, and time.   Skin: Skin is warm and dry. Capillary refill takes less than 2 seconds. He is not diaphoretic.   Psychiatric: He has a normal mood and affect. His behavior is normal.   Nursing note and vitals reviewed.      Procedures         CT Lumbar Spine Without Contrast    (Results Pending)       BP  "132/80   Pulse 76   Temp 97.9 °F (36.6 °C)   Resp 16   Ht 185.4 cm (73\")   Wt 95.3 kg (210 lb)   SpO2 98%   BMI 27.71 kg/m²     ED Course    ED Course as of Sep 02 0306   Mon Sep 02, 2019   0016 CT L-spine comparison from 3/29-no acute finding or substantial change.  Moderate anterior wedge-shaped fracture compression fracture defect at L1 with 25% loss in height.  Unchanged.  Moderate degenerative disc disease causing mild neuroforaminal narrowing at L3 and L4-5.  Worse on the left.  Dr. Delmar Coburn.   [CP]   0020 GOMEZ query complete. Treatment plan to include limited course of prescribed  controlled substance. Risks including addiction, benefits, and alternatives presented to patient.   [id number]  [CP]      ED Course User Index  [CP] Don Swanson PA-C       Medications   oxyCODONE-acetaminophen (PERCOCET) 7.5-325 MG per tablet 1 tablet (1 tablet Oral Given 9/1/19 2236)   methylPREDNISolone sodium succinate (SOLU-Medrol) injection 80 mg (80 mg Intramuscular Given 9/1/19 2236)   morphine injection 4 mg (4 mg Intramuscular Given 9/2/19 0046)            MDM  Number of Diagnoses or Management Options  Strain of lumbar region, initial encounter:   Diagnosis management comments: Patient with lumbar strain after stepping into a hole and jarring low back.  History of lumbar surgery and known compression fracture.  CT scan shows no changes from previous CT scan of 2017.  Still at 25% compression fracture.  Neurovascular intact.  No red flag symptoms including bowel incontinence, bladder retention, saddle anesthesia or bilateral sciatica symptoms, fever.  Will discharge with pain and symptom control close follow with neurosurgery.  All questions answered strict return precautions given patient agreeable to plan of care at this time       Amount and/or Complexity of Data Reviewed  Tests in the radiology section of CPT®: reviewed and ordered    Risk of Complications, Morbidity, and/or Mortality  Presenting " problems: low  Diagnostic procedures: low  Management options: low    Patient Progress  Patient progress: stable      Final diagnoses:   Strain of lumbar region, initial encounter          Don Swanson PA-C  09/02/19 0300

## 2019-09-02 NOTE — DISCHARGE INSTRUCTIONS
The CT scan shows stable compression fracture with 25% loss in height similar to how it was in 2017.  You do have moderate degenerative disc disease.  No obvious or acute or substantial findings in comparison from your previous CT scan.  I do recommend you follow-up with a neurosurgeon as soon as you can.  Please take the medications as prescribed.  Return to your normal activities as you are able.  Return to the ER should you develop any new, worsening or other concerning symptoms such as those listed below.    Contact a health care provider if:  You have a fever.  You develop a cough that makes your pain worse.  Your pain medicine is not helping.  Your pain does not get better over time.  You cannot return to your normal activities as planned or expected.  Get help right away if:  Your pain is very bad and it suddenly gets worse.  You are unable to move any body part (paralysis) that is below the level of your injury.  You have numbness, tingling, or weakness in any body part that is below the level of your injury.  You cannot control your bladder or bowels.

## 2019-09-06 ENCOUNTER — HOSPITAL ENCOUNTER (EMERGENCY)
Facility: HOSPITAL | Age: 44
Discharge: HOME | End: 2019-09-06
Admitting: EMERGENCY MEDICINE

## 2019-09-06 ENCOUNTER — APPOINTMENT (OUTPATIENT)
Dept: CT IMAGING | Facility: HOSPITAL | Age: 44
End: 2019-09-06

## 2019-09-06 VITALS
DIASTOLIC BLOOD PRESSURE: 90 MMHG | HEIGHT: 72 IN | OXYGEN SATURATION: 99 % | BODY MASS INDEX: 28.44 KG/M2 | RESPIRATION RATE: 16 BRPM | TEMPERATURE: 97.5 F | HEART RATE: 76 BPM | SYSTOLIC BLOOD PRESSURE: 140 MMHG | WEIGHT: 210 LBS

## 2019-09-06 DIAGNOSIS — R10.9 ABDOMINAL PAIN, UNSPECIFIED ABDOMINAL LOCATION: Primary | ICD-10-CM

## 2019-09-06 LAB
ALBUMIN SERPL-MCNC: 4.4 G/DL (ref 3.5–5)
ALBUMIN/GLOB SERPL: 1.3 G/DL (ref 1.1–2.5)
ALP SERPL-CCNC: 93 U/L (ref 24–120)
ALT SERPL W P-5'-P-CCNC: 26 U/L (ref 0–54)
ANION GAP SERPL CALCULATED.3IONS-SCNC: 8 MMOL/L (ref 4–13)
AST SERPL-CCNC: 24 U/L (ref 7–45)
BASOPHILS # BLD AUTO: 0.14 10*3/MM3 (ref 0–0.2)
BASOPHILS NFR BLD AUTO: 0.9 % (ref 0–1.5)
BILIRUB SERPL-MCNC: 0.6 MG/DL (ref 0.1–1)
BILIRUB UR QL STRIP: NEGATIVE
BUN BLD-MCNC: 12 MG/DL (ref 5–21)
BUN/CREAT SERPL: 10 (ref 7–25)
CALCIUM SPEC-SCNC: 10.8 MG/DL (ref 8.4–10.4)
CHLORIDE SERPL-SCNC: 106 MMOL/L (ref 98–110)
CLARITY UR: ABNORMAL
CO2 SERPL-SCNC: 26 MMOL/L (ref 24–31)
COLOR UR: YELLOW
CREAT BLD-MCNC: 1.2 MG/DL (ref 0.5–1.4)
DEPRECATED RDW RBC AUTO: 45.5 FL (ref 37–54)
EOSINOPHIL # BLD AUTO: 0.64 10*3/MM3 (ref 0–0.4)
EOSINOPHIL NFR BLD AUTO: 4.3 % (ref 0.3–6.2)
ERYTHROCYTE [DISTWIDTH] IN BLOOD BY AUTOMATED COUNT: 14.5 % (ref 12.3–15.4)
GFR SERPL CREATININE-BSD FRML MDRD: 66 ML/MIN/1.73
GLOBULIN UR ELPH-MCNC: 3.3 GM/DL
GLUCOSE BLD-MCNC: 97 MG/DL (ref 70–100)
GLUCOSE UR STRIP-MCNC: NEGATIVE MG/DL
HCT VFR BLD AUTO: 46.5 % (ref 37.5–51)
HGB BLD-MCNC: 16 G/DL (ref 13–17.7)
HGB UR QL STRIP.AUTO: NEGATIVE
IMM GRANULOCYTES # BLD AUTO: 0.04 10*3/MM3 (ref 0–0.05)
IMM GRANULOCYTES NFR BLD AUTO: 0.3 % (ref 0–0.5)
KETONES UR QL STRIP: NEGATIVE
LEUKOCYTE ESTERASE UR QL STRIP.AUTO: NEGATIVE
LYMPHOCYTES # BLD AUTO: 6.05 10*3/MM3 (ref 0.7–3.1)
LYMPHOCYTES NFR BLD AUTO: 40.6 % (ref 19.6–45.3)
MAGNESIUM SERPL-MCNC: 2.5 MG/DL (ref 1.4–2.2)
MCH RBC QN AUTO: 29.7 PG (ref 26.6–33)
MCHC RBC AUTO-ENTMCNC: 34.4 G/DL (ref 31.5–35.7)
MCV RBC AUTO: 86.3 FL (ref 79–97)
MONOCYTES # BLD AUTO: 1.31 10*3/MM3 (ref 0.1–0.9)
MONOCYTES NFR BLD AUTO: 8.8 % (ref 5–12)
NEUTROPHILS # BLD AUTO: 6.73 10*3/MM3 (ref 1.7–7)
NEUTROPHILS NFR BLD AUTO: 45.1 % (ref 42.7–76)
NITRITE UR QL STRIP: NEGATIVE
NRBC BLD AUTO-RTO: 0 /100 WBC (ref 0–0.2)
PH UR STRIP.AUTO: 7 [PH] (ref 5–8)
PLATELET # BLD AUTO: 398 10*3/MM3 (ref 140–450)
PMV BLD AUTO: 10.8 FL (ref 6–12)
POTASSIUM BLD-SCNC: 4.4 MMOL/L (ref 3.5–5.3)
PROT SERPL-MCNC: 7.7 G/DL (ref 6.3–8.7)
PROT UR QL STRIP: NEGATIVE
RBC # BLD AUTO: 5.39 10*6/MM3 (ref 4.14–5.8)
SODIUM BLD-SCNC: 140 MMOL/L (ref 135–145)
SP GR UR STRIP: 1.02 (ref 1–1.03)
TSH SERPL DL<=0.05 MIU/L-ACNC: 0.63 UIU/ML (ref 0.47–4.68)
UROBILINOGEN UR QL STRIP: ABNORMAL
WBC NRBC COR # BLD: 14.91 10*3/MM3 (ref 3.4–10.8)

## 2019-09-06 PROCEDURE — 81003 URINALYSIS AUTO W/O SCOPE: CPT | Performed by: PHYSICIAN ASSISTANT

## 2019-09-06 PROCEDURE — 25010000002 KETOROLAC TROMETHAMINE PER 15 MG: Performed by: PHYSICIAN ASSISTANT

## 2019-09-06 PROCEDURE — 80050 GENERAL HEALTH PANEL: CPT | Performed by: PHYSICIAN ASSISTANT

## 2019-09-06 PROCEDURE — 96375 TX/PRO/DX INJ NEW DRUG ADDON: CPT

## 2019-09-06 PROCEDURE — 99283 EMERGENCY DEPT VISIT LOW MDM: CPT

## 2019-09-06 PROCEDURE — 74177 CT ABD & PELVIS W/CONTRAST: CPT

## 2019-09-06 PROCEDURE — 96374 THER/PROPH/DIAG INJ IV PUSH: CPT

## 2019-09-06 PROCEDURE — 25010000002 ONDANSETRON PER 1 MG: Performed by: PHYSICIAN ASSISTANT

## 2019-09-06 PROCEDURE — 83735 ASSAY OF MAGNESIUM: CPT | Performed by: PHYSICIAN ASSISTANT

## 2019-09-06 PROCEDURE — 25010000002 IOPAMIDOL 61 % SOLUTION: Performed by: PHYSICIAN ASSISTANT

## 2019-09-06 RX ORDER — ONDANSETRON 2 MG/ML
4 INJECTION INTRAMUSCULAR; INTRAVENOUS ONCE
Status: COMPLETED | OUTPATIENT
Start: 2019-09-06 | End: 2019-09-06

## 2019-09-06 RX ORDER — KETOROLAC TROMETHAMINE 15 MG/ML
15 INJECTION, SOLUTION INTRAMUSCULAR; INTRAVENOUS ONCE
Status: COMPLETED | OUTPATIENT
Start: 2019-09-06 | End: 2019-09-06

## 2019-09-06 RX ADMIN — IOPAMIDOL 100 ML: 612 INJECTION, SOLUTION INTRAVENOUS at 21:11

## 2019-09-06 RX ADMIN — KETOROLAC TROMETHAMINE 15 MG: 15 INJECTION, SOLUTION INTRAMUSCULAR; INTRAVENOUS at 20:04

## 2019-09-06 RX ADMIN — SODIUM CHLORIDE 1000 ML: 9 INJECTION, SOLUTION INTRAVENOUS at 20:04

## 2019-09-06 RX ADMIN — ONDANSETRON HYDROCHLORIDE 4 MG: 2 SOLUTION INTRAMUSCULAR; INTRAVENOUS at 20:04

## 2019-09-07 NOTE — ED NOTES
Pt inquiring if he is going to be d/c'd.  Chino MENJIVAR notified.  No other concerns at this time     Teo Harper RN  09/06/19 0622

## 2019-09-07 NOTE — ED NOTES
Pt updated results final and Chino MENJIVAR to discuss.  No other concerns at this time     Teo Harper RN  09/06/19 9219

## 2019-09-07 NOTE — ED PROVIDER NOTES
Subjective   History of Present Illness  44-year-old male presents with a chief complaint of right lower quadrant abdominal pain.  The patient reports pain began this morning today and got progressively worse.  No fever no vomiting no diarrhea.  He reports history of kidney stones as well reports this feels different.  Reports that sharp pain radiates to his back.  Review of Systems   All other systems reviewed and are negative.      Past Medical History:   Diagnosis Date   • Back pain    • Fracture of lumbar spine (CMS/HCC) 2002 and 2015    lower lumbar 2002; L1 2015   • Head injury    • Neck pain        No Known Allergies    Past Surgical History:   Procedure Laterality Date   • FACIAL FRACTURE SURGERY     • INCISION AND DRAINAGE LEG Right 12/1/2018    Procedure: INCISION AND DRAINAGE OF RIGHT UPPER INNER THIGH, PLACEMENT OF WOUND VAC;  Surgeon: René Dallas MD;  Location: [facility] OR;  Service: General   • SPLENECTOMY         Family History   Problem Relation Age of Onset   • Fibromyalgia Mother    • Lung cancer Father        Social History     Socioeconomic History   • Marital status: Legally      Spouse name: Not on file   • Number of children: Not on file   • Years of education: Not on file   • Highest education level: Not on file   Tobacco Use   • Smoking status: Never Smoker   • Smokeless tobacco: Never Used   Substance and Sexual Activity   • Alcohol use: No   • Drug use: No   • Sexual activity: Yes     Partners: Female           Objective   Physical Exam   Constitutional: He is oriented to person, place, and time. He appears well-developed and well-nourished.   HENT:   Head: Normocephalic and atraumatic.   Eyes: EOM are normal. Pupils are equal, round, and reactive to light.   Cardiovascular: Normal rate, regular rhythm and normal heart sounds.   Pulmonary/Chest: Effort normal and breath sounds normal.   Abdominal: Normal appearance and normal aorta. There is tenderness in the right lower  quadrant.   Neurological: He is alert and oriented to person, place, and time.   Skin: Skin is warm and dry. Capillary refill takes less than 2 seconds.   Psychiatric: He has a normal mood and affect. His behavior is normal.   Nursing note and vitals reviewed.      Procedures           ED Course        Labs Reviewed   COMPREHENSIVE METABOLIC PANEL - Abnormal; Notable for the following components:       Result Value    Calcium 10.8 (*)     All other components within normal limits    Narrative:     GFR Normal >60  Chronic Kidney Disease <60  Kidney Failure <15   URINALYSIS W/ CULTURE IF INDICATED - Abnormal; Notable for the following components:    Appearance, UA Cloudy (*)     All other components within normal limits    Narrative:     Urine microscopic not indicated.   MAGNESIUM - Abnormal; Notable for the following components:    Magnesium 2.5 (*)     All other components within normal limits   CBC WITH AUTO DIFFERENTIAL - Abnormal; Notable for the following components:    WBC 14.91 (*)     Lymphocytes, Absolute 6.05 (*)     Monocytes, Absolute 1.31 (*)     Eosinophils, Absolute 0.64 (*)     All other components within normal limits   TSH - Normal   CBC AND DIFFERENTIAL    Narrative:     The following orders were created for panel order CBC & Differential.  Procedure                               Abnormality         Status                     ---------                               -----------         ------                     CBC Auto Differential[[id number]]        Abnormal            Final result                 Please view results for these tests on the individual orders.     CT Abdomen Pelvis With Contrast   Final Result   1. No CT evidence of an acute intra-abdominal/pelvic pathological   process.   This report was finalized on 09/06/2019 22:00 by Dr. Tano Nj MD.                  MDM  Number of Diagnoses or Management Options  Abdominal pain, unspecified abdominal location: new and requires  workup  Diagnosis management comments: Rlq pain, negative ct, improved here, dc in stable condition        Amount and/or Complexity of Data Reviewed  Clinical lab tests: reviewed and ordered  Tests in the radiology section of CPT®: ordered and reviewed  Tests in the medicine section of CPT®: ordered and reviewed    Risk of Complications, Morbidity, and/or Mortality  Presenting problems: moderate  Diagnostic procedures: moderate  Management options: moderate    Patient Progress  Patient progress: stable      Final diagnoses:   Abdominal pain, unspecified abdominal location              Chino Handley PA-C  09/07/19 0326

## 2019-09-22 NOTE — PROGRESS NOTES
"Primary Care Provider: Provider, No Known  Requesting Provider: Don Swanson*    Chief Complaint:   Chief Complaint   Patient presents with   • Back Pain     Patient is here today for back pain and numbness and tingling in both legs and feet.     History of Present Illness  Consultation today at the request of Don Swanson*    HPI:  Saulo Shepard is a 44 y.o. male who presents today with a complaint of lumbar back and right leg pain, 85% back, 50% right leg.  No previous spine surgeries.  History of a prior L1 and T8 compression fracture as result of a previous injury.    Gradual progressive onset of lower back discomfort over the past 10 years.  He states his lower back discomfort is constant, waxing and waning in severity.  He describes his lower back discomfort as a \"pulling to stabbing sensation\" to the mid back that occasionally radiates down the medial aspect of the right leg extending to the ankle.  He additionally reports rare numbness and paresthesias to the bilateral lower extremities in a stocking glove distribution from the mid thighs.  His lower back discomfort worsens with prolonged sitting with his back unsupported and decreases to some extent with movement, while walking, lying down, and with use of OTC Tylenol and ibuprofen.  He denies fevers, chills, night sweats, unexplained weight loss, saddle anesthesia, or bowel bladder dysfunction.  He currently rates the severity of his symptoms 7/10.  No additional concerns at this time.    Mr. Shepard has not completed nor participated in a dedicated course of physician directed physical therapy, massage and/or chiropractic care, nor been evaluated by pain management.    Oswestry Disability Index (Yun et al, 1980)   Score   Pain Intensity 2   Personal Care 0   Lifting 3   Walking 1   Sitting 2   Standing 1   Sleeping 3   Sex Life (if applicable) 1   Social Life 1   Traveling 2   Previous Treatment Yes   TOTAL = Score x2  (or " "2.22 if one NA) 32     SCORE INTERPRETATION OF THE OSWESTRY LBP DISABILITY QUESTIONNAIRE   20-40% Moderate disability This group experiences more pain and problems with sitting, lifting, and standing. Travel and social life are more difficult and they may well be off work. Personal care, sexual activity, and sleeping are not grossly affected, and the back condition can usually be managed by conservative means.       ROS  Review of Systems   Constitutional: Negative.    Eyes: Negative.    Respiratory: Positive for chest tightness, shortness of breath and wheezing.    Cardiovascular: Negative.    Gastrointestinal: Negative.    Endocrine: Negative.    Genitourinary: Negative.    Musculoskeletal: Positive for back pain, neck pain and neck stiffness.   Skin: Negative.    Allergic/Immunologic: Negative.    Neurological: Positive for dizziness, light-headedness and headaches.   Hematological: Negative.    Psychiatric/Behavioral: Negative.    All other systems reviewed and are negative.    Past Medical History:   Diagnosis Date   • Back pain    • Fracture of lumbar spine (CMS/HCC) 2002 and 2015    lower lumbar 2002; L1 2015   • Head injury    • Neck pain    • Overweight (BMI 25.0-29.9)      Past Surgical History:   Procedure Laterality Date   • FACIAL FRACTURE SURGERY     • INCISION AND DRAINAGE LEG Right 12/1/2018    Procedure: INCISION AND DRAINAGE OF RIGHT UPPER INNER THIGH, PLACEMENT OF WOUND VAC;  Surgeon: René Dallas MD;  Location: [facility] OR;  Service: General   • SPLENECTOMY       Family History: family history includes Fibromyalgia in his mother; Lung cancer in his father.    Social History:  reports that he has never smoked. He has never used smokeless tobacco. He reports that he does not drink alcohol or use drugs.    Medications:  No current outpatient medications on file.    Allergies:  Patient has no known allergies.    Objective   Ht 182.9 cm (72\")   Wt 94.3 kg (208 lb)   BMI 28.21 kg/m²   Physical " Exam   Constitutional: He is oriented to person, place, and time. He appears well-developed and well-nourished.  Non-toxic appearance. He does not have a sickly appearance. He does not appear ill. No distress.   BMI 28.2   HENT:   Head: Normocephalic and atraumatic.   Right Ear: Hearing normal.   Left Ear: Hearing normal.   Mouth/Throat: Mucous membranes are normal.   Eyes: Conjunctivae and EOM are normal. Pupils are equal, round, and reactive to light.   Neck: Trachea normal and full passive range of motion without pain. Neck supple.   Cardiovascular: Normal rate and regular rhythm.   Pulmonary/Chest: Effort normal. No accessory muscle usage. No apnea, no tachypnea and no bradypnea. No respiratory distress.   Abdominal: Soft. Normal appearance.   Neurological: He is alert and oriented to person, place, and time. Gait normal.   Reflex Scores:       Tricep reflexes are 2+ on the right side and 2+ on the left side.       Bicep reflexes are 2+ on the right side and 2+ on the left side.       Brachioradialis reflexes are 2+ on the right side and 2+ on the left side.       Patellar reflexes are 2+ on the right side and 2+ on the left side.       Achilles reflexes are 2+ on the right side and 2+ on the left side.  Skin: Skin is warm, dry and intact.   Psychiatric: He has a normal mood and affect. His speech is normal and behavior is normal.   Nursing note and vitals reviewed.    Neurologic Exam     Mental Status   Oriented to person, place, and time.   Attention: normal. Concentration: normal.   Speech: speech is normal   Level of consciousness: alert    Cranial Nerves     CN II   Visual fields full to confrontation.     CN III, IV, VI   Pupils are equal, round, and reactive to light.  Extraocular motions are normal.     CN V   Facial sensation intact.     CN VII   Facial expression full, symmetric.     CN VIII   CN VIII normal.     CN IX, X   CN IX normal.     CN XI   CN XI normal.     Motor Exam   Muscle bulk:  normal  Overall muscle tone: normal  Right arm tone: normal  Left arm tone: normal  Right arm pronator drift: absent  Left arm pronator drift: absent  Right leg tone: normal  Left leg tone: normal    Strength   Right deltoid: 5/5  Left deltoid: 5/5  Right biceps: 5/5  Left biceps: 5/5  Right triceps: 5/5  Left triceps: 5/5  Right wrist extension: 5/5  Left wrist extension: 5/5  Right iliopsoas: 5/5  Left iliopsoas: 5/5  Right quadriceps: 5/5  Left quadriceps: 5/5  Right anterior tibial: 5/5  Left anterior tibial: 5/5  Right posterior tibial: 5/5  Left posterior tibial: 5/5  Positive right NEELA     Sensory Exam   Light touch normal.     Gait, Coordination, and Reflexes     Gait  Gait: normal    Tremor   Resting tremor: absent  Intention tremor: absent  Action tremor: absent    Reflexes   Right brachioradialis: 2+  Left brachioradialis: 2+  Right biceps: 2+  Left biceps: 2+  Right triceps: 2+  Left triceps: 2+  Right patellar: 2+  Left patellar: 2+  Right achilles: 2+  Left achilles: 2+  Right : 4+  Left : 4+  Right plantar: normal  Left plantar: normal  Right Davis: absent  Left Davis: absent  Right ankle clonus: absent  Left ankle clonus: absent  Right pendular knee jerk: absent  Left pendular knee jerk: absent  Able to maintain gait on heels and toes     Imaging: (independent review and interpretation)  9/1/19       ASSESSMENT and PLAN  Saulo Shepard is a 44 y.o. male with a past medical history of prior T8 and L1 compression fractures in 2002 in 2015, and he is overweight.  He presents with a new problem of lower back and occasional right leg pain, 85% back, 50% right leg.  Physical exam findings of positive right NEELA, neurologically intact.  His imaging shows chronic fracture to the inferior endplate of L1, no acute fractures, relatively well-maintained alignment and disc space height, mild degenerative changes.  Imaging discussed and reviewed with patient.    Although we do not have advanced  imaging, Saulo's signs and symptoms are most concerning for chronic axial low back pain given his symptoms of mid lower back pain that occasionally radiate down the medial aspect of the right leg extending to the ankle with rare numbness and paresthesias in the same distribution that worsen with prolonged sitting and is alleviated with movement, primarily while ambulating and signs of a positive right NEELA, otherwise he is neurologically intact.  Furthermore his imaging shows mild degenerative changes with no significant abnormalities in alignment or disc space height loss.    A structural diagnosis is possible and only 50% of patients with chronic back pain.  These patients account for 85% of the cost of lost work and compensation. (Johnny. Back Pain and Sciatica. [facility].1988; [phone]).    Axial Low Back Pain:    Define his back pain without significant radiculopathy or weakness.  This can include referred pain radiating down posterior thighs without decent below the knees.     Acute (<6 weeks)  Most typically trauma related, compression fracture, myofascial pain, drug induced myalgias (statins, Neulasta, or phosphodiesterase type V inhibitors such as tadalafil).    Subacute (6 weeks - 3 months)  Infectious discitis, vertebral osteomyelitis, spinal epidural abscess, spinal tumor (intradural or extradural), multiple myeloma, sacroiliitis    Chronic (>3 months)  Degenerative spine: lumbar stenosis, spondylolisthesis with mechanical instability, degenerative facet arthropathy, coronal or sagittal spinal malalignment, failed back syndrome after surgery, flat back syndrome. Spondyloarthropathies including ankylosis spondylitis (HLA-B27), Paget's disease.  Fibromyalgia or other rheumatological disease. Malingering, conversion disorder and secondary gain are diagnoses of exclusion.    TREATMENT RECOMMENDATIONS ...  X-ray of the lumbar spine complete with flexion extension to assess for lumbar instabilities.  PT/OT,  prescription provided to patient  Massage and/or chiropractic care as able  Unless contraindicated, nonsteroidal anti-inflammatories, 220mg naproxen sodium BID x 10 days.  B/R/AE and use discussed.  CBC, CMP, CRP, sed rate, HLA-B27, Rheumatoid Factor, and OSORIO to rule out additional causes of lower back pain    We will have Mr. Shepard return for follow-up after physical therapy for reassessment.  I advised the patient to call and return sooner for new or worsening complaints of weakness, paresthesias, gait disturbances, or any additional concerns.  Treatment options discussed in detail with Saulo and he voiced understanding.  Mr. Shepard agrees with this plan of care.    Overweight  BMI today is 28.2.  Information on the DASH diet provided in the AVS.  We will continue to provided diet and exercise information with the goal of weight loss at each scheduled appointment.     Saulo was seen today for back pain.    Diagnoses and all orders for this visit:    Lumbar pain  -     CBC & Differential; Future  -     Comprehensive Metabolic Panel; Future  -     C-reactive Protein; Future  -     Sedimentation Rate  -     OSORIO; Future  -     Rheumatoid Factor, Quant; Future  -     HLA-B27 Antigen; Future  -     XR Spine Lumbar Complete With Flex & Ext; Future  -     Ambulatory Referral to Physical Therapy Evaluate and treat (3x a week for 6 weeks); Heat; Soft Tissue Mobilizaton; ROM    Leg pain, right  -     Ambulatory Referral to Physical Therapy Evaluate and treat (3x a week for 6 weeks); Heat; Soft Tissue Mobilizaton; ROM    Overweight (BMI 25.0-29.9)      Return for follow up with Delmar after pt.    Thank you for this Consultation and the opportunity to participate in Saulo's care.    Sincerely,  Delmar Trujillo, KYLER    Level of Risk: Moderate due to: undiagnosed new problem  MDM: Moderate Complexity  (Mod = 70058, High = 62878)

## 2019-09-24 ENCOUNTER — HOSPITAL ENCOUNTER (OUTPATIENT)
Dept: GENERAL RADIOLOGY | Facility: HOSPITAL | Age: 44
Discharge: HOME | End: 2019-09-24
Admitting: NURSE PRACTITIONER

## 2019-09-24 ENCOUNTER — LAB (OUTPATIENT)
Dept: LAB | Facility: HOSPITAL | Age: 44
End: 2019-09-24

## 2019-09-24 ENCOUNTER — OFFICE VISIT (OUTPATIENT)
Dept: NEUROSURGERY | Facility: CLINIC | Age: 44
End: 2019-09-24

## 2019-09-24 VITALS — WEIGHT: 208 LBS | HEIGHT: 72 IN | BODY MASS INDEX: 28.17 KG/M2

## 2019-09-24 DIAGNOSIS — M54.50 LUMBAR PAIN: ICD-10-CM

## 2019-09-24 DIAGNOSIS — M54.50 LUMBAR PAIN: Primary | ICD-10-CM

## 2019-09-24 DIAGNOSIS — M79.604 LEG PAIN, RIGHT: ICD-10-CM

## 2019-09-24 DIAGNOSIS — E66.3 OVERWEIGHT (BMI 25.0-29.9): ICD-10-CM

## 2019-09-24 LAB
ALBUMIN SERPL-MCNC: 4.5 G/DL (ref 3.5–5.2)
ALBUMIN/GLOB SERPL: 1.6 G/DL
ALP SERPL-CCNC: 90 U/L (ref 39–117)
ALT SERPL W P-5'-P-CCNC: 18 U/L (ref 1–41)
ANION GAP SERPL CALCULATED.3IONS-SCNC: 7.4 MMOL/L (ref 5–15)
AST SERPL-CCNC: 18 U/L (ref 1–40)
BASOPHILS # BLD AUTO: 0.14 10*3/MM3 (ref 0–0.2)
BASOPHILS NFR BLD AUTO: 0.9 % (ref 0–1.5)
BILIRUB SERPL-MCNC: 0.5 MG/DL (ref 0.2–1.2)
BUN BLD-MCNC: 9 MG/DL (ref 6–20)
BUN/CREAT SERPL: 7.9 (ref 7–25)
CALCIUM SPEC-SCNC: 10 MG/DL (ref 8.6–10.5)
CHLORIDE SERPL-SCNC: 99 MMOL/L (ref 98–107)
CHROMATIN AB SERPL-ACNC: <10 IU/ML (ref 0–14)
CO2 SERPL-SCNC: 30.6 MMOL/L (ref 22–29)
CREAT BLD-MCNC: 1.14 MG/DL (ref 0.76–1.27)
CRP SERPL-MCNC: 0.23 MG/DL (ref 0–0.5)
DEPRECATED RDW RBC AUTO: 43.5 FL (ref 37–54)
EOSINOPHIL # BLD AUTO: 0.25 10*3/MM3 (ref 0–0.4)
EOSINOPHIL NFR BLD AUTO: 1.7 % (ref 0.3–6.2)
ERYTHROCYTE [DISTWIDTH] IN BLOOD BY AUTOMATED COUNT: 13.9 % (ref 12.3–15.4)
ERYTHROCYTE [SEDIMENTATION RATE] IN BLOOD: 1 MM/HR (ref 0–15)
GFR SERPL CREATININE-BSD FRML MDRD: 70 ML/MIN/1.73
GLOBULIN UR ELPH-MCNC: 2.9 GM/DL
GLUCOSE BLD-MCNC: 89 MG/DL (ref 65–99)
HCT VFR BLD AUTO: 45.9 % (ref 37.5–51)
HGB BLD-MCNC: 15.5 G/DL (ref 13–17.7)
IMM GRANULOCYTES # BLD AUTO: 0.06 10*3/MM3 (ref 0–0.05)
IMM GRANULOCYTES NFR BLD AUTO: 0.4 % (ref 0–0.5)
LYMPHOCYTES # BLD AUTO: 2.53 10*3/MM3 (ref 0.7–3.1)
LYMPHOCYTES NFR BLD AUTO: 16.7 % (ref 19.6–45.3)
MCH RBC QN AUTO: 29.1 PG (ref 26.6–33)
MCHC RBC AUTO-ENTMCNC: 33.8 G/DL (ref 31.5–35.7)
MCV RBC AUTO: 86.1 FL (ref 79–97)
MONOCYTES # BLD AUTO: 1.33 10*3/MM3 (ref 0.1–0.9)
MONOCYTES NFR BLD AUTO: 8.8 % (ref 5–12)
NEUTROPHILS # BLD AUTO: 10.82 10*3/MM3 (ref 1.7–7)
NEUTROPHILS NFR BLD AUTO: 71.5 % (ref 42.7–76)
NRBC BLD AUTO-RTO: 0 /100 WBC (ref 0–0.2)
PLATELET # BLD AUTO: 398 10*3/MM3 (ref 140–450)
PMV BLD AUTO: 11.5 FL (ref 6–12)
POTASSIUM BLD-SCNC: 4.4 MMOL/L (ref 3.5–5.2)
PROT SERPL-MCNC: 7.4 G/DL (ref 6–8.5)
RBC # BLD AUTO: 5.33 10*6/MM3 (ref 4.14–5.8)
SODIUM BLD-SCNC: 137 MMOL/L (ref 136–145)
WBC NRBC COR # BLD: 15.13 10*3/MM3 (ref 3.4–10.8)

## 2019-09-24 PROCEDURE — 36415 COLL VENOUS BLD VENIPUNCTURE: CPT

## 2019-09-24 PROCEDURE — 86140 C-REACTIVE PROTEIN: CPT | Performed by: NURSE PRACTITIONER

## 2019-09-24 PROCEDURE — 80053 COMPREHEN METABOLIC PANEL: CPT | Performed by: NURSE PRACTITIONER

## 2019-09-24 PROCEDURE — 99214 OFFICE O/P EST MOD 30 MIN: CPT | Performed by: NURSE PRACTITIONER

## 2019-09-24 PROCEDURE — 85652 RBC SED RATE AUTOMATED: CPT | Performed by: NURSE PRACTITIONER

## 2019-09-24 PROCEDURE — 72114 X-RAY EXAM L-S SPINE BENDING: CPT

## 2019-09-24 PROCEDURE — 86431 RHEUMATOID FACTOR QUANT: CPT | Performed by: NURSE PRACTITIONER

## 2019-09-24 PROCEDURE — 81374 HLA I TYPING 1 ANTIGEN LR: CPT | Performed by: NURSE PRACTITIONER

## 2019-09-24 PROCEDURE — 86038 ANTINUCLEAR ANTIBODIES: CPT | Performed by: NURSE PRACTITIONER

## 2019-09-24 PROCEDURE — 85025 COMPLETE CBC W/AUTO DIFF WBC: CPT | Performed by: NURSE PRACTITIONER

## 2019-09-24 NOTE — PATIENT INSTRUCTIONS
"DASH Eating Plan  DASH stands for \"Dietary Approaches to Stop Hypertension.\" The DASH eating plan is a healthy eating plan that has been shown to reduce high blood pressure (hypertension). It may also reduce your risk for type 2 diabetes, heart disease, and stroke. The DASH eating plan may also help with weight loss.  What are tips for following this plan?    General guidelines  · Avoid eating more than 2,300 mg (milligrams) of salt (sodium) a day. If you have hypertension, you may need to reduce your sodium intake to 1,500 mg a day.  · Limit alcohol intake to no more than 1 drink a day for nonpregnant women and 2 drinks a day for men. One drink equals 12 oz of beer, 5 oz of wine, or 1½ oz of hard liquor.  · Work with your health care provider to maintain a healthy body weight or to lose weight. Ask what an ideal weight is for you.  · Get at least 30 minutes of exercise that causes your heart to beat faster (aerobic exercise) most days of the week. Activities may include walking, swimming, or biking.  · Work with your health care provider or diet and nutrition specialist (dietitian) to adjust your eating plan to your individual calorie needs.  Reading food labels    · Check food labels for the amount of sodium per serving. Choose foods with less than 5 percent of the Daily Value of sodium. Generally, foods with less than 300 mg of sodium per serving fit into this eating plan.  · To find whole grains, look for the word \"whole\" as the first word in the ingredient list.  Shopping  · Buy products labeled as \"low-sodium\" or \"no salt added.\"  · Buy fresh foods. Avoid canned foods and premade or frozen meals.  Cooking  · Avoid adding salt when cooking. Use salt-free seasonings or herbs instead of table salt or sea salt. Check with your health care provider or pharmacist before using salt substitutes.  · Do not walker foods. Cook foods using healthy methods such as baking, boiling, grilling, and broiling instead.  · Cook with " heart-healthy oils, such as olive, canola, soybean, or sunflower oil.  Meal planning  · Eat a balanced diet that includes:  ? 5 or more servings of fruits and vegetables each day. At each meal, try to fill half of your plate with fruits and vegetables.  ? Up to 6-8 servings of whole grains each day.  ? Less than 6 oz of lean meat, poultry, or fish each day. A 3-oz serving of meat is about the same size as a deck of cards. One egg equals 1 oz.  ? 2 servings of low-fat dairy each day.  ? A serving of nuts, seeds, or beans 5 times each week.  ? Heart-healthy fats. Healthy fats called Omega-3 fatty acids are found in foods such as flaxseeds and coldwater fish, like sardines, salmon, and mackerel.  · Limit how much you eat of the following:  ? Canned or prepackaged foods.  ? Food that is high in trans fat, such as fried foods.  ? Food that is high in saturated fat, such as fatty meat.  ? Sweets, desserts, sugary drinks, and other foods with added sugar.  ? Full-fat dairy products.  · Do not salt foods before eating.  · Try to eat at least 2 vegetarian meals each week.  · Eat more home-cooked food and less restaurant, buffet, and fast food.  · When eating at a restaurant, ask that your food be prepared with less salt or no salt, if possible.  What foods are recommended?  The items listed may not be a complete list. Talk with your dietitian about what dietary choices are best for you.  Grains  Whole-grain or whole-wheat bread. Whole-grain or whole-wheat pasta. Brown rice. Oatmeal. Quinoa. Bulgur. Whole-grain and low-sodium cereals. Kandi bread. Low-fat, low-sodium crackers. Whole-wheat flour tortillas.  Vegetables  Fresh or frozen vegetables (raw, steamed, roasted, or grilled). Low-sodium or reduced-sodium tomato and vegetable juice. Low-sodium or reduced-sodium tomato sauce and tomato paste. Low-sodium or reduced-sodium canned vegetables.  Fruits  All fresh, dried, or frozen fruit. Canned fruit in natural juice (without  added sugar).  Meat and other protein foods  Skinless chicken or turkey. Ground chicken or turkey. Pork with fat trimmed off. Fish and seafood. Egg whites. Dried beans, peas, or lentils. Unsalted nuts, nut butters, and seeds. Unsalted canned beans. Lean cuts of beef with fat trimmed off. Low-sodium, lean deli meat.  Dairy  Low-fat (1%) or fat-free (skim) milk. Fat-free, low-fat, or reduced-fat cheeses. Nonfat, low-sodium ricotta or cottage cheese. Low-fat or nonfat yogurt. Low-fat, low-sodium cheese.  Fats and oils  Soft margarine without trans fats. Vegetable oil. Low-fat, reduced-fat, or light mayonnaise and salad dressings (reduced-sodium). Canola, safflower, olive, soybean, and sunflower oils. Avocado.  Seasoning and other foods  Herbs. Spices. Seasoning mixes without salt. Unsalted popcorn and pretzels. Fat-free sweets.  What foods are not recommended?  The items listed may not be a complete list. Talk with your dietitian about what dietary choices are best for you.  Grains  Baked goods made with fat, such as croissants, muffins, or some breads. Dry pasta or rice meal packs.  Vegetables  Creamed or fried vegetables. Vegetables in a cheese sauce. Regular canned vegetables (not low-sodium or reduced-sodium). Regular canned tomato sauce and paste (not low-sodium or reduced-sodium). Regular tomato and vegetable juice (not low-sodium or reduced-sodium). Pickles. Olives.  Fruits  Canned fruit in a light or heavy syrup. Fried fruit. Fruit in cream or butter sauce.  Meat and other protein foods  Fatty cuts of meat. Ribs. Fried meat. Renner. Sausage. Bologna and other processed lunch meats. Salami. Fatback. Hotdogs. Bratwurst. Salted nuts and seeds. Canned beans with added salt. Canned or smoked fish. Whole eggs or egg yolks. Chicken or turkey with skin.  Dairy  Whole or 2% milk, cream, and half-and-half. Whole or full-fat cream cheese. Whole-fat or sweetened yogurt. Full-fat cheese. Nondairy creamers. Whipped toppings.  Processed cheese and cheese spreads.  Fats and oils  Butter. Stick margarine. Lard. Shortening. Ghee. Renner fat. Tropical oils, such as coconut, palm kernel, or palm oil.  Seasoning and other foods  Salted popcorn and pretzels. Onion salt, garlic salt, seasoned salt, table salt, and sea salt. Worcestershire sauce. Tartar sauce. Barbecue sauce. Teriyaki sauce. Soy sauce, including reduced-sodium. Steak sauce. Canned and packaged gravies. Fish sauce. Oyster sauce. Cocktail sauce. Horseradish that you find on the shelf. Ketchup. Mustard. Meat flavorings and tenderizers. Bouillon cubes. Hot sauce and Tabasco sauce. Premade or packaged marinades. Premade or packaged taco seasonings. Relishes. Regular salad dressings.  Where to find more information:  · National Heart, Lung, and [employer]: www.nhlbi.nih.gov  · American Heart Association: www.heart.org  Summary  · The DASH eating plan is a healthy eating plan that has been shown to reduce high blood pressure (hypertension). It may also reduce your risk for type 2 diabetes, heart disease, and stroke.  · With the DASH eating plan, you should limit salt (sodium) intake to 2,300 mg a day. If you have hypertension, you may need to reduce your sodium intake to 1,500 mg a day.  · When on the DASH eating plan, aim to eat more fresh fruits and vegetables, whole grains, lean proteins, low-fat dairy, and heart-healthy fats.  · Work with your health care provider or diet and nutrition specialist (dietitian) to adjust your eating plan to your individual calorie needs.  This information is not intended to replace advice given to you by your health care provider. Make sure you discuss any questions you have with your health care provider.  Document Released: 12/06/2012 Document Revised: 12/11/2017 Document Reviewed: 12/11/2017  [employer] Interactive Patient Education © 2019 [employer]

## 2019-09-25 LAB — ANA SER QL: NEGATIVE

## 2019-09-30 LAB — HLA-B27 QL NAA+PROBE: POSITIVE

## 2019-10-01 ENCOUNTER — TREATMENT (OUTPATIENT)
Dept: PHYSICAL THERAPY | Facility: CLINIC | Age: 44
End: 2019-10-01

## 2019-10-01 DIAGNOSIS — M54.50 LUMBAR PAIN: Primary | ICD-10-CM

## 2019-10-01 DIAGNOSIS — M79.604 RIGHT LEG PAIN: ICD-10-CM

## 2019-10-01 DIAGNOSIS — R20.2 NUMBNESS AND TINGLING OF BOTH LOWER EXTREMITIES: ICD-10-CM

## 2019-10-01 DIAGNOSIS — R20.0 NUMBNESS AND TINGLING OF BOTH LOWER EXTREMITIES: ICD-10-CM

## 2019-10-01 DIAGNOSIS — M54.16 RADICULOPATHY, LUMBAR REGION: ICD-10-CM

## 2019-10-01 PROCEDURE — 97161 PT EVAL LOW COMPLEX 20 MIN: CPT | Performed by: PHYSICAL THERAPIST
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
## 2019-10-09 DIAGNOSIS — M45.6 ANKYLOSING SPONDYLITIS OF LUMBAR REGION (HCC): Primary | ICD-10-CM

## 2019-12-02 ENCOUNTER — TREATMENT (OUTPATIENT)
Dept: PHYSICAL THERAPY | Facility: CLINIC | Age: 44
End: 2019-12-02

## 2019-12-02 DIAGNOSIS — M79.604 RIGHT LEG PAIN: ICD-10-CM

## 2019-12-02 DIAGNOSIS — M54.16 RADICULOPATHY, LUMBAR REGION: ICD-10-CM

## 2019-12-02 DIAGNOSIS — M54.50 LUMBAR PAIN: Primary | ICD-10-CM

## 2019-12-02 PROCEDURE — 97164 PT RE-EVAL EST PLAN CARE: CPT | Performed by: PHYSICAL THERAPIST

## 2019-12-02 NOTE — PROGRESS NOTES
Outpatient Physical Therapy Ortho Re-Evaluation       Patient Name: Saulo Shepard  : 1975  MRN: [id number]  Today's Date: 2019      Visit Date: 2019    Patient Active Problem List   Diagnosis   • Intractable acute post-traumatic headache   • Abscess of left lower extremity excluding foot   • Abscess   • Lumbar pain   • Leg pain, right   • Overweight (BMI 25.0-29.9)   • Ankylosing spondylitis of lumbar region (CMS/HCC)        Past Medical History:   Diagnosis Date   • Anxiety    • Back pain    • Fracture of lumbar spine (CMS/HCC)  and     lower lumbar ; L1    • Head injury    • Injury of back    • Neck pain    • Overweight (BMI 25.0-29.9)         Past Surgical History:   Procedure Laterality Date   • FACIAL FRACTURE SURGERY     • INCISION AND DRAINAGE LEG Right 2018    Procedure: INCISION AND DRAINAGE OF RIGHT UPPER INNER THIGH, PLACEMENT OF WOUND VAC;  Surgeon: René Dallas MD;  Location: [facility] OR;  Service: General   • SPLENECTOMY         Visit Dx:     ICD-10-CM ICD-9-CM   1. Lumbar pain M54.5 724.2   2. Radiculopathy, lumbar region M54.16 724.4   3. Right leg pain M79.604 729.5         Patient History     Row Name 19 1100             History    Chief Complaint  Impaired sensation;Joint stiffness;Muscle tenderness;Muscle weakness;Numbness;Pain;Tinglings  -WJ      Type of Pain  Back pain;Lower Extremity / Leg  -WJ      Date Current Problem(s) Began  10/10/17  -WJ      Brief Description of Current Complaint  Pt continues to reports pain in the low back that radiates down the R leg.  Pt contoinues to report that it feels as if his hips are being pulled apart. He reports a L1 fx in  and unsure about onset of a T8 compression fx. He was seen here in October for this same condition, howveer did not return due to insurance issues.   -WJ      Patient/Caregiver Goals  Relieve pain;Return to prior level of function;Improve mobility;Improve strength;Know what to do  to help the symptoms  -WJ      Occupation/sports/leisure activities  [employer]  -WJ      How has patient tried to help current problem?  medication  -WJ      What clinical tests have you had for this problem?  X-ray  -WJ      Results of Clinical Tests  mild degeneration of lumbar spine  -WJ         Pain     Pain Location  Back;Leg  -WJ      Pain at Present  5  -WJ      Pain at Best  3  -WJ      Pain at Worst  10  -WJ      Pain Frequency  Constant/continuous  -WJ      Pain Description  Sharp;Burning;Shooting;Tingling  -WJ      What Performance Factors Make the Current Problem(s) WORSE?  prolonged movement and bianca car rides   -WJ      What Performance Factors Make the Current Problem(s) BETTER?  lying down in the fetal position  -WJ         Fall Risk Assessment    Any falls in the past year:  No  -WJ         Daily Activities    Pt Participated in POC and Goals  Yes  -WJ        User Key  (r) = Recorded By, (t) = Taken By, (c) = Cosigned By    Initials Name Provider Type    WJ Alvin Faria, PT DPT Physical Therapist          PT Ortho     Row Name 12/02/19 1100       Posture/Observations    Alignment Options  Forward head;Thoracic kyphosis;Cervical lordosis;Rounded shoulders  -WJ    Forward Head  Mild;Increased  -WJ    Thoracic Kyphosis  Mild;Decreased  -WJ    Rounded Shoulders  Moderate;Increased  -WJ    Posture/Observations Comments  Forward head/shoulders w/ increased thoracic kyphosis and decreased lumbar lordosis  -WJ       Quarter Clearing    Quarter Clearing  Lower Quarter Clearing  -WJ       DTR- Lower Quarter Clearing    Patellar tendon (L2-4)  Bilateral:;2- Normal response  -WJ    Achilles tendon (S1-2)  Bilateral:;2- Normal response  -WJ       Neural Tension Signs- Lower Quarter Clearing    Slump  Right:;Positive  -WJ    SLR  Right:;Positive  -WJ       Sensory Screen for Light Touch- Lower Quarter Clearing    L1 (inguinal area)  Bilateral:;Intact  -WJ    L2 (anterior mid thigh)  Bilateral:;Intact  -WJ     L3 (distal anterior thigh)  Bilateral:;Intact  -WJ    L4 (medial lower leg/foot)  Bilateral:;Intact  -WJ    L5 (lateral lower leg/great toe)  Bilateral:;Intact  -WJ    S1 (bottom of foot)  Bilateral:;Intact  -WJ       Myotomal Screen- Lower Quarter Clearing    Hip flexion (L2)  Bilateral:;4+ (Good +)  -WJ    Knee extension (L3)  Bilateral:;5 (Normal)  -WJ    Ankle DF (L4)  Right:;4+ (Good +);Left:;5 (Normal)  -WJ    Great toe extension (L5)  Bilateral:;4+ (Good +)  -WJ    Ankle PF (S1)  Bilateral:;5 (Normal)  -WJ    Knee flexion (S2)  Right:;4 (Good);Left:;5 (Normal)  -WJ       Lumbar ROM Screen- Lower Quarter Clearing    Lumbar Flexion  Impaired 25% to shins  -WJ    Lumbar Extension  Impaired 25%  -WJ    Lumbar Lateral Flexion  Normal  -WJ    Lumbar Rotation  Impaired  -WJ       SI/Hip Screen- Lower Quarter Clearing    ASIS compression  Negative  -WJ    ASIS distraction  Negative  -WJ    Bill's/Nacho's test  Negative  -WJ       Special Tests/Palpation    Special Tests/Palpation  Lumbar/SI;Hip  -WJ       Lumbosacral Accessory Motions    Lumbosacral Accessory Motions Tested?  Yes  -WJ    PA Glide- L1  Hypomobile;Bilateral pain  -WJ    PA Glide- L2  Hypomobile;Bilateral pain  -WJ    PA Glide- L3  Hypomobile;Bilateral pain  -WJ    PA Glide- L4  Hypomobile;Bilateral pain  -WJ    PA Glide- L5  Hypomobile;Bilateral pain  -WJ       Lumbar/SI Special Tests    Trendelenburg Test (Gluteus Medius Weakness)  Bilateral:;Positive  -WJ       Lumbosacral Palpation    Lumbosacral Palpation?  Yes  -WJ    Lumbosacral Segment  Tender  -WJ    Thoracolumbar Segment  Tender  -WJ    Spinous Process  Tender  -WJ    Quadratus Lumborum  Bilateral:;Tender;Guarded/taut  -WJ    Erector Spinae (Paraspinals)  Bilateral:;Tender;Guarded/taut  -WJ       General ROM    RT Lower Ext  Rt Hip Flexion;Rt Hip External Rotation;Rt Hip Internal Rotation  -WJ    LT Lower Ext  Lt Hip Flexion;Lt Hip External Rotation;Lt Hip Internal Rotation  -WJ       Right  Lower Ext    Rt Hip Extension PROM  105  -WJ    Rt Hip External Rotation PROM  35  -WJ    Rt Hip Internal Rotation PROM  10  -WJ       Left Lower Ext    Lt Hip Flexion PROM  114  -WJ    Lt Hip External Rotation PROM  40  -WJ    Lt Hip Internal Rotation PROM  22  -WJ       MMT (Manual Muscle Testing)    Rt Lower Ext  Rt Hip Extension;Rt Hip ABduction  -WJ    Lt Lower Ext  Lt Hip Extension;Lt Hip ABduction  -WJ       MMT Right Lower Ext    Rt Hip Extension MMT, Gross Movement  (4-/5) good minus  -WJ    Rt Hip ABduction MMT, Gross Movement  (3+/5) fair plus  -WJ    Rt Lower Extremity Comments   glute max 4/5  -WJ       MMT Left Lower Ext    Lt Hip Extension MMT, Gross Movement  (4+/5) good plus  -WJ    Lt Hip ABduction MMT, Gross Movement  (4/5) good  -WJ    Lt Lower Extremity Comments   glute max 3+/5  -WJ       Flexibility    Flexibility Tested?  Lower Extremity  -WJ       Lower Extremity Flexibility    Hamstrings  Right:;Moderately limited;Left:;Mildly limited  -WJ      User Key  (r) = Recorded By, (t) = Taken By, (c) = Cosigned By    Initials Name Provider Type    WJ Alvin Faria, PT DPT Physical Therapist                      Therapy Education  Education Details: SKTC and supine hamstring stretch with belt  Given: HEP, Symptoms/condition management, Posture/body mechanics  Program: New  How Provided: Verbal, Demonstration  Provided to: Patient  Level of Understanding: Verbalized, Demonstrated     PT OP Goals     Row Name 12/02/19 1100          PT Short Term Goals    STG Date to Achieve  12/23/19  -WJ     STG 1  Pt to improve R hip PROM IR to 20 degrees.  -WJ     STG 1 Progress  New  -WJ     STG 2  Pt to demonstrate improved postural awareness and ability to self correct to decrease strain on the lumbar spine.  -WJ     STG 2 Progress  New  -WJ     STG 3  Pt to demonstrate decreased parspinal muscle guarding upon palpation.  -WJ     STG 3 Progress  New  -W        Long Term Goals    LTG Date to Achieve  01/13/20   -WJ     LTG 1  Independent w/ HEP.  -WJ     LTG 1 Progress  New  -WJ     LTG 2  Pt to B SLS x10 seconds w/ hip stability.  -WJ     LTG 2 Progress  New  -WJ     LTG 3  Pt to report pain </= 4/10 for 1 week.  -WJ     LTG 3 Progress  New  -WJ     LTG 4  Pt to improve THA score to <10.  -WJ     LTG 4 Progress  New  -WJ     LTG 5  Pt to improve B hip flexion and ABD MMT strength to 4+/5.  -WJ     LTG 5 Progress  New  -WJ        Time Calculation    PT Goal Re-Cert Due Date  01/01/20  -WJ       User Key  (r) = Recorded By, (t) = Taken By, (c) = Cosigned By    Initials Name Provider Type    WAlvin Guzman, PT DPT Physical Therapist          PT Assessment/Plan     Row Name 12/02/19 1200          PT Assessment    Functional Limitations  Limitations in community activities;Performance in leisure activities;Limitations in functional capacity and performance;Performance in work activities  -W     Impairments  Joint mobility;Motor function;Muscle strength;Pain;Poor body mechanics;Posture;Range of motion;Sensation  -WJ     Assessment Comments  Pt was initially seen in October for this same condition, however reports that he was not notified that his visits were approved. Sicne that time his symptoms have reianed the same and are consistent with lumbar radiculopathy. His symptoms did not seem to be as flared down the lower extrmeity today, however he reports this varies dependning on the activity. The musculature on the R side of his his lumbar region were very guarded along with decreased hip mobility on the R. There was right sided hip weakness present as a result of compensatory strategies utilized due to his pain. His poor posture could also be attributing to hs pain putting increased strain on the lumbar region. Skilled physical therapy is indicated to address these defcits and improve his fucnitonal mobility. Thank you for this referral.  -WJ     Rehab Potential  Good  -W     Patient/caregiver participated in  establishment of treatment plan and goals  Yes  -WJ     Patient would benefit from skilled therapy intervention  Yes  -WJ        PT Plan    PT Frequency  2x/week  -WJ     Predicted Duration of Therapy Intervention (Therapy Eval)  6 weeks  -WJ     Planned CPT's?  PT RE-EVAL: 36663;PT THER PROC EA 15 MIN: 00346;PT THER ACT EA 15 MIN: 60707;PT MANUAL THERAPY EA 15 MIN: 29581;PT NEUROMUSC RE-EDUCATION EA 15 MIN: 45521;PT GAIT TRAINING EA 15 MIN: 82890;PT SELF CARE/HOME MGMT/TRAIN EA 15: 73197;PT ELECTRICAL STIM UNATTEND: ;PT ELECTRICAL STIM ATTD EA 15 MIN: 76688;PT ULTRASOUND EA 15 MIN: 64315;PT HOT/COLD PACK WC NONMCARE: 04069  -WJ     PT Plan Comments  We will initially start by addresing his spinal and hip mobility to decrease the strain on the lumbar spine. We will progress with hip and core strengthening along with placing an emphasis on posture throughout the rehab process. An HEP will be developed and we will work towards independence upon discharge.  -WJ       User Key  (r) = Recorded By, (t) = Taken By, (c) = Cosigned By    Initials Name Provider Type    WAlvin Guzman, PT DPT Physical Therapist                              Outcome Measure Options: Modifed Owestry  Modified Oswestry  Modified Oswestry Score/Comments: 20% impaired      Time Calculation:               PT G-Codes  Outcome Measure Options: Modifed Owestry  Modified Oswestry Score/Comments: 20% impaired         Alvin Faria, PT DPT  12/2/2019

## 2019-12-06 ENCOUNTER — TREATMENT (OUTPATIENT)
Dept: PHYSICAL THERAPY | Facility: CLINIC | Age: 44
End: 2019-12-06

## 2019-12-06 DIAGNOSIS — M54.50 LUMBAR PAIN: Primary | ICD-10-CM

## 2019-12-06 DIAGNOSIS — R20.2 NUMBNESS AND TINGLING OF BOTH LOWER EXTREMITIES: ICD-10-CM

## 2019-12-06 DIAGNOSIS — M54.16 RADICULOPATHY, LUMBAR REGION: ICD-10-CM

## 2019-12-06 DIAGNOSIS — R20.0 NUMBNESS AND TINGLING OF BOTH LOWER EXTREMITIES: ICD-10-CM

## 2019-12-06 DIAGNOSIS — M79.604 RIGHT LEG PAIN: ICD-10-CM

## 2019-12-06 PROCEDURE — 97140 MANUAL THERAPY 1/> REGIONS: CPT | Performed by: PHYSICAL THERAPIST

## 2019-12-06 PROCEDURE — 97110 THERAPEUTIC EXERCISES: CPT | Performed by: PHYSICAL THERAPIST

## 2019-12-06 NOTE — PROGRESS NOTES
Outpatient Physical Therapy Ortho Treatment Note       Patient Name: Saulo Shepard  : 1975  MRN: [id number]  Today's Date: 2019      Visit Date: 2019    Visit Dx:    ICD-10-CM ICD-9-CM   1. Lumbar pain M54.5 724.2   2. Radiculopathy, lumbar region M54.16 724.4   3. Right leg pain M79.604 729.5   4. Numbness and tingling of both lower extremities R20.0 782.0    R20.2        Patient Active Problem List   Diagnosis   • Intractable acute post-traumatic headache   • Abscess of left lower extremity excluding foot   • Abscess   • Lumbar pain   • Leg pain, right   • Overweight (BMI 25.0-29.9)   • Ankylosing spondylitis of lumbar region (CMS/[facility])        Past Medical History:   Diagnosis Date   • Anxiety    • Back pain    • Fracture of lumbar spine (CMS/[facility])  and     lower lumbar ; L1    • Head injury    • Injury of back    • Neck pain    • Overweight (BMI 25.0-29.9)         Past Surgical History:   Procedure Laterality Date   • FACIAL FRACTURE SURGERY     • INCISION AND DRAINAGE LEG Right 2018    Procedure: INCISION AND DRAINAGE OF RIGHT UPPER INNER THIGH, PLACEMENT OF WOUND VAC;  Surgeon: René Dallas MD;  Location: [facility] OR;  Service: General   • SPLENECTOMY                         PT Assessment/Plan     Row Name 19 0900          PT Assessment    Assessment Comments  He  walked in well today. His radicular pain is more intermittent. The tightness of the right hip could lead to more stress to his right LB causing more radicular  -TB        PT Plan    PT Plan Comments  cont emphasis on flexibility and consistently centralizing pain out of right leg  -TB       User Key  (r) = Recorded By, (t) = Taken By, (c) = Cosigned By    Initials Name Provider Type    TB Shiva Patel, PT Physical Therapist            OP Exercises     Row Name 19 0900             Subjective Comments    Subjective Comments  He hasn't had any radicular symptoms since last night. His pain is  doing OK  -TB         Subjective Pain    Pre-Treatment Pain Level  4  -TB         Total Minutes    18635 - PT Therapeutic Exercise Minutes  25  -TB      04927 - PT Manual Therapy Minutes  20  -TB         Exercise 1    Exercise Name 1  lumbar flexion/right rotation stretch sustained  -TB         Exercise 2    Exercise Name 2  pigeon pose blanca  -TB      Time 2  2 min each  -TB      Additional Comments  felt tighter on right  -TB         Exercise 3    Exercise Name 3  supine passive piriformis and OI stretch blanca  -TB      Additional Comments  spent more time stretching right as it was tighter  -TB         Exercise 4    Exercise Name 4  supine right SLR neural flossing  -TB        User Key  (r) = Recorded By, (t) = Taken By, (c) = Cosigned By    Initials Name Provider Type    TB Shiva Patel, PT Physical Therapist                      Manual Rx (last 36 hours)      Manual Treatments     Row Name 12/06/19 0900             Total Minutes    57614 - PT Manual Therapy Minutes  20  -TB         Manual Rx 1    Manual Rx 1 Location  prone thoracic ext  -TB      Manual Rx 1 Type  foam roll and manual OP  -TB         Manual Rx 2    Manual Rx 2 Location  prone sacral HORACIO  -TB      Manual Rx 2 Type  blanca PA  -TB         Manual Rx 3    Manual Rx 3 Location  sacral float  -TB      Manual Rx 3 Type  hooklying over wedge with hips at 90  -TB      Manual Rx 3 Grade  OP at hips  -TB         Manual Rx 4    Manual Rx 4 Location  kneeling thoracic extension stretch  -TB        User Key  (r) = Recorded By, (t) = Taken By, (c) = Cosigned By    Initials Name Provider Type    TB Shiva Patel, PT Physical Therapist          PT OP Goals     Row Name 12/06/19 0900          PT Short Term Goals    STG Date to Achieve  12/23/19  -TB     STG 1  Pt to improve R hip PROM IR to 20 degrees.  -TB     STG 1 Progress  Ongoing  -TB     STG 2  Pt to demonstrate improved postural awareness and ability to self correct to decrease strain on the lumbar spine.   -TB     STG 2 Progress  Ongoing  -TB     STG 2 Progress Comments  discussed this with him today  -TB     STG 3  Pt to demonstrate decreased parspinal muscle guarding upon palpation.  -TB     STG 3 Progress  Ongoing  -TB     STG 4  Pt to reduce guarding in lumbar/hip muscles.  -TB     STG 4 Progress  Ongoing  -TB        Long Term Goals    LTG Date to Achieve  01/13/20  -TB     LTG 1  Independent w/ HEP.  -TB     LTG 1 Progress  New  -TB     LTG 2  Pt to B SLS x10 seconds w/ hip stability.  -TB     LTG 2 Progress  New  -TB     LTG 3  Pt to report pain </= 4/10 for 1 week.  -TB     LTG 3 Progress  New  -TB     LTG 4  Pt to improve THA score to <10.  -TB     LTG 4 Progress  New  -TB     LTG 5  Pt to improve B hip flexion and ABD MMT strength to 4+/5.  -TB     LTG 5 Progress  New  -TB        Time Calculation    PT Goal Re-Cert Due Date  01/01/19  -TB       User Key  (r) = Recorded By, (t) = Taken By, (c) = Cosigned By    Initials Name Provider Type    TB Shiva Paetl, PT Physical Therapist          Therapy Education  Education Details: posture; kneeling thor ext stretch              Time Calculation:                    Shiva Patel, PT  12/6/2019

## 2019-12-10 ENCOUNTER — TREATMENT (OUTPATIENT)
Dept: PHYSICAL THERAPY | Facility: CLINIC | Age: 44
End: 2019-12-10

## 2019-12-10 DIAGNOSIS — M79.604 RIGHT LEG PAIN: ICD-10-CM

## 2019-12-10 DIAGNOSIS — M54.50 LUMBAR PAIN: Primary | ICD-10-CM

## 2019-12-10 DIAGNOSIS — R20.0 NUMBNESS AND TINGLING OF BOTH LOWER EXTREMITIES: ICD-10-CM

## 2019-12-10 DIAGNOSIS — M54.16 RADICULOPATHY, LUMBAR REGION: ICD-10-CM

## 2019-12-10 DIAGNOSIS — R20.2 NUMBNESS AND TINGLING OF BOTH LOWER EXTREMITIES: ICD-10-CM

## 2019-12-10 PROCEDURE — 97110 THERAPEUTIC EXERCISES: CPT | Performed by: PHYSICAL THERAPIST

## 2019-12-10 PROCEDURE — 97140 MANUAL THERAPY 1/> REGIONS: CPT | Performed by: PHYSICAL THERAPIST

## 2019-12-10 NOTE — PROGRESS NOTES
Outpatient Physical Therapy Ortho Treatment Note       Patient Name: Saulo Shepard  : 1975  MRN: [id number]  Today's Date: 12/10/2019      Visit Date: 12/10/2019    Visit Dx:    ICD-10-CM ICD-9-CM   1. Lumbar pain M54.5 724.2   2. Radiculopathy, lumbar region M54.16 724.4   3. Right leg pain M79.604 729.5   4. Numbness and tingling of both lower extremities R20.0 782.0    R20.2        Patient Active Problem List   Diagnosis   • Intractable acute post-traumatic headache   • Abscess of left lower extremity excluding foot   • Abscess   • Lumbar pain   • Leg pain, right   • Overweight (BMI 25.0-29.9)   • Ankylosing spondylitis of lumbar region (CMS/HCC)        Past Medical History:   Diagnosis Date   • Anxiety    • Back pain    • Fracture of lumbar spine (CMS/HCC)  and     lower lumbar ; L1    • Head injury    • Injury of back    • Neck pain    • Overweight (BMI 25.0-29.9)         Past Surgical History:   Procedure Laterality Date   • FACIAL FRACTURE SURGERY     • INCISION AND DRAINAGE LEG Right 2018    Procedure: INCISION AND DRAINAGE OF RIGHT UPPER INNER THIGH, PLACEMENT OF WOUND VAC;  Surgeon: René Dallas MD;  Location: [facility];  Service: General   • SPLENECTOMY                         PT Assessment/Plan     Row Name 12/10/19 7199          PT Assessment    Assessment Comments  After treatment he had less pain. He denies having any radicular symptoms today and only had them Saturday when doing work outside for prolonged. HE does conitnue to have thoracic and sacral stiffness. We will conitnue to work on improving spinal and hip flexibility and progressing with core strengthening.   -KR        PT Plan    PT Plan Comments  see assessment. Progress HEP as able.   -KR       User Key  (r) = Recorded By, (t) = Taken By, (c) = Cosigned By    Initials Name Provider Type    Lisseth Ghosh PT DPT Physical Therapist            OP Exercises     Row Name 12/10/19 2989              Subjective Comments    Subjective Comments  He currently doesn't have any LE symptoms. He reports the last time he had them was Saturday while working in his yard.   -KR         Subjective Pain    Able to rate subjective pain?  yes  -KR      Pre-Treatment Pain Level  4  -KR      Post-Treatment Pain Level  -- feels pretty good  -KR         Total Minutes    43137 - PT Therapeutic Exercise Minutes  18  -KR      74221 - PT Manual Therapy Minutes  25  -KR         Exercise 1    Exercise Name 1  thoracic extension stretch on 1/2 foam  -KR      Cueing 1  Verbal  -KR      Time 1  3 minutes  -KR         Exercise 2    Exercise Name 2  SKC   -KR      Cueing 2  Verbal  -KR      Sets 2  3  -KR      Time 2  30 sec  -KR         Exercise 3    Exercise Name 3  BKFO with red TB   -KR      Cueing 3  Verbal  -KR      Sets 3  2  -KR      Reps 3  20  -KR        User Key  (r) = Recorded By, (t) = Taken By, (c) = Cosigned By    Initials Name Provider Type    Lisseth Ghosh, PT DPT Physical Therapist                      Manual Rx (last 36 hours)      Manual Treatments     Row Name 12/10/19 0830             Total Minutes    05949 - PT Manual Therapy Minutes  25  -KR         Manual Rx 1    Manual Rx 1 Location  prone thoracic ext  -KR      Manual Rx 1 Type    -KR      Manual Rx 1 Grade  2/3 one cavitation approx T 6/7  -KR         Manual Rx 2    Manual Rx 2 Location  prone sacral HORACIO  -KR      Manual Rx 2 Type  blanca PA  -KR         Manual Rx 3    Manual Rx 3 Location  prone thoracic STM with blue ridge roller to thoracolumbar frank  -KR      Manual Rx 3 Type  --  -KR      Manual Rx 3 Grade  mod OP  -KR         Manual Rx 4    Manual Rx 4 Location  --  -KR        User Key  (r) = Recorded By, (t) = Taken By, (c) = Cosigned By    Initials Name Provider Type    Lisseth Ghosh, PT DPT Physical Therapist          PT OP Goals     Row Name 12/10/19 0830          PT Short Term Goals    STG Date to Achieve  12/23/19  -KR     STG 1  Pt  to improve R hip PROM IR to 20 degrees.  -KR     STG 1 Progress  Ongoing  -KR     STG 2  Pt to demonstrate improved postural awareness and ability to self correct to decrease strain on the lumbar spine.  -KR     STG 2 Progress  Ongoing  -KR     STG 3  Pt to demonstrate decreased parspinal muscle guarding upon palpation.  -KR     STG 3 Progress  Ongoing  -KR     STG 4  Pt to reduce guarding in lumbar/hip muscles.  -KR     STG 4 Progress  Ongoing  -KR        Long Term Goals    LTG Date to Achieve  01/13/20  -KR     LTG 1  Independent w/ HEP.  -KR     LTG 1 Progress  Ongoing  -KR     LTG 2  Pt to B SLS x10 seconds w/ hip stability.  -KR     LTG 2 Progress  Ongoing  -KR     LTG 3  Pt to report pain </= 4/10 for 1 week.  -KR     LTG 3 Progress  Ongoing  -KR     LTG 3 Progress Comments  4/10 today, improved with treatment  -KR     LTG 4  Pt to improve THA score to <10.  -KR     LTG 4 Progress  Ongoing  -KR     LTG 5  Pt to improve B hip flexion and ABD MMT strength to 4+/5.  -KR     LTG 5 Progress  Ongoing  -KR        Time Calculation    PT Goal Re-Cert Due Date  01/01/20  -KR       User Key  (r) = Recorded By, (t) = Taken By, (c) = Cosigned By    Initials Name Provider Type    Lisseth Ghosh PT DPT Physical Therapist          Therapy Education  Given: HEP, Symptoms/condition management, Posture/body mechanics  Program: Reinforced  How Provided: Verbal  Provided to: Patient  Level of Understanding: Verbalized              Time Calculation:   Total Timed Code Minutes- PT: 43 minute(s)                Lisseth Loza, PT DPT  12/10/2019

## 2019-12-13 ENCOUNTER — TREATMENT (OUTPATIENT)
Dept: PHYSICAL THERAPY | Facility: CLINIC | Age: 44
End: 2019-12-13

## 2019-12-13 DIAGNOSIS — M54.16 RADICULOPATHY, LUMBAR REGION: ICD-10-CM

## 2019-12-13 DIAGNOSIS — M54.50 LUMBAR PAIN: Primary | ICD-10-CM

## 2019-12-13 DIAGNOSIS — R20.2 NUMBNESS AND TINGLING OF BOTH LOWER EXTREMITIES: ICD-10-CM

## 2019-12-13 DIAGNOSIS — M79.604 RIGHT LEG PAIN: ICD-10-CM

## 2019-12-13 DIAGNOSIS — R20.0 NUMBNESS AND TINGLING OF BOTH LOWER EXTREMITIES: ICD-10-CM

## 2019-12-13 PROCEDURE — 97140 MANUAL THERAPY 1/> REGIONS: CPT | Performed by: PHYSICAL THERAPIST

## 2019-12-13 PROCEDURE — 97110 THERAPEUTIC EXERCISES: CPT | Performed by: PHYSICAL THERAPIST

## 2019-12-13 NOTE — PROGRESS NOTES
Outpatient Physical Therapy Ortho Treatment Note       Patient Name: Saulo Shepard  : 1975  MRN: [id number]  Today's Date: 2019      Visit Date: 2019    Visit Dx:    ICD-10-CM ICD-9-CM   1. Lumbar pain M54.5 724.2   2. Radiculopathy, lumbar region M54.16 724.4   3. Right leg pain M79.604 729.5   4. Numbness and tingling of both lower extremities R20.0 782.0    R20.2        Patient Active Problem List   Diagnosis   • Intractable acute post-traumatic headache   • Abscess of left lower extremity excluding foot   • Abscess   • Lumbar pain   • Leg pain, right   • Overweight (BMI 25.0-29.9)   • Ankylosing spondylitis of lumbar region (CMS/HCC)        Past Medical History:   Diagnosis Date   • Anxiety    • Back pain    • Fracture of lumbar spine (CMS/[facility])  and     lower lumbar ; L1    • Head injury    • Injury of back    • Neck pain    • Overweight (BMI 25.0-29.9)         Past Surgical History:   Procedure Laterality Date   • FACIAL FRACTURE SURGERY     • INCISION AND DRAINAGE LEG Right 2018    Procedure: INCISION AND DRAINAGE OF RIGHT UPPER INNER THIGH, PLACEMENT OF WOUND VAC;  Surgeon: René Dallas MD;  Location: [facility];  Service: General   • SPLENECTOMY                         PT Assessment/Plan     Row Name 19 0800          PT Assessment    Assessment Comments  He met his short term hip ROM goal today. We progressed with posture and core strengthening today. He reported no change in symptoms today. He also reported pain relief after last visit that lasted one day.   -KR        PT Plan    PT Plan Comments  Conitnue to work on thoracic, sacral and hip mobility. Progressing with core and hip strengthening.   -KR       User Key  (r) = Recorded By, (t) = Taken By, (c) = Cosigned By    Initials Name Provider Type    Lisseth Ghosh, PT DPT Physical Therapist            OP Exercises     Row Name 19 0800             Precautions    Existing  "Precautions/Restrictions  (S) other (see comments) history of T 8 and L fractures  -KR         Subjective Comments    Subjective Comments  He denies any pain down leg. 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 He reports all day pain relief from his pain after last visit.   -KR         Subjective Pain    Able to rate subjective pain?  yes  -KR      Pre-Treatment Pain Level  4  -KR      Post-Treatment Pain Level  3 \"not too bad\"  -KR         Total Minutes    63364 - PT Therapeutic Exercise Minutes  30  -KR      11655 - PT Manual Therapy Minutes  10  -KR         Exercise 1    Exercise Name 1  thoracic extension stretch on 1/2 foam  -KR      Cueing 1  Verbal  -KR      Time 1  3 minutes  -KR         Exercise 2    Exercise Name 2  SKC   -KR      Cueing 2  Verbal  -KR      Time 2  30 sec  -KR         Exercise 3    Exercise Name 3  BKFO with red TB   -KR      Cueing 3  Verbal  -KR      Sets 3  2  -KR      Reps 3  20  -KR         Exercise 4    Exercise Name 4  LTR on SB  -KR      Cueing 4  Verbal  -KR      Sets 4  2  -KR      Reps 4  20  -KR         Exercise 5    Exercise Name 5  March  on SB  -KR      Cueing 5  Verbal  -KR      Sets 5  2  -KR      Reps 5  20  -KR        User Key  (r) = Recorded By, (t) = Taken By, (c) = Cosigned By    Initials Name Provider Type    Lisseth Ghosh, PT DPT Physical Therapist                      Manual Rx (last 36 hours)      Manual Treatments     Row Name 12/13/19 0800             Total Minutes    76328 - PT Manual Therapy Minutes  10  -KR         Manual Rx 1    Manual Rx 1 Location  R hip belt mobilization  -KR      Manual Rx 1 Type  lateral/inferior  -KR      Manual Rx 1 Grade  2/3  -KR         Manual Rx 2    Manual Rx 2 Location  R LAD  -KR      Manual Rx 2 Grade  1/2  -KR        User Key  (r) = Recorded By, (t) = Taken By, (c) = Cosigned By    Initials Name Provider Type    Lisseth Ghosh, PT DPT Physical Therapist    "       PT OP Goals     Row Name 12/13/19 0800          PT Short Term Goals    STG Date to Achieve  12/23/19  -KR     STG 1  Pt to improve R hip PROM IR to 20 degrees.  -KR     STG 1 Progress  Met  -KR     STG 1 Progress Comments  R 20, L 22  -KR     STG 2  Pt to demonstrate improved postural awareness and ability to self correct to decrease strain on the lumbar spine.  -KR     STG 2 Progress  Ongoing  -KR     STG 3  Pt to demonstrate decreased parspinal muscle guarding upon palpation.  -KR     STG 3 Progress  Ongoing  -KR     STG 4  Pt to reduce guarding in lumbar/hip muscles.  -KR     STG 4 Progress  Ongoing  -KR        Long Term Goals    LTG Date to Achieve  01/13/20  -KR     LTG 1  Independent w/ HEP.  -KR     LTG 1 Progress  Ongoing  -KR     LTG 2  Pt to B SLS x10 seconds w/ hip stability.  -KR     LTG 2 Progress  Ongoing  -KR     LTG 3  Pt to report pain </= 4/10 for 1 week.  -KR     LTG 3 Progress  Ongoing  -KR     LTG 4  Pt to improve THA score to <10.  -KR     LTG 4 Progress  Ongoing  -KR     LTG 5  Pt to improve B hip flexion and ABD MMT strength to 4+/5.  -KR     LTG 5 Progress  Ongoing  -KR        Time Calculation    PT Goal Re-Cert Due Date  01/01/20  -KR       User Key  (r) = Recorded By, (t) = Taken By, (c) = Cosigned By    Initials Name Provider Type    Lisseth Ghosh, PT DPT Physical Therapist          Therapy Education  Given: HEP, Symptoms/condition management, Posture/body mechanics  Program: Reinforced  How Provided: Verbal  Provided to: Patient  Level of Understanding: Verbalized              Time Calculation:   Total Timed Code Minutes- PT: 40 minute(s)                Lisseth Loza PT DPT  12/13/2019

## 2019-12-17 ENCOUNTER — TREATMENT (OUTPATIENT)
Dept: PHYSICAL THERAPY | Facility: CLINIC | Age: 44
End: 2019-12-17

## 2019-12-17 DIAGNOSIS — M54.16 RADICULOPATHY, LUMBAR REGION: ICD-10-CM

## 2019-12-17 DIAGNOSIS — R20.0 NUMBNESS AND TINGLING OF BOTH LOWER EXTREMITIES: ICD-10-CM

## 2019-12-17 DIAGNOSIS — M79.604 RIGHT LEG PAIN: ICD-10-CM

## 2019-12-17 DIAGNOSIS — R20.2 NUMBNESS AND TINGLING OF BOTH LOWER EXTREMITIES: ICD-10-CM

## 2019-12-17 DIAGNOSIS — M54.50 LUMBAR PAIN: Primary | ICD-10-CM

## 2019-12-17 PROCEDURE — 97140 MANUAL THERAPY 1/> REGIONS: CPT | Performed by: PHYSICAL THERAPIST

## 2019-12-17 PROCEDURE — 97110 THERAPEUTIC EXERCISES: CPT | Performed by: PHYSICAL THERAPIST

## 2019-12-17 NOTE — PROGRESS NOTES
Outpatient Physical Therapy Ortho Treatment Note       Patient Name: Saulo Shepard  : 1975  MRN: [id number]  Today's Date: 2019      Visit Date: 2019    Visit Dx:    ICD-10-CM ICD-9-CM   1. Lumbar pain M54.5 724.2   2. Radiculopathy, lumbar region M54.16 724.4   3. Right leg pain M79.604 729.5   4. Numbness and tingling of both lower extremities R20.0 782.0    R20.2        Patient Active Problem List   Diagnosis   • Intractable acute post-traumatic headache   • Abscess of left lower extremity excluding foot   • Abscess   • Lumbar pain   • Leg pain, right   • Overweight (BMI 25.0-29.9)   • Ankylosing spondylitis of lumbar region (CMS/HCC)        Past Medical History:   Diagnosis Date   • Anxiety    • Back pain    • Fracture of lumbar spine (CMS/HCC)  and     lower lumbar ; L1    • Head injury    • Injury of back    • Neck pain    • Overweight (BMI 25.0-29.9)         Past Surgical History:   Procedure Laterality Date   • FACIAL FRACTURE SURGERY     • INCISION AND DRAINAGE LEG Right 2018    Procedure: INCISION AND DRAINAGE OF RIGHT UPPER INNER THIGH, PLACEMENT OF WOUND VAC;  Surgeon: René Dallas MD;  Location: [facility];  Service: General   • SPLENECTOMY                         PT Assessment/Plan     Row Name 19 0828          PT Assessment    Assessment Comments  He continues to have low back pain, but denies radicular symptoms the last sevearl visits. He does continue to have decreased hip and core strength, causing increased strain on his lower back.   -KR        PT Plan    PT Plan Comments  Conitnue to work on postural and core stabiltiy. Working on soft tissue restrictions. Also work towards lifting mechanics and towards work like activities.   -KR       User Key  (r) = Recorded By, (t) = Taken By, (c) = Cosigned By    Initials Name Provider Type    Lisseth Ghosh, PT DPT Physical Therapist            OP Exercises     Row Name 19 0828              "Precautions    Existing Precautions/Restrictions  (S) other (see comments) history of T 8 and L1 fractures  -KR         Subjective Comments    Subjective Comments  He reports no radicular symptoms, and feeling good. \"trying\" to do exercises at home.   -KR         Subjective Pain    Able to rate subjective pain?  yes  -KR      Pre-Treatment Pain Level  -- 3 or 4  -KR      Post-Treatment Pain Level  -- abdominal and hip soreness after exercises  -KR         Total Minutes    02486 - PT Therapeutic Exercise Minutes  25  -KR      38357 - PT Manual Therapy Minutes  15  -KR         Exercise 1    Exercise Name 1  thoracic extension stretch on 1/2 foam  -KR      Cueing 1  Verbal  -KR      Time 1  2 minutes  -KR         Exercise 2    Exercise Name 2  HL on 1/2 foam marches  -KR      Cueing 2  Verbal  -KR      Sets 2  2  -KR      Reps 2  20  -KR      Additional Comments  needed use of UEs for stability  -KR         Exercise 3    Exercise Name 3  modified deadbugs with SB   -KR      Cueing 3  Verbal  -KR      Sets 3  2  -KR      Reps 3  20  -KR         Exercise 4    Exercise Name 4  (B) heel slides with SB   -KR      Cueing 4  Verbal  -KR      Sets 4  2  -KR      Reps 4  20  -KR         Exercise 5    Exercise Name 5  sidelying clams  -KR      Cueing 5  Verbal  -KR      Sets 5  2  -KR      Reps 5  20  -KR        User Key  (r) = Recorded By, (t) = Taken By, (c) = Cosigned By    Initials Name Provider Type    Lisseth Ghosh, PT DPT Physical Therapist                      Manual Rx (last 36 hours)      Manual Treatments     Row Name 12/17/19 0828             Total Minutes    99682 - PT Manual Therapy Minutes  15  -KR         Manual Rx 2    Manual Rx 2 Location  --  -KR      Manual Rx 2 Type  --  -KR         Manual Rx 3    Manual Rx 3 Location  prone thoracic STM with blue ridge roller to thoracolumbar frank  -KR      Manual Rx 3 Grade  mod OP  -KR        User Key  (r) = Recorded By, (t) = Taken By, (c) = Cosigned By    " Initials Name Provider Type    Lisseth Ghosh PT DPT Physical Therapist          PT OP Goals     Row Name 12/17/19 0828          PT Short Term Goals    STG Date to Achieve  12/23/19  -KR     STG 1  Pt to improve R hip PROM IR to 20 degrees.  -KR     STG 1 Progress  Met  -KR     STG 2  Pt to demonstrate improved postural awareness and ability to self correct to decrease strain on the lumbar spine.  -KR     STG 2 Progress  Ongoing  -KR     STG 3  Pt to demonstrate decreased parspinal muscle guarding upon palpation.  -KR     STG 3 Progress  Ongoing  -KR     STG 4  Pt to reduce guarding in lumbar/hip muscles.  -KR     STG 4 Progress  Ongoing  -KR        Long Term Goals    LTG Date to Achieve  01/13/20  -KR     LTG 1  Independent w/ HEP.  -KR     LTG 1 Progress  Ongoing  -KR     LTG 2  Pt to B SLS x10 seconds w/ hip stability.  -KR     LTG 2 Progress  Ongoing  -KR     LTG 3  Pt to report pain </= 4/10 for 1 week.  -KR     LTG 3 Progress  Ongoing  -KR     LTG 3 Progress Comments  3-4/10 today  -KR     LTG 4  Pt to improve THA score to <10.  -KR     LTG 4 Progress  Ongoing  -KR     LTG 5  Pt to improve B hip flexion and ABD MMT strength to 4+/5.  -KR     LTG 5 Progress  Ongoing  -KR        Time Calculation    PT Goal Re-Cert Due Date  01/01/20  -KR       User Key  (r) = Recorded By, (t) = Taken By, (c) = Cosigned By    Initials Name Provider Type    Lisseth Ghosh PT DPT Physical Therapist          Therapy Education  Given: HEP, Symptoms/condition management, Posture/body mechanics  Program: Reinforced  How Provided: Verbal  Provided to: Patient  Level of Understanding: Verbalized              Time Calculation:   Total Timed Code Minutes- PT: 40 minute(s)                Lisseth Loza, PT DPT  12/17/2019

## 2019-12-19 ENCOUNTER — TREATMENT (OUTPATIENT)
Dept: PHYSICAL THERAPY | Facility: CLINIC | Age: 44
End: 2019-12-19

## 2019-12-19 DIAGNOSIS — R20.2 NUMBNESS AND TINGLING OF BOTH LOWER EXTREMITIES: ICD-10-CM

## 2019-12-19 DIAGNOSIS — M54.16 RADICULOPATHY, LUMBAR REGION: ICD-10-CM

## 2019-12-19 DIAGNOSIS — R20.0 NUMBNESS AND TINGLING OF BOTH LOWER EXTREMITIES: ICD-10-CM

## 2019-12-19 DIAGNOSIS — M79.604 RIGHT LEG PAIN: ICD-10-CM

## 2019-12-19 DIAGNOSIS — M54.50 LUMBAR PAIN: Primary | ICD-10-CM

## 2019-12-19 PROCEDURE — 97110 THERAPEUTIC EXERCISES: CPT | Performed by: PHYSICAL THERAPIST

## 2019-12-19 PROCEDURE — 97140 MANUAL THERAPY 1/> REGIONS: CPT | Performed by: PHYSICAL THERAPIST

## 2019-12-19 NOTE — PROGRESS NOTES
Outpatient Physical Therapy Ortho Progress Note       Patient Name: Saulo Shepard  : 1975  MRN: [id number]  Today's Date: 2019      Visit Date: 2019    Visit Dx:    ICD-10-CM ICD-9-CM   1. Lumbar pain M54.5 724.2   2. Radiculopathy, lumbar region M54.16 724.4   3. Right leg pain M79.604 729.5   4. Numbness and tingling of both lower extremities R20.0 782.0    R20.2        Patient Active Problem List   Diagnosis   • Intractable acute post-traumatic headache   • Abscess of left lower extremity excluding foot   • Abscess   • Lumbar pain   • Leg pain, right   • Overweight (BMI 25.0-29.9)   • Ankylosing spondylitis of lumbar region (CMS/[facility])        Past Medical History:   Diagnosis Date   • Anxiety    • Back pain    • Fracture of lumbar spine (CMS/[facility])  and     lower lumbar ; L1    • Head injury    • Injury of back    • Neck pain    • Overweight (BMI 25.0-29.9)         Past Surgical History:   Procedure Laterality Date   • FACIAL FRACTURE SURGERY     • INCISION AND DRAINAGE LEG Right 2018    Procedure: INCISION AND DRAINAGE OF RIGHT UPPER INNER THIGH, PLACEMENT OF WOUND VAC;  Surgeon: René Dallas MD;  Location: [facility];  Service: General   • SPLENECTOMY                         PT Assessment/Plan     Row Name 19 0835          PT Assessment    Functional Limitations  Limitations in community activities;Performance in leisure activities;Limitations in functional capacity and performance;Performance in work activities  -KR     Impairments  Joint mobility;Motor function;Muscle strength;Pain;Poor body mechanics;Posture;Range of motion;Sensation  -KR     Assessment Comments  Saulo has met 3 goals and partially met two others, he is progressing well towards others. He reports 50-60% improvements since starting therapy, however his Oswestry was up 6% points. He does still have pain, but progressively lowering. He reports still limited with prolonged sitting. He verbalizes  compliance with his HEP and we progressed this today. Additional skilled PT needed to progress with hip and core strengthening and continue to decrease pain and progress towards finalized HEP.   -KR        PT Plan    Predicted Duration of Therapy Intervention (Therapy Eval)  4-8 more visits at 1-2 times per week   -KR     Planned CPT's?  PT RE-EVAL: 93890;PT THER PROC EA 15 MIN: 42325;PT THER ACT EA 15 MIN: 79244;PT MANUAL THERAPY EA 15 MIN: 38870;PT NEUROMUSC RE-EDUCATION EA 15 MIN: 14581;PT GAIT TRAINING EA 15 MIN: 92549;PT SELF CARE/HOME MGMT/TRAIN EA 15: 83357;PT ELECTRICAL STIM UNATTEND: ;PT ELECTRICAL STIM ATTD EA 15 MIN: 55452;PT ULTRASOUND EA 15 MIN: 81647;PT HOT/COLD PACK WC NONMCARE: 32140  -KR     PT Plan Comments  Conitnue to progress on with his hip and core strengthening, progressing with functional and more upright activities.   -KR       User Key  (r) = Recorded By, (t) = Taken By, (c) = Cosigned By    Initials Name Provider Type    Lisseth Ghosh, PT DPT Physical Therapist            OP Exercises     Row Name 12/19/19 0835             Precautions    Existing Precautions/Restrictions  (S) other (see comments) history of T 8 and L1 fractures  -KR         Subjective Comments    Subjective Comments  He reports having a flat tire this morning. He reports back not feeling too bad. 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 He reports 50-60% improvements since starting therapy. He still has increased pain with prolonged sitting/riding in car. HE reports his hip only hurts every once in  a while, he reports stretching decreases his pain.   -KR         Subjective Pain    Able to rate subjective pain?  yes  -KR      Pre-Treatment Pain Level  -- 3.5  -KR         Total Minutes    18257 - PT Therapeutic Exercise Minutes  30  -KR      97314 - PT Manual Therapy Minutes  10  -KR         Exercise 1    Exercise Name 1  addressed goals for progress note  -KR          Exercise 2    Exercise Name 2  reviewed/updated HEP  -KR         Exercise 3    Exercise Name 3  thoracic extension stretch on 1/2 foam  -KR      Cueing 3  Verbal  -KR      Time 3  2 minutes  -KR         Exercise 4    Exercise Name 4  on 1/2 foam BKFO red TB  -KR      Cueing 4  Verbal  -KR      Sets 4  2  -KR      Reps 4  20  -KR         Exercise 5    Exercise Name 5  sidelying clams   -KR      Cueing 5  Verbal  -KR        User Key  (r) = Recorded By, (t) = Taken By, (c) = Cosigned By    Initials Name Provider Type    Lisseth Ghosh, PT DPT Physical Therapist                      Manual Rx (last 36 hours)      Manual Treatments     Row Name 12/19/19 0835             Total Minutes    26494 - PT Manual Therapy Minutes  10  -KR         Manual Rx 3    Manual Rx 3 Location  prone thoracic STM with blue ridge roller to thoracolumbar frank  -KR      Manual Rx 3 Grade  mod OP  -KR        User Key  (r) = Recorded By, (t) = Taken By, (c) = Cosigned By    Initials Name Provider Type    Lisseth Ghosh, PT DPT Physical Therapist          PT OP Goals     Row Name 12/19/19 0835          PT Short Term Goals    STG Date to Achieve  12/23/19  -KR     STG 1  Pt to improve R hip PROM IR to 20 degrees.  -KR     STG 1 Progress  Met  -KR     STG 2  Pt to demonstrate improved postural awareness and ability to self correct to decrease strain on the lumbar spine.  -KR     STG 2 Progress  Ongoing  -KR     STG 2 Progress Comments  tends to have a forward flexed posture with standing and sitting.   -KR     STG 3  Pt to demonstrate decreased parspinal muscle guarding upon palpation.  -KR     STG 3 Progress  Met  -KR     STG 4  Pt to reduce guarding in lumbar/hip muscles.  -KR     STG 4 Progress  Met  -KR        Long Term Goals    LTG Date to Achieve  01/13/20  -KR     LTG 1  Independent w/ HEP.  -KR     LTG 1 Progress  Ongoing  -KR     LTG 1 Progress Comments  updated tpday  -KR     LTG 2  Pt to B SLS x10 seconds w/ hip stability.   -KR     LTG 2 Progress  Ongoing;Partially Met  -KR     LTG 2 Progress Comments  decreased stability on RLE   -KR     LTG 3  Pt to report pain </= 4/10 for 1 week.  -KR     LTG 3 Progress  Ongoing;Progressing  -KR     LTG 3 Progress Comments  3.5/10 today, progressing  -KR     LTG 4  Pt to improve THA score to <10.  -KR     LTG 4 Progress  Ongoing;Progressing  -KR     LTG 4 Progress Comments  13  -KR     LTG 5  Pt to improve B hip flexion and ABD MMT strength to 4+/5.  -KR     LTG 5 Progress  Ongoing;Partially Met  -KR     LTG 5 Progress Comments  R hip flexion 4+/5 (B) hip abduction 4/5  -KR        Time Calculation    PT Goal Re-Cert Due Date  01/01/20  -KR       User Key  (r) = Recorded By, (t) = Taken By, (c) = Cosigned By    Initials Name Provider Type    Lisseth Ghosh, PT DPT Physical Therapist          Therapy Education  Education Details: sKC, BKFO, clams, quadruped hip extension  Given: HEP, Symptoms/condition management  Program: Reinforced  How Provided: Verbal  Provided to: Patient  Level of Understanding: Verbalized    Outcome Measure Options: Modifed Owestry  Modified Oswestry  Modified Oswestry Score/Comments: 26%      Time Calculation:   Total Timed Code Minutes- PT: 40 minute(s)      PT G-Codes  Outcome Measure Options: Modifed Owestry  Modified Oswestry Score/Comments: 26%         Lisseth Loza, PT DPT  12/19/2019
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
## 2019-12-30 ENCOUNTER — OFFICE VISIT (OUTPATIENT)
Dept: NEUROSURGERY | Facility: CLINIC | Age: 44
End: 2019-12-30

## 2019-12-30 VITALS — HEIGHT: 72 IN | WEIGHT: 205 LBS | BODY MASS INDEX: 27.77 KG/M2

## 2019-12-30 DIAGNOSIS — M79.604 LEG PAIN, RIGHT: ICD-10-CM

## 2019-12-30 DIAGNOSIS — E66.3 OVERWEIGHT (BMI 25.0-29.9): ICD-10-CM

## 2019-12-30 DIAGNOSIS — M54.50 LUMBAR PAIN: Primary | ICD-10-CM

## 2019-12-30 PROCEDURE — 99214 OFFICE O/P EST MOD 30 MIN: CPT | Performed by: NURSE PRACTITIONER

## 2019-12-31 ENCOUNTER — TREATMENT (OUTPATIENT)
Dept: PHYSICAL THERAPY | Facility: CLINIC | Age: 44
End: 2019-12-31

## 2019-12-31 DIAGNOSIS — R20.2 NUMBNESS AND TINGLING OF BOTH LOWER EXTREMITIES: ICD-10-CM

## 2019-12-31 DIAGNOSIS — M54.50 LUMBAR PAIN: Primary | ICD-10-CM

## 2019-12-31 DIAGNOSIS — M54.16 RADICULOPATHY, LUMBAR REGION: ICD-10-CM

## 2019-12-31 DIAGNOSIS — M79.604 RIGHT LEG PAIN: ICD-10-CM

## 2019-12-31 DIAGNOSIS — R20.0 NUMBNESS AND TINGLING OF BOTH LOWER EXTREMITIES: ICD-10-CM

## 2019-12-31 PROCEDURE — 97110 THERAPEUTIC EXERCISES: CPT | Performed by: PHYSICAL THERAPIST

## 2019-12-31 PROCEDURE — 97140 MANUAL THERAPY 1/> REGIONS: CPT | Performed by: PHYSICAL THERAPIST

## 2019-12-31 NOTE — PROGRESS NOTES
Outpatient Physical Therapy Ortho Treatment Note       Patient Name: Saulo Shepard  : 1975  MRN: [id number]  Today's Date: 2019      Visit Date: 2019    Visit Dx:    ICD-10-CM ICD-9-CM   1. Lumbar pain M54.5 724.2   2. Radiculopathy, lumbar region M54.16 724.4   3. Right leg pain M79.604 729.5   4. Numbness and tingling of both lower extremities R20.0 782.0    R20.2        Patient Active Problem List   Diagnosis   • Intractable acute post-traumatic headache   • Abscess of left lower extremity excluding foot   • Abscess   • Lumbar pain   • Leg pain, right   • Overweight (BMI 25.0-29.9)   • Ankylosing spondylitis of lumbar region (CMS/HCC)        Past Medical History:   Diagnosis Date   • Anxiety    • Back pain    • Fracture of lumbar spine (CMS/HCC)  and     lower lumbar ; L1    • Head injury    • Injury of back    • Neck pain    • Overweight (BMI 25.0-29.9)         Past Surgical History:   Procedure Laterality Date   • FACIAL FRACTURE SURGERY     • INCISION AND DRAINAGE LEG Right 2018    Procedure: INCISION AND DRAINAGE OF RIGHT UPPER INNER THIGH, PLACEMENT OF WOUND VAC;  Surgeon: René Dallas MD;  Location: [facility];  Service: General   • SPLENECTOMY                         PT Assessment/Plan     Row Name 19 0800          PT Assessment    Assessment Comments  Pt reports increased pain over the last 2 days, he returned to his MD yesterday and he reports they discussed the possibility of surgery later in the year. He initially reported 7/10 pain upon arrival, we worked on STM and hip mobility progressing with strengthening activity and incorproating some standing strengthening. Post session he reported pain levels of 5/10.  -WJ        PT Plan    PT Plan Comments  Continue to progress with hip and core strengthening as symptoms allow.  -WJ       User Key  (r) = Recorded By, (t) = Taken By, (c) = Cosigned By    Initials Name Provider Type    WAlvin Guzman, PT  DPT Physical Therapist            OP Exercises     Row Name 12/31/19 0800 12/31/19 0700          Precautions    Existing Precautions/Restrictions  (S) other (see comments) hx of T8 and L1 fx  -WJ  --        Subjective Comments    Subjective Comments  Pt reports that his pain has been flared the past 2 days, he reports that he did not do much on Sunday and think this maybe attributed to his increase in pain.  -WJ  --        Subjective Pain    Able to rate subjective pain?  yes  -WJ  --     Pre-Treatment Pain Level  7  -WJ  --     Post-Treatment Pain Level  5  -WJ  --        Total Minutes    31348 - PT Therapeutic Exercise Minutes  --  30  -WJ     16269 - PT Manual Therapy Minutes  --  15  -WJ        Exercise 1    Exercise Name 1  B hip stretches in flexion, IR, ER  -WJ  --     Cueing 1  Verbal;Tactile  -WJ  --     Sets 1  3  -WJ  --     Time 1  30 sec each  -WJ  --        Exercise 2    Exercise Name 2  thoracic extension on 1/2 foam roller  -WJ  --     Cueing 2  Verbal;Tactile  -WJ  --     Time 2  2 min  -WJ  --        Exercise 3    Exercise Name 3  HL on 1/2 foam roller marches  -WJ  --     Cueing 3  Verbal;Tactile  -WJ  --     Time 3  3 min   -WJ  --        Exercise 4    Exercise Name 4  HL bridge with ER at peak with RTB  -WJ  --     Cueing 4  Verbal;Tactile  -WJ  --     Sets 4  2  -WJ  --     Reps 4  10  -WJ  --        Exercise 5    Exercise Name 5  standing hip abduction with red can do  -WJ  --     Cueing 5  Verbal;Demo  -WJ  --     Sets 5  2  -WJ  --     Reps 5  10  -WJ  --        Exercise 6    Exercise Name 6  standing hip extension with red can-do   -WJ  --     Cueing 6  Verbal;Demo  -WJ  --     Sets 6  2  -WJ  --     Reps 6  10  -WJ  --       User Key  (r) = Recorded By, (t) = Taken By, (c) = Cosigned By    Initials Name Provider Type    WAlvin Guzman, PT DPT Physical Therapist                      Manual Rx (last 36 hours)      Manual Treatments     Row Name 12/31/19 0700             Total Minutes     20265 - PT Manual Therapy Minutes  15  -WJ         Manual Rx 3    Manual Rx 3 Location  prone thoracic STM with blue ridge roller to thoracolumbar frank  -WJ      Manual Rx 3 Grade  mod OP  -WJ        User Key  (r) = Recorded By, (t) = Taken By, (c) = Cosigned By    Initials Name Provider Type    Alvin Grant PT DPT Physical Therapist          PT OP Goals     Row Name 12/31/19 0800          PT Short Term Goals    STG Date to Achieve  12/23/19  -WJ     STG 1  Pt to improve R hip PROM IR to 20 degrees.  -WJ     STG 1 Progress  Met  -WJ     STG 2  Pt to demonstrate improved postural awareness and ability to self correct to decrease strain on the lumbar spine.  -WJ     STG 2 Progress  Ongoing  -WJ     STG 3  Pt to demonstrate decreased parspinal muscle guarding upon palpation.  -WJ     STG 3 Progress  Met  -WJ     STG 4  Pt to reduce guarding in lumbar/hip muscles.  -WJ     STG 4 Progress  Met  -WJ        Long Term Goals    LTG Date to Achieve  01/13/20  -WJ     LTG 1  Independent w/ HEP.  -WJ     LTG 1 Progress  Ongoing  -WJ     LTG 1 Progress Comments  verbalized and demonstrated updated HEP today  -WJ     LTG 2  Pt to B SLS x10 seconds w/ hip stability.  -WJ     LTG 2 Progress  Ongoing;Partially Met  -WJ     LTG 3  Pt to report pain </= 4/10 for 1 week.  -WJ     LTG 3 Progress  Ongoing;Progressing  -WJ     LTG 4  Pt to improve THA score to <10.  -WJ     LTG 4 Progress  Ongoing;Progressing  -WJ     LTG 5  Pt to improve B hip flexion and ABD MMT strength to 4+/5.  -WJ     LTG 5 Progress  Ongoing;Partially Met  -W        Time Calculation    PT Goal Re-Cert Due Date  01/19/20  -WJ       User Key  (r) = Recorded By, (t) = Taken By, (c) = Cosigned By    Initials Name Provider Type    Alvin Grant PT DPT Physical Therapist          Therapy Education  Given: HEP, Symptoms/condition management  Program: Reinforced  How Provided: Verbal  Provided to: Patient  Level of Understanding: Verbalized              Time  Calculation:   Total Timed Code Minutes- PT: 45 minute(s)                Alvin Faria, PT DPT  12/31/2019

## 2020-01-06 ENCOUNTER — TREATMENT (OUTPATIENT)
Dept: PHYSICAL THERAPY | Facility: CLINIC | Age: 45
End: 2020-01-06

## 2020-01-06 DIAGNOSIS — M54.16 RADICULOPATHY, LUMBAR REGION: ICD-10-CM

## 2020-01-06 DIAGNOSIS — R20.0 NUMBNESS AND TINGLING OF BOTH LOWER EXTREMITIES: ICD-10-CM

## 2020-01-06 DIAGNOSIS — M79.604 RIGHT LEG PAIN: ICD-10-CM

## 2020-01-06 DIAGNOSIS — R20.2 NUMBNESS AND TINGLING OF BOTH LOWER EXTREMITIES: ICD-10-CM

## 2020-01-06 DIAGNOSIS — M54.50 LUMBAR PAIN: Primary | ICD-10-CM

## 2020-01-06 PROCEDURE — 97140 MANUAL THERAPY 1/> REGIONS: CPT | Performed by: PHYSICAL THERAPIST

## 2020-01-06 PROCEDURE — 97110 THERAPEUTIC EXERCISES: CPT | Performed by: PHYSICAL THERAPIST

## 2020-01-06 NOTE — PROGRESS NOTES
Outpatient Physical Therapy Ortho Treatment Note       Patient Name: Saulo Shepard  : 1975  MRN: [id number]  Today's Date: 2020      Visit Date: 2020    Visit Dx:    ICD-10-CM ICD-9-CM   1. Lumbar pain M54.5 724.2   2. Radiculopathy, lumbar region M54.16 724.4   3. Right leg pain M79.604 729.5   4. Numbness and tingling of both lower extremities R20.0 782.0    R20.2        Patient Active Problem List   Diagnosis   • Intractable acute post-traumatic headache   • Abscess of left lower extremity excluding foot   • Abscess   • Lumbar pain   • Leg pain, right   • Overweight (BMI 25.0-29.9)   • Ankylosing spondylitis of lumbar region (CMS/[facility])        Past Medical History:   Diagnosis Date   • Anxiety    • Back pain    • Fracture of lumbar spine (CMS/[facility])  and     lower lumbar ; L1    • Head injury    • Injury of back    • Neck pain    • Overweight (BMI 25.0-29.9)         Past Surgical History:   Procedure Laterality Date   • FACIAL FRACTURE SURGERY     • INCISION AND DRAINAGE LEG Right 2018    Procedure: INCISION AND DRAINAGE OF RIGHT UPPER INNER THIGH, PLACEMENT OF WOUND VAC;  Surgeon: René Dallas MD;  Location: [facility];  Service: General   • SPLENECTOMY                         PT Assessment/Plan     Row Name 20 0800          PT Assessment    Assessment Comments  We continue to porgress with addtional hip and core strengthening in [facility]. He tolerated today's activities well wihtout an exacerbation of pain. He deos demonstrate poor endruance and reports fatigue follwoing one set of standing hip strengthening activities.  -WJ        PT Plan    PT Plan Comments  Continue to progress with additional hip and core strengthening in standing.  -WJ       User Key  (r) = Recorded By, (t) = Taken By, (c) = Cosigned By    Initials Name Provider Type    Alvin Grant, PT DPT Physical Therapist            OP Exercises     Row Name 20 0800 20 0700           Precautions    Existing Precautions/Restrictions  (S) other (see comments) hx of T8 L fxs  -WJ  --        Subjective Comments    Subjective Comments  Pt reports that he is doing pretty well this morning.  -WJ  --        Subjective Pain    Able to rate subjective pain?  yes  -WJ  --     Pre-Treatment Pain Level  4  -WJ  --        Total Minutes    81971 - PT Therapeutic Exercise Minutes  --  30  -WJ     08831 - PT Manual Therapy Minutes  --  10  -WJ        Exercise 1    Exercise Name 1  HL bridge with ER at peak with RTB  -WJ  --     Cueing 1  Verbal  -WJ  --     Sets 1  2  -WJ  --     Reps 1  10  -WJ  --        Exercise 2    Exercise Name 2  SL bridge   -WJ  --     Cueing 2  Verbal  -WJ  --     Sets 2  2  -WJ  --     Reps 2  10  -WJ  --     Additional Comments  each leg   -WJ  --        Exercise 3    Exercise Name 3  standing hip abduction with red can-do   -WJ  --     Cueing 3  Verbal  -WJ  --     Sets 3  2  -WJ  --     Reps 3  10  -WJ  --        Exercise 4    Exercise Name 4  standing hip extension with red can do  -WJ  --     Cueing 4  Verbal  -WJ  --     Sets 4  2  -WJ  --     Reps 4  10  -WJ  --        Exercise 5    Exercise Name 5  side stepping with red can-do  -WJ  --     Cueing 5  Verbal;Demo  -WJ  --     Reps 5  3 down and back   -WJ  --     Additional Comments  25 ft   -WJ  --        Exercise 6    Exercise Name 6  monster walks with red can  -WJ  --     Cueing 6  Verbal;Demo  -WJ  --     Reps 6  3 down and back   -WJ  --        Exercise 7    Exercise Name 7  squat with 55 cm SB  -WJ  --     Cueing 7  Verbal;Demo  -WJ  --     Sets 7  1  -WJ  --     Reps 7  10  -WJ  --       User Key  (r) = Recorded By, (t) = Taken By, (c) = Cosigned By    Initials Name Provider Type    WAlvin Guzman, PT DPT Physical Therapist                      Manual Rx (last 36 hours)      Manual Treatments     Row Name 01/06/20 0700             Total Minutes    71990 - PT Manual Therapy Minutes  10  -WJ         Manual Rx 3     Manual Rx 3 Location  prone thoracic STM with blue ridge roller to thoracolumbar frank  -WJ      Manual Rx 3 Grade  mod OP  -WJ        User Key  (r) = Recorded By, (t) = Taken By, (c) = Cosigned By    Initials Name Provider Type    Alvin Grant, PT DPT Physical Therapist          PT OP Goals     Row Name 01/06/20 0800          PT Short Term Goals    STG Date to Achieve  12/23/19  -WJ     STG 1  Pt to improve R hip PROM IR to 20 degrees.  -WJ     STG 1 Progress  Met  -WJ     STG 2  Pt to demonstrate improved postural awareness and ability to self correct to decrease strain on the lumbar spine.  -WJ     STG 2 Progress  Ongoing  -WJ     STG 3  Pt to demonstrate decreased parspinal muscle guarding upon palpation.  -WJ     STG 3 Progress  Met  -WJ     STG 4  Pt to reduce guarding in lumbar/hip muscles.  -WJ     STG 4 Progress  Met  -WJ        Long Term Goals    LTG Date to Achieve  01/13/20  -WJ     LTG 1  Independent w/ HEP.  -WJ     LTG 1 Progress  Ongoing  -WJ     LTG 2  Pt to B SLS x10 seconds w/ hip stability.  -WJ     LTG 2 Progress  Ongoing;Partially Met  -WJ     LTG 3  Pt to report pain </= 4/10 for 1 week.  -WJ     LTG 3 Progress  Ongoing;Progressing  -WJ     LTG 3 Progress Comments  4/10 today  -WJ     LTG 4  Pt to improve THA score to <10.  -WJ     LTG 4 Progress  Ongoing;Progressing  -WJ     LTG 5  Pt to improve B hip flexion and ABD MMT strength to 4+/5.  -WJ     LTG 5 Progress  Ongoing;Partially Met  -WJ        Time Calculation    PT Goal Re-Cert Due Date  01/19/20  -WJ       User Key  (r) = Recorded By, (t) = Taken By, (c) = Cosigned By    Initials Name Provider Type    Alvin Grant, PT DPT Physical Therapist          Therapy Education  Given: HEP, Symptoms/condition management  Program: Reinforced  How Provided: Verbal  Provided to: Patient  Level of Understanding: Verbalized              Time Calculation:   Total Timed Code Minutes- PT: 40 minute(s)                Alvin Faria, PT  DPT  1/6/2020

## 2020-01-13 ENCOUNTER — TREATMENT (OUTPATIENT)
Dept: PHYSICAL THERAPY | Facility: CLINIC | Age: 45
End: 2020-01-13

## 2020-01-13 DIAGNOSIS — M54.16 RADICULOPATHY, LUMBAR REGION: ICD-10-CM

## 2020-01-13 DIAGNOSIS — R20.0 NUMBNESS AND TINGLING OF BOTH LOWER EXTREMITIES: ICD-10-CM

## 2020-01-13 DIAGNOSIS — M79.604 RIGHT LEG PAIN: ICD-10-CM

## 2020-01-13 DIAGNOSIS — R20.2 NUMBNESS AND TINGLING OF BOTH LOWER EXTREMITIES: ICD-10-CM

## 2020-01-13 DIAGNOSIS — M54.50 LUMBAR PAIN: Primary | ICD-10-CM

## 2020-01-13 PROCEDURE — 97110 THERAPEUTIC EXERCISES: CPT | Performed by: PHYSICAL THERAPIST

## 2020-01-13 NOTE — PROGRESS NOTES
Outpatient Physical Therapy Ortho Progress Note       Patient Name: Saulo Shepard  : 1975  MRN: [id number]  Today's Date: 2020      Visit Date: 2020    Visit Dx:    ICD-10-CM ICD-9-CM   1. Lumbar pain M54.5 724.2   2. Radiculopathy, lumbar region M54.16 724.4   3. Right leg pain M79.604 729.5   4. Numbness and tingling of both lower extremities R20.0 782.0    R20.2        Patient Active Problem List   Diagnosis   • Intractable acute post-traumatic headache   • Abscess of left lower extremity excluding foot   • Abscess   • Lumbar pain   • Leg pain, right   • Overweight (BMI 25.0-29.9)   • Ankylosing spondylitis of lumbar region (CMS/[facility])        Past Medical History:   Diagnosis Date   • Anxiety    • Back pain    • Fracture of lumbar spine (CMS/[facility])  and     lower lumbar ; L1    • Head injury    • Injury of back    • Neck pain    • Overweight (BMI 25.0-29.9)         Past Surgical History:   Procedure Laterality Date   • FACIAL FRACTURE SURGERY     • INCISION AND DRAINAGE LEG Right 2018    Procedure: INCISION AND DRAINAGE OF RIGHT UPPER INNER THIGH, PLACEMENT OF WOUND VAC;  Surgeon: René Dallas MD;  Location: [facility];  Service: General   • SPLENECTOMY                         PT Assessment/Plan     Row Name 20 0800          PT Assessment    Functional Limitations  Limitations in community activities;Performance in leisure activities;Limitations in functional capacity and performance;Performance in work activities  -WJ     Impairments  Joint mobility;Motor function;Muscle strength;Pain;Poor body mechanics;Posture;Range of motion;Sensation  -WJ     Assessment Comments  At this time Mr. Shepard has met four of his eight goals while partially meeting another. He continues to report low levels of pain around 4/10 with exacerbations with increased work. He dmeonstrates imprvments in strength, however continues to presnet with functional glute med weakness. He also  fatigues quickly. He has one session remaining and following it he should be well equipped to continue independently at home for addtional strength and endurance training.  -WJ        PT Plan    PT Frequency  2x/week  -WJ     Predicted Duration of Therapy Intervention (Therapy Eval)  1-2 more visits  -WJ     Planned CPT's?  PT RE-EVAL: 10363;PT THER PROC EA 15 MIN: 06773;PT THER ACT EA 15 MIN: 34733;PT MANUAL THERAPY EA 15 MIN: 04386;PT NEUROMUSC RE-EDUCATION EA 15 MIN: 08644;PT GAIT TRAINING EA 15 MIN: 21257;PT SELF CARE/HOME MGMT/TRAIN EA 15: 28031;PT ELECTRICAL STIM UNATTEND: ;PT ELECTRICAL STIM ATTD EA 15 MIN: 47217;PT ULTRASOUND EA 15 MIN: 52397  -WJ     PT Plan Comments  Continue to progress with hip and core strengthening, finalize HEP to reflect the same.  -WJ       User Key  (r) = Recorded By, (t) = Taken By, (c) = Cosigned By    Initials Name Provider Type    WJ Alvin Faria, PT DPT Physical Therapist            OP Exercises     Row Name 01/13/20 0800             Precautions    Existing Precautions/Restrictions  (S) other (see comments) hx of T8 and lumbar lumbar fx  -WJ         Subjective Comments    Subjective Comments  Pt reports that overal he has been doign well. he continues to have his normal level of pain.  -WJ         Subjective Pain    Able to rate subjective pain?  yes  -WJ      Pre-Treatment Pain Level  4  -WJ         Total Minutes    87956 - PT Therapeutic Exercise Minutes  40  -WJ         Exercise 1    Exercise Name 1  SL bridge on BOSU   -WJ      Cueing 1  Verbal  -WJ      Sets 1  2  -WJ      Reps 1  10  -WJ         Exercise 2    Exercise Name 2  SL SLR abduction with green can-do  -WJ      Cueing 2  Verbal  -WJ      Sets 2  2  -WJ      Reps 2  10  -WJ         Exercise 3    Exercise Name 3  hip airplanes with TRX straps  -WJ      Cueing 3  Verbal;Tactile  -WJ      Sets 3  2  -WJ      Reps 3  10  -WJ         Exercise 4    Exercise Name 4  50lb sled push/pull   -WJ      Cueing 4   Verbal;Tactile  -WJ      Reps 4  3 down and back   -WJ      Additional Comments  50 ft   -WJ         Exercise 5    Exercise Name 5  squat lift with 46.5 lbs  -WJ      Cueing 5  Verbal;Tactile  -WJ      Sets 5  2  -WJ      Reps 5  10  -WJ         Exercise 6    Exercise Name 6  addressed goals for progress note  -WJ        User Key  (r) = Recorded By, (t) = Taken By, (c) = Cosigned By    Initials Name Provider Type    WJ Alvin Faria, PT DPT Physical Therapist                       PT OP Goals     Row Name 01/13/20 0800          PT Short Term Goals    STG Date to Achieve  12/23/19  -WJ     STG 1  Pt to improve R hip PROM IR to 20 degrees.  -WJ     STG 1 Progress  Met  -WJ     STG 2  Pt to demonstrate improved postural awareness and ability to self correct to decrease strain on the lumbar spine.  -WJ     STG 2 Progress  Ongoing  -WJ     STG 2 Progress Comments  continues to stand and sit in forward flexion  -WJ     STG 3  Pt to demonstrate decreased parspinal muscle guarding upon palpation.  -WJ     STG 3 Progress  Met  -WJ     STG 4  Pt to reduce guarding in lumbar/hip muscles.  -WJ     STG 4 Progress  Met  -WJ        Long Term Goals    LTG Date to Achieve  01/13/20  -WJ     LTG 1  Independent w/ HEP.  -WJ     LTG 1 Progress  Ongoing  -WJ     LTG 1 Progress Comments  pt verbalizes compliance  -WJ     LTG 2  Pt to B SLS x10 seconds w/ hip stability.  -WJ     LTG 2 Progress  Ongoing;Partially Met  -WJ     LTG 2 Progress Comments  demonstrates glut med weakness biltaerally, however able to hold for 10 sec  -WJ     LTG 3  Pt to report pain </= 4/10 for 1 week.  -WJ     LTG 3 Progress  Ongoing;Progressing  -WJ     LTG 3 Progress Comments  4/10 today, with work activity pain has increased  -WJ     LTG 4  Pt to improve THA score to <10.  -WJ     LTG 4 Progress  Ongoing  -WJ     LTG 4 Progress Comments  16  -WJ     LTG 5  Pt to improve B hip flexion and ABD MMT strength to 4+/5.  -WJ     LTG 5 Progress  Met  -WJ     LTG 5  Progress Comments  4+/5  -WJ        Time Calculation    PT Goal Re-Cert Due Date  02/12/20  -WJ       User Key  (r) = Recorded By, (t) = Taken By, (c) = Cosigned By    Initials Name Provider Type    Alvin Grant, PT DPT Physical Therapist          Therapy Education  Education Details: SL bridge and SL SLR  Given: HEP, Symptoms/condition management  Program: Reinforced, New  How Provided: Verbal  Provided to: Patient  Level of Understanding: Verbalized    Outcome Measure Options: Modifed Owestry  Modified Oswestry  Modified Oswestry Score/Comments: 32%      Time Calculation:   Total Timed Code Minutes- PT: 40 minute(s)      PT G-Codes  Outcome Measure Options: Modifed Owestry  Modified Oswestry Score/Comments: 32%         Alvin Faria, PT DPT  1/13/2020

## 2020-01-17 ENCOUNTER — HOSPITAL ENCOUNTER (OUTPATIENT)
Dept: MRI IMAGING | Facility: HOSPITAL | Age: 45
Discharge: HOME | End: 2020-01-17
Admitting: NURSE PRACTITIONER

## 2020-01-17 DIAGNOSIS — M54.50 LUMBAR PAIN: ICD-10-CM

## 2020-01-17 PROCEDURE — 72148 MRI LUMBAR SPINE W/O DYE: CPT

## 2020-01-20 ENCOUNTER — TREATMENT (OUTPATIENT)
Dept: PHYSICAL THERAPY | Facility: CLINIC | Age: 45
End: 2020-01-20

## 2020-01-20 DIAGNOSIS — M54.50 LUMBAR PAIN: Primary | ICD-10-CM

## 2020-01-20 DIAGNOSIS — M54.16 RADICULOPATHY, LUMBAR REGION: ICD-10-CM

## 2020-01-20 DIAGNOSIS — R20.0 NUMBNESS AND TINGLING OF BOTH LOWER EXTREMITIES: ICD-10-CM

## 2020-01-20 DIAGNOSIS — M79.604 RIGHT LEG PAIN: ICD-10-CM

## 2020-01-20 DIAGNOSIS — R20.2 NUMBNESS AND TINGLING OF BOTH LOWER EXTREMITIES: ICD-10-CM

## 2020-01-20 PROCEDURE — 97140 MANUAL THERAPY 1/> REGIONS: CPT | Performed by: PHYSICAL THERAPIST

## 2020-01-20 PROCEDURE — 97110 THERAPEUTIC EXERCISES: CPT | Performed by: PHYSICAL THERAPIST

## 2020-01-20 NOTE — PROGRESS NOTES
Outpatient Physical Therapy Ortho Treatment Note       Patient Name: Saulo Shepard  : 1975  MRN: [id number]  Today's Date: 2020      Visit Date: 2020    Visit Dx:    ICD-10-CM ICD-9-CM   1. Lumbar pain M54.5 724.2   2. Radiculopathy, lumbar region M54.16 724.4   3. Right leg pain M79.604 729.5   4. Numbness and tingling of both lower extremities R20.0 782.0    R20.2        Patient Active Problem List   Diagnosis   • Intractable acute post-traumatic headache   • Abscess of left lower extremity excluding foot   • Abscess   • Lumbar pain   • Leg pain, right   • Overweight (BMI 25.0-29.9)   • Ankylosing spondylitis of lumbar region (CMS/[facility])        Past Medical History:   Diagnosis Date   • Anxiety    • Back pain    • Fracture of lumbar spine (CMS/[facility])  and     lower lumbar ; L1    • Head injury    • Injury of back    • Neck pain    • Overweight (BMI 25.0-29.9)         Past Surgical History:   Procedure Laterality Date   • FACIAL FRACTURE SURGERY     • INCISION AND DRAINAGE LEG Right 2018    Procedure: INCISION AND DRAINAGE OF RIGHT UPPER INNER THIGH, PLACEMENT OF WOUND VAC;  Surgeon: René Dallas MD;  Location: [facility];  Service: General   • SPLENECTOMY                         PT Assessment/Plan     Row Name 20 0800          PT Assessment    Assessment Comments  He continues to be symptomatic but seems pleased with his progress and current status as he stated he thinks it would be appropriate to place his POC on hold  -EC        PT Plan    PT Plan Comments  Place POC on ho;ld and D/C if not contacted by the patient within thirty days.  -EC       User Key  (r) = Recorded By, (t) = Taken By, (c) = Cosigned By    Initials Name Provider Type    Juanito Ballard PTA Physical Therapy Assistant            OP Exercises     Row Name 20 0800 20 0700          Precautions    Existing Precautions/Restrictions  other (see comments) hx of T8 and lumbar lumbar fx   -EC  --        Subjective Comments    Subjective Comments  Pt reports Wednesday his symptoms were pretty bad. Reports a little pain in his low back. He reports that he is ready to place his POC on hold.  -EC  --        Subjective Pain    Able to rate subjective pain?  yes  -EC  --     Pre-Treatment Pain Level  3  -EC  --        Total Minutes    89691 - PT Therapeutic Exercise Minutes  30  -EC  --     05569 - PT Manual Therapy Minutes  --  10  -EC        Exercise 1    Exercise Name 1  bridging with 55 cm SB  -EC  --     Reps 1  20  -EC  --        Exercise 2    Exercise Name 2  B unilateral wall ball hip abduction/extension in SL with 55 cm SB  -EC  --     Reps 2  15  -EC  --        Exercise 3    Exercise Name 3  B unilateral fire hydrants on 55 cm SB  -EC  --     Reps 3  15  -EC  --        Exercise 4    Exercise Name 4  B hip ER/Ir stretches with intermittent inferior lateral glides  -EC  --        Exercise 5    Exercise Name 5  standing pot stirrers with 45 cm SB  -EC  --       User Key  (r) = Recorded By, (t) = Taken By, (c) = Cosigned By    Initials Name Provider Type    Juanito Ballard PTA Physical Therapy Assistant                      Manual Rx (last 36 hours)      Manual Treatments     Row Name 01/20/20 0700             Total Minutes    33272 - PT Manual Therapy Minutes  10  -EC         Manual Rx 1    Manual Rx 1 Location  B lower lumbar region  -EC      Manual Rx 1 Type  STM in prone   -EC      Manual Rx 1 Duration  10  -EC        User Key  (r) = Recorded By, (t) = Taken By, (c) = Cosigned By    Initials Name Provider Type    Juanito Ballard PTA Physical Therapy Assistant          PT OP Goals     Row Name 01/20/20 0800          PT Short Term Goals    STG Date to Achieve  12/23/19  -EC     STG 1  Pt to improve R hip PROM IR to 20 degrees.  -EC     STG 1 Progress  Met  -EC     STG 2  Pt to demonstrate improved postural awareness and ability to self correct to decrease strain on the lumbar spine.   -EC     STG 2 Progress  Ongoing  -EC     STG 3  Pt to demonstrate decreased parspinal muscle guarding upon palpation.  -EC     STG 3 Progress  Met  -EC     STG 4  Pt to reduce guarding in lumbar/hip muscles.  -EC     STG 4 Progress  Met  -EC        Long Term Goals    LTG Date to Achieve  01/13/20  -EC     LTG 1  Independent w/ HEP.  -EC     LTG 1 Progress  Ongoing  -EC     LTG 2  Pt to B SLS x10 seconds w/ hip stability.  -EC     LTG 2 Progress  Ongoing;Partially Met  -EC     LTG 3  Pt to report pain </= 4/10 for 1 week.  -EC     LTG 3 Progress  Ongoing;Progressing  -EC     LTG 3 Progress Comments  3/10 today  -EC     LTG 4  Pt to improve THA score to <10.  -EC     LTG 4 Progress  Ongoing  -EC     LTG 5  Pt to improve B hip flexion and ABD MMT strength to 4+/5.  -EC     LTG 5 Progress  Met  -EC        Time Calculation    PT Goal Re-Cert Due Date  02/12/20  -EC       User Key  (r) = Recorded By, (t) = Taken By, (c) = Cosigned By    Initials Name Provider Type    Juanito Ballard PTA Physical Therapy Assistant                         Time Calculation:                    Juanito Boateng PTA  1/20/2020

## 2020-02-19 ENCOUNTER — DOCUMENTATION (OUTPATIENT)
Dept: PHYSICAL THERAPY | Facility: CLINIC | Age: 45
End: 2020-02-19

## 2020-02-19 DIAGNOSIS — M54.50 LUMBAR PAIN: Primary | ICD-10-CM

## 2020-02-19 DIAGNOSIS — M54.16 RADICULOPATHY, LUMBAR REGION: ICD-10-CM

## 2020-02-19 DIAGNOSIS — R20.0 NUMBNESS AND TINGLING OF BOTH LOWER EXTREMITIES: ICD-10-CM

## 2020-02-19 DIAGNOSIS — M79.604 RIGHT LEG PAIN: ICD-10-CM

## 2020-02-19 DIAGNOSIS — R20.2 NUMBNESS AND TINGLING OF BOTH LOWER EXTREMITIES: ICD-10-CM

## 2020-02-19 NOTE — PROGRESS NOTES
Outpatient Physical Therapy Discharge Summary         Patient Name: Saulo Shepard  : 1975  MRN: [id number]    Today's Date: 2020    Visit Dx:    ICD-10-CM ICD-9-CM   1. Lumbar pain M54.5 724.2   2. Radiculopathy, lumbar region M54.16 724.4   3. Right leg pain M79.604 729.5   4. Numbness and tingling of both lower extremities R20.0 782.0    R20.2        PT OP Goals     Row Name 20 1100          PT Short Term Goals    STG Date to Achieve  19  -EC     STG 1  Pt to improve R hip PROM IR to 20 degrees.  -EC     STG 1 Progress  Met  -EC     STG 2  Pt to demonstrate improved postural awareness and ability to self correct to decrease strain on the lumbar spine.  -EC     STG 2 Progress  Partially Met  -EC     STG 3  Pt to demonstrate decreased parspinal muscle guarding upon palpation.  -EC     STG 3 Progress  Met  -EC     STG 4  Pt to reduce guarding in lumbar/hip muscles.  -EC     STG 4 Progress  Met  -EC        Long Term Goals    LTG Date to Achieve  20  -EC     LTG 1  Independent w/ HEP.  -EC     LTG 1 Progress  Met  -EC     LTG 2  Pt to B SLS x10 seconds w/ hip stability.  -EC     LTG 2 Progress  Partially Met  -EC     LTG 3  Pt to report pain </= 4/10 for 1 week.  -EC     LTG 3 Progress  Ongoing;Progressing  -EC     LTG 4  Pt to improve THA score to <10.  -EC     LTG 4 Progress  Not Met  -EC     LTG 5  Pt to improve B hip flexion and ABD MMT strength to 4+/5.  -EC     LTG 5 Progress  Met  -EC       User Key  (r) = Recorded By, (t) = Taken By, (c) = Cosigned By    Initials Name Provider Type    Juanito Ballard, PTA Physical Therapy Assistant          OP PT Discharge Summary  Date of Discharge: 20  Reason for Discharge: All goals achieved, Patient/Caregiver request  Outcomes Achieved: Able to achieve all goals within established timeline  Discharge Destination: Home with home program  Discharge Instructions/Additional Comments: We have been working with this patient to  address his LBP by working to improve his overall flexibility and core strength. We also have been working with him to improve his B hip strength as well. He has a comprehensive HEP which should continue his progression without continued skilled services with continued complaince.      Time Calculation:                    Juanito Boateng, PTA  2/19/2020

## 2020-02-19 NOTE — PROGRESS NOTES
I have reviewed the notes, assessments, and/or procedures performed by Juanito Boateng PTA, I concur with her/his documentation of Saulo Shepard.

## 2020-03-16 ENCOUNTER — TELEPHONE (OUTPATIENT)
Dept: NEUROSURGERY | Facility: CLINIC | Age: 45
End: 2020-03-16

## 2020-03-16 NOTE — TELEPHONE ENCOUNTER
Appointment for 03/23/2020 canceled due to COVID19 virus, unable to contact patient due to incorrect phone numbers. Spoke with Harleen, advised of appointment cancellation, advised she would relay the message. Will call to RS when restriction is lifted.

## 2020-04-04 ENCOUNTER — APPOINTMENT (OUTPATIENT)
Dept: GENERAL RADIOLOGY | Facility: HOSPITAL | Age: 45
End: 2020-04-04

## 2020-04-04 ENCOUNTER — NURSE TRIAGE (OUTPATIENT)
Dept: CALL CENTER | Facility: HOSPITAL | Age: 45
End: 2020-04-04

## 2020-04-04 ENCOUNTER — HOSPITAL ENCOUNTER (EMERGENCY)
Facility: HOSPITAL | Age: 45
Discharge: HOME | End: 2020-04-04
Attending: EMERGENCY MEDICINE | Admitting: EMERGENCY MEDICINE

## 2020-04-04 VITALS
WEIGHT: 212 LBS | DIASTOLIC BLOOD PRESSURE: 80 MMHG | HEART RATE: 92 BPM | HEIGHT: 73 IN | TEMPERATURE: 98.1 F | OXYGEN SATURATION: 98 % | RESPIRATION RATE: 14 BRPM | BODY MASS INDEX: 28.1 KG/M2 | SYSTOLIC BLOOD PRESSURE: 153 MMHG

## 2020-04-04 DIAGNOSIS — R09.1 PLEURISY: Primary | ICD-10-CM

## 2020-04-04 PROCEDURE — 71046 X-RAY EXAM CHEST 2 VIEWS: CPT

## 2020-04-04 PROCEDURE — 96372 THER/PROPH/DIAG INJ SC/IM: CPT

## 2020-04-04 PROCEDURE — 25010000002 KETOROLAC TROMETHAMINE PER 15 MG: Performed by: EMERGENCY MEDICINE

## 2020-04-04 PROCEDURE — 99282 EMERGENCY DEPT VISIT SF MDM: CPT

## 2020-04-04 RX ORDER — INDOMETHACIN 50 MG/1
50 CAPSULE ORAL
Qty: 30 CAPSULE | Refills: 0 | Status: SHIPPED | OUTPATIENT
Start: 2020-04-04 | End: 2020-05-05

## 2020-04-04 RX ORDER — KETOROLAC TROMETHAMINE 30 MG/ML
60 INJECTION, SOLUTION INTRAMUSCULAR; INTRAVENOUS ONCE
Status: COMPLETED | OUTPATIENT
Start: 2020-04-04 | End: 2020-04-04

## 2020-04-04 RX ADMIN — KETOROLAC TROMETHAMINE 60 MG: 30 INJECTION, SOLUTION INTRAMUSCULAR; INTRAVENOUS at 19:13
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
## 2020-05-05 ENCOUNTER — HOSPITAL ENCOUNTER (EMERGENCY)
Facility: HOSPITAL | Age: 45
Discharge: HOME | End: 2020-05-05
Attending: EMERGENCY MEDICINE | Admitting: EMERGENCY MEDICINE

## 2020-05-05 ENCOUNTER — APPOINTMENT (OUTPATIENT)
Dept: GENERAL RADIOLOGY | Facility: HOSPITAL | Age: 45
End: 2020-05-05

## 2020-05-05 ENCOUNTER — APPOINTMENT (OUTPATIENT)
Dept: CT IMAGING | Facility: HOSPITAL | Age: 45
End: 2020-05-05

## 2020-05-05 VITALS
HEART RATE: 88 BPM | BODY MASS INDEX: 28.49 KG/M2 | SYSTOLIC BLOOD PRESSURE: 142 MMHG | OXYGEN SATURATION: 99 % | DIASTOLIC BLOOD PRESSURE: 78 MMHG | HEIGHT: 73 IN | RESPIRATION RATE: 18 BRPM | WEIGHT: 215 LBS | TEMPERATURE: 98.3 F

## 2020-05-05 DIAGNOSIS — S82.831A OTHER CLOSED FRACTURE OF PROXIMAL END OF RIGHT FIBULA, INITIAL ENCOUNTER: Primary | ICD-10-CM

## 2020-05-05 PROCEDURE — 73590 X-RAY EXAM OF LOWER LEG: CPT

## 2020-05-05 PROCEDURE — 72131 CT LUMBAR SPINE W/O DYE: CPT

## 2020-05-05 PROCEDURE — 99283 EMERGENCY DEPT VISIT LOW MDM: CPT

## 2020-05-05 PROCEDURE — 73562 X-RAY EXAM OF KNEE 3: CPT

## 2020-05-05 RX ORDER — IBUPROFEN 800 MG/1
800 TABLET ORAL
Qty: 15 TABLET | Refills: 0 | OUTPATIENT
Start: 2020-05-05 | End: 2020-06-23 | Stop reason: HOSPADM

## 2020-05-05 RX ORDER — HYDROCODONE BITARTRATE AND ACETAMINOPHEN 7.5; 325 MG/1; MG/1
1 TABLET ORAL EVERY 6 HOURS PRN
Qty: 16 TABLET | Refills: 0 | OUTPATIENT
Start: 2020-05-05 | End: 2021-07-29

## 2020-05-05 RX ORDER — HYDROCODONE BITARTRATE AND ACETAMINOPHEN 7.5; 325 MG/1; MG/1
1 TABLET ORAL ONCE
Status: COMPLETED | OUTPATIENT
Start: 2020-05-05 | End: 2020-05-05

## 2020-05-05 RX ADMIN — HYDROCODONE BITARTRATE AND ACETAMINOPHEN 1 TABLET: 7.5; 325 TABLET ORAL at 17:58
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
## 2020-05-06 NOTE — DISCHARGE INSTRUCTIONS
You should not take both Indocin and ibuprofen at the same time.  If you prefer Indocin then do not use the ibuprofen.    Follow up with one of the [facility] physician groups below to setup primary care. If you have trouble making an appointment, please call the [facility] Nurse Line at [phone]    Dr. Anu Ho DO, Dr. Kira Nagy DO, and KYLER Roy  [facility]  [address] [place] [address], [address] [phone]    Dr. Jose Angel Taylor MD  [facility] - [address], [address], [place] [address] [phone]    Dr. Vivek Stewart DO, Dr. Tonny Moreno DO,  KYLER Lam, and KYLER Ayers  [facility] - [address] [address], [address], [place] [address] [phone]     Dr. Khalida Dickerson MD, and Ella Anaya, KYLER  [address], [place], [place] [id number] [phone]    Dr. Ronny Proctor MD and Dr. Ross Cadena MD  [facility] [address], [address] [phone]    Dr. Edgard Booker MD  [facility]  [address] [place] [address], [place], [address] [phone]    Dr. Danny Clark MD  [facility]  [address], [place], [address] [phone]

## 2020-05-08 ENCOUNTER — OFFICE VISIT (OUTPATIENT)
Dept: NEUROSURGERY | Facility: CLINIC | Age: 45
End: 2020-05-08

## 2020-05-08 DIAGNOSIS — M53.3 SACRAL PAIN: ICD-10-CM

## 2020-05-08 DIAGNOSIS — Z78.9 NON-TOBACCO USER: ICD-10-CM

## 2020-05-08 DIAGNOSIS — E66.3 OVERWEIGHT (BMI 25.0-29.9): ICD-10-CM

## 2020-05-08 DIAGNOSIS — M47.816 FACET ARTHROPATHY, LUMBAR: Primary | ICD-10-CM

## 2020-05-08 PROCEDURE — 99213 OFFICE O/P EST LOW 20 MIN: CPT | Performed by: NEUROLOGICAL SURGERY
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
## 2020-06-23 ENCOUNTER — APPOINTMENT (OUTPATIENT)
Dept: GENERAL RADIOLOGY | Facility: HOSPITAL | Age: 45
End: 2020-06-23

## 2020-06-23 ENCOUNTER — HOSPITAL ENCOUNTER (EMERGENCY)
Facility: HOSPITAL | Age: 45
Discharge: HOME | End: 2020-06-23
Admitting: EMERGENCY MEDICINE

## 2020-06-23 VITALS
SYSTOLIC BLOOD PRESSURE: 138 MMHG | RESPIRATION RATE: 18 BRPM | HEIGHT: 73 IN | TEMPERATURE: 98.6 F | BODY MASS INDEX: 27.83 KG/M2 | OXYGEN SATURATION: 98 % | DIASTOLIC BLOOD PRESSURE: 82 MMHG | HEART RATE: 66 BPM | WEIGHT: 210 LBS

## 2020-06-23 DIAGNOSIS — S39.012A STRAIN OF LUMBAR REGION, INITIAL ENCOUNTER: Primary | ICD-10-CM

## 2020-06-23 DIAGNOSIS — M62.838 MUSCLE SPASM: ICD-10-CM

## 2020-06-23 PROCEDURE — 99283 EMERGENCY DEPT VISIT LOW MDM: CPT

## 2020-06-23 PROCEDURE — 96372 THER/PROPH/DIAG INJ SC/IM: CPT

## 2020-06-23 PROCEDURE — 72110 X-RAY EXAM L-2 SPINE 4/>VWS: CPT

## 2020-06-23 PROCEDURE — 25010000002 KETOROLAC TROMETHAMINE PER 15 MG: Performed by: NURSE PRACTITIONER

## 2020-06-23 PROCEDURE — 25010000002 ORPHENADRINE CITRATE PER 60 MG: Performed by: NURSE PRACTITIONER

## 2020-06-23 RX ORDER — KETOROLAC TROMETHAMINE 30 MG/ML
60 INJECTION, SOLUTION INTRAMUSCULAR; INTRAVENOUS ONCE
Status: COMPLETED | OUTPATIENT
Start: 2020-06-23 | End: 2020-06-23

## 2020-06-23 RX ORDER — TIZANIDINE 4 MG/1
4 TABLET ORAL EVERY 8 HOURS PRN
Qty: 15 TABLET | Refills: 0 | OUTPATIENT
Start: 2020-06-23 | End: 2021-07-29

## 2020-06-23 RX ORDER — ORPHENADRINE CITRATE 30 MG/ML
60 INJECTION INTRAMUSCULAR; INTRAVENOUS ONCE
Status: COMPLETED | OUTPATIENT
Start: 2020-06-23 | End: 2020-06-23

## 2020-06-23 RX ADMIN — KETOROLAC TROMETHAMINE 60 MG: 30 INJECTION, SOLUTION INTRAMUSCULAR; INTRAVENOUS at 15:23

## 2020-06-23 RX ADMIN — ORPHENADRINE CITRATE 60 MG: 30 INJECTION INTRAMUSCULAR; INTRAVENOUS at 15:23

## 2020-06-23 NOTE — DISCHARGE INSTRUCTIONS
Return to ER if symptoms worsen   Ice to area for 24 hours, then moist heat compresses three times a day     Heat Therapy  Heat therapy is the use of heat to help ease sore, stiff, injured, and tight muscles and joints. Heat relaxes muscles, which may help relieve pain and muscle spasms.  What are the risks?  If you have any of the following conditions, do not use heat therapy unless your health care provider approves:  · New bruises.  · Open wounds.  · Healing wounds, infected skin, or scarred skin in the area being treated.  · Poor circulation.  · Numbness in the area being treated.  · Unusual swelling of the area being treated.  · Blood clots.  · Diabetes or heart disease.  · Cancer.  · Inability to communicate pain. This may include young children and people who have problems with their brain function (dementia).  How to use heat therapy  Use the heat source that your health care provider recommends, such as:  · Moist heat pack.  · Hot water bottle.  · Electric heating pad.  · Heated gel pack.  · Heated wrap.  · Warm water bath.  Follow your health care provider's instructions about when and how to use heat therapy. In general, you should:  1. Place a towel between your skin and the heat source.  2. Leave the heat on for 20-30 minutes. Your skin may turn pink.  3. Remove the heat if your skin turns bright red. This is especially important if you are unable to feel pain, heat, or cold. You may have a greater risk of getting burned.  If directed, you can also soak in a warm water bath. To prepare:  1. Put a non-slip padding in the bathtub to prevent slips or falls.  2. Fill a bathtub with warm water.  3. Always check the water temperature before getting into the bathtub.  4. Soak in the water for 15-20 minutes, or for as long as you are told by your health care provider.  5. When you are done, be careful when you stand up. You may feel dizzy.  6. After the bath, pat yourself dry. Do not rub your skin to dry  it.  General recommendations  · Be careful to avoid burning your skin. High heat or long exposure to heat can cause burns.  · Check your skin during heat therapy.  · Do not use heat therapy:  ? For new injuries, especially if you have swelling on the injured area.  ? On areas of skin that are already irritated, such as with a rash or sunburn.  ? While sleeping. Only use heat therapy while you are awake.  ? If your skin turns bright red.  Contact a health care provider if you have:  · Blisters, redness, swelling, or numbness in the area where you use heat therapy.  · New pain.  · Pain that gets worse.  Summary  · Heat therapy is the use of heat to help ease sore, stiff, injured, and tight muscles and joints. Heat relaxes muscles, which may help relieve pain.  · If you have the following conditions, do not use heat therapy unless your health care provider approves: poor circulation, healing wounds, diabetes, numbness or swelling in the area being treated, blood clots, cancer, or the inability to communicate pain.  · Be careful to avoid burning your skin. Only use heat therapy while you are awake.  · Follow your health care provider's instructions about when and how to use heat therapy.  This information is not intended to replace advice given to you by your health care provider. Make sure you discuss any questions you have with your health care provider.  Document Released: 03/11/2013 Document Revised: 02/10/2020 Document Reviewed: 12/29/2018  Elsevier Patient Education © 2020 [employer]      How to Use Cold Therapy  Cold therapy, or cryotherapy, is a treatment that uses cold temperatures to treat an injury or medical condition. It includes using cold packs or ice packs to reduce pain and swelling. Only use cold therapy if your doctor says it is okay.  What are the risks?  Generally, cold therapy is a safe treatment. However, it is not safe for:  · People who are not able to say they are in pain. These include  small children and people who have memory problems.  · People who have certain conditions, such as:  ? A problem in the vessels that slows blood flow to the fingers and toes (Raynaud's syndrome).  ? Feeling very cold easily (cold hypersensitivity).  ? Lack of feeling in the area being iced.  Cold therapy may not be safe for people who have other conditions. Do not use it without talking to your doctor if you have:  · A heart condition.  · High blood pressure.  · Open or healing wounds.  · An infection.  · Pain and swelling in your joints (rheumatoid arthritis).  · Poor blood flow in the body.  · Diabetes.  · Certain skin conditions.  How can I make a cold pack?  When using a cold pack at home to reduce pain and swelling, you can use:  · A silica gel cold pack that has been left in the freezer. You can buy this online or in stores.  · A sealable plastic bag that has been filled with crushed ice.  · A washcloth or paper towels soaked in cold (or ice) water.  · A plastic bag of frozen vegetables. Throw them away when you are finished using them as a cold pack.  Supplies needed:  · A cold pack.  · A towel. This can be dry or damp, based on what you like.  How to use cold therapy    1. Have your cold pack ready.  2. Place a towel between the cold pack and your skin. You may also wrap the cold pack in a towel.  3. Put the cold pack on the affected area. Keep it on for no more than 20 minutes at a time.  4. Check your skin after 5 minutes to make sure that there is no damage to the area. Check for:  ? White spots on your skin. Your skin may look blotchy or mottled.  ? Skin that looks blue or pale.  ? Skin that feels waxy or hard.  5. Repeat these steps as many times each day as told by your doctor.  Always use a towel to avoid direct contact with your skin.  Contact a doctor if:  · You start to have white spots on your skin. This may give your skin a blotchy or mottled look.  · Your skin turns blue or pale.  · Your skin  becomes waxy or hard.  · Your swelling gets worse.  Summary  · Cold therapy, or cryotherapy, is used to treat an injury or other conditions. It includes using cold packs or ice packs to reduce pain and swelling.  · Cold therapy is not safe for people who are not able to say they are in pain.  · When using cold packs or ice packs, always place a towel between the cold source and your skin.  · Check your skin after 5 minutes of icing it. This is to make sure that there is no skin damage.  · Contact your doctor if you notice changes in your skin or your swelling gets worse.  This information is not intended to replace advice given to you by your health care provider. Make sure you discuss any questions you have with your health care provider.  Document Released: 06/05/2009 Document Revised: 09/16/2019 Document Reviewed: 09/16/2019  Elsevier Patient Education © 2020 [employer]      Lumbar Strain  A lumbar strain, which is sometimes called a low-back strain, is a stretch or tear in a muscle or the strong cords of tissue that attach muscle to bone (tendons) in the lower back (lumbar spine). This type of injury occurs when muscles or tendons are torn or are stretched beyond their limits.  Lumbar strains can range from mild to severe. Mild strains may involve stretching a muscle or tendon without tearing it. These may heal in 1-2 weeks. More severe strains involve tearing of muscle fibers or tendons. These will cause more pain and may take 6-8 weeks to heal.  What are the causes?  This condition may be caused by:  · Trauma, such as a fall or a hit to the body.  · Twisting or overstretching the back. This may result from doing activities that need a lot of energy, such as lifting heavy objects.  What increases the risk?  This injury is more common in:  · Athletes.  · People with obesity.  · People who do repeated lifting, bending, or other movements that involve their back.  What are the signs or symptoms?  Symptoms of  this condition may include:  · Sharp or dull pain in the lower back that does not go away. The pain may extend to the buttocks.  · Stiffness or limited range of motion.  · Sudden muscle tightening (spasms).  How is this diagnosed?  This condition may be diagnosed based on:  · Your symptoms.  · Your medical history.  · A physical exam.  · Imaging tests, such as:  ? X-rays.  ? MRI.  How is this treated?  Treatment for this condition may include:  · Rest.  · Applying heat and cold to the affected area.  · Over-the-counter medicines to help relieve pain and inflammation, such as NSAIDs.  · Prescription pain medicine and muscle relaxants may be needed for a short time.  · Physical therapy.  Follow these instructions at home:  Managing pain, stiffness, and swelling         · If directed, put ice on the injured area during the first 24 hours after your injury.  ? Put ice in a plastic bag.  ? Place a towel between your skin and the bag.  ? Leave the ice on for 20 minutes, 2-3 times a day.  · If directed, apply heat to the affected area as often as told by your health care provider. Use the heat source that your health care provider recommends, such as a moist heat pack or a heating pad.  ? Place a towel between your skin and the heat source.  ? Leave the heat on for 20-30 minutes.  ? Remove the heat if your skin turns bright red. This is especially important if you are unable to feel pain, heat, or cold. You may have a greater risk of getting burned.  Activity  · Rest and return to your normal activities as told by your health care provider. Ask your health care provider what activities are safe for you.  · Do exercises as told by your health care provider.  Medicines  · Take over-the-counter and prescription medicines only as told by your health care provider.  · Ask your health care provider if the medicine prescribed to you:  ? Requires you to avoid driving or using heavy machinery.  ? Can cause constipation. You may  need to take these actions to prevent or treat constipation:  § Drink enough fluid to keep your urine pale yellow.  § Take over-the-counter or prescription medicines.  § Eat foods that are high in fiber, such as beans, whole grains, and fresh fruits and vegetables.  § Limit foods that are high in fat and processed sugars, such as fried or sweet foods.  Injury prevention  To prevent a future low-back injury:  · Always warm up properly before physical activity or sports.  · Cool down and stretch after being active.  · Use correct form when playing sports and lifting heavy objects. Bend your knees before you lift heavy objects.  · Use good posture when sitting and standing.  · Stay physically fit and keep a healthy weight.  ? Do at least 150 minutes of moderate-intensity exercise each week, such as brisk walking or water aerobics.  ? Do strength exercises at least 2 times each week.    General instructions  · Do not use any products that contain nicotine or tobacco, such as cigarettes, e-cigarettes, and chewing tobacco. If you need help quitting, ask your health care provider.  · Keep all follow-up visits as told by your health care provider. This is important.  Contact a health care provider if:  · Your back pain does not improve after 6 weeks of treatment.  · Your symptoms get worse.  Get help right away if:  · Your back pain is severe.  · You are unable to stand or walk.  · You develop pain in your legs.  · You develop weakness in your buttocks or legs.  · You have difficulty controlling when you urinate or when you have a bowel movement.  ? You have frequent, painful, or bloody urination.  ? You have a temperature over 101.0°F (38.3°C)  Summary  · A lumbar strain, which is sometimes called a low-back strain, is a stretch or tear in a muscle or the strong cords of tissue that attach muscle to bone (tendons) in the lower back (lumbar spine).  · This type of injury occurs when muscles or tendons are torn or are  stretched beyond their limits.  · Rest and return to your normal activities as told by your health care provider. If directed, apply heat and ice to the affected area as often as told by your health care provider.  · Take over-the-counter and prescription medicines only as told by your health care provider.  · Contact a health care provider if you have new or worsening symptoms.  This information is not intended to replace advice given to you by your health care provider. Make sure you discuss any questions you have with your health care provider.  Document Released: 12/18/2006 Document Revised: 10/17/2019 Document Reviewed: 10/17/2019  [employer] Patient Education © 2020 [employer]      Muscle Cramps and Spasms  Muscle cramps and spasms are when muscles tighten by themselves. They usually get better within minutes. Muscle cramps are painful. They are usually stronger and last longer than muscle spasms. Muscle spasms may or may not be painful. They can last a few seconds or much longer.  Cramps and spasms can affect any muscle, but they occur most often in the calf muscles of the leg. They are usually not caused by a serious problem. In many cases, the cause is not known. Some common causes include:  · Doing more physical work or exercise than your body is ready for.  · Using the muscles too much (overuse) by repeating certain movements too many times.  · Staying in a certain position for a long time.  · Playing a sport or doing an activity without preparing properly.  · Using bad form or technique while playing a sport or doing an activity.  · Not having enough water in your body (dehydration).  · Injury.  · Side effects of some medicines.  · Low levels of the salts and minerals in your blood (electrolytes), such as low potassium or calcium.  Follow these instructions at home:  Managing pain and stiffness         · Massage, stretch, and relax the muscle. Do this for many minutes at a time.  · If told, put heat on  tight or tense muscles as often as told by your doctor. Use the heat source that your doctor recommends, such as a moist heat pack or a heating pad.  ? Place a towel between your skin and the heat source.  ? Leave the heat on for 20-30 minutes.  ? Remove the heat if your skin turns bright red. This is very important if you are not able to feel pain, heat, or cold. You may have a greater risk of getting burned.  · If told, put ice on the affected area. This may help if you are sore or have pain after a cramp or spasm.  ? Put ice in a plastic bag.  ? Place a towel between your skin and the bag.  ? Leave the ice on for 20 minutes, 2-3 times a day.  · Try taking hot showers or baths to help relax tight muscles.  Eating and drinking  · Drink enough fluid to keep your pee (urine) pale yellow.  · Eat a healthy diet to help ensure that your muscles work well. This should include:  ? Fruits and vegetables.  ? Lean protein.  ? Whole grains.  ? Low-fat or nonfat dairy products.  General instructions  · If you are having cramps often, avoid intense exercise for several days.  · Take over-the-counter and prescription medicines only as told by your doctor.  · Watch for any changes in your symptoms.  · Keep all follow-up visits as told by your doctor. This is important.  Contact a doctor if:  · Your cramps or spasms get worse or happen more often.  · Your cramps or spasms do not get better with time.  Summary  · Muscle cramps and spasms are when muscles tighten by themselves. They usually get better within minutes.  · Cramps and spasms occur most often in the calf muscles of the leg.  · Massage, stretch, and relax the muscle. This may help the cramp or spasm go away.  · Drink enough fluid to keep your pee (urine) pale yellow.  This information is not intended to replace advice given to you by your health care provider. Make sure you discuss any questions you have with your health care provider.  Document Released: 11/30/2009  Document Revised: 05/13/2019 Document Reviewed: 05/13/2019  Elsevier Patient Education © 2020 [employer] Inc[employer]

## 2020-06-23 NOTE — ED PROVIDER NOTES
"Subjective   Patient is a 45-year-old white male presents emergency department with low back pain for the past 2 days.  He states he was walking while shopping yesterday and felt a \"pop\" in his low back.  He states that the pain is worse today.  He denies any numbness or focal weakness.  He denies any incontinence of urine or stool.  He denies any recent injury.      History provided by:  Patient   used: No        Review of Systems   Constitutional: Negative.    HENT: Negative.    Eyes: Negative.    Respiratory: Negative.    Cardiovascular: Negative.    Gastrointestinal: Negative.    Endocrine: Negative.    Genitourinary: Negative.    Musculoskeletal:        Patient is a 45-year-old white male presents emergency department with low back pain for the past 2 days.  He states he was walking while shopping yesterday and felt a \"pop\" in his low back.  He states that the pain is worse today.  He denies any numbness or focal weakness.  He denies any incontinence of urine or stool.  He denies any recent injury   Skin: Negative.    Allergic/Immunologic: Negative.    Neurological: Negative.    Hematological: Negative.    Psychiatric/Behavioral: Negative.    All other systems reviewed and are negative.      Past Medical History:   Diagnosis Date   • Anxiety    • Back pain    • Fracture of lumbar spine (CMS/HCC) 2002 and 2015    lower lumbar 2002; L1 2015   • Head injury    • Injury of back    • Neck pain    • Overweight (BMI 25.0-29.9)        No Known Allergies    Past Surgical History:   Procedure Laterality Date   • FACIAL FRACTURE SURGERY     • INCISION AND DRAINAGE LEG Right 12/1/2018    Procedure: INCISION AND DRAINAGE OF RIGHT UPPER INNER THIGH, PLACEMENT OF WOUND VAC;  Surgeon: René Dallas MD;  Location: [facility] OR;  Service: General   • SPLENECTOMY         Family History   Problem Relation Age of Onset   • Fibromyalgia Mother    • Lung cancer Father        Social History     Socioeconomic " "History   • Marital status: Legally      Spouse name: Not on file   • Number of children: Not on file   • Years of education: Not on file   • Highest education level: Not on file   Tobacco Use   • Smoking status: Never Smoker   • Smokeless tobacco: Never Used   Substance and Sexual Activity   • Alcohol use: No   • Drug use: No   • Sexual activity: Yes     Partners: Female       Prior to Admission medications    Medication Sig Start Date End Date Taking? Authorizing Provider   HYDROcodone-acetaminophen (NORCO) 7.5-325 MG per tablet Take 1 tablet by mouth Every 6 (Six) Hours As Needed for Moderate Pain . 5/5/20   Ross Penn DO   ibuprofen (ADVIL,MOTRIN) 800 MG tablet Take 1 tablet by mouth 3 (Three) Times a Day With Meals. 5/5/20   Ross Penn DO       /82   Pulse 66   Temp 98.6 °F (37 °C) (Oral)   Resp 18   Ht 185.4 cm (73\")   Wt 95.3 kg (210 lb)   SpO2 98%   BMI 27.71 kg/m²     Objective   Physical Exam   Constitutional: He is oriented to person, place, and time. He appears well-developed and well-nourished.   HENT:   Head: Normocephalic and atraumatic.   Eyes: Pupils are equal, round, and reactive to light. Conjunctivae and EOM are normal.   Neck: Normal range of motion. Neck supple.   Cardiovascular: Normal rate, regular rhythm, normal heart sounds and intact distal pulses.   Pulmonary/Chest: Effort normal and breath sounds normal.   Abdominal: Soft. Bowel sounds are normal.   Musculoskeletal: Normal range of motion.   Tenderness on palpation of the paraspinal muscles of the lower lumbar spine.  There is no step-off or laxity noted.  DTRs are intact.  Foot push pull strong and equal.   Neurological: He is alert and oriented to person, place, and time. He has normal reflexes.   Skin: Skin is warm and dry.   Psychiatric: He has a normal mood and affect. His behavior is normal. Judgment and thought content normal.   Nursing note and vitals reviewed.      Procedures         Lab Results " (last 24 hours)     ** No results found for the last 24 hours. **          XR Spine Lumbar Complete 4+VW   Final Result   1. Chronic anterior wedge deformity of L1.   2. Mild degenerative changes in the lumbar spine.           This report was finalized on 06/23/2020 15:47 by Dr. Nathan Ricks MD.          ED Course  ED Course as of Jun 23 1635   Tue Jun 23, 2020   1602 Pt states that he is feeling some better at this time. Will prescribe muscle relaxer and antiinflam. Advised to apply ice for 24 hours, then moist heat compresses three times a day     [CW]      ED Course User Index  [CW] Madalyn Cobb, APRN          MDM  Number of Diagnoses or Management Options  Muscle spasm: minor  Strain of lumbar region, initial encounter: minor     Amount and/or Complexity of Data Reviewed  Tests in the radiology section of CPT®: ordered and reviewed    Patient Progress  Patient progress: stable      Final diagnoses:   Strain of lumbar region, initial encounter   Muscle spasm          Madalyn Cobb, APRN  06/23/20 [id number]

## 2020-12-08 ENCOUNTER — HOSPITAL ENCOUNTER (EMERGENCY)
Facility: HOSPITAL | Age: 45
Discharge: HOME | End: 2020-12-08
Attending: EMERGENCY MEDICINE | Admitting: EMERGENCY MEDICINE

## 2020-12-08 ENCOUNTER — APPOINTMENT (OUTPATIENT)
Dept: GENERAL RADIOLOGY | Facility: HOSPITAL | Age: 45
End: 2020-12-08

## 2020-12-08 VITALS
DIASTOLIC BLOOD PRESSURE: 85 MMHG | SYSTOLIC BLOOD PRESSURE: 141 MMHG | BODY MASS INDEX: 28.1 KG/M2 | HEIGHT: 73 IN | HEART RATE: 74 BPM | TEMPERATURE: 97.8 F | RESPIRATION RATE: 15 BRPM | WEIGHT: 212 LBS | OXYGEN SATURATION: 98 %

## 2020-12-08 DIAGNOSIS — S86.911A STRAIN OF RIGHT KNEE, INITIAL ENCOUNTER: Primary | ICD-10-CM

## 2020-12-08 PROCEDURE — 73562 X-RAY EXAM OF KNEE 3: CPT

## 2020-12-08 PROCEDURE — 99282 EMERGENCY DEPT VISIT SF MDM: CPT

## 2020-12-08 RX ORDER — NAPROXEN 500 MG/1
500 TABLET ORAL 2 TIMES DAILY PRN
Qty: 10 TABLET | Refills: 0 | Status: SHIPPED | OUTPATIENT
Start: 2020-12-08 | End: 2020-12-08 | Stop reason: SDUPTHER

## 2020-12-08 RX ORDER — NAPROXEN 500 MG/1
500 TABLET ORAL 2 TIMES DAILY PRN
Qty: 10 TABLET | Refills: 0 | Status: SHIPPED | OUTPATIENT
Start: 2020-12-08 | End: 2020-12-13

## 2020-12-09 NOTE — ED NOTES
Patient presents to the ED with the CC of right knee pain. He states he was going down steps and slipped on saw dust causing him to fall. Denies hitting his head or a loc. Denies medication pta.      Mercedez Browning RN  12/08/20 2011

## 2020-12-11 NOTE — ED PROVIDER NOTES
Subjective     History provided by:  Patient   used: No    Knee Pain  Location:  Knee  Time since incident:  1 day  Injury: yes    Mechanism of injury comment:  Twisted knee after slipping on step  Knee location:  R knee  Pain details:     Quality:  Aching and dull    Radiates to:  Does not radiate    Severity:  Mild    Onset quality:  Sudden    Duration:  1 day    Timing:  Constant    Progression:  Unchanged  Chronicity:  New  Relieved by:  Nothing  Worsened by:  Nothing  Ineffective treatments:  None tried  Associated symptoms: no back pain, no decreased ROM, no fatigue, no fever, no itching, no muscle weakness, no neck pain, no numbness, no stiffness, no swelling and no tingling    Risk factors: no concern for non-accidental trauma, no frequent fractures, no known bone disorder, no obesity and no recent illness        Review of Systems   Constitutional: Negative for fatigue and fever.   Musculoskeletal: Negative for back pain, neck pain and stiffness.   Skin: Negative for itching.   All other systems reviewed and are negative.      Past Medical History:   Diagnosis Date   • Anxiety    • Back pain    • Fracture of lumbar spine (CMS/HCC) 2002 and 2015    lower lumbar 2002; L1 2015   • Head injury    • Injury of back    • Neck pain    • Overweight (BMI 25.0-29.9)        No Known Allergies    Past Surgical History:   Procedure Laterality Date   • FACIAL FRACTURE SURGERY     • INCISION AND DRAINAGE LEG Right 12/1/2018    Procedure: INCISION AND DRAINAGE OF RIGHT UPPER INNER THIGH, PLACEMENT OF WOUND VAC;  Surgeon: René Dallas MD;  Location: [facility] OR;  Service: General   • SPLENECTOMY         Family History   Problem Relation Age of Onset   • Fibromyalgia Mother    • Lung cancer Father        Social History     Socioeconomic History   • Marital status: Legally      Spouse name: Not on file   • Number of children: Not on file   • Years of education: Not on file   • Highest education  level: Not on file   Tobacco Use   • Smoking status: Never Smoker   • Smokeless tobacco: Never Used   Substance and Sexual Activity   • Alcohol use: No   • Drug use: No   • Sexual activity: Yes     Partners: Female           Objective   Physical Exam  Vitals signs and nursing note reviewed.   Constitutional:       Appearance: Normal appearance.   HENT:      Head: Normocephalic and atraumatic.   Eyes:      Conjunctiva/sclera: Conjunctivae normal.   Cardiovascular:      Rate and Rhythm: Normal rate.   Pulmonary:      Effort: Pulmonary effort is normal.   Musculoskeletal:      Comments: Pain to anterior and lateral right knee.  No swelling, ecchymosis, or erythema noted.  Pedal pulses 2+.  Positive CMS.  Neurovascularly intact.  Able to ambulate on right leg without difficulty.   Skin:     General: Skin is warm and dry.      Capillary Refill: Capillary refill takes less than 2 seconds.   Neurological:      General: No focal deficit present.      Mental Status: He is alert.   Psychiatric:         Mood and Affect: Mood normal.         Procedures           ED Course  ED Course as of Dec 11 0030   Fri Dec 11, 2020   0030 Patient will be placed in a knee immobilizer.  Advised to follow-up orthopedics.  To be discharged home in stable condition.  Return to ER as needed.    [LF]      ED Course User Index  [LF] Tricia Kamara APRN                                   XR Knee 3 View Right   Final Result   No acute findings.       This report was finalized on 12/08/2020 21:04 by Dr. Igor Pena MD.        Labs Reviewed - No data to display          MDM  Number of Diagnoses or Management Options  Strain of right knee, initial encounter: new and requires workup     Amount and/or Complexity of Data Reviewed  Tests in the radiology section of CPT®: ordered and reviewed    Patient Progress  Patient progress: stable      Final diagnoses:   Strain of right knee, initial encounter            Tricia Kamara APRN  12/11/20  0030

## 2021-07-28 ENCOUNTER — HOSPITAL ENCOUNTER (EMERGENCY)
Facility: HOSPITAL | Age: 46
Discharge: HOME | End: 2021-07-29
Admitting: EMERGENCY MEDICINE

## 2021-07-28 ENCOUNTER — APPOINTMENT (OUTPATIENT)
Dept: CT IMAGING | Facility: HOSPITAL | Age: 46
End: 2021-07-28

## 2021-07-28 DIAGNOSIS — S39.011A STRAIN OF ABDOMINAL MUSCLE, INITIAL ENCOUNTER: ICD-10-CM

## 2021-07-28 DIAGNOSIS — D72.829 LEUKOCYTOSIS, UNSPECIFIED TYPE: ICD-10-CM

## 2021-07-28 DIAGNOSIS — T14.8XXA MUSCLE STRAIN: Primary | ICD-10-CM

## 2021-07-28 LAB
ALBUMIN SERPL-MCNC: 4.7 G/DL (ref 3.5–5.2)
ALBUMIN/GLOB SERPL: 1.5 G/DL
ALP SERPL-CCNC: 97 U/L (ref 39–117)
ALT SERPL W P-5'-P-CCNC: 26 U/L (ref 1–41)
ANION GAP SERPL CALCULATED.3IONS-SCNC: 12 MMOL/L (ref 5–15)
AST SERPL-CCNC: 24 U/L (ref 1–40)
BASOPHILS # BLD AUTO: 0.15 10*3/MM3 (ref 0–0.2)
BASOPHILS NFR BLD AUTO: 1 % (ref 0–1.5)
BILIRUB SERPL-MCNC: 0.6 MG/DL (ref 0–1.2)
BILIRUB UR QL STRIP: ABNORMAL
BUN SERPL-MCNC: 9 MG/DL (ref 6–20)
BUN/CREAT SERPL: 7.8 (ref 7–25)
CALCIUM SPEC-SCNC: 10.3 MG/DL (ref 8.6–10.5)
CHLORIDE SERPL-SCNC: 106 MMOL/L (ref 98–107)
CLARITY UR: CLEAR
CO2 SERPL-SCNC: 22 MMOL/L (ref 22–29)
COLOR UR: ABNORMAL
CREAT SERPL-MCNC: 1.16 MG/DL (ref 0.76–1.27)
DEPRECATED RDW RBC AUTO: 44.2 FL (ref 37–54)
EOSINOPHIL # BLD AUTO: 0.41 10*3/MM3 (ref 0–0.4)
EOSINOPHIL NFR BLD AUTO: 2.8 % (ref 0.3–6.2)
ERYTHROCYTE [DISTWIDTH] IN BLOOD BY AUTOMATED COUNT: 14.1 % (ref 12.3–15.4)
GFR SERPL CREATININE-BSD FRML MDRD: 68 ML/MIN/1.73
GLOBULIN UR ELPH-MCNC: 3.1 GM/DL
GLUCOSE SERPL-MCNC: 132 MG/DL (ref 65–99)
GLUCOSE UR STRIP-MCNC: NEGATIVE MG/DL
HCT VFR BLD AUTO: 44.8 % (ref 37.5–51)
HGB BLD-MCNC: 15.6 G/DL (ref 13–17.7)
HGB UR QL STRIP.AUTO: NEGATIVE
HOLD SPECIMEN: NORMAL
HOLD SPECIMEN: NORMAL
IMM GRANULOCYTES # BLD AUTO: 0.05 10*3/MM3 (ref 0–0.05)
IMM GRANULOCYTES NFR BLD AUTO: 0.3 % (ref 0–0.5)
INR PPP: 1.02 (ref 0.91–1.09)
KETONES UR QL STRIP: ABNORMAL
LEUKOCYTE ESTERASE UR QL STRIP.AUTO: NEGATIVE
LIPASE SERPL-CCNC: 29 U/L (ref 13–60)
LYMPHOCYTES # BLD AUTO: 4.35 10*3/MM3 (ref 0.7–3.1)
LYMPHOCYTES NFR BLD AUTO: 29.7 % (ref 19.6–45.3)
MCH RBC QN AUTO: 29.9 PG (ref 26.6–33)
MCHC RBC AUTO-ENTMCNC: 34.8 G/DL (ref 31.5–35.7)
MCV RBC AUTO: 86 FL (ref 79–97)
MONOCYTES # BLD AUTO: 1.28 10*3/MM3 (ref 0.1–0.9)
MONOCYTES NFR BLD AUTO: 8.7 % (ref 5–12)
NEUTROPHILS NFR BLD AUTO: 57.5 % (ref 42.7–76)
NEUTROPHILS NFR BLD AUTO: 8.4 10*3/MM3 (ref 1.7–7)
NITRITE UR QL STRIP: NEGATIVE
NRBC BLD AUTO-RTO: 0 /100 WBC (ref 0–0.2)
PH UR STRIP.AUTO: 5.5 [PH] (ref 5–8)
PLATELET # BLD AUTO: 334 10*3/MM3 (ref 140–450)
PMV BLD AUTO: 10.6 FL (ref 6–12)
POTASSIUM SERPL-SCNC: 4.1 MMOL/L (ref 3.5–5.2)
PROT SERPL-MCNC: 7.8 G/DL (ref 6–8.5)
PROT UR QL STRIP: ABNORMAL
PROTHROMBIN TIME: 12.6 SECONDS (ref 11.5–13.4)
RBC # BLD AUTO: 5.21 10*6/MM3 (ref 4.14–5.8)
SODIUM SERPL-SCNC: 140 MMOL/L (ref 136–145)
SP GR UR STRIP: >1.03 (ref 1–1.03)
UROBILINOGEN UR QL STRIP: ABNORMAL
WBC # BLD AUTO: 14.64 10*3/MM3 (ref 3.4–10.8)
WHOLE BLOOD HOLD SPECIMEN: NORMAL

## 2021-07-28 PROCEDURE — 85610 PROTHROMBIN TIME: CPT

## 2021-07-28 PROCEDURE — 74177 CT ABD & PELVIS W/CONTRAST: CPT

## 2021-07-28 PROCEDURE — 85025 COMPLETE CBC W/AUTO DIFF WBC: CPT

## 2021-07-28 PROCEDURE — 25010000002 KETOROLAC TROMETHAMINE PER 15 MG: Performed by: NURSE PRACTITIONER

## 2021-07-28 PROCEDURE — 83690 ASSAY OF LIPASE: CPT

## 2021-07-28 PROCEDURE — 25010000002 IOPAMIDOL 61 % SOLUTION: Performed by: NURSE PRACTITIONER

## 2021-07-28 PROCEDURE — 96374 THER/PROPH/DIAG INJ IV PUSH: CPT

## 2021-07-28 PROCEDURE — 80053 COMPREHEN METABOLIC PANEL: CPT

## 2021-07-28 PROCEDURE — 99283 EMERGENCY DEPT VISIT LOW MDM: CPT

## 2021-07-28 PROCEDURE — 81003 URINALYSIS AUTO W/O SCOPE: CPT

## 2021-07-28 RX ORDER — SODIUM CHLORIDE 0.9 % (FLUSH) 0.9 %
10 SYRINGE (ML) INJECTION AS NEEDED
Status: DISCONTINUED | OUTPATIENT
Start: 2021-07-28 | End: 2021-07-29 | Stop reason: HOSPADM

## 2021-07-28 RX ORDER — KETOROLAC TROMETHAMINE 15 MG/ML
15 INJECTION, SOLUTION INTRAMUSCULAR; INTRAVENOUS ONCE
Status: COMPLETED | OUTPATIENT
Start: 2021-07-28 | End: 2021-07-28

## 2021-07-28 RX ADMIN — IOPAMIDOL 100 ML: 612 INJECTION, SOLUTION INTRAVENOUS at 23:28

## 2021-07-28 RX ADMIN — KETOROLAC TROMETHAMINE 15 MG: 15 INJECTION, SOLUTION INTRAMUSCULAR; INTRAVENOUS at 22:24

## 2021-07-28 RX ADMIN — SODIUM CHLORIDE, PRESERVATIVE FREE 10 ML: 5 INJECTION INTRAVENOUS at 22:24

## 2021-07-29 VITALS
SYSTOLIC BLOOD PRESSURE: 128 MMHG | BODY MASS INDEX: 27.3 KG/M2 | HEIGHT: 73 IN | TEMPERATURE: 98 F | DIASTOLIC BLOOD PRESSURE: 78 MMHG | RESPIRATION RATE: 20 BRPM | WEIGHT: 206 LBS | OXYGEN SATURATION: 98 % | HEART RATE: 78 BPM

## 2021-07-29 RX ORDER — NAPROXEN 500 MG/1
500 TABLET ORAL 2 TIMES DAILY PRN
Qty: 15 TABLET | Refills: 0 | Status: SHIPPED | OUTPATIENT
Start: 2021-07-29 | End: 2021-08-08 | Stop reason: SDUPTHER

## 2021-07-29 RX ORDER — HYDROCODONE BITARTRATE AND ACETAMINOPHEN 5; 325 MG/1; MG/1
1 TABLET ORAL EVERY 4 HOURS PRN
Qty: 15 TABLET | Refills: 0 | Status: SHIPPED | OUTPATIENT
Start: 2021-07-29 | End: 2021-08-08

## 2021-08-08 ENCOUNTER — APPOINTMENT (OUTPATIENT)
Dept: GENERAL RADIOLOGY | Facility: HOSPITAL | Age: 46
End: 2021-08-08

## 2021-08-08 ENCOUNTER — APPOINTMENT (OUTPATIENT)
Dept: CT IMAGING | Facility: HOSPITAL | Age: 46
End: 2021-08-08

## 2021-08-08 ENCOUNTER — HOSPITAL ENCOUNTER (EMERGENCY)
Facility: HOSPITAL | Age: 46
Discharge: HOME | End: 2021-08-08
Admitting: EMERGENCY MEDICINE

## 2021-08-08 VITALS
HEIGHT: 72 IN | TEMPERATURE: 98 F | HEART RATE: 70 BPM | BODY MASS INDEX: 28.44 KG/M2 | WEIGHT: 210 LBS | SYSTOLIC BLOOD PRESSURE: 153 MMHG | OXYGEN SATURATION: 97 % | DIASTOLIC BLOOD PRESSURE: 85 MMHG | RESPIRATION RATE: 16 BRPM

## 2021-08-08 DIAGNOSIS — T14.8XXA MUSCLE STRAIN: Primary | ICD-10-CM

## 2021-08-08 DIAGNOSIS — D72.829 LEUKOCYTOSIS, UNSPECIFIED TYPE: ICD-10-CM

## 2021-08-08 LAB
ALBUMIN SERPL-MCNC: 4.7 G/DL (ref 3.5–5.2)
ALBUMIN/GLOB SERPL: 1.6 G/DL
ALP SERPL-CCNC: 100 U/L (ref 39–117)
ALT SERPL W P-5'-P-CCNC: 30 U/L (ref 1–41)
ANION GAP SERPL CALCULATED.3IONS-SCNC: 9 MMOL/L (ref 5–15)
AST SERPL-CCNC: 23 U/L (ref 1–40)
BASOPHILS # BLD AUTO: 0.16 10*3/MM3 (ref 0–0.2)
BASOPHILS NFR BLD AUTO: 1.2 % (ref 0–1.5)
BILIRUB SERPL-MCNC: 0.6 MG/DL (ref 0–1.2)
BILIRUB UR QL STRIP: NEGATIVE
BUN SERPL-MCNC: 12 MG/DL (ref 6–20)
BUN/CREAT SERPL: 11.3 (ref 7–25)
CALCIUM SPEC-SCNC: 10.7 MG/DL (ref 8.6–10.5)
CHLORIDE SERPL-SCNC: 107 MMOL/L (ref 98–107)
CLARITY UR: CLEAR
CO2 SERPL-SCNC: 23 MMOL/L (ref 22–29)
COLOR UR: YELLOW
CREAT SERPL-MCNC: 1.06 MG/DL (ref 0.76–1.27)
DEPRECATED RDW RBC AUTO: 43.9 FL (ref 37–54)
EOSINOPHIL # BLD AUTO: 0.54 10*3/MM3 (ref 0–0.4)
EOSINOPHIL NFR BLD AUTO: 4.1 % (ref 0.3–6.2)
ERYTHROCYTE [DISTWIDTH] IN BLOOD BY AUTOMATED COUNT: 14.3 % (ref 12.3–15.4)
GFR SERPL CREATININE-BSD FRML MDRD: 76 ML/MIN/1.73
GLOBULIN UR ELPH-MCNC: 3 GM/DL
GLUCOSE SERPL-MCNC: 100 MG/DL (ref 65–99)
GLUCOSE UR STRIP-MCNC: NEGATIVE MG/DL
HCT VFR BLD AUTO: 46.3 % (ref 37.5–51)
HGB BLD-MCNC: 16.3 G/DL (ref 13–17.7)
HGB UR QL STRIP.AUTO: NEGATIVE
HOLD SPECIMEN: NORMAL
HOLD SPECIMEN: NORMAL
IMM GRANULOCYTES # BLD AUTO: 0.04 10*3/MM3 (ref 0–0.05)
IMM GRANULOCYTES NFR BLD AUTO: 0.3 % (ref 0–0.5)
KETONES UR QL STRIP: ABNORMAL
LEUKOCYTE ESTERASE UR QL STRIP.AUTO: NEGATIVE
LIPASE SERPL-CCNC: 40 U/L (ref 13–60)
LYMPHOCYTES # BLD AUTO: 4.41 10*3/MM3 (ref 0.7–3.1)
LYMPHOCYTES NFR BLD AUTO: 33.8 % (ref 19.6–45.3)
MCH RBC QN AUTO: 30 PG (ref 26.6–33)
MCHC RBC AUTO-ENTMCNC: 35.2 G/DL (ref 31.5–35.7)
MCV RBC AUTO: 85.1 FL (ref 79–97)
MONOCYTES # BLD AUTO: 1.43 10*3/MM3 (ref 0.1–0.9)
MONOCYTES NFR BLD AUTO: 11 % (ref 5–12)
NEUTROPHILS NFR BLD AUTO: 49.6 % (ref 42.7–76)
NEUTROPHILS NFR BLD AUTO: 6.47 10*3/MM3 (ref 1.7–7)
NITRITE UR QL STRIP: NEGATIVE
NRBC BLD AUTO-RTO: 0 /100 WBC (ref 0–0.2)
PH UR STRIP.AUTO: 6.5 [PH] (ref 5–8)
PLATELET # BLD AUTO: 359 10*3/MM3 (ref 140–450)
PMV BLD AUTO: 10.7 FL (ref 6–12)
POTASSIUM SERPL-SCNC: 4.4 MMOL/L (ref 3.5–5.2)
PROT SERPL-MCNC: 7.7 G/DL (ref 6–8.5)
PROT UR QL STRIP: NEGATIVE
RBC # BLD AUTO: 5.44 10*6/MM3 (ref 4.14–5.8)
SODIUM SERPL-SCNC: 139 MMOL/L (ref 136–145)
SP GR UR STRIP: 1.02 (ref 1–1.03)
UROBILINOGEN UR QL STRIP: ABNORMAL
WBC # BLD AUTO: 13.05 10*3/MM3 (ref 3.4–10.8)
WHOLE BLOOD HOLD SPECIMEN: NORMAL

## 2021-08-08 PROCEDURE — 72131 CT LUMBAR SPINE W/O DYE: CPT

## 2021-08-08 PROCEDURE — 25010000002 ONDANSETRON PER 1 MG: Performed by: NURSE PRACTITIONER

## 2021-08-08 PROCEDURE — 25010000002 KETOROLAC TROMETHAMINE PER 15 MG: Performed by: NURSE PRACTITIONER

## 2021-08-08 PROCEDURE — 74018 RADEX ABDOMEN 1 VIEW: CPT

## 2021-08-08 PROCEDURE — 80053 COMPREHEN METABOLIC PANEL: CPT

## 2021-08-08 PROCEDURE — 96375 TX/PRO/DX INJ NEW DRUG ADDON: CPT

## 2021-08-08 PROCEDURE — 96374 THER/PROPH/DIAG INJ IV PUSH: CPT

## 2021-08-08 PROCEDURE — 83690 ASSAY OF LIPASE: CPT

## 2021-08-08 PROCEDURE — 85025 COMPLETE CBC W/AUTO DIFF WBC: CPT

## 2021-08-08 PROCEDURE — 99283 EMERGENCY DEPT VISIT LOW MDM: CPT

## 2021-08-08 PROCEDURE — 81003 URINALYSIS AUTO W/O SCOPE: CPT

## 2021-08-08 RX ORDER — NAPROXEN 500 MG/1
500 TABLET ORAL 2 TIMES DAILY WITH MEALS
Qty: 20 TABLET | Refills: 0 | Status: SHIPPED | OUTPATIENT
Start: 2021-08-08 | End: 2021-08-18

## 2021-08-08 RX ORDER — CYCLOBENZAPRINE HCL 10 MG
10 TABLET ORAL NIGHTLY PRN
Qty: 10 TABLET | Refills: 0 | Status: SHIPPED | OUTPATIENT
Start: 2021-08-08 | End: 2021-08-18

## 2021-08-08 RX ORDER — SODIUM CHLORIDE 0.9 % (FLUSH) 0.9 %
10 SYRINGE (ML) INJECTION AS NEEDED
Status: DISCONTINUED | OUTPATIENT
Start: 2021-08-08 | End: 2021-08-08 | Stop reason: HOSPADM

## 2021-08-08 RX ORDER — KETOROLAC TROMETHAMINE 30 MG/ML
30 INJECTION, SOLUTION INTRAMUSCULAR; INTRAVENOUS ONCE
Status: COMPLETED | OUTPATIENT
Start: 2021-08-08 | End: 2021-08-08

## 2021-08-08 RX ORDER — ONDANSETRON 2 MG/ML
4 INJECTION INTRAMUSCULAR; INTRAVENOUS ONCE
Status: COMPLETED | OUTPATIENT
Start: 2021-08-08 | End: 2021-08-08

## 2021-08-08 RX ADMIN — KETOROLAC TROMETHAMINE 30 MG: 30 INJECTION, SOLUTION INTRAMUSCULAR; INTRAVENOUS at 17:43

## 2021-08-08 RX ADMIN — SODIUM CHLORIDE, POTASSIUM CHLORIDE, SODIUM LACTATE AND CALCIUM CHLORIDE 1000 ML: 600; 310; 30; 20 INJECTION, SOLUTION INTRAVENOUS at 17:43

## 2021-08-08 RX ADMIN — ONDANSETRON 4 MG: 2 INJECTION INTRAMUSCULAR; INTRAVENOUS at 17:43

## 2021-08-08 NOTE — ED PROVIDER NOTES
Subjective   46 yom presents with c/o right flank pain.  He states he was seen eleven days ago for the same symptoms.  He states he was told they thought he had a kidney stone.  He states he has continued to have flank pain.  He has a history of a lumbar fractures in 2002 and 2015. He denies new injury.  He denies fever.  He denies dysuria. He denies abdomen pain or n/v/d.  He denies saddle paresthesia or bowel/bladder incontinence.      His medical records state he was dx home with a muscle strain.  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  Incidental note is made of a fat-containing left inguinal hernia.  No acute intra-abdominal or pelvic abnormality is identified.             Review of Systems   Constitutional: Negative for activity change, appetite change, fatigue and fever.   HENT: Negative for congestion, ear pain, facial swelling and sore throat.    Eyes: Negative for discharge and visual disturbance.   Respiratory: Negative for apnea, chest tightness, shortness of breath, wheezing and stridor.    Cardiovascular: Negative for chest pain and palpitations.   Gastrointestinal: Negative for abdominal distention, abdominal pain, diarrhea, nausea and vomiting.   Genitourinary: Positive for flank pain. Negative for difficulty urinating and dysuria.   Musculoskeletal: Negative for arthralgias and myalgias.   Skin: Negative for rash and wound.   Neurological: Negative for dizziness and seizures.   Psychiatric/Behavioral: Negative for agitation and confusion.       Past Medical History:   Diagnosis Date   • Anxiety    • Back pain    • Fracture of lumbar spine (CMS/[facility]) 2002 and 2015    lower lumbar 2002; L1 2015   • Head injury    • Injury of back    • Neck pain    • Overweight (BMI 25.0-29.9)        No Known Allergies    Past Surgical History:   Procedure Laterality Date   • FACIAL FRACTURE SURGERY     • INCISION AND DRAINAGE LEG Right 12/1/2018    Procedure: INCISION AND DRAINAGE OF  RIGHT UPPER INNER THIGH, PLACEMENT OF WOUND VAC;  Surgeon: René Dallas MD;  Location:  [facility] OR;  Service: General   • SPLENECTOMY         Family History   Problem Relation Age of Onset   • Fibromyalgia Mother    • Lung cancer Father        Social History     Socioeconomic History   • Marital status: Legally      Spouse name: Not on file   • Number of children: Not on file   • Years of education: Not on file   • Highest education level: Not on file   Tobacco Use   • Smoking status: Never Smoker   • Smokeless tobacco: Never Used   Substance and Sexual Activity   • Alcohol use: No   • Drug use: No   • Sexual activity: Yes     Partners: Female           Objective   Physical Exam  Vitals and nursing note reviewed.   Constitutional:       Appearance: He is well-developed.   HENT:      Head: Normocephalic.   Eyes:      Pupils: Pupils are equal, round, and reactive to light.   Cardiovascular:      Rate and Rhythm: Normal rate and regular rhythm.      Heart sounds: No murmur heard.     Pulmonary:      Effort: Pulmonary effort is normal.      Breath sounds: Normal breath sounds.   Abdominal:      General: Bowel sounds are normal. There is no distension.      Palpations: Abdomen is soft.      Tenderness: There is no right CVA tenderness or left CVA tenderness.   Musculoskeletal:         General: Normal range of motion.      Cervical back: Normal range of motion and neck supple.        Back:    Skin:     General: Skin is warm and dry.   Neurological:      Mental Status: He is alert and oriented to person, place, and time.         Procedures           ED Course  ED Course as of Aug 16 1018   Sun Aug 08, 2021   1949 His WBC is 13.05 today which is improved from 11 days ago.  His other lab work is unremarkable as is his KUB.  His CT scan reveals a chronic L1 wedge fracture.  There are no acute findings.   I discussed these results with him.  He does not have a PCP.  I will give him a list to f/u with.  He voiced  understanding of both results and instructions.     [KS]      ED Course User Index  [KS] DamiansSg, APRN                                           MDM  Number of Diagnoses or Management Options  Leukocytosis, unspecified type: minor  Muscle strain: minor     Amount and/or Complexity of Data Reviewed  Clinical lab tests: reviewed and ordered  Tests in the radiology section of CPT®: ordered and reviewed  Decide to obtain previous medical records or to obtain history from someone other than the patient: yes  Independent visualization of images, tracings, or specimens: yes    Risk of Complications, Morbidity, and/or Mortality  Presenting problems: minimal  Diagnostic procedures: minimal  Management options: minimal    Patient Progress  Patient progress: stable      Final diagnoses:   Muscle strain   Leukocytosis, unspecified type       ED Disposition  ED Disposition     ED Disposition Condition Comment    Discharge Stable           Provider, No Known  [facility] [id number]    Schedule an appointment as soon as possible for a visit in 1 day  Routine ED follow up         Medication List      New Prescriptions    cyclobenzaprine 10 MG tablet  Commonly known as: FLEXERIL  Take 1 tablet by mouth At Night As Needed for Muscle Spasms for up to 10 days.        Changed    naproxen 500 MG tablet  Commonly known as: NAPROSYN  Take 1 tablet by mouth 2 (Two) Times a Day With Meals for 10 days.  What changed:   · when to take this  · reasons to take this        Stop    HYDROcodone-acetaminophen 5-325 MG per tablet  Commonly known as: NORCO           Where to Get Your Medications      These medications were sent to [facility]/pharmacy #[id number] - [place], [place] - [address] AT ACROSS FROM [address] - [phone]  - [phone]   [address] [facility] [place] [address]    Hours: 24-hours Phone: [phone]   · cyclobenzaprine 10 MG tablet  · naproxen 500 MG tablet          Sg Payne,  APRN  08/16/21 1019

## 2021-08-08 NOTE — ED TRIAGE NOTES
"Patient presents to ED with right sided pain for 2 weeks. Patient reports he was seen here. States \"they thought it was a kidney stone\". The pain has gotten worse.  "

## 2021-08-09 NOTE — DISCHARGE INSTRUCTIONS
Follow up with one of the [facility] physician groups below to setup primary care. If you have trouble making an appointment, please call the [facility] Nurse Line at [phone]    Dr. Anu Ho DO, Dr. Kira Nagy DO, and KYLER Roy  [facility]  [address] [place] [address], [id number] [phone]    Dr. Jose Angel Taylor MD  [facility] - [address] [address], [address], [place], [place] [id number] [phone]    Dr. Vivek Stewart DO, Dr. Tonny Moreno DO,  KYLER Lam, and KYLER Ayers  [facility] - [address] [address], [address], [place], [place] [id number] [phone]     Dr. Khalida Dickerson MD, and KYLER Kelley  [facility] [address] [id number] [phone]    Dr. Ronny Proctor MD and Dr. Ross Cadena MD  [address], [address] [phone]    Dr. Edgard Booker MD  [facility]  [address] [place] [address], [address], [place], [place], [address] [phone]    Dr. Danny Clark MD  [facility]  [address], [place], [address] [phone]    Drink plenty of fluid.  Medication as ordered.  Can add tylenol as needed for pain.  Follow up with PCP - call tomorrow for appointment. Return to ED if condition does not improve or worsens

## 2022-02-18 ENCOUNTER — APPOINTMENT (OUTPATIENT)
Dept: GENERAL RADIOLOGY | Facility: HOSPITAL | Age: 47
End: 2022-02-18

## 2022-02-18 ENCOUNTER — HOSPITAL ENCOUNTER (EMERGENCY)
Facility: HOSPITAL | Age: 47
Discharge: HOME | End: 2022-02-18
Attending: EMERGENCY MEDICINE | Admitting: EMERGENCY MEDICINE

## 2022-02-18 VITALS
HEIGHT: 73 IN | RESPIRATION RATE: 16 BRPM | HEART RATE: 104 BPM | TEMPERATURE: 98.5 F | WEIGHT: 212 LBS | SYSTOLIC BLOOD PRESSURE: 147 MMHG | BODY MASS INDEX: 28.1 KG/M2 | DIASTOLIC BLOOD PRESSURE: 94 MMHG | OXYGEN SATURATION: 96 %

## 2022-02-18 DIAGNOSIS — S86.911A KNEE STRAIN, RIGHT, INITIAL ENCOUNTER: Primary | ICD-10-CM

## 2022-02-18 PROCEDURE — 99283 EMERGENCY DEPT VISIT LOW MDM: CPT

## 2022-02-18 PROCEDURE — 73562 X-RAY EXAM OF KNEE 3: CPT

## 2022-02-18 RX ORDER — HYDROCODONE BITARTRATE AND ACETAMINOPHEN 5; 325 MG/1; MG/1
1 TABLET ORAL ONCE
Status: COMPLETED | OUTPATIENT
Start: 2022-02-18 | End: 2022-02-18

## 2022-02-18 RX ORDER — TRAMADOL HYDROCHLORIDE 50 MG/1
50 TABLET ORAL EVERY 8 HOURS PRN
Qty: 10 TABLET | Refills: 0 | Status: SHIPPED | OUTPATIENT
Start: 2022-02-18 | End: 2022-07-08

## 2022-02-18 RX ADMIN — HYDROCODONE BITARTRATE AND ACETAMINOPHEN 1 TABLET: 5; 325 TABLET ORAL at 10:14

## 2022-02-18 NOTE — ED NOTES
Knee immobilizer applied and instructions on use given, pt given crutch training and is able to demonstrate adequately,     Carl Henriquez, SHARATHN  02/18/22 1044

## 2022-02-18 NOTE — ED PROVIDER NOTES
Subjective   Patient is a 46-year-old male who presents to the ER with right knee pain.  Patient states he was walking last night and turned and then felt a pop and pain in his right inferior knee radiating to the posterior knee.  Patient states this occurred around 12 AM.  Patient states he has had pain since that time.  Pain is slightly worse with ambulation.  Patient did have right knee surgery in September 2021.  Patient states they repaired his meniscus.  He denies any direct trauma, fever, chest pain, shortness of air, abdominal pain, nausea vomiting diarrhea, urinary changes, neurologic changes, neck or back pain, other extremity pain.          Review of Systems   Constitutional: Negative.    HENT: Negative.    Eyes: Negative.    Respiratory: Negative.    Cardiovascular: Negative.    Gastrointestinal: Negative.    Endocrine: Negative.    Genitourinary: Negative.    Musculoskeletal: Positive for arthralgias.   Skin: Negative.    Allergic/Immunologic: Negative.    Neurological: Negative.    Hematological: Negative.    Psychiatric/Behavioral: Negative.    All other systems reviewed and are negative.      Past Medical History:   Diagnosis Date   • Anxiety    • Back pain    • Fracture of lumbar spine (HCC) 2002 and 2015    lower lumbar 2002; L1 2015   • Head injury    • Injury of back    • Neck pain    • Overweight (BMI 25.0-29.9)        No Known Allergies    Past Surgical History:   Procedure Laterality Date   • FACIAL FRACTURE SURGERY     • INCISION AND DRAINAGE LEG Right 12/1/2018    Procedure: INCISION AND DRAINAGE OF RIGHT UPPER INNER THIGH, PLACEMENT OF WOUND VAC;  Surgeon: René Dallas MD;  Location: [facility] OR;  Service: General   • KNEE SURGERY Right 09/2021   • SPLENECTOMY         Family History   Problem Relation Age of Onset   • Fibromyalgia Mother    • Lung cancer Father        Social History     Socioeconomic History   • Marital status: Legally    Tobacco Use   • Smoking status: Never  Smoker   • Smokeless tobacco: Never Used   Substance and Sexual Activity   • Alcohol use: No   • Drug use: No   • Sexual activity: Yes     Partners: Female           Objective   Physical Exam  Vitals and nursing note reviewed.   Constitutional:       Appearance: He is well-developed.   HENT:      Head: Normocephalic and atraumatic.   Eyes:      Conjunctiva/sclera: Conjunctivae normal.      Pupils: Pupils are equal, round, and reactive to light.   Cardiovascular:      Rate and Rhythm: Normal rate and regular rhythm.      Heart sounds: Normal heart sounds.   Pulmonary:      Effort: Pulmonary effort is normal.      Breath sounds: Normal breath sounds.   Abdominal:      Palpations: Abdomen is soft.      Tenderness: There is no abdominal tenderness.   Musculoskeletal:         General: No deformity. Normal range of motion.      Cervical back: Normal range of motion.      Comments: Mild tenderness to palpation to the right inferior knee, no obvious deformity or edema, stable knee, nontender palpation elsewhere, normal range of motion, neurovascularly intact, pulses 2+   Skin:     General: Skin is warm.   Neurological:      Mental Status: He is alert and oriented to person, place, and time.      Sensory: Sensation is intact.      Motor: Motor function is intact.   Psychiatric:         Behavior: Behavior normal.         Procedures           ED Course      Patient was given Lortab.  Improving.    XR Knee 3 View Right   Final Result       No acute osseous findings.   This report was finalized on 02/18/2022 10:15 by Dr. Kieran Dubois MD.        X-ray showed no acute findings.  Work-up consistent with a knee strain/sprain.  Patient was advised rest ice and elevation.  He was given a knee immobilizer to use for comfort.  He was advised only to wear when ambulating to prevent the risk of DVT.  He was given crutches.  Patient be given a short course of Ultram for pain control.  He is to follow up with Dr. Zazueta with orthopedic  surgery.  He may need an outpatient MRI if pain persists.  He is to return to the ER for any worsening or new pain or other concerns.  Patient agreeable.     GOMEZ query complete. Treatment plan to include limited course of prescribed  controlled substance. Risks including addiction, benefits, and alternatives presented to patient.                         GOMEZ reviewed by Lilian Barragan MD             [facility]    Final diagnoses:   Knee strain, right, initial encounter       ED Disposition  ED Disposition     ED Disposition Condition Comment    Discharge Good           Luis Enrique Zazueta MD  [address] [id number] [phone]    Schedule an appointment as soon as possible for a visit            Medication List      New Prescriptions    traMADol 50 MG tablet  Commonly known as: ULTRAM  Take 1 tablet by mouth Every 8 (Eight) Hours As Needed for Moderate Pain .           Where to Get Your Medications      These medications were sent to [facility] [address] [place] [address] AT  [address] - [phone] PH - [phone] [address] [address]    Phone: [phone]   · traMADol 50 MG tablet          Lilian Barragan MD  02/18/22 7701
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
## 2022-06-07 ENCOUNTER — APPOINTMENT (OUTPATIENT)
Dept: GENERAL RADIOLOGY | Facility: HOSPITAL | Age: 47
End: 2022-06-07

## 2022-06-07 ENCOUNTER — HOSPITAL ENCOUNTER (EMERGENCY)
Facility: HOSPITAL | Age: 47
Discharge: HOME | End: 2022-06-07
Admitting: EMERGENCY MEDICINE

## 2022-06-07 VITALS
RESPIRATION RATE: 20 BRPM | HEIGHT: 73 IN | BODY MASS INDEX: 27.57 KG/M2 | WEIGHT: 208 LBS | OXYGEN SATURATION: 99 % | TEMPERATURE: 98.7 F | SYSTOLIC BLOOD PRESSURE: 142 MMHG | HEART RATE: 85 BPM | DIASTOLIC BLOOD PRESSURE: 74 MMHG

## 2022-06-07 DIAGNOSIS — S86.911A KNEE STRAIN, RIGHT, INITIAL ENCOUNTER: Primary | ICD-10-CM

## 2022-06-07 PROCEDURE — 73562 X-RAY EXAM OF KNEE 3: CPT

## 2022-06-07 PROCEDURE — 99283 EMERGENCY DEPT VISIT LOW MDM: CPT

## 2022-06-07 NOTE — DISCHARGE INSTRUCTIONS
Return to ER if symptoms worsen   Ice to area for 24 hours, then moist heat compresses three times a day

## 2022-06-07 NOTE — ED PROVIDER NOTES
Subjective   Patient is a 47-year-old white male presents the emergency department with right knee pain that started this morning.  He states he went to take a step out of bed and felt a pop in his leg.  He does ambulate without difficulty but states he is having to limp secondary to the pain.  He denies any other injury.  He states he did not fall.  He states the pain is to the medial lateral aspect of the right knee.      History provided by:  Patient   used: No        Review of Systems   Constitutional: Negative.    HENT: Negative.    Eyes: Negative.    Respiratory: Negative.    Cardiovascular: Negative.    Gastrointestinal: Negative.    Endocrine: Negative.    Genitourinary: Negative.    Musculoskeletal:        Patient is a 47-year-old white male presents the emergency department with right knee pain that started this morning.  He states he went to take a step out of bed and felt a pop in his leg.  He does ambulate without difficulty but states he is having to limp secondary to the pain.  He denies any other injury.  He states he did not fall.  He states the pain is to the medial lateral aspect of the right knee   Skin: Negative.    Allergic/Immunologic: Negative.    Neurological: Negative.    Hematological: Negative.    Psychiatric/Behavioral: Negative.    All other systems reviewed and are negative.      Past Medical History:   Diagnosis Date   • Anxiety    • Back pain    • Fracture of lumbar spine (HCC) 2002 and 2015    lower lumbar 2002; L1 2015   • Head injury    • Injury of back    • Neck pain    • Overweight (BMI 25.0-29.9)        No Known Allergies    Past Surgical History:   Procedure Laterality Date   • FACIAL FRACTURE SURGERY     • INCISION AND DRAINAGE LEG Right 12/1/2018    Procedure: INCISION AND DRAINAGE OF RIGHT UPPER INNER THIGH, PLACEMENT OF WOUND VAC;  Surgeon: René Dallas MD;  Location: [facility];  Service: General   • KNEE SURGERY Right 09/2021   • SPLENECTOMY    "      Family History   Problem Relation Age of Onset   • Fibromyalgia Mother    • Lung cancer Father        Social History     Socioeconomic History   • Marital status: Legally    Tobacco Use   • Smoking status: Never Smoker   • Smokeless tobacco: Never Used   Substance and Sexual Activity   • Alcohol use: No   • Drug use: No   • Sexual activity: Yes     Partners: Female       Prior to Admission medications    Medication Sig Start Date End Date Taking? Authorizing Provider   traMADol (ULTRAM) 50 MG tablet Take 1 tablet by mouth Every 8 (Eight) Hours As Needed for Moderate Pain . 2/18/22   Lilian Barragan MD       /74   Pulse 85   Temp 98.7 °F (37.1 °C)   Resp 20   Ht 185.4 cm (73\")   Wt 94.3 kg (208 lb)   SpO2 99%   BMI 27.44 kg/m²     Objective   Physical Exam  Vitals and nursing note reviewed.   Constitutional:       Appearance: He is well-developed.   HENT:      Head: Normocephalic and atraumatic.   Eyes:      Conjunctiva/sclera: Conjunctivae normal.      Pupils: Pupils are equal, round, and reactive to light.   Cardiovascular:      Rate and Rhythm: Normal rate and regular rhythm.      Heart sounds: Normal heart sounds.   Pulmonary:      Effort: Pulmonary effort is normal.      Breath sounds: Normal breath sounds.   Musculoskeletal:      Cervical back: Normal range of motion and neck supple.      Comments: Right knee there is no soft tissue swelling noted.  No erythema.  No joint instability noted.  Patient has tenderness to the medial lateral aspect of the right knee.  Pedal pulses are palpable.  He moves the extremity without difficulty.:   Skin:     General: Skin is warm and dry.   Neurological:      Mental Status: He is alert and oriented to person, place, and time.      Deep Tendon Reflexes: Reflexes are normal and symmetric.   Psychiatric:         Behavior: Behavior normal.         Thought Content: Thought content normal.         Judgment: Judgment normal.         Procedures     "     Lab Results (last 24 hours)     ** No results found for the last 24 hours. **          XR Knee 3 View Right   Final Result   1. Unremarkable radiographs of the right knee.           This report was finalized on 06/07/2022 09:55 by Dr. Galileo Ocasio MD.          ED Course  ED Course as of 06/07/22 9199   Tue Jun 07, 2022   1018 Xray of right knee- unremarkable. No fx noted. Will place in knee brace and place on antiinflam and have apply ice. Advised to follow up with orthop in 3 days if still painful. Pt will be discharged shortly in stable condition  [CW]      ED Course User Index  [CW] Madalyn Cobb APRN          MDM  Number of Diagnoses or Management Options  Knee strain, right, initial encounter: minor     Amount and/or Complexity of Data Reviewed  Tests in the radiology section of CPT®: ordered and reviewed    Patient Progress  Patient progress: stable      Final diagnoses:   Knee strain, right, initial encounter          Madalyn Cobb APRN  06/07/22 7310

## 2022-06-29 ENCOUNTER — APPOINTMENT (OUTPATIENT)
Dept: GENERAL RADIOLOGY | Facility: HOSPITAL | Age: 47
End: 2022-06-29

## 2022-06-29 ENCOUNTER — HOSPITAL ENCOUNTER (EMERGENCY)
Facility: HOSPITAL | Age: 47
Discharge: HOME | End: 2022-06-29
Admitting: EMERGENCY MEDICINE

## 2022-06-29 VITALS
HEART RATE: 84 BPM | RESPIRATION RATE: 16 BRPM | WEIGHT: 212 LBS | SYSTOLIC BLOOD PRESSURE: 124 MMHG | BODY MASS INDEX: 28.1 KG/M2 | OXYGEN SATURATION: 99 % | TEMPERATURE: 97.8 F | DIASTOLIC BLOOD PRESSURE: 73 MMHG | HEIGHT: 73 IN

## 2022-06-29 DIAGNOSIS — S46.911A SHOULDER STRAIN, RIGHT, INITIAL ENCOUNTER: Primary | ICD-10-CM

## 2022-06-29 PROCEDURE — 25010000002 KETOROLAC TROMETHAMINE PER 15 MG: Performed by: NURSE PRACTITIONER

## 2022-06-29 PROCEDURE — 96372 THER/PROPH/DIAG INJ SC/IM: CPT

## 2022-06-29 PROCEDURE — 73030 X-RAY EXAM OF SHOULDER: CPT

## 2022-06-29 PROCEDURE — 99283 EMERGENCY DEPT VISIT LOW MDM: CPT

## 2022-06-29 RX ORDER — HYDROCODONE BITARTRATE AND ACETAMINOPHEN 5; 325 MG/1; MG/1
1 TABLET ORAL EVERY 4 HOURS PRN
Qty: 12 TABLET | Refills: 0 | Status: SHIPPED | OUTPATIENT
Start: 2022-06-29 | End: 2022-07-08

## 2022-06-29 RX ORDER — KETOROLAC TROMETHAMINE 30 MG/ML
30 INJECTION, SOLUTION INTRAMUSCULAR; INTRAVENOUS ONCE
Status: COMPLETED | OUTPATIENT
Start: 2022-06-29 | End: 2022-06-29

## 2022-06-29 RX ADMIN — KETOROLAC TROMETHAMINE 30 MG: 30 INJECTION, SOLUTION INTRAMUSCULAR; INTRAVENOUS at 20:22

## 2022-07-02 ENCOUNTER — APPOINTMENT (OUTPATIENT)
Dept: CT IMAGING | Age: 47
End: 2022-07-02
Payer: MEDICAID

## 2022-07-02 ENCOUNTER — HOSPITAL ENCOUNTER (EMERGENCY)
Age: 47
Discharge: HOME | End: 2022-07-03
Payer: MEDICAID

## 2022-07-02 VITALS
OXYGEN SATURATION: 95 % | RESPIRATION RATE: 18 BRPM | HEART RATE: 99 BPM | TEMPERATURE: 99 F | DIASTOLIC BLOOD PRESSURE: 78 MMHG | SYSTOLIC BLOOD PRESSURE: 128 MMHG

## 2022-07-02 DIAGNOSIS — K40.20 BILATERAL INGUINAL HERNIA WITHOUT OBSTRUCTION OR GANGRENE, RECURRENCE NOT SPECIFIED: ICD-10-CM

## 2022-07-02 DIAGNOSIS — K52.9 GASTROENTERITIS: ICD-10-CM

## 2022-07-02 DIAGNOSIS — K42.9 UMBILICAL HERNIA WITHOUT OBSTRUCTION AND WITHOUT GANGRENE: ICD-10-CM

## 2022-07-02 DIAGNOSIS — N20.0 LEFT RENAL STONE: Primary | ICD-10-CM

## 2022-07-02 LAB
ALBUMIN SERPL-MCNC: 3.9 G/DL (ref 3.5–5.2)
ALP BLD-CCNC: 89 U/L (ref 40–130)
ALT SERPL-CCNC: 15 U/L (ref 5–41)
ANION GAP SERPL CALCULATED.3IONS-SCNC: 9 MMOL/L (ref 7–19)
AST SERPL-CCNC: 19 U/L (ref 5–40)
BACTERIA: NEGATIVE /HPF
BASOPHILS ABSOLUTE: 0 K/UL (ref 0–0.2)
BASOPHILS RELATIVE PERCENT: 0.3 % (ref 0–1)
BILIRUB SERPL-MCNC: 0.6 MG/DL (ref 0.2–1.2)
BILIRUBIN URINE: NEGATIVE
BLOOD, URINE: NEGATIVE
BUN BLDV-MCNC: 12 MG/DL (ref 6–20)
CALCIUM SERPL-MCNC: 10 MG/DL (ref 8.6–10)
CHLORIDE BLD-SCNC: 101 MMOL/L (ref 98–111)
CLARITY: CLEAR
CO2: 25 MMOL/L (ref 22–29)
COLOR: YELLOW
CREAT SERPL-MCNC: 1.4 MG/DL (ref 0.5–1.2)
CRYSTALS, UA: ABNORMAL /HPF
EOSINOPHILS ABSOLUTE: 0 K/UL (ref 0–0.6)
EOSINOPHILS RELATIVE PERCENT: 0.1 % (ref 0–5)
EPITHELIAL CELLS, UA: 0 /HPF (ref 0–5)
GFR AFRICAN AMERICAN: >59
GFR NON-AFRICAN AMERICAN: 55
GLUCOSE BLD-MCNC: 114 MG/DL (ref 74–109)
GLUCOSE URINE: NEGATIVE MG/DL
HCT VFR BLD CALC: 48.5 % (ref 42–52)
HEMOGLOBIN: 15.9 G/DL (ref 14–18)
HYALINE CASTS: 3 /HPF (ref 0–8)
IMMATURE GRANULOCYTES #: 0.1 K/UL
KETONES, URINE: ABNORMAL MG/DL
LACTIC ACID: 0.9 MMOL/L (ref 0.5–1.9)
LEUKOCYTE ESTERASE, URINE: NEGATIVE
LYMPHOCYTES ABSOLUTE: 1.9 K/UL (ref 1.1–4.5)
LYMPHOCYTES RELATIVE PERCENT: 12.4 % (ref 20–40)
MCH RBC QN AUTO: 29.2 PG (ref 27–31)
MCHC RBC AUTO-ENTMCNC: 32.8 G/DL (ref 33–37)
MCV RBC AUTO: 89 FL (ref 80–94)
MONOCYTES ABSOLUTE: 1.5 K/UL (ref 0–0.9)
MONOCYTES RELATIVE PERCENT: 9.5 % (ref 0–10)
NEUTROPHILS ABSOLUTE: 12.1 K/UL (ref 1.5–7.5)
NEUTROPHILS RELATIVE PERCENT: 77.3 % (ref 50–65)
NITRITE, URINE: NEGATIVE
PDW BLD-RTO: 15 % (ref 11.5–14.5)
PH UA: 5.5 (ref 5–8)
PLATELET # BLD: 357 K/UL (ref 130–400)
PMV BLD AUTO: 10.7 FL (ref 9.4–12.4)
POTASSIUM REFLEX MAGNESIUM: 4 MMOL/L (ref 3.5–5)
PROTEIN UA: NEGATIVE MG/DL
RBC # BLD: 5.45 M/UL (ref 4.7–6.1)
RBC UA: 2 /HPF (ref 0–4)
SARS-COV-2, NAAT: NOT DETECTED
SODIUM BLD-SCNC: 135 MMOL/L (ref 136–145)
SPECIFIC GRAVITY UA: 1.02 (ref 1–1.03)
TOTAL PROTEIN: 7.2 G/DL (ref 6.6–8.7)
UROBILINOGEN, URINE: 1 E.U./DL
WBC # BLD: 15.7 K/UL (ref 4.8–10.8)
WBC UA: 3 /HPF (ref 0–5)

## 2022-07-02 PROCEDURE — 87635 SARS-COV-2 COVID-19 AMP PRB: CPT

## 2022-07-02 PROCEDURE — 74176 CT ABD & PELVIS W/O CONTRAST: CPT | Performed by: RADIOLOGY

## 2022-07-02 PROCEDURE — 2580000003 HC RX 258: Performed by: PHYSICIAN ASSISTANT

## 2022-07-02 PROCEDURE — 83605 ASSAY OF LACTIC ACID: CPT

## 2022-07-02 PROCEDURE — 6360000002 HC RX W HCPCS: Performed by: PHYSICIAN ASSISTANT

## 2022-07-02 PROCEDURE — 96374 THER/PROPH/DIAG INJ IV PUSH: CPT

## 2022-07-02 PROCEDURE — 85025 COMPLETE CBC W/AUTO DIFF WBC: CPT

## 2022-07-02 PROCEDURE — 74176 CT ABD & PELVIS W/O CONTRAST: CPT

## 2022-07-02 PROCEDURE — 99284 EMERGENCY DEPT VISIT MOD MDM: CPT

## 2022-07-02 PROCEDURE — 80053 COMPREHEN METABOLIC PANEL: CPT

## 2022-07-02 PROCEDURE — 36415 COLL VENOUS BLD VENIPUNCTURE: CPT

## 2022-07-02 PROCEDURE — 96361 HYDRATE IV INFUSION ADD-ON: CPT

## 2022-07-02 PROCEDURE — 96375 TX/PRO/DX INJ NEW DRUG ADDON: CPT

## 2022-07-02 PROCEDURE — 81001 URINALYSIS AUTO W/SCOPE: CPT

## 2022-07-02 RX ORDER — ONDANSETRON 2 MG/ML
4 INJECTION INTRAMUSCULAR; INTRAVENOUS ONCE
Status: COMPLETED | OUTPATIENT
Start: 2022-07-02 | End: 2022-07-02

## 2022-07-02 RX ORDER — MORPHINE SULFATE 2 MG/ML
2 INJECTION, SOLUTION INTRAMUSCULAR; INTRAVENOUS ONCE
Status: COMPLETED | OUTPATIENT
Start: 2022-07-02 | End: 2022-07-02

## 2022-07-02 RX ORDER — 0.9 % SODIUM CHLORIDE 0.9 %
1000 INTRAVENOUS SOLUTION INTRAVENOUS ONCE
Status: COMPLETED | OUTPATIENT
Start: 2022-07-02 | End: 2022-07-02

## 2022-07-02 RX ORDER — DICYCLOMINE HYDROCHLORIDE 10 MG/1
10 CAPSULE ORAL 4 TIMES DAILY PRN
Qty: 12 CAPSULE | Refills: 1 | Status: SHIPPED | OUTPATIENT
Start: 2022-07-02 | End: 2022-10-20

## 2022-07-02 RX ORDER — AMOXICILLIN AND CLAVULANATE POTASSIUM 875; 125 MG/1; MG/1
1 TABLET, FILM COATED ORAL 2 TIMES DAILY
Qty: 20 TABLET | Refills: 0 | Status: SHIPPED | OUTPATIENT
Start: 2022-07-02 | End: 2022-07-12

## 2022-07-02 RX ORDER — ONDANSETRON 4 MG/1
4 TABLET, ORALLY DISINTEGRATING ORAL EVERY 8 HOURS PRN
Qty: 12 TABLET | Refills: 0 | Status: SHIPPED | OUTPATIENT
Start: 2022-07-02 | End: 2022-10-20

## 2022-07-02 RX ADMIN — MORPHINE SULFATE 2 MG: 2 INJECTION, SOLUTION INTRAMUSCULAR; INTRAVENOUS at 19:27

## 2022-07-02 RX ADMIN — ONDANSETRON 4 MG: 2 INJECTION INTRAMUSCULAR; INTRAVENOUS at 19:27

## 2022-07-02 RX ADMIN — SODIUM CHLORIDE 1000 ML: 9 INJECTION, SOLUTION INTRAVENOUS at 19:26

## 2022-07-02 ASSESSMENT — ENCOUNTER SYMPTOMS
VOMITING: 0
DIARRHEA: 1
NAUSEA: 1
ABDOMINAL PAIN: 1
EYES NEGATIVE: 1
CONSTIPATION: 0
RESPIRATORY NEGATIVE: 1

## 2022-07-02 ASSESSMENT — PAIN - FUNCTIONAL ASSESSMENT: PAIN_FUNCTIONAL_ASSESSMENT: 0-10

## 2022-07-02 ASSESSMENT — PAIN DESCRIPTION - DESCRIPTORS: DESCRIPTORS: CRAMPING

## 2022-07-02 ASSESSMENT — PAIN SCALES - GENERAL
PAINLEVEL_OUTOF10: 10
PAINLEVEL_OUTOF10: 10

## 2022-07-02 ASSESSMENT — PAIN DESCRIPTION - LOCATION: LOCATION: ABDOMEN;LEG

## 2022-07-03 NOTE — ED PROVIDER NOTES
[facility] EMERGENCY DEPT  EMERGENCY DEPARTMENT ENCOUNTER      Pt Name: Onesimo Apple  MRN: [id number]  [place] 1975  Date of evaluation: 7/2/2022  Provider: Myriam Reynolds PA-C    CHIEF COMPLAINT       Chief Complaint   Patient presents with    Abdominal Pain     abd pain to entire abd     Leg Pain     cramping down both legs          HISTORY OF PRESENT ILLNESS   (Location/Symptom, Timing/Onset, Context/Setting, Quality, Duration, Modifying Factors, Severity)  Note limiting factors. HPI      Onesimo Apple is a 56 y.o. male who presents to the emergency department with abdominal pain and diarrhea. PMH significant for status post splenectomy. Patient states all day yesterday he felt at his baseline and he went to eat dinner at [address] [place] [address] describing shortly after getting home he developed diarrhea followed by diffuse lower abdominal pain. Patient states on that he continued to have bowel movements, worsening pain, and today he feels like the pain is starting to radiate towards his right groin and into his right testicle. Patient reports nausea but denies any emesis, constipation, black/bloody/tarry stools, fevers, chills, or diaphoresis. Patient states that as the afternoon progressed he felt like he started developing subjective fevers with diaphoresis however and was concerned because due to his nausea he was not drinking and had not urinated all day. Patient denies dysuria or hematuria. Patient denies flank pain. Patient denies any medication use for his symptoms and presents for further evaluation at this time. Patient denies any known recent sick contact. Records reviewed show patient last seen in the ED on 6/29/2022 for a right shoulder strain.     Patient frequently seen for MSK strains and injuries with last abdominal imaging on 9/6/2013 with a CT that showed: Mild obstructive uropathy on the right related to 2 mm stone in the distal right ureter approximately 1.5 cm from UVJ level with normal appendix. Nursing Notes were reviewed. REVIEW OF SYSTEMS    (2-9 systems for level 4, 10 or more for level 5)     Review of Systems   Constitutional: Positive for diaphoresis and fever (subjective). Negative for chills. HENT: Negative. Eyes: Negative. Respiratory: Negative. Cardiovascular: Negative. Gastrointestinal: Positive for abdominal pain (lower), diarrhea and nausea. Negative for constipation and vomiting. Genitourinary: Positive for decreased urine volume and testicular pain (right). Negative for dysuria, flank pain, genital sores, hematuria, penile discharge and scrotal swelling. Musculoskeletal: Negative. Skin: Negative. Neurological: Negative. Psychiatric/Behavioral: Negative. All other systems reviewed and are negative. Except as noted above the remainder of the review of systems was reviewed and negative. PAST MEDICAL HISTORY     Past Medical History:   Diagnosis Date    Back injuries     x3 fracture         SURGICAL HISTORY       Past Surgical History:   Procedure Laterality Date    KNEE ARTHROSCOPY      SPLENECTOMY           CURRENT MEDICATIONS       Discharge Medication List as of 7/2/2022 11:51 PM          ALLERGIES     Patient has no known allergies. FAMILY HISTORY     History reviewed. No pertinent family history.        SOCIAL HISTORY       Social History     Socioeconomic History    Marital status: Legally      Spouse name: None    Number of children: None    Years of education: None    Highest education level: None   Occupational History    None   Tobacco Use    Smoking status: Never Smoker    Smokeless tobacco: Never Used   Substance and Sexual Activity    Alcohol use: No    Drug use: No    Sexual activity: None   Other Topics Concern    None   Social History Narrative    None     Social Determinants of Health     Financial Resource Strain:     Difficulty of Paying Living Expenses: Not on file   Food Insecurity:     Worried About Running Out of Food in the Last Year: Not on file    Melissa of Food in the Last Year: Not on file   Transportation Needs:     Lack of Transportation (Medical): Not on file    Lack of Transportation (Non-Medical): Not on file   Physical Activity:     Days of Exercise per Week: Not on file    Minutes of Exercise per Session: Not on file   Stress:     Feeling of Stress : Not on file   Social Connections:     Frequency of Communication with Friends and Family: Not on file    Frequency of Social Gatherings with Friends and Family: Not on file    Attends Ten[employer] Services: Not on file    Active Member of [address] [place] [address] [employer] or Organizations: Not on file    Attends Club or Organization Meetings: Not on file    Marital Status: Not on file   Intimate Partner Violence:     Fear of Current or Ex-Partner: Not on file    Emotionally Abused: Not on file    Physically Abused: Not on file    Sexually Abused: Not on file   Housing Stability:     Unable to Pay for Housing in the Last Year: Not on file    Number of [place] in the Last Year: Not on file    Unstable Housing in the Last Year: Not on file       SCREENINGS         Jason Coma Scale  Eye Opening: Spontaneous  Best Verbal Response: Oriented  Best Motor Response: Obeys commands  [place] Coma Scale Score: 15                     CIWA Assessment  BP: 128/78  Heart Rate: 99                 PHYSICAL EXAM    (up to 7 for level 4, 8 or more for level 5)     ED Triage Vitals   BP Temp Temp src Heart Rate Resp SpO2 Height Weight   07/02/22 1818 07/02/22 1812 -- 07/02/22 1812 07/02/22 1812 07/02/22 1812 -- --   128/78 99 °F (37.2 °C)  99 18 95 %         Physical Exam  Vitals and nursing note reviewed. Constitutional:       General: He is in acute distress (appears uncomfortable due to pain and nausea). Appearance: Normal appearance. He is well-developed, well-groomed and overweight. He is ill-appearing.  He is not toxic-appearing or reconstruction, and/or weight based dosing when appropriate to reduce radiation dose to as low as reasonably achievable. Electronically Signed by Nicole Fofana at 02-Jul-2022 08:56:33 PM                     ED BEDSIDE ULTRASOUND:   Performed by ED Physician - none    LABS:  Labs Reviewed   CBC WITH AUTO DIFFERENTIAL - Abnormal; Notable for the following components:       Result Value    WBC 15.7 (*)     MCHC 32.8 (*)     RDW 15.0 (*)     Neutrophils % 77.3 (*)     Lymphocytes % 12.4 (*)     Neutrophils Absolute 12.1 (*)     Monocytes Absolute 1.50 (*)     All other components within normal limits   COMPREHENSIVE METABOLIC PANEL W/ REFLEX TO MG FOR LOW K - Abnormal; Notable for the following components:    Sodium 135 (*)     Glucose 114 (*)     CREATININE 1.4 (*)     GFR Non- 55 (*)     All other components within normal limits   URINALYSIS WITH MICROSCOPIC - Abnormal; Notable for the following components:    Ketones, Urine TRACE (*)     Bacteria, UA NEGATIVE (*)     Crystals, UA NEG (*)     All other components within normal limits   COVID-19, RAPID   LACTIC ACID       All other labs were within normal range or not returned as of this dictation.     EMERGENCY DEPARTMENT COURSE and DIFFERENTIAL DIAGNOSIS/MDM:   Vitals:    Vitals:    07/02/22 1812 07/02/22 1818   BP:  128/78   Pulse: 99    Resp: 18    Temp: 99 °F (37.2 °C)    SpO2: 95%            MDM  Number of Diagnoses or Management Options     Amount and/or Complexity of Data Reviewed  Clinical lab tests: reviewed and ordered  Tests in the radiology section of CPT®: reviewed and ordered  Tests in the medicine section of CPT®: ordered and reviewed  Decide to obtain previous medical records or to obtain history from someone other than the patient: yes  Review and summarize past medical records: yes  Discuss the patient with other providers: yes (Dr. Moses Riggins (attending))    Patient Progress  Patient progress: stable            Nicola Bailey is a 56 y.o. male who presents to the emergency department with abdominal pain and diarrhea. PMH significant for status post splenectomy. Lab work showed leukocytosis 15.7, creatinine 1.4 with GFR of 55 and no further acute findings on CBC or CMP. Urinalysis unremarkable, lactic acid to be no. COVID testing negative. CT of the abdomen and pelvis shows: Punctate nonobstructing left renal stone lower pole, normal appendix, 3 mm fat-containing umbilical hernia, bilateral inguinal hernia. Discussed with patient need for antibiotic compliance, close outpatient follow-up, need for monitoring of incidental findings as well as reevaluation within the next 48 hours. Discussed need to avoid any antidiarrheal medications, strict return precautions, need for immediate return to ED should he develop any new or worsening symptoms. Patient reports over the of the questions concerns complaints at this time, is tolerating p.o. fluids, and is stable for discharge. CONSULTS:  None    PROCEDURES:  Unless otherwise noted below, none     Procedures      FINAL IMPRESSION      1. Left renal stone    2. Umbilical hernia without obstruction and without gangrene    3. Bilateral inguinal hernia without obstruction or gangrene, recurrence not specified    4. Gastroenteritis          DISPOSITION/PLAN   DISPOSITION Decision To Discharge 07/02/2022 11:07:34 PM      PATIENT REFERRED TO:  [place] Huizar [id number] [phone]  Schedule an appointment as soon as possible for a visit in 2 days      [facility] EMERGENCY DEPT  [place]  [phone]    As needed      DISCHARGE MEDICATIONS:  Discharge Medication List as of 7/2/2022 11:51 PM      START taking these medications    Details   amoxicillin-clavulanate (AUGMENTIN) 875-125 MG per tablet Take 1 tablet by mouth 2 times daily for 10 days, Disp-20 tablet, R-0Print      dicyclomine (BENTYL) 10 MG capsule Take 1 capsule by mouth 4 times daily as needed (abdominal pain), Disp-12 capsule, R-1Print      ondansetron (ZOFRAN ODT) 4 MG disintegrating tablet Take 1 tablet by mouth every 8 hours as needed for Nausea or Vomiting, Disp-12 tablet, R-0Print           Controlled Substances Monitoring:     No flowsheet data found.     (Please note that portions of this note were completed with a voice recognition program.  Efforts were made to edit the dictations but occasionally words are mis-transcribed.)    Myriam Reynolds PA-C (electronically signed)           Myriam Reynolds PA-C  07/04/22 0698

## 2022-07-08 ENCOUNTER — OFFICE VISIT (OUTPATIENT)
Dept: FAMILY MEDICINE CLINIC | Facility: CLINIC | Age: 47
End: 2022-07-08

## 2022-07-08 VITALS
WEIGHT: 204 LBS | SYSTOLIC BLOOD PRESSURE: 129 MMHG | HEIGHT: 73 IN | DIASTOLIC BLOOD PRESSURE: 82 MMHG | BODY MASS INDEX: 27.04 KG/M2 | TEMPERATURE: 97.3 F | HEART RATE: 84 BPM | OXYGEN SATURATION: 96 %

## 2022-07-08 DIAGNOSIS — M25.511 RIGHT SHOULDER PAIN, UNSPECIFIED CHRONICITY: Primary | ICD-10-CM

## 2022-07-08 PROCEDURE — 96372 THER/PROPH/DIAG INJ SC/IM: CPT | Performed by: FAMILY MEDICINE

## 2022-07-08 PROCEDURE — 99204 OFFICE O/P NEW MOD 45 MIN: CPT | Performed by: FAMILY MEDICINE

## 2022-07-08 RX ORDER — METHYLPREDNISOLONE ACETATE 40 MG/ML
40 INJECTION, SUSPENSION INTRA-ARTICULAR; INTRALESIONAL; INTRAMUSCULAR; SOFT TISSUE ONCE
Status: COMPLETED | OUTPATIENT
Start: 2022-07-08 | End: 2022-07-08

## 2022-07-08 RX ORDER — METHYLPREDNISOLONE 4 MG/1
TABLET ORAL
Qty: 21 TABLET | Refills: 0 | OUTPATIENT
Start: 2022-07-08 | End: 2023-02-13

## 2022-07-08 RX ORDER — DICYCLOMINE HYDROCHLORIDE 10 MG/1
10 CAPSULE ORAL 4 TIMES DAILY
Status: ON HOLD | COMMUNITY
Start: 2022-07-03 | End: 2023-03-27

## 2022-07-08 RX ORDER — IBUPROFEN 800 MG/1
800 TABLET ORAL EVERY 8 HOURS PRN
Qty: 90 TABLET | Refills: 2 | Status: SHIPPED | OUTPATIENT
Start: 2022-07-08 | End: 2022-12-28

## 2022-07-08 RX ORDER — ONDANSETRON 4 MG/1
4 TABLET, ORALLY DISINTEGRATING ORAL EVERY 8 HOURS PRN
COMMUNITY
Start: 2022-07-03 | End: 2023-03-27 | Stop reason: SDUPTHER

## 2022-07-08 RX ORDER — AMOXICILLIN AND CLAVULANATE POTASSIUM 875; 125 MG/1; MG/1
1 TABLET, FILM COATED ORAL 2 TIMES DAILY
COMMUNITY
Start: 2022-07-03 | End: 2023-02-13

## 2022-07-08 RX ADMIN — METHYLPREDNISOLONE ACETATE 40 MG: 40 INJECTION, SUSPENSION INTRA-ARTICULAR; INTRALESIONAL; INTRAMUSCULAR; SOFT TISSUE at 15:42

## 2022-07-08 NOTE — PROGRESS NOTES
"Chief Complaint  Establish Care and Shoulder Pain (Pain in right shoulder-rates 10/10 with movement)    Subjective        Saulo Shepard presents to [facility]  History of Present Illness  Here to establish care reporting about 2 months of intermittent right-sided shoulder pain that hurts all the way across the deltoid occasionally in the armpit, with occasional numbness that will radiate down his arm.  He reports that the arm is painful to lift past 90 degrees of abduction, he can lift it but he does report significant pain.  He was seen in the ED on 6/29/2022, diagnosed with shoulder strain and x-ray had no fracture dislocation but arthrosis of AC joint.  Denies specific injury, ROS otherwise negative    Objective   Vital Signs:  /82 (BP Location: Left arm, Patient Position: Sitting, Cuff Size: Adult)   Pulse 84   Temp 97.3 °F (36.3 °C) (Temporal)   Ht 185.4 cm (73\")   Wt 92.5 kg (204 lb)   SpO2 96%   BMI 26.91 kg/m²   Estimated body mass index is 26.91 kg/m² as calculated from the following:    Height as of this encounter: 185.4 cm (73\").    Weight as of this encounter: 92.5 kg (204 lb).          Physical Exam  Vitals and nursing note reviewed.   Constitutional:       General: He is not in acute distress.     Appearance: He is not diaphoretic.   HENT:      Head: Normocephalic and atraumatic.      Nose: Nose normal.   Eyes:      General: No scleral icterus.        Right eye: No discharge.         Left eye: No discharge.      Conjunctiva/sclera: Conjunctivae normal.   Neck:      Trachea: No tracheal deviation.   Pulmonary:      Effort: Pulmonary effort is normal.   Musculoskeletal:      Comments: Passive range of motion restricted to 100 degrees of abduction of right shoulder secondary to pain, with positive empty can test and Unger test.  There is a mild amount of swelling and effusion present in the shoulder, with tenderness to palpation throughout   Skin:     " General: Skin is warm and dry.      Coloration: Skin is not pale.   Neurological:      Mental Status: He is alert and oriented to person, place, and time.   Psychiatric:         Behavior: Behavior normal.         Thought Content: Thought content normal.         Judgment: Judgment normal.        Result Review :{Labs  Result Review  Imaging  Med Tab  Media  Procedures  :23}                Assessment and Plan   Diagnoses and all orders for this visit:    1. Right shoulder pain, unspecified chronicity (Primary)  -     Ambulatory Referral to Physical Therapy Evaluate and treat  -     MRI Shoulder Right Without Contrast; Future  -     methylPREDNISolone acetate (DEPO-medrol) injection 40 mg  -     methylPREDNISolone (MEDROL) 4 MG dose pack; Take as directed on package instructions.  Dispense: 21 tablet; Refill: 0  -     ibuprofen (ADVIL,MOTRIN) 800 MG tablet; Take 1 tablet by mouth Every 8 (Eight) Hours As Needed for Mild Pain  or Moderate Pain .  Dispense: 90 tablet; Refill: 2    Unclear etiology, acute arthritis versus muscle strain versus labral tear, rotator cuff tear not excluded.    Imaging and PT referral above   discussed not taking ibuprofen while on steroids, follow-up as needed

## 2022-07-22 ENCOUNTER — TELEPHONE (OUTPATIENT)
Dept: FAMILY MEDICINE CLINIC | Facility: CLINIC | Age: 47
End: 2022-07-22

## 2022-07-22 NOTE — TELEPHONE ENCOUNTER
Per  scheduling department. Patient is scheduled for an MRI of shoulder. Requesting peer to peer ant physical therapy notes. He is schedule for PT on 7/28/2022. Wondering if you want to push out his MRI or move forward with peer to peer.     Please call [phone], case number [id number].

## 2022-07-26 NOTE — TELEPHONE ENCOUNTER
Patient and [facility] scheduling notified that PT will need to be completed before MRI   How Severe Is Your Acne?: moderate Is This A New Presentation, Or A Follow-Up?: Acne

## 2022-07-27 ENCOUNTER — APPOINTMENT (OUTPATIENT)
Dept: MRI IMAGING | Facility: HOSPITAL | Age: 47
End: 2022-07-27

## 2022-10-20 ENCOUNTER — HOSPITAL ENCOUNTER (EMERGENCY)
Age: 47
Discharge: HOME | End: 2022-10-20
Payer: MEDICAID

## 2022-10-20 VITALS
HEIGHT: 73 IN | SYSTOLIC BLOOD PRESSURE: 145 MMHG | OXYGEN SATURATION: 97 % | DIASTOLIC BLOOD PRESSURE: 88 MMHG | HEART RATE: 79 BPM | WEIGHT: 215 LBS | BODY MASS INDEX: 28.49 KG/M2 | TEMPERATURE: 97.8 F | RESPIRATION RATE: 18 BRPM

## 2022-10-20 DIAGNOSIS — D49.2 SKIN TUMOR: Primary | ICD-10-CM

## 2022-10-20 PROCEDURE — 99282 EMERGENCY DEPT VISIT SF MDM: CPT

## 2022-10-20 ASSESSMENT — PAIN - FUNCTIONAL ASSESSMENT: PAIN_FUNCTIONAL_ASSESSMENT: 0-10

## 2022-10-20 ASSESSMENT — PAIN SCALES - GENERAL: PAINLEVEL_OUTOF10: 6

## 2022-10-21 NOTE — ED PROVIDER NOTES
[facility] EMERGENCY DEPT  eMERGENCY dEPARTMENT eNCOUnter      Pt Name: Jacob Nelson  MRN: [id number]  [place] 1975  Date of evaluation: 10/20/2022  Provider: Melissa Cordova       Chief Complaint   Patient presents with    Wound Check     Pt has wound on back. Pt states \"I about ripped it off today. \"          HISTORY OF PRESENT ILLNESS   (Location/Symptom, Timing/Onset,Context/Setting, Quality, Duration, Modifying Factors, Severity)  Note limiting factors. Jose Maria Hansen a 56 y.o. male who presents to the emergency department for evaluation of wound check. Pt tells me that he has a skin tag to his back that has gotten larger over the past month. Today he turned over in bed and lesion was caught on his shirt and had bled. He relates that he had a \"mole\" in same area as a child. He denies history of known skin malignancy as well as fevers, night sweats, weight loss. HPI    Nursing Notes were reviewed. REVIEW OF SYSTEMS    (2-9 systems for level 4, 10 or more for level 5)     Review of Systems   Skin:         Skin tumor back      A complete review of systems was performed and is negative except as noted above in the HPI. PAST MEDICAL HISTORY     Past Medical History:   Diagnosis Date    Back injuries     x3 fracture         SURGICAL HISTORY       Past Surgical History:   Procedure Laterality Date    KNEE ARTHROSCOPY      SPLENECTOMY           CURRENT MEDICATIONS       Previous Medications    No medications on file       ALLERGIES     Patient has no known allergies. FAMILY HISTORY     History reviewed. No pertinent family history. SOCIAL HISTORY       Social History     Socioeconomic History    Marital status: Legally      Spouse name: None    Number of children: None    Years of education: None    Highest education level: None   Tobacco Use    Smoking status: Never    Smokeless tobacco: Never   Substance and Sexual Activity    Alcohol use: No    Drug use:  No SCREENINGS    Jason Coma Scale  Eye Opening: Spontaneous  Best Verbal Response: Oriented  Best Motor Response: Obeys commands  Jason Coma Scale Score: 15        PHYSICAL EXAM    (up to 7 for level 4, 8 or more for level 5)     ED Triage Vitals [10/20/22 1916]   BP Temp Temp Source Heart Rate Resp SpO2 Height Weight   (!) 155/99 97.8 °F (36.6 °C) Temporal 80 20 97 % 6' 1\" (1.854 m) 215 lb (97.5 kg)       Physical Exam  Vitals reviewed. Constitutional:       Appearance: Normal appearance. Cardiovascular:      Rate and Rhythm: Normal rate. Pulmonary:      Effort: Pulmonary effort is normal.   Skin:     General: Skin is warm and dry. Comments: 0.5cm darkly pigmented pediculated tumor without active bleeding or surround erythema    Neurological:      Mental Status: He is alert. DIAGNOSTIC RESULTS     EKG: All EKG's are interpreted by the Emergency Department Physician who either signs or Co-signs this chart in the absence of acardiologist.        RADIOLOGY:   Non-plain film images such as CT, Ultrasound andMRI are read by the radiologist. Plain radiographic images are visualized and preliminarily interpreted by the emergency physician with the below findings:        Interpretation per the Radiologist below, if available at the time of this note:    No orders to display         ED BEDSIDE ULTRASOUND:   Performed by ED Physician - none    LABS:  Labs Reviewed - No data to display    All other labs were within normal range or not returned as of this dictation. RE-ASSESSMENT     Discussed with patient need for follow up with dermatology or pcp for excision/biopsy with pathology due to concern for skin cancer. Pt states understanding and tells me that he will follow up as discussed.        EMERGENCY DEPARTMENT COURSE and DIFFERENTIALDIAGNOSIS/MDM:   Vitals:    Vitals:    10/20/22 1916   BP: (!) 155/99   Pulse: 80   Resp: 20   Temp: 97.8 °F (36.6 °C)   TempSrc: Temporal   SpO2: 97%   Weight: 215 lb (97.5 kg)   Height: 6' 1\" (1.854 m)       MDM        CONSULTS:  None    PROCEDURES:  Unless otherwise notedbelow, none     Procedures    FINAL IMPRESSION     1. Skin tumor          DISPOSITION/PLAN   DISPOSITION Decision To Discharge 10/20/2022 08:36:59 PM      PATIENT REFERRED TO:  Yenni Fry MD  [address]  [place] [phone]    Schedule an appointment as soon as possible for a visit       Mo Richards, [address] [place] [address]  [phone]    Schedule an appointment as soon as possible for a visit       [place].   [address] [place] [id number] [phone]  Schedule an appointment as soon as possible for a visit       DISCHARGE MEDICATIONS:       Current Discharge Medication List          (Pleasenote that portions of this note were completed with a voice recognition program.  Efforts were made to edit the dictations but occasionally words are mis-transcribed.)               Gabby Walker, CHELSIE - CNP  10/20/22 2038

## 2022-12-24 DIAGNOSIS — M25.511 RIGHT SHOULDER PAIN, UNSPECIFIED CHRONICITY: ICD-10-CM

## 2022-12-28 RX ORDER — IBUPROFEN 800 MG/1
800 TABLET ORAL EVERY 8 HOURS PRN
Qty: 90 TABLET | Refills: 2 | Status: SHIPPED | OUTPATIENT
Start: 2022-12-28 | End: 2023-03-27

## 2023-02-13 ENCOUNTER — HOSPITAL ENCOUNTER (EMERGENCY)
Facility: HOSPITAL | Age: 48
Discharge: HOME | End: 2023-02-13
Admitting: EMERGENCY MEDICINE
Payer: COMMERCIAL

## 2023-02-13 VITALS
WEIGHT: 210 LBS | RESPIRATION RATE: 16 BRPM | HEIGHT: 73 IN | OXYGEN SATURATION: 97 % | HEART RATE: 79 BPM | DIASTOLIC BLOOD PRESSURE: 83 MMHG | SYSTOLIC BLOOD PRESSURE: 141 MMHG | BODY MASS INDEX: 27.83 KG/M2 | TEMPERATURE: 98 F

## 2023-02-13 DIAGNOSIS — H65.91 RIGHT OTITIS MEDIA WITH EFFUSION: Primary | ICD-10-CM

## 2023-02-13 PROCEDURE — 99282 EMERGENCY DEPT VISIT SF MDM: CPT

## 2023-02-13 RX ORDER — METHYLPREDNISOLONE 4 MG/1
TABLET ORAL
Qty: 21 EACH | Refills: 0 | Status: ON HOLD | OUTPATIENT
Start: 2023-02-13 | End: 2023-03-27

## 2023-02-13 RX ORDER — AMOXICILLIN AND CLAVULANATE POTASSIUM 875; 125 MG/1; MG/1
1 TABLET, FILM COATED ORAL 2 TIMES DAILY
Qty: 20 TABLET | Refills: 0 | Status: SHIPPED | OUTPATIENT
Start: 2023-02-13 | End: 2023-02-23

## 2023-02-13 RX ORDER — FLUTICASONE PROPIONATE 50 MCG
2 SPRAY, SUSPENSION (ML) NASAL DAILY
Qty: 9.9 ML | Refills: 0 | Status: SHIPPED | OUTPATIENT
Start: 2023-02-13

## 2023-02-13 NOTE — ED PROVIDER NOTES
Subjective   History of Present Illness  Patient is a 47-year-old male who presents to the ER with chief complaints of right ear pain and sore throat.  He states symptoms began yesterday however reports much worsening symptoms today.  He has had no recorded fevers.  Past medical history significant for anxiety, back pain, fracture to the lumbar spine, neck pain.        Review of Systems   Constitutional: Negative.  Negative for fever.   HENT: Positive for ear pain, postnasal drip and sore throat. Negative for congestion, sinus pressure and sinus pain.    Eyes: Negative.    Respiratory: Negative for cough.    Cardiovascular: Negative.  Negative for chest pain.   Gastrointestinal: Negative.  Negative for abdominal pain, diarrhea, nausea and vomiting.   Genitourinary: Negative.  Negative for dysuria.   Musculoskeletal: Negative.  Negative for back pain.   Skin: Negative.    All other systems reviewed and are negative.      Past Medical History:   Diagnosis Date   • Anxiety    • Back pain    • Fracture of lumbar spine (HCC) 2002 and 2015    lower lumbar 2002; L1 2015   • Head injury    • Injury of back    • Neck pain    • Overweight (BMI 25.0-29.9)        No Known Allergies    Past Surgical History:   Procedure Laterality Date   • FACIAL FRACTURE SURGERY     • INCISION AND DRAINAGE LEG Right 12/1/2018    Procedure: INCISION AND DRAINAGE OF RIGHT UPPER INNER THIGH, PLACEMENT OF WOUND VAC;  Surgeon: René Dallas MD;  Location: [facility];  Service: General   • KNEE SURGERY Right 09/2021   • SPLENECTOMY         Family History   Problem Relation Age of Onset   • Fibromyalgia Mother    • Lung cancer Father        Social History     Socioeconomic History   • Marital status: Legally    Tobacco Use   • Smoking status: Never   • Smokeless tobacco: Never   Substance and Sexual Activity   • Alcohol use: No   • Drug use: No   • Sexual activity: Yes     Partners: Female           Objective   Physical Exam  Vitals and  nursing note reviewed.   Constitutional:       General: He is not in acute distress.     Appearance: He is well-developed. He is not diaphoretic.   HENT:      Head: Normocephalic and atraumatic.      Right Ear: Tenderness present. A middle ear effusion is present. Tympanic membrane is erythematous.      Nose: Nose normal.      Mouth/Throat:      Mouth: Mucous membranes are moist. No oral lesions.      Pharynx: No pharyngeal swelling, oropharyngeal exudate or posterior oropharyngeal erythema.   Eyes:      General: No scleral icterus.     Conjunctiva/sclera: Conjunctivae normal.      Pupils: Pupils are equal, round, and reactive to light.   Neck:      Thyroid: No thyromegaly.      Vascular: No JVD.   Cardiovascular:      Rate and Rhythm: Normal rate and regular rhythm.      Heart sounds: Normal heart sounds. No murmur heard.  Pulmonary:      Effort: Pulmonary effort is normal. No respiratory distress.      Breath sounds: Normal breath sounds. No wheezing or rales.   Chest:      Chest wall: No tenderness.   Abdominal:      General: Bowel sounds are normal. There is no distension.      Palpations: Abdomen is soft. There is no mass.      Tenderness: There is no abdominal tenderness. There is no guarding or rebound.   Musculoskeletal:         General: Normal range of motion.      Cervical back: Normal range of motion and neck supple.   Lymphadenopathy:      Cervical: No cervical adenopathy.   Skin:     General: Skin is warm and dry.      Capillary Refill: Capillary refill takes less than 2 seconds.      Coloration: Skin is not pale.      Findings: No erythema or rash.   Neurological:      General: No focal deficit present.      Mental Status: He is alert and oriented to person, place, and time.      Cranial Nerves: No cranial nerve deficit.      Coordination: Coordination normal.      Deep Tendon Reflexes: Reflexes are normal and symmetric.   Psychiatric:         Mood and Affect: Mood normal.         Behavior: Behavior  normal.         Thought Content: Thought content normal.         Judgment: Judgment normal.         Procedures           ED Course                                           Medical Decision Making  Patient is a 47-year-old male who presents to the ER with chief complaints of right ear pain and sore throat.  He states symptoms began yesterday however reports much worsening symptoms today.  He has had no recorded fevers.  Past medical history significant for anxiety, back pain, fracture to the lumbar spine, neck pain.  Differential diagnosis: Otitis media, effusion to the ear, postnasal drip causing pharyngitis, strep throat, and other    On exam patient's throat is benign.  His right ear however has quite a bit of fluid noted behind the right TM as well as a erythematous TM.  There is no drainage noted to the external ear canal.  We will treat accordingly and recommend follow-up with PCP with no improvements.        Final diagnoses:   Right otitis media with effusion       ED Disposition  ED Disposition     ED Disposition   Discharge    Condition   Good    Comment   --             No follow-up provider specified.       Medication List      New Prescriptions    fluticasone 50 MCG/ACT nasal spray  Commonly known as: FLONASE  2 sprays into the nostril(s) as directed by provider Daily.           Where to Get Your Medications      Information about where to get these medications is not yet available    Ask your nurse or doctor about these medications  · amoxicillin-clavulanate 875-125 MG per tablet  · fluticasone 50 MCG/ACT nasal spray  · methylPREDNISolone 4 MG dose pack          Rica Crump, APRN  02/13/23 4018

## 2023-03-25 ENCOUNTER — HOSPITAL ENCOUNTER (EMERGENCY)
Facility: HOSPITAL | Age: 48
Discharge: HOME | End: 2023-03-25
Admitting: STUDENT IN AN ORGANIZED HEALTH CARE EDUCATION/TRAINING PROGRAM
Payer: COMMERCIAL

## 2023-03-25 ENCOUNTER — APPOINTMENT (OUTPATIENT)
Dept: CT IMAGING | Facility: HOSPITAL | Age: 48
End: 2023-03-25
Payer: COMMERCIAL

## 2023-03-25 VITALS
BODY MASS INDEX: 27.57 KG/M2 | SYSTOLIC BLOOD PRESSURE: 125 MMHG | TEMPERATURE: 98 F | RESPIRATION RATE: 18 BRPM | HEART RATE: 82 BPM | DIASTOLIC BLOOD PRESSURE: 76 MMHG | OXYGEN SATURATION: 97 % | HEIGHT: 73 IN | WEIGHT: 208 LBS

## 2023-03-25 DIAGNOSIS — K57.92 DIVERTICULITIS: Primary | ICD-10-CM

## 2023-03-25 LAB
ACANTHOCYTES BLD QL SMEAR: ABNORMAL
ALBUMIN SERPL-MCNC: 3.9 G/DL (ref 3.5–5.2)
ALBUMIN/GLOB SERPL: 1.2 G/DL
ALP SERPL-CCNC: 87 U/L (ref 39–117)
ALT SERPL W P-5'-P-CCNC: 11 U/L (ref 1–41)
AMPHET+METHAMPHET UR QL: NEGATIVE
AMPHETAMINES UR QL: NEGATIVE
ANION GAP SERPL CALCULATED.3IONS-SCNC: 10 MMOL/L (ref 5–15)
AST SERPL-CCNC: 15 U/L (ref 1–40)
BACTERIA UR QL AUTO: ABNORMAL /HPF
BARBITURATES UR QL SCN: NEGATIVE
BENZODIAZ UR QL SCN: NEGATIVE
BILIRUB SERPL-MCNC: 0.7 MG/DL (ref 0–1.2)
BILIRUB UR QL STRIP: NEGATIVE
BUN SERPL-MCNC: 10 MG/DL (ref 6–20)
BUN/CREAT SERPL: 9.3 (ref 7–25)
BUPRENORPHINE SERPL-MCNC: NEGATIVE NG/ML
CALCIUM SPEC-SCNC: 10.1 MG/DL (ref 8.6–10.5)
CANNABINOIDS SERPL QL: NEGATIVE
CHLORIDE SERPL-SCNC: 102 MMOL/L (ref 98–107)
CLARITY UR: CLEAR
CO2 SERPL-SCNC: 24 MMOL/L (ref 22–29)
COCAINE UR QL: NEGATIVE
COLOR UR: YELLOW
CREAT SERPL-MCNC: 1.07 MG/DL (ref 0.76–1.27)
D-LACTATE SERPL-SCNC: 0.8 MMOL/L (ref 0.5–2)
DEPRECATED RDW RBC AUTO: 47.4 FL (ref 37–54)
EGFRCR SERPLBLD CKD-EPI 2021: 86.7 ML/MIN/1.73
EOSINOPHIL # BLD MANUAL: 0.19 10*3/MM3 (ref 0–0.4)
EOSINOPHIL NFR BLD MANUAL: 1 % (ref 0.3–6.2)
ERYTHROCYTE [DISTWIDTH] IN BLOOD BY AUTOMATED COUNT: 14.5 % (ref 12.3–15.4)
ETHANOL UR QL: <0.01 %
FLUAV RNA RESP QL NAA+PROBE: NOT DETECTED
FLUBV RNA RESP QL NAA+PROBE: NOT DETECTED
GIANT PLATELETS: ABNORMAL
GLOBULIN UR ELPH-MCNC: 3.3 GM/DL
GLUCOSE SERPL-MCNC: 112 MG/DL (ref 65–99)
GLUCOSE UR STRIP-MCNC: NEGATIVE MG/DL
HCT VFR BLD AUTO: 45.3 % (ref 37.5–51)
HGB BLD-MCNC: 14.8 G/DL (ref 13–17.7)
HGB UR QL STRIP.AUTO: ABNORMAL
HYALINE CASTS UR QL AUTO: ABNORMAL /LPF
KETONES UR QL STRIP: NEGATIVE
LEUKOCYTE ESTERASE UR QL STRIP.AUTO: NEGATIVE
LIPASE SERPL-CCNC: 18 U/L (ref 13–60)
LYMPHOCYTES # BLD MANUAL: 4.91 10*3/MM3 (ref 0.7–3.1)
LYMPHOCYTES NFR BLD MANUAL: 7 % (ref 5–12)
MAGNESIUM SERPL-MCNC: 2.1 MG/DL (ref 1.6–2.6)
MCH RBC QN AUTO: 29.2 PG (ref 26.6–33)
MCHC RBC AUTO-ENTMCNC: 32.7 G/DL (ref 31.5–35.7)
MCV RBC AUTO: 89.3 FL (ref 79–97)
METHADONE UR QL SCN: NEGATIVE
MONOCYTES # BLD: 1.32 10*3/MM3 (ref 0.1–0.9)
NEUTROPHILS # BLD AUTO: 12.47 10*3/MM3 (ref 1.7–7)
NEUTROPHILS NFR BLD MANUAL: 66 % (ref 42.7–76)
NEUTS VAC BLD QL SMEAR: ABNORMAL
NITRITE UR QL STRIP: NEGATIVE
OPIATES UR QL: POSITIVE
OXYCODONE UR QL SCN: NEGATIVE
PCP UR QL SCN: NEGATIVE
PH UR STRIP.AUTO: 6.5 [PH] (ref 5–8)
PLATELET # BLD AUTO: 380 10*3/MM3 (ref 140–450)
PMV BLD AUTO: 10.5 FL (ref 6–12)
POIKILOCYTOSIS BLD QL SMEAR: ABNORMAL
POTASSIUM SERPL-SCNC: 3.9 MMOL/L (ref 3.5–5.2)
PROCALCITONIN SERPL-MCNC: 0.06 NG/ML (ref 0–0.25)
PROPOXYPH UR QL: NEGATIVE
PROT SERPL-MCNC: 7.2 G/DL (ref 6–8.5)
PROT UR QL STRIP: NEGATIVE
RBC # BLD AUTO: 5.07 10*6/MM3 (ref 4.14–5.8)
RBC # UR STRIP: ABNORMAL /HPF
REF LAB TEST METHOD: ABNORMAL
RSV RNA NPH QL NAA+NON-PROBE: NOT DETECTED
SARS-COV-2 RNA RESP QL NAA+PROBE: NOT DETECTED
SODIUM SERPL-SCNC: 136 MMOL/L (ref 136–145)
SP GR UR STRIP: >1.03 (ref 1–1.03)
SQUAMOUS #/AREA URNS HPF: ABNORMAL /HPF
TRICYCLICS UR QL SCN: NEGATIVE
UROBILINOGEN UR QL STRIP: ABNORMAL
VARIANT LYMPHS NFR BLD MANUAL: 10 % (ref 0–5)
VARIANT LYMPHS NFR BLD MANUAL: 16 % (ref 19.6–45.3)
WBC # UR STRIP: ABNORMAL /HPF
WBC NRBC COR # BLD: 18.89 10*3/MM3 (ref 3.4–10.8)

## 2023-03-25 PROCEDURE — 80306 DRUG TEST PRSMV INSTRMNT: CPT | Performed by: PHYSICIAN ASSISTANT

## 2023-03-25 PROCEDURE — 81001 URINALYSIS AUTO W/SCOPE: CPT | Performed by: PHYSICIAN ASSISTANT

## 2023-03-25 PROCEDURE — 80053 COMPREHEN METABOLIC PANEL: CPT | Performed by: PHYSICIAN ASSISTANT

## 2023-03-25 PROCEDURE — 25010000002 LEVOFLOXACIN PER 250 MG: Performed by: PHYSICIAN ASSISTANT

## 2023-03-25 PROCEDURE — 87637 SARSCOV2&INF A&B&RSV AMP PRB: CPT | Performed by: PHYSICIAN ASSISTANT

## 2023-03-25 PROCEDURE — 83735 ASSAY OF MAGNESIUM: CPT | Performed by: PHYSICIAN ASSISTANT

## 2023-03-25 PROCEDURE — 83605 ASSAY OF LACTIC ACID: CPT | Performed by: PHYSICIAN ASSISTANT

## 2023-03-25 PROCEDURE — 25010000002 ONDANSETRON PER 1 MG: Performed by: PHYSICIAN ASSISTANT

## 2023-03-25 PROCEDURE — 96375 TX/PRO/DX INJ NEW DRUG ADDON: CPT

## 2023-03-25 PROCEDURE — 85007 BL SMEAR W/DIFF WBC COUNT: CPT | Performed by: PHYSICIAN ASSISTANT

## 2023-03-25 PROCEDURE — 99284 EMERGENCY DEPT VISIT MOD MDM: CPT

## 2023-03-25 PROCEDURE — 0 HYDROMORPHONE 1 MG/ML SOLUTION: Performed by: STUDENT IN AN ORGANIZED HEALTH CARE EDUCATION/TRAINING PROGRAM

## 2023-03-25 PROCEDURE — 25510000001 IOPAMIDOL 61 % SOLUTION: Performed by: PHYSICIAN ASSISTANT

## 2023-03-25 PROCEDURE — 96365 THER/PROPH/DIAG IV INF INIT: CPT

## 2023-03-25 PROCEDURE — 83690 ASSAY OF LIPASE: CPT | Performed by: PHYSICIAN ASSISTANT

## 2023-03-25 PROCEDURE — 84145 PROCALCITONIN (PCT): CPT | Performed by: PHYSICIAN ASSISTANT

## 2023-03-25 PROCEDURE — 74177 CT ABD & PELVIS W/CONTRAST: CPT

## 2023-03-25 PROCEDURE — 96368 THER/DIAG CONCURRENT INF: CPT

## 2023-03-25 PROCEDURE — 96366 THER/PROPH/DIAG IV INF ADDON: CPT

## 2023-03-25 PROCEDURE — 25010000002 MORPHINE PER 10 MG: Performed by: EMERGENCY MEDICINE

## 2023-03-25 PROCEDURE — 82077 ASSAY SPEC XCP UR&BREATH IA: CPT | Performed by: PHYSICIAN ASSISTANT

## 2023-03-25 PROCEDURE — 85025 COMPLETE CBC W/AUTO DIFF WBC: CPT | Performed by: PHYSICIAN ASSISTANT

## 2023-03-25 RX ORDER — ONDANSETRON 4 MG/1
4 TABLET, ORALLY DISINTEGRATING ORAL EVERY 6 HOURS PRN
Qty: 12 TABLET | Refills: 0 | Status: SHIPPED | OUTPATIENT
Start: 2023-03-25

## 2023-03-25 RX ORDER — ONDANSETRON 2 MG/ML
4 INJECTION INTRAMUSCULAR; INTRAVENOUS ONCE
Status: COMPLETED | OUTPATIENT
Start: 2023-03-25 | End: 2023-03-25

## 2023-03-25 RX ORDER — METRONIDAZOLE 500 MG/1
500 TABLET ORAL 2 TIMES DAILY
Qty: 20 TABLET | Refills: 0 | Status: ON HOLD | OUTPATIENT
Start: 2023-03-25 | End: 2023-03-29 | Stop reason: SDUPTHER

## 2023-03-25 RX ORDER — LEVOFLOXACIN 5 MG/ML
750 INJECTION, SOLUTION INTRAVENOUS ONCE
Status: COMPLETED | OUTPATIENT
Start: 2023-03-25 | End: 2023-03-25

## 2023-03-25 RX ORDER — METRONIDAZOLE 500 MG/100ML
500 INJECTION, SOLUTION INTRAVENOUS ONCE
Status: COMPLETED | OUTPATIENT
Start: 2023-03-25 | End: 2023-03-25

## 2023-03-25 RX ORDER — SODIUM CHLORIDE 0.9 % (FLUSH) 0.9 %
10 SYRINGE (ML) INJECTION AS NEEDED
Status: DISCONTINUED | OUTPATIENT
Start: 2023-03-25 | End: 2023-03-25 | Stop reason: HOSPADM

## 2023-03-25 RX ORDER — CIPROFLOXACIN 500 MG/1
500 TABLET, FILM COATED ORAL 2 TIMES DAILY
Qty: 20 TABLET | Refills: 0 | Status: SHIPPED | OUTPATIENT
Start: 2023-03-25 | End: 2023-04-04

## 2023-03-25 RX ADMIN — METRONIDAZOLE 500 MG: 500 INJECTION, SOLUTION INTRAVENOUS at 20:05

## 2023-03-25 RX ADMIN — HYDROMORPHONE HYDROCHLORIDE 1 MG: 1 INJECTION, SOLUTION INTRAMUSCULAR; INTRAVENOUS; SUBCUTANEOUS at 21:26

## 2023-03-25 RX ADMIN — IOPAMIDOL 100 ML: 612 INJECTION, SOLUTION INTRAVENOUS at 18:53

## 2023-03-25 RX ADMIN — LEVOFLOXACIN 750 MG: 5 INJECTION, SOLUTION INTRAVENOUS at 20:05

## 2023-03-25 RX ADMIN — ONDANSETRON 4 MG: 2 INJECTION INTRAMUSCULAR; INTRAVENOUS at 18:59

## 2023-03-25 RX ADMIN — SODIUM CHLORIDE 1000 ML: 9 INJECTION, SOLUTION INTRAVENOUS at 18:44

## 2023-03-25 RX ADMIN — MORPHINE SULFATE 4 MG: 4 INJECTION, SOLUTION INTRAMUSCULAR; INTRAVENOUS at 18:59
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
## 2023-03-26 NOTE — DISCHARGE INSTRUCTIONS
Please complete your antibiotics in their entirety even if you begin to feel better.  Please have a bland diet as we discussed avoiding high fibrous foods as well as greasy/fatty/processed/fried foods.  Please use the Zofran as needed for nausea.  Should you develop any new or worsening symptoms please return to the ER for further evaluation, otherwise please follow with your primary care provider within the next 48 hours.

## 2023-03-27 ENCOUNTER — HOSPITAL ENCOUNTER (OUTPATIENT)
Facility: HOSPITAL | Age: 48
Setting detail: OBSERVATION
Discharge: HOME | End: 2023-03-29
Attending: INTERNAL MEDICINE | Admitting: INTERNAL MEDICINE
Payer: COMMERCIAL

## 2023-03-27 ENCOUNTER — APPOINTMENT (OUTPATIENT)
Dept: CT IMAGING | Facility: HOSPITAL | Age: 48
End: 2023-03-27
Payer: COMMERCIAL

## 2023-03-27 ENCOUNTER — TELEPHONE (OUTPATIENT)
Dept: FAMILY MEDICINE CLINIC | Facility: CLINIC | Age: 48
End: 2023-03-27
Payer: COMMERCIAL

## 2023-03-27 DIAGNOSIS — Z78.9 FAILURE OF OUTPATIENT TREATMENT: ICD-10-CM

## 2023-03-27 DIAGNOSIS — K57.92 DIVERTICULITIS: Primary | ICD-10-CM

## 2023-03-27 LAB
ALBUMIN SERPL-MCNC: 3.9 G/DL (ref 3.5–5.2)
ALBUMIN/GLOB SERPL: 1.1 G/DL
ALP SERPL-CCNC: 89 U/L (ref 39–117)
ALT SERPL W P-5'-P-CCNC: 13 U/L (ref 1–41)
ANION GAP SERPL CALCULATED.3IONS-SCNC: 10 MMOL/L (ref 5–15)
AST SERPL-CCNC: 14 U/L (ref 1–40)
BACTERIA UR QL AUTO: ABNORMAL /HPF
BASOPHILS # BLD MANUAL: 0.33 10*3/MM3 (ref 0–0.2)
BASOPHILS NFR BLD MANUAL: 2 % (ref 0–1.5)
BILIRUB SERPL-MCNC: 0.5 MG/DL (ref 0–1.2)
BILIRUB UR QL STRIP: NEGATIVE
BUN SERPL-MCNC: 11 MG/DL (ref 6–20)
BUN/CREAT SERPL: 11.2 (ref 7–25)
BURR CELLS BLD QL SMEAR: ABNORMAL
CALCIUM SPEC-SCNC: 10.4 MG/DL (ref 8.6–10.5)
CHLORIDE SERPL-SCNC: 105 MMOL/L (ref 98–107)
CLARITY UR: CLEAR
CO2 SERPL-SCNC: 22 MMOL/L (ref 22–29)
COLOR UR: ABNORMAL
CREAT SERPL-MCNC: 0.98 MG/DL (ref 0.76–1.27)
D-LACTATE SERPL-SCNC: 0.8 MMOL/L (ref 0.5–2)
DEPRECATED RDW RBC AUTO: 47 FL (ref 37–54)
EGFRCR SERPLBLD CKD-EPI 2021: 96.3 ML/MIN/1.73
EOSINOPHIL # BLD MANUAL: 0.49 10*3/MM3 (ref 0–0.4)
EOSINOPHIL NFR BLD MANUAL: 3 % (ref 0.3–6.2)
ERYTHROCYTE [DISTWIDTH] IN BLOOD BY AUTOMATED COUNT: 14.4 % (ref 12.3–15.4)
GLOBULIN UR ELPH-MCNC: 3.6 GM/DL
GLUCOSE SERPL-MCNC: 105 MG/DL (ref 65–99)
GLUCOSE UR STRIP-MCNC: NEGATIVE MG/DL
HCT VFR BLD AUTO: 47.6 % (ref 37.5–51)
HGB BLD-MCNC: 15.4 G/DL (ref 13–17.7)
HGB UR QL STRIP.AUTO: NEGATIVE
HOWELL-JOLLY BOD BLD QL SMEAR: ABNORMAL
HYALINE CASTS UR QL AUTO: ABNORMAL /LPF
KETONES UR QL STRIP: ABNORMAL
LEUKOCYTE ESTERASE UR QL STRIP.AUTO: ABNORMAL
LYMPHOCYTES # BLD MANUAL: 2.94 10*3/MM3 (ref 0.7–3.1)
LYMPHOCYTES NFR BLD MANUAL: 7 % (ref 5–12)
MCH RBC QN AUTO: 28.8 PG (ref 26.6–33)
MCHC RBC AUTO-ENTMCNC: 32.4 G/DL (ref 31.5–35.7)
MCV RBC AUTO: 89.1 FL (ref 79–97)
MONOCYTES # BLD: 1.14 10*3/MM3 (ref 0.1–0.9)
NEUTROPHILS # BLD AUTO: 11.43 10*3/MM3 (ref 1.7–7)
NEUTROPHILS NFR BLD MANUAL: 69 % (ref 42.7–76)
NEUTS BAND NFR BLD MANUAL: 1 % (ref 0–5)
NITRITE UR QL STRIP: NEGATIVE
PH UR STRIP.AUTO: 7.5 [PH] (ref 5–8)
PLAT MORPH BLD: NORMAL
PLATELET # BLD AUTO: 393 10*3/MM3 (ref 140–450)
PMV BLD AUTO: 10.5 FL (ref 6–12)
POIKILOCYTOSIS BLD QL SMEAR: ABNORMAL
POTASSIUM SERPL-SCNC: 4 MMOL/L (ref 3.5–5.2)
PROT SERPL-MCNC: 7.5 G/DL (ref 6–8.5)
PROT UR QL STRIP: NEGATIVE
RBC # BLD AUTO: 5.34 10*6/MM3 (ref 4.14–5.8)
RBC # UR STRIP: ABNORMAL /HPF
REF LAB TEST METHOD: ABNORMAL
SODIUM SERPL-SCNC: 137 MMOL/L (ref 136–145)
SP GR UR STRIP: 1.02 (ref 1–1.03)
SQUAMOUS #/AREA URNS HPF: ABNORMAL /HPF
UROBILINOGEN UR QL STRIP: ABNORMAL
VARIANT LYMPHS NFR BLD MANUAL: 11 % (ref 19.6–45.3)
VARIANT LYMPHS NFR BLD MANUAL: 7 % (ref 0–5)
WBC # UR STRIP: ABNORMAL /HPF
WBC MORPH BLD: NORMAL
WBC NRBC COR # BLD: 16.33 10*3/MM3 (ref 3.4–10.8)

## 2023-03-27 PROCEDURE — G0378 HOSPITAL OBSERVATION PER HR: HCPCS

## 2023-03-27 PROCEDURE — 85007 BL SMEAR W/DIFF WBC COUNT: CPT | Performed by: NURSE PRACTITIONER

## 2023-03-27 PROCEDURE — 96376 TX/PRO/DX INJ SAME DRUG ADON: CPT

## 2023-03-27 PROCEDURE — 83605 ASSAY OF LACTIC ACID: CPT | Performed by: NURSE PRACTITIONER

## 2023-03-27 PROCEDURE — 25510000001 IOPAMIDOL 61 % SOLUTION: Performed by: NURSE PRACTITIONER

## 2023-03-27 PROCEDURE — 25010000002 LEVOFLOXACIN PER 250 MG: Performed by: INTERNAL MEDICINE

## 2023-03-27 PROCEDURE — 74177 CT ABD & PELVIS W/CONTRAST: CPT

## 2023-03-27 PROCEDURE — 25010000002 ENOXAPARIN PER 10 MG: Performed by: INTERNAL MEDICINE

## 2023-03-27 PROCEDURE — 96365 THER/PROPH/DIAG IV INF INIT: CPT

## 2023-03-27 PROCEDURE — 96366 THER/PROPH/DIAG IV INF ADDON: CPT

## 2023-03-27 PROCEDURE — 96372 THER/PROPH/DIAG INJ SC/IM: CPT

## 2023-03-27 PROCEDURE — 87040 BLOOD CULTURE FOR BACTERIA: CPT | Performed by: NURSE PRACTITIONER

## 2023-03-27 PROCEDURE — 96361 HYDRATE IV INFUSION ADD-ON: CPT

## 2023-03-27 PROCEDURE — 96375 TX/PRO/DX INJ NEW DRUG ADDON: CPT

## 2023-03-27 PROCEDURE — 80053 COMPREHEN METABOLIC PANEL: CPT | Performed by: NURSE PRACTITIONER

## 2023-03-27 PROCEDURE — 36415 COLL VENOUS BLD VENIPUNCTURE: CPT

## 2023-03-27 PROCEDURE — 85025 COMPLETE CBC W/AUTO DIFF WBC: CPT | Performed by: NURSE PRACTITIONER

## 2023-03-27 PROCEDURE — 81001 URINALYSIS AUTO W/SCOPE: CPT | Performed by: NURSE PRACTITIONER

## 2023-03-27 PROCEDURE — 99284 EMERGENCY DEPT VISIT MOD MDM: CPT

## 2023-03-27 PROCEDURE — 25010000002 HYDROMORPHONE PER 4 MG: Performed by: INTERNAL MEDICINE

## 2023-03-27 PROCEDURE — 96367 TX/PROPH/DG ADDL SEQ IV INF: CPT

## 2023-03-27 PROCEDURE — 25010000002 ONDANSETRON PER 1 MG: Performed by: NURSE PRACTITIONER

## 2023-03-27 PROCEDURE — 0 HYDROMORPHONE 1 MG/ML SOLUTION: Performed by: NURSE PRACTITIONER

## 2023-03-27 PROCEDURE — 25010000002 ONDANSETRON PER 1 MG: Performed by: INTERNAL MEDICINE

## 2023-03-27 RX ORDER — SODIUM CHLORIDE 0.9 % (FLUSH) 0.9 %
10 SYRINGE (ML) INJECTION AS NEEDED
Status: DISCONTINUED | OUTPATIENT
Start: 2023-03-27 | End: 2023-03-29 | Stop reason: HOSPADM

## 2023-03-27 RX ORDER — SODIUM CHLORIDE 9 MG/ML
40 INJECTION, SOLUTION INTRAVENOUS AS NEEDED
Status: DISCONTINUED | OUTPATIENT
Start: 2023-03-27 | End: 2023-03-29 | Stop reason: HOSPADM

## 2023-03-27 RX ORDER — FLUTICASONE PROPIONATE 50 MCG
2 SPRAY, SUSPENSION (ML) NASAL DAILY
Status: DISCONTINUED | OUTPATIENT
Start: 2023-03-27 | End: 2023-03-29 | Stop reason: HOSPADM

## 2023-03-27 RX ORDER — IBUPROFEN 800 MG/1
800 TABLET ORAL EVERY 8 HOURS PRN
COMMUNITY

## 2023-03-27 RX ORDER — ONDANSETRON 2 MG/ML
4 INJECTION INTRAMUSCULAR; INTRAVENOUS EVERY 6 HOURS PRN
Status: DISCONTINUED | OUTPATIENT
Start: 2023-03-27 | End: 2023-03-29 | Stop reason: HOSPADM

## 2023-03-27 RX ORDER — LEVOFLOXACIN 5 MG/ML
750 INJECTION, SOLUTION INTRAVENOUS ONCE
Status: DISCONTINUED | OUTPATIENT
Start: 2023-03-27 | End: 2023-03-27

## 2023-03-27 RX ORDER — METRONIDAZOLE 500 MG/100ML
500 INJECTION, SOLUTION INTRAVENOUS ONCE
Status: DISCONTINUED | OUTPATIENT
Start: 2023-03-27 | End: 2023-03-27

## 2023-03-27 RX ORDER — ONDANSETRON 2 MG/ML
4 INJECTION INTRAMUSCULAR; INTRAVENOUS ONCE
Status: COMPLETED | OUTPATIENT
Start: 2023-03-27 | End: 2023-03-27

## 2023-03-27 RX ORDER — HYDROMORPHONE HYDROCHLORIDE 1 MG/ML
0.5 INJECTION, SOLUTION INTRAMUSCULAR; INTRAVENOUS; SUBCUTANEOUS
Status: DISCONTINUED | OUTPATIENT
Start: 2023-03-27 | End: 2023-03-29 | Stop reason: HOSPADM

## 2023-03-27 RX ORDER — ENOXAPARIN SODIUM 100 MG/ML
40 INJECTION SUBCUTANEOUS
Status: DISCONTINUED | OUTPATIENT
Start: 2023-03-27 | End: 2023-03-29 | Stop reason: HOSPADM

## 2023-03-27 RX ORDER — SODIUM CHLORIDE, SODIUM LACTATE, POTASSIUM CHLORIDE, CALCIUM CHLORIDE 600; 310; 30; 20 MG/100ML; MG/100ML; MG/100ML; MG/100ML
100 INJECTION, SOLUTION INTRAVENOUS CONTINUOUS
Status: DISCONTINUED | OUTPATIENT
Start: 2023-03-27 | End: 2023-03-29

## 2023-03-27 RX ORDER — SODIUM CHLORIDE 0.9 % (FLUSH) 0.9 %
10 SYRINGE (ML) INJECTION EVERY 12 HOURS SCHEDULED
Status: DISCONTINUED | OUTPATIENT
Start: 2023-03-27 | End: 2023-03-29 | Stop reason: HOSPADM

## 2023-03-27 RX ORDER — METRONIDAZOLE 500 MG/100ML
500 INJECTION, SOLUTION INTRAVENOUS EVERY 8 HOURS
Status: DISCONTINUED | OUTPATIENT
Start: 2023-03-27 | End: 2023-03-29 | Stop reason: HOSPADM

## 2023-03-27 RX ORDER — NALOXONE HCL 0.4 MG/ML
0.4 VIAL (ML) INJECTION
Status: DISCONTINUED | OUTPATIENT
Start: 2023-03-27 | End: 2023-03-29 | Stop reason: HOSPADM

## 2023-03-27 RX ORDER — LEVOFLOXACIN 5 MG/ML
750 INJECTION, SOLUTION INTRAVENOUS EVERY 24 HOURS
Status: DISCONTINUED | OUTPATIENT
Start: 2023-03-27 | End: 2023-03-29 | Stop reason: HOSPADM

## 2023-03-27 RX ADMIN — ONDANSETRON 4 MG: 2 INJECTION INTRAMUSCULAR; INTRAVENOUS at 16:50

## 2023-03-27 RX ADMIN — ONDANSETRON 4 MG: 2 INJECTION INTRAMUSCULAR; INTRAVENOUS at 09:17

## 2023-03-27 RX ADMIN — HYDROMORPHONE HYDROCHLORIDE 1 MG: 1 INJECTION, SOLUTION INTRAMUSCULAR; INTRAVENOUS; SUBCUTANEOUS at 09:17

## 2023-03-27 RX ADMIN — ENOXAPARIN SODIUM 40 MG: 100 INJECTION SUBCUTANEOUS at 12:25

## 2023-03-27 RX ADMIN — SODIUM CHLORIDE, POTASSIUM CHLORIDE, SODIUM LACTATE AND CALCIUM CHLORIDE 100 ML/HR: 600; 310; 30; 20 INJECTION, SOLUTION INTRAVENOUS at 22:33

## 2023-03-27 RX ADMIN — METRONIDAZOLE 500 MG: 500 INJECTION, SOLUTION INTRAVENOUS at 14:15

## 2023-03-27 RX ADMIN — Medication 10 ML: at 21:31

## 2023-03-27 RX ADMIN — SODIUM CHLORIDE, POTASSIUM CHLORIDE, SODIUM LACTATE AND CALCIUM CHLORIDE 100 ML/HR: 600; 310; 30; 20 INJECTION, SOLUTION INTRAVENOUS at 12:24

## 2023-03-27 RX ADMIN — LEVOFLOXACIN 750 MG: 5 INJECTION, SOLUTION INTRAVENOUS at 12:24

## 2023-03-27 RX ADMIN — METRONIDAZOLE 500 MG: 500 INJECTION, SOLUTION INTRAVENOUS at 21:31

## 2023-03-27 RX ADMIN — HYDROMORPHONE HYDROCHLORIDE 0.5 MG: 1 INJECTION, SOLUTION INTRAMUSCULAR; INTRAVENOUS; SUBCUTANEOUS at 21:37

## 2023-03-27 RX ADMIN — SODIUM CHLORIDE, POTASSIUM CHLORIDE, SODIUM LACTATE AND CALCIUM CHLORIDE 1000 ML: 600; 310; 30; 20 INJECTION, SOLUTION INTRAVENOUS at 09:17

## 2023-03-27 RX ADMIN — IOPAMIDOL 100 ML: 612 INJECTION, SOLUTION INTRAVENOUS at 09:49

## 2023-03-27 RX ADMIN — FLUTICASONE PROPIONATE 2 SPRAY: 50 SPRAY, METERED NASAL at 12:24

## 2023-03-27 RX ADMIN — Medication 10 ML: at 12:25

## 2023-03-27 NOTE — LETTER
March 29, 2023     Patient: Saulo Shepard   YOB: 1975   Date of Visit: 3/27/2023       To Whom It May Concern:    It is my medical opinion that Saulo Shepard needs to be excused from work since 3/27/23.           Sincerely,      Dr. Chris French

## 2023-03-27 NOTE — ED PROVIDER NOTES
Subjective   History of Present Illness  Patient is a 47-year-old male presents the emergency department with left lower quadrant abdominal pain for the past several days.  He was seen here in the emergency department 2 days ago and had a CAT scan of the abdomen pelvis which revealed short segment of diverticulitis.  He was sent home on outpatient Cipro and Flagyl.  He states he has had some intermittent vomiting since he is left here 2 days ago.  He states the pain has become much worse.  He is now having some swelling down in his left inguinal area as well.  He does have a history of inguinal hernia there.  He states he is urinating without difficulty.  He has had subjective fever.  No diarrhea.  He states has not had a bowel movement for the last 3 days.  He states he came back today because his pain was so much worse.  He states he felt like he has had fever because he has had chills but he has not taken his temperature.    History provided by:  Patient   used: No        Review of Systems   Constitutional: Negative.    HENT: Negative.    Eyes: Negative.    Respiratory: Negative.    Cardiovascular: Negative.    Gastrointestinal: Negative.         Patient is a 47-year-old male presents the emergency department with left lower quadrant abdominal pain for the past several days.  He was seen here in the emergency department 2 days ago and had a CAT scan of the abdomen pelvis which revealed short segment of diverticulitis.  He was sent home on outpatient Cipro and Flagyl.  He states he has had some intermittent vomiting since he is left here 2 days ago.  He states the pain has become much worse.  He is now having some swelling down in his left inguinal area as well.  He does have a history of inguinal hernia there.  He states he is urinating without difficulty.  He has had subjective fever.  No diarrhea.  He states has not had a bowel movement for the last 3 days.  He states he came back today  because his pain was so much worse.  He states he felt like he has had fever because he has had chills but he has not taken his temperature.   Endocrine: Negative.    Genitourinary: Negative.    Musculoskeletal: Negative.    Skin: Negative.    Allergic/Immunologic: Negative.    Neurological: Negative.    Hematological: Negative.    Psychiatric/Behavioral: Negative.    All other systems reviewed and are negative.      Past Medical History:   Diagnosis Date   • Anxiety    • Back pain    • Fracture of lumbar spine (HCC) 2002 and 2015    lower lumbar 2002; L1 2015   • Head injury    • Injury of back    • Neck pain    • Overweight (BMI 25.0-29.9)        No Known Allergies    Past Surgical History:   Procedure Laterality Date   • FACIAL FRACTURE SURGERY     • INCISION AND DRAINAGE LEG Right 12/1/2018    Procedure: INCISION AND DRAINAGE OF RIGHT UPPER INNER THIGH, PLACEMENT OF WOUND VAC;  Surgeon: René Dallas MD;  Location: [facility];  Service: General   • KNEE SURGERY Right 09/2021   • SPLENECTOMY         Family History   Problem Relation Age of Onset   • Fibromyalgia Mother    • Lung cancer Father        Social History     Socioeconomic History   • Marital status: Legally    Tobacco Use   • Smoking status: Never   • Smokeless tobacco: Never   Vaping Use   • Vaping Use: Never used   Substance and Sexual Activity   • Alcohol use: No   • Drug use: No   • Sexual activity: Yes     Partners: Female           Objective   Physical Exam  Vitals and nursing note reviewed.   Constitutional:       Appearance: He is well-developed.      Comments: Nontoxic-appearing.  Patient does appear to be uncomfortable.   HENT:      Head: Normocephalic and atraumatic.   Eyes:      Conjunctiva/sclera: Conjunctivae normal.      Pupils: Pupils are equal, round, and reactive to light.   Cardiovascular:      Rate and Rhythm: Normal rate and regular rhythm.      Heart sounds: Normal heart sounds.   Pulmonary:      Effort: Pulmonary  effort is normal.      Breath sounds: Normal breath sounds.   Abdominal:      General: Bowel sounds are normal.      Palpations: Abdomen is soft.      Comments: Abdomen is soft, nondistended.  There is moderate tenderness noted in the left lower quadrant abdomen.  He does have some soft tissue swelling noted to the left inguinal area most consistent with probable left inguinal hernia.   Musculoskeletal:         General: Normal range of motion.      Cervical back: Normal range of motion and neck supple.   Skin:     General: Skin is warm and dry.      Comments: Mild pallor noted   Neurological:      Mental Status: He is alert and oriented to person, place, and time.      Deep Tendon Reflexes: Reflexes are normal and symmetric.   Psychiatric:         Behavior: Behavior normal.         Thought Content: Thought content normal.         Judgment: Judgment normal.         Procedures           ED Course  ED Course as of 03/27/23 1045   Mon Mar 27, 2023   1010 Patient states that pain is better after pain medicine at this time but he still having significant amount of pain.  Laboratory studies CMP is unremarkable.  He has a white count of 16.3.  Lactic is negative.  CT of the abdomen pelvis does show acute diverticulitis.  Patient has failed outpatient therapy he has been unable to tolerate the Cipro and Flagyl that he was sent home with on 25 March.  I will speak to the hospitalist about inpatient mission since he has failed outpatient therapy at this time.  Patient is in agreement with the care plan. [CW]   1028 Spoke with Dr. French-hospitalist on-call.  Has accepted patient for admission.  Patient be admitted shortly in stable condition. [CW]      ED Course User Index  [CW] Madalyn Cobb APRN                                           Medical Decision Making  Patient is a 47-year-old male presents the emergency department with left lower quadrant abdominal pain for the past several days.  He was seen here in  the emergency department 2 days ago and had a CAT scan of the abdomen pelvis which revealed short segment of diverticulitis.  He was sent home on outpatient Cipro and Flagyl.  He states he has had some intermittent vomiting since he is left here 2 days ago.  He states the pain has become much worse.  He is now having some swelling down in his left inguinal area as well.  He does have a history of inguinal hernia there.  He states he is urinating without difficulty.  He has had subjective fever.  No diarrhea.  He states has not had a bowel movement for the last 3 days.  He states he came back today because his pain was so much worse.  He states he felt like he has had fever because he has had chills but he has not taken his temperature.  Course of treatment in the er: CT Abdomen Pelvis With Contrast   Final Result     Labs Reviewed  COMPREHENSIVE METABOLIC PANEL - Abnormal; Notable for the following components:     Glucose                       105 (*)             All other components within normal limits         Narrative: GFR Normal >60                  Chronic Kidney Disease <60                  Kidney Failure <15                    URINALYSIS W/ CULTURE IF INDICATED - Abnormal; Notable for the following components:     Color, UA                     Dark Yellow (*)               Ketones, UA                   Trace (*)               Leuk Esterase, UA             Small (1+) (*)            All other components within normal limits         Narrative: In absence of clinical symptoms, the presence of pyuria, bacteria, and/or nitrites on the urinalysis result does not correlate with infection.  CBC WITH AUTO DIFFERENTIAL - Abnormal; Notable for the following components:     WBC                           16.33 (*)            All other components within normal limits         Narrative: The previously reported component NRBC is no longer being reported. Previous result was 0.0 /100 WBC (Reference Range: 0.0-0.2 /100 WBC) on  3/27/2023 at 0934 CDT.  MANUAL DIFFERENTIAL - Abnormal; Notable for the following components:     Lymphocyte %                  11.0 (*)               Basophil %                    2.0 (*)                Atypical Lymphocyte %         7.0 (*)                Neutrophils Absolute          11.43 (*)               Monocytes Absolute            1.14 (*)               Eosinophils Absolute          0.49 (*)               Basophils Absolute            0.33 (*)            All other components within normal limits  URINALYSIS, MICROSCOPIC ONLY - Abnormal; Notable for the following components:     RBC, UA                       3-5 (*)                WBC, UA                       0-2 (*)                Bacteria, UA                  Trace (*)            All other components within normal limits  LACTIC ACID, PLASMA - Normal  BLOOD CULTURE  BLOOD CULTURE  CBC AND DIFFERENTIAL  Patient received pain medication and antiemetics while in the emergency department.  He states that his pain was better at this time.  He states his nausea is better at this time.  His white count is elevated at 16.  Repeat CT of the abdomen pelvis did show diverticulitis.  No perforation.  I feel that this patient probably needs inpatient admission since he has been unable to tolerate his oral Cipro and Flagyl due to vomiting.  He states his pain is been much worse.  Spoke with Dr. Martinez-hospitalist on-call.  Has accepted for admission.  Patient be admitted shortly in stable condition.  Patient was in agreement with care plan.    Diverticulitis: acute illness or injury  Failure of outpatient treatment: acute illness or injury  Amount and/or Complexity of Data Reviewed  Labs: ordered. Decision-making details documented in ED Course.  Radiology: ordered. Decision-making details documented in ED Course.      Risk  Prescription drug management.  Decision regarding hospitalization.          Final diagnoses:   Diverticulitis   Failure of outpatient  treatment       ED Disposition  ED Disposition     ED Disposition   Decision to Admit    Condition   --    Comment   Level of Care: Med/Surg [1]   Diagnosis: Diverticulitis [663891]   Admitting Physician: DEJUAN GARCIA [[id number]]   Attending Physician: DEJUAN GARCIA [[id number]]               No follow-up provider specified.       Medication List      ASK your doctor about these medications    ondansetron ODT 4 MG disintegrating tablet  Commonly known as: ZOFRAN-ODT  Place 1 tablet on the tongue Every 6 (Six) Hours As Needed for Nausea.  Ask about: Which instructions should I use?             Madalyn Cobb, APRN  03/27/23 1042

## 2023-03-27 NOTE — PLAN OF CARE
Goal Outcome Evaluation:      Pt to room 378 from ER with c/o abdominal pain. DX:Diverticulitis. Pt alert and oriented. Lovenox for DVT prevention. Will continue to monitor pt.

## 2023-03-27 NOTE — H&P
"    [facility]  HISTORY AND PHYSICAL    Date of Admission: 3/27/2023  Primary Care Physician: Vivek Stewart, DO    Subjective   Primary Historian: patient     Chief Complaint: Abdominal pain left lower quadrant with nausea and vomiting    History of Present Illness  This is a 47-year-old man who I am asked to admit for IV antibiotic for diverticulitis.  Patient was in the emergency room on March 25 presenting with lower abdominal pain.  I reviewed the ER record dating March 25.  It indicates that patient has no fever chills or diaphoresis, no nausea, vomiting diarrhea or constipation.  CT scan of the abdomen pelvis showed acute diverticulitis.  Patient was discharged on Cipro and metronidazole with Zofran.  I am told that patient did not tolerate medications because of nausea with vomiting.    Patient came back today because of abdominal pain  He is hemodynamically stable  Work-up today showed:  Specific gravity 1.020 presence of leukocyte esterase, no significant pyuria (0-2) there is trace bacteriuria and RBC of 3-5.  Otherwise negative nitrite  He has white count of 16 (18).  No left shift.  Predominance of neutrophils at 69%  Chemistry other than glucose of 105 is within normal limits.  Normal lactic acid  NP Carrie of emergency room repeated the CT scan of the abdomen.  Finding is acute diverticulitis involving short segment of the colon at left lower quadrant similar appearance compared to 2 days ago.  No free air or abscess.    Patient received Dilaudid 1 mg, Zofran.  Patient also received Levaquin and metronidazole.  Hospitalist service requested to admit patient    Patient states that he went home following his ER visit on March 25.  He said he felt sick and threw up.  Therefore he could not keep his antibiotics by mouth.  He told me on the other hand also that he had taken the antibiotic at least twice (\"1 in the morning and 1 in the evening\")  He " "felt that his belly is more swollen.  The pain was so severe and rates this as greater than 50 out of 10.  He received Dilaudid in the emergency room which brings the pain level to 7 out of 10..  There has not been a change in character of pain.  Its has gone down to his testicle at times.    He has been able to tolerate soda.  Otherwise he has not had any solid food    No fever  No difficulty breathing  No cough  No chest pain  No dysuria, frequency -\"urinates well without a problem\"  Has small bowel movement March 25.  Has not had anything to eat but tolerates drinking soda.        Review of Systems   Otherwise complete ROS reviewed and negative except as mentioned in the HPI.    Past Medical History:   Past Medical History:   Diagnosis Date   • Anxiety    • Back pain    • Fracture of lumbar spine (HCC) 2002 and 2015    lower lumbar 2002; L1 2015   • Head injury    • Injury of back    • Neck pain    • Overweight (BMI 25.0-29.9)      Past Surgical History:  Past Surgical History:   Procedure Laterality Date   • FACIAL FRACTURE SURGERY     • INCISION AND DRAINAGE LEG Right 12/1/2018    Procedure: INCISION AND DRAINAGE OF RIGHT UPPER INNER THIGH, PLACEMENT OF WOUND VAC;  Surgeon: René Dallas MD;  Location: [facility];  Service: General   • KNEE SURGERY Right 09/2021   • SPLENECTOMY       Social History:  reports that he has never smoked. He has never used smokeless tobacco. He reports that he does not drink alcohol and does not use drugs.    Family History: family history includes Fibromyalgia in his mother; Lung cancer in his father.       Allergies:  No Known Allergies    Medications:  Prior to Admission medications    Medication Sig Start Date End Date Taking? Authorizing Provider   ciprofloxacin (CIPRO) 500 MG tablet Take 1 tablet by mouth 2 (Two) Times a Day for 10 days. 3/25/23 4/4/23 Yes Rg Rayo PA-C   fluticasone (FLONASE) 50 MCG/ACT nasal spray 2 sprays into the nostril(s) as directed by " "provider Daily. 2/13/23  Yes Rica Crump APRN   ibuprofen (ADVIL,MOTRIN) 800 MG tablet Take 1 tablet by mouth Every 8 (Eight) Hours As Needed for Mild Pain.   Yes Tiffanie Pierce MD   metroNIDAZOLE (FLAGYL) 500 MG tablet Take 1 tablet by mouth 2 (Two) Times a Day for 10 days. 3/25/23 4/4/23 Yes Rg Rayo PA-C   ondansetron ODT (ZOFRAN-ODT) 4 MG disintegrating tablet Place 1 tablet on the tongue Every 6 (Six) Hours As Needed for Nausea. 3/25/23  Yes Rg Rayo PA-C   dicyclomine (BENTYL) 10 MG capsule Take 1 capsule by mouth 4 (Four) Times a Day.  Patient not taking: Reported on 3/27/2023 7/3/22   Tiffanie Pierce MD   methylPREDNISolone (MEDROL) 4 MG dose pack Take as directed on package instructions.  Patient not taking: Reported on 3/27/2023 2/13/23   Rica Crump APRN   ibuprofen (ADVIL,MOTRIN) 800 MG tablet TAKE 1 TABLET BY MOUTH EVERY 8 (EIGHT) HOURS AS NEEDED FOR MILD PAIN OR MODERATE PAIN .  Patient not taking: Reported on 3/27/2023 12/28/22 3/27/23  Vivek Stewart,    ondansetron ODT (ZOFRAN-ODT) 4 MG disintegrating tablet 1 tablet Every 8 (Eight) Hours As Needed. 7/3/22 3/27/23  Tiffanie Pierce MD     I have utilized all available immediate resources to obtain, update, or review the patient's current medications (including all prescriptions, over-the-counter products, herbals, cannabis/cannabidiol products, and vitamin/mineral/dietary (nutritional) supplements).    Objective     Vital Signs: /88   Pulse 73   Temp 98.3 °F (36.8 °C)   Resp 20   Ht 185.4 cm (73\")   Wt 94.3 kg (208 lb)   SpO2 97%   BMI 27.44 kg/m²   Physical Exam   GEN: Awake, alert, interactive, in NAD  HEENT: Atraumatic, PERRLA, EOMI, Anicteric, Trachea midline  Lungs: CTAB, no wheezing/rales/rhonchi  Heart: RRR, +S1/s2, no rub  ABD: soft, nt/nd, +BS, positive voluntary guarding left lower quadrant  Extremities: atraumatic, no cyanosis, no edema  Skin: no rashes or " lesions  Neuro: AAOx3, no focal deficits  Psych: normal mood & affect      Results Reviewed:  Lab Results (last 24 hours)     Procedure Component Value Units Date/Time    Urinalysis, Microscopic Only - Urine, Clean Catch [[id number]]  (Abnormal) Collected: 03/27/23 0939    Specimen: Urine, Clean Catch Updated: 03/27/23 1000     RBC, UA 3-5 /HPF      WBC, UA 0-2 /HPF      Comment: Urine culture not indicated.        Bacteria, UA Trace /HPF      Squamous Epithelial Cells, UA None Seen /HPF      Hyaline Casts, UA None Seen /LPF      Methodology Manual Light Microscopy    Urinalysis With Culture If Indicated - Urine, Clean Catch [[id number]]  (Abnormal) Collected: 03/27/23 0939    Specimen: Urine, Clean Catch Updated: 03/27/23 1000     Color, UA Dark Yellow     Appearance, UA Clear     pH, UA 7.5     Specific Gravity, UA 1.020     Glucose, UA Negative     Ketones, UA Trace     Bilirubin, UA Negative     Blood, UA Negative     Protein, UA Negative     Leuk Esterase, UA Small (1+)     Nitrite, UA Negative     Urobilinogen, UA 1.0 E.U./dL    Narrative:      In absence of clinical symptoms, the presence of pyuria, bacteria, and/or nitrites on the urinalysis result does not correlate with infection.    Manual Differential [[id number]]  (Abnormal) Collected: 03/27/23 0914    Specimen: Blood Updated: 03/27/23 0952     Neutrophil % 69.0 %      Lymphocyte % 11.0 %      Monocyte % 7.0 %      Eosinophil % 3.0 %      Basophil % 2.0 %      Bands %  1.0 %      Atypical Lymphocyte % 7.0 %      Neutrophils Absolute 11.43 10*3/mm3      Lymphocytes Absolute 2.94 10*3/mm3      Monocytes Absolute 1.14 10*3/mm3      Eosinophils Absolute 0.49 10*3/mm3      Basophils Absolute 0.33 10*3/mm3      Crenated RBC's Slight/1+     Zuniga-Wainaku Bodies Slight/1+     Poikilocytes Slight/1+     WBC Morphology Normal     Platelet Morphology Normal    CBC & Differential [[id number]]  (Abnormal) Collected: 03/27/23 0914    Specimen: Blood Updated: 03/27/23 0952     Narrative:      The following orders were created for panel order CBC & Differential.  Procedure                               Abnormality         Status                     ---------                               -----------         ------                     CBC Auto Differential[[id number]]        Abnormal            Final result                 Please view results for these tests on the individual orders.    CBC Auto Differential [[id number]]  (Abnormal) Collected: 03/27/23 0914    Specimen: Blood Updated: 03/27/23 0952     WBC 16.33 10*3/mm3      RBC 5.34 10*6/mm3      Hemoglobin 15.4 g/dL      Hematocrit 47.6 %      MCV 89.1 fL      MCH 28.8 pg      MCHC 32.4 g/dL      RDW 14.4 %      RDW-SD 47.0 fl      MPV 10.5 fL      Platelets 393 10*3/mm3     Narrative:      The previously reported component NRBC is no longer being reported. Previous result was 0.0 /100 WBC (Reference Range: 0.0-0.2 /100 WBC) on 3/27/2023 at 0934 CDT.    Comprehensive Metabolic Panel [[id number]]  (Abnormal) Collected: 03/27/23 0914    Specimen: Blood Updated: 03/27/23 0947     Glucose 105 mg/dL      BUN 11 mg/dL      Creatinine 0.98 mg/dL      Sodium 137 mmol/L      Potassium 4.0 mmol/L      Comment: Slight hemolysis detected by analyzer. Results may be affected.        Chloride 105 mmol/L      CO2 22.0 mmol/L      Calcium 10.4 mg/dL      Total Protein 7.5 g/dL      Albumin 3.9 g/dL      ALT (SGPT) 13 U/L      AST (SGOT) 14 U/L      Alkaline Phosphatase 89 U/L      Total Bilirubin 0.5 mg/dL      Globulin 3.6 gm/dL      A/G Ratio 1.1 g/dL      BUN/Creatinine Ratio 11.2     Anion Gap 10.0 mmol/L      eGFR 96.3 mL/min/1.73     Narrative:      GFR Normal >60  Chronic Kidney Disease <60  Kidney Failure <15      Lactic Acid, Plasma [[id number]]  (Normal) Collected: 03/27/23 0914    Specimen: Blood Updated: 03/27/23 0943     Lactate 0.8 mmol/L         Imaging Results (Last 24 Hours)     Procedure Component Value Units Date/Time    CT Abdomen  Pelvis With Contrast [[id number]] Collected: 03/27/23 0951     Updated: 03/27/23 0956    Narrative:      EXAMINATION: CT ABDOMEN PELVIS W CONTRAST-      3/27/2023 9:43 AM CDT     HISTORY: Left lower quadrant abdominal pain and nausea.     In order to have a CT radiation dose as low as reasonably achievable  Automated Exposure Control was utilized for adjustment of the mA and/or  KV according to patient size.     DLP in mGycm= 299.     Abdomen/pelvis CT with IV contrast injection.     Comparison is made with March 25, 2023.     Unchanged appearance of focal diverticulitis at the distal descending  colon.     No free air or abscess.     Normal heart size.  Clear lung bases.  Normal liver, gallbladder, pancreas, adrenal glands, and kidneys.  The spleen is absent.     No appendicitis.     Small fat-containing left inguinal hernia noted.     Summary:  1. Acute diverticulitis changes involving a short segment of the colon  at left lower quadrant. A similar appearance compared with 2 days ago.  2. No free air or abscess.                                   This report was finalized on 03/27/2023 09:53 by Dr. Shivam Mejía MD.        I have personally reviewed and interpreted the radiology studies and ECG obtained at time of admission.     Assessment / Plan   Assessment:   Active Hospital Problems    Diagnosis    • **Diverticulitis            Medical Decision Making  Number and Complexity of problems:  Acute diverticulitis with worsening pain and intolerance to p.o. medication due to nausea vomiting.  Admitted for IV antibiotic, pain control, antiemetic.  Will start on clears since he is able to tolerate soda at home.  Repeat CT scan of the abdomen showed no evidence of complication.    Leukocytosis secondary to above-improving\    Left lower quadrant abdominal pain secondary to above    Treatment Plan  The patient will be admitted to my service here at [facility].   IV antibiotic  Pain  control  Antiemetic  Clears  Monitor progress    Conditions and Status      Stable    MDM Data  External documents reviewed: Reviewed ER record from prior visit  Cardiac tracing (EKG, telemetry) interpretation: None.   Radiology interpretation: Reviewed  Labs reviewed: Reviewed and compared  Any tests that were considered but not ordered: None     Decision rules/scores evaluated (example [id number], [place], etc): None     Discussed with: Patient and nurse Jacqueline     Care Planning  Shared decision making patient  Code status and discussions: Full support  States that Roberta Asencio girlfriend as well as mom Harleen Cerda mother will be his decision maker.      Disposition  Social Determinants of Health that impact treatment or disposition none identified at this time  Estimated length of stay is probably at least couple of days  I confirmed that the patient's advanced care plan is present, code status is documented, and a surrogate decision maker is listed in the patient's medical record.     The patient's surrogate decision maker as mentioned above  The patient was seen and examined by me on  Electronically signed by Mohinder French MD, 03/27/23, 11:11 CDT.

## 2023-03-27 NOTE — TELEPHONE ENCOUNTER
Discussed with patient symptoms and increased pain.  Patient advised to go to ED for further evaluation.  Patient informed, all questions answered, voiced understanding.  Patient scheduled for hospital follow up on 3/29/23

## 2023-03-28 PROBLEM — R10.32 LEFT LOWER QUADRANT ABDOMINAL PAIN: Status: ACTIVE | Noted: 2023-03-28

## 2023-03-28 PROBLEM — R11.2 NAUSEA WITH VOMITING: Status: ACTIVE | Noted: 2023-03-28

## 2023-03-28 PROBLEM — D72.829 LEUKOCYTOSIS: Status: ACTIVE | Noted: 2023-03-28

## 2023-03-28 PROBLEM — R11.0 NAUSEA WITHOUT VOMITING: Status: ACTIVE | Noted: 2023-03-28

## 2023-03-28 LAB
BASOPHILS # BLD AUTO: 0.13 10*3/MM3 (ref 0–0.2)
BASOPHILS NFR BLD AUTO: 1 % (ref 0–1.5)
DEPRECATED RDW RBC AUTO: 48.8 FL (ref 37–54)
EOSINOPHIL # BLD AUTO: 0.72 10*3/MM3 (ref 0–0.4)
EOSINOPHIL NFR BLD AUTO: 5.7 % (ref 0.3–6.2)
ERYTHROCYTE [DISTWIDTH] IN BLOOD BY AUTOMATED COUNT: 14.5 % (ref 12.3–15.4)
HCT VFR BLD AUTO: 44.4 % (ref 37.5–51)
HGB BLD-MCNC: 14 G/DL (ref 13–17.7)
IMM GRANULOCYTES # BLD AUTO: 0.04 10*3/MM3 (ref 0–0.05)
IMM GRANULOCYTES NFR BLD AUTO: 0.3 % (ref 0–0.5)
LYMPHOCYTES # BLD AUTO: 3.74 10*3/MM3 (ref 0.7–3.1)
LYMPHOCYTES NFR BLD AUTO: 29.8 % (ref 19.6–45.3)
MCH RBC QN AUTO: 28.7 PG (ref 26.6–33)
MCHC RBC AUTO-ENTMCNC: 31.5 G/DL (ref 31.5–35.7)
MCV RBC AUTO: 91.2 FL (ref 79–97)
MONOCYTES # BLD AUTO: 1.5 10*3/MM3 (ref 0.1–0.9)
MONOCYTES NFR BLD AUTO: 12 % (ref 5–12)
NEUTROPHILS NFR BLD AUTO: 51.2 % (ref 42.7–76)
NEUTROPHILS NFR BLD AUTO: 6.4 10*3/MM3 (ref 1.7–7)
NRBC BLD AUTO-RTO: 0 /100 WBC (ref 0–0.2)
PLATELET # BLD AUTO: 385 10*3/MM3 (ref 140–450)
PMV BLD AUTO: 10.9 FL (ref 6–12)
RBC # BLD AUTO: 4.87 10*6/MM3 (ref 4.14–5.8)
WBC NRBC COR # BLD: 12.53 10*3/MM3 (ref 3.4–10.8)

## 2023-03-28 PROCEDURE — G0378 HOSPITAL OBSERVATION PER HR: HCPCS

## 2023-03-28 PROCEDURE — 25010000002 ONDANSETRON PER 1 MG: Performed by: INTERNAL MEDICINE

## 2023-03-28 PROCEDURE — 25010000002 LEVOFLOXACIN PER 250 MG: Performed by: INTERNAL MEDICINE

## 2023-03-28 PROCEDURE — 25010000002 ENOXAPARIN PER 10 MG: Performed by: INTERNAL MEDICINE

## 2023-03-28 PROCEDURE — 25010000002 HYDROMORPHONE PER 4 MG: Performed by: INTERNAL MEDICINE

## 2023-03-28 PROCEDURE — 96372 THER/PROPH/DIAG INJ SC/IM: CPT

## 2023-03-28 PROCEDURE — 85025 COMPLETE CBC W/AUTO DIFF WBC: CPT | Performed by: INTERNAL MEDICINE

## 2023-03-28 PROCEDURE — 96366 THER/PROPH/DIAG IV INF ADDON: CPT

## 2023-03-28 PROCEDURE — 96376 TX/PRO/DX INJ SAME DRUG ADON: CPT

## 2023-03-28 PROCEDURE — 96361 HYDRATE IV INFUSION ADD-ON: CPT

## 2023-03-28 RX ADMIN — SODIUM CHLORIDE, POTASSIUM CHLORIDE, SODIUM LACTATE AND CALCIUM CHLORIDE 100 ML/HR: 600; 310; 30; 20 INJECTION, SOLUTION INTRAVENOUS at 08:42

## 2023-03-28 RX ADMIN — ONDANSETRON 4 MG: 2 INJECTION INTRAMUSCULAR; INTRAVENOUS at 14:16

## 2023-03-28 RX ADMIN — HYDROMORPHONE HYDROCHLORIDE 0.5 MG: 1 INJECTION, SOLUTION INTRAMUSCULAR; INTRAVENOUS; SUBCUTANEOUS at 21:56

## 2023-03-28 RX ADMIN — HYDROMORPHONE HYDROCHLORIDE 0.5 MG: 1 INJECTION, SOLUTION INTRAMUSCULAR; INTRAVENOUS; SUBCUTANEOUS at 08:42

## 2023-03-28 RX ADMIN — METRONIDAZOLE 500 MG: 500 INJECTION, SOLUTION INTRAVENOUS at 14:16

## 2023-03-28 RX ADMIN — SODIUM CHLORIDE, POTASSIUM CHLORIDE, SODIUM LACTATE AND CALCIUM CHLORIDE 100 ML/HR: 600; 310; 30; 20 INJECTION, SOLUTION INTRAVENOUS at 18:16

## 2023-03-28 RX ADMIN — LEVOFLOXACIN 750 MG: 5 INJECTION, SOLUTION INTRAVENOUS at 12:00

## 2023-03-28 RX ADMIN — ENOXAPARIN SODIUM 40 MG: 100 INJECTION SUBCUTANEOUS at 12:00

## 2023-03-28 RX ADMIN — METRONIDAZOLE 500 MG: 500 INJECTION, SOLUTION INTRAVENOUS at 06:01

## 2023-03-28 RX ADMIN — METRONIDAZOLE 500 MG: 500 INJECTION, SOLUTION INTRAVENOUS at 21:40

## 2023-03-28 RX ADMIN — ONDANSETRON 4 MG: 2 INJECTION INTRAMUSCULAR; INTRAVENOUS at 21:40

## 2023-03-28 NOTE — CASE MANAGEMENT/SOCIAL WORK
Discharge Planning Assessment   [place]     Patient Name: Saulo Shepard  MRN: [id number]  Today's Date: 3/28/2023    Admit Date: 3/27/2023        Discharge Needs Assessment     Row Name 03/28/23 0936       Living Environment    People in Home child(lyssa), dependent    Name(s) of People in Home 9 year old son    Current Living Arrangements home    Potentially Unsafe Housing Conditions none    Primary Care Provided by self    Provides Primary Care For no one    Family Caregiver if Needed child(lyssa), adult    Family Caregiver Names Roberta    Able to Return to Prior Arrangements no       Resource/Environmental Concerns    Resource/Environmental Concerns none       Transportation Needs    In the past 12 months, has lack of transportation kept you from medical appointments or from getting medications? no    In the past 12 months, has lack of transportation kept you from meetings, work, or from getting things needed for daily living? No       Food Insecurity    Within the past 12 months, you worried that your food would run out before you got the money to buy more. Never true    Within the past 12 months, the food you bought just didn't last and you didn't have money to get more. Never true       Transition Planning    Patient/Family Anticipates Transition to home    Transportation Anticipated family or friend will provide       Discharge Needs Assessment    Readmission Within the Last 30 Days no previous admission in last 30 days    Equipment Currently Used at Home none    Concerns to be Addressed no discharge needs identified    Equipment Needed After Discharge none    Discharge Coordination/Progress spoke to patient who lives with 9 year old son; has RX coverage and PCP; independent at home prior to illness working; will follow for DC needs               Discharge Plan    No documentation.               Continued Care and Services - Admitted Since 3/27/2023    Coordination has not been started for this encounter.        Expected Discharge Date and Time     Expected Discharge Date Expected Discharge Time    Mar 29, 2023          Demographic Summary    No documentation.                Functional Status    No documentation.                Psychosocial    No documentation.                Abuse/Neglect    No documentation.                Legal    No documentation.                Substance Abuse    No documentation.                Patient Forms    No documentation.                   Clara Blood RN

## 2023-03-28 NOTE — PROGRESS NOTES
[facility] Medicine Services  INPATIENT PROGRESS NOTE    Patient Name: Saulo Shepard  Date of Admission: 3/27/2023  Today's Date: 03/28/23  Length of Stay: 0  Primary Care Physician: Vivek Stewart DO    Subjective   Chief Complaint: Left lower quadrant abdominal pain, nausea with vomiting  HPI   Mr. Shepard represented to ER 3/27/2023 with persistent left lower quadrant abdominal pain associate with nausea and vomiting.  Initially, he presented to ER 3/25 with complaints of abdominal pain.  CT scan of the abdomen showed acute diverticulitis.  Patient was treated with Cipro, Flagyl and Zofran.  After returning home, patient continued to have left lower quadrant abdominal pain and was unable to tolerate medications due to nausea and vomiting.  He represented back to the emergency room, WBC 16.33.  CT scan of the abdomen repeated showing acute diverticulitis changes involving a short segment of the colon at the left lower quadrant.  Similar appearance compared to 2 days ago.  No free air or abscess.  Lactated Ringer's fluid bolus, Dilaudid IV, Zofran given in ER.    Patient lying in bed.  He reports left lower quadrant abdominal pain level 7 out of 10.  He reports nausea earlier today and required IV Zofran.  He reports nausea worsened after receiving Flagyl.  He denies chest pain or palpitations and is not requiring any oxygen at this time.  Continue clear liquid diet.  WBC 16 on admission down to 12 today.  Patient remains afebrile.    Review of Systems   Constitutional: Positive for appetite change and fatigue.   HENT: Negative for congestion and trouble swallowing.    Eyes: Negative for photophobia and visual disturbance.   Respiratory: Negative for cough, shortness of breath and wheezing.    Cardiovascular: Negative for chest pain, palpitations and leg swelling.   Gastrointestinal: Positive for abdominal pain (Left lower quadrant), nausea and vomiting.   Endocrine:  Negative for cold intolerance, heat intolerance and polyuria.   Genitourinary: Negative for dysuria, frequency and urgency.   Musculoskeletal: Negative for back pain and gait problem.   Skin: Negative for color change, pallor, rash and wound.   Allergic/Immunologic: Negative for immunocompromised state.   Neurological: Positive for weakness. Negative for light-headedness.   Hematological: Negative for adenopathy. Does not bruise/bleed easily.   Psychiatric/Behavioral: Negative for agitation, behavioral problems and confusion.      All pertinent negatives and positives are as above. All other systems have been reviewed and are negative unless otherwise stated.     Objective    Temp:  [97.6 °F (36.4 °C)-98.3 °F (36.8 °C)] 97.8 °F (36.6 °C)  Heart Rate:  [] 65  Resp:  [16-20] 16  BP: (111-134)/(55-88) 119/67  Physical Exam  Vitals and nursing note reviewed.   Constitutional:       Comments: Lying in bed.  No oxygen use.  No visitors in room.   HENT:      Head: Normocephalic and atraumatic.      Nose: No congestion.      Mouth/Throat:      Pharynx: Oropharynx is clear. No oropharyngeal exudate or posterior oropharyngeal erythema.   Eyes:      Extraocular Movements: Extraocular movements intact.      Pupils: Pupils are equal, round, and reactive to light.   Cardiovascular:      Rate and Rhythm: Normal rate and regular rhythm.   Pulmonary:      Breath sounds: No wheezing, rhonchi or rales.      Comments: No oxygen in use.  Abdominal:      Tenderness: There is abdominal tenderness (Left lower quadrant).   Genitourinary:     Comments: Voiding.  Musculoskeletal:         General: No swelling or tenderness.      Cervical back: Normal range of motion and neck supple.   Skin:     General: Skin is warm and dry.   Neurological:      General: No focal deficit present.      Mental Status: He is alert and oriented to person, place, and time.   Psychiatric:         Mood and Affect: Mood normal.         Behavior: Behavior normal.          Thought Content: Thought content normal.         Judgment: Judgment normal.       Results Review:  I have reviewed the labs, radiology results, and diagnostic studies.    Laboratory Data:   Results from last 7 days   Lab Units 03/28/23  0409 03/27/23  0914 03/25/23  1836   WBC 10*3/mm3 12.53* 16.33* 18.89*   HEMOGLOBIN g/dL 14.0 15.4 14.8   HEMATOCRIT % 44.4 47.6 45.3   PLATELETS 10*3/mm3 385 393 380        Results from last 7 days   Lab Units 03/27/23  0914 03/25/23  1859   SODIUM mmol/L 137 136   POTASSIUM mmol/L 4.0 3.9   CHLORIDE mmol/L 105 102   CO2 mmol/L 22.0 24.0   BUN mg/dL 11 10   CREATININE mg/dL 0.98 1.07   CALCIUM mg/dL 10.4 10.1   BILIRUBIN mg/dL 0.5 0.7   ALK PHOS U/L 89 87   ALT (SGPT) U/L 13 11   AST (SGOT) U/L 14 15   GLUCOSE mg/dL 105* 112*     Culture Data:   No results found for: BLOODCX, URINECX, WOUNDCX, MRSACX, RESPCX, STOOLCX    Radiology Data:   Imaging Results (Last 24 Hours)     Procedure Component Value Units Date/Time    CT Abdomen Pelvis With Contrast [[id number]] Collected: 03/27/23 0951     Updated: 03/27/23 0956    Narrative:      EXAMINATION: CT ABDOMEN PELVIS W CONTRAST-      3/27/2023 9:43 AM CDT     HISTORY: Left lower quadrant abdominal pain and nausea.     In order to have a CT radiation dose as low as reasonably achievable  Automated Exposure Control was utilized for adjustment of the mA and/or  KV according to patient size.     DLP in mGycm= 299.     Abdomen/pelvis CT with IV contrast injection.     Comparison is made with March 25, 2023.     Unchanged appearance of focal diverticulitis at the distal descending  colon.     No free air or abscess.     Normal heart size.  Clear lung bases.  Normal liver, gallbladder, pancreas, adrenal glands, and kidneys.  The spleen is absent.     No appendicitis.     Small fat-containing left inguinal hernia noted.     Summary:  1. Acute diverticulitis changes involving a short segment of the colon  at left lower quadrant. A similar  appearance compared with 2 days ago.  2. No free air or abscess.                                   This report was finalized on 03/27/2023 09:53 by Dr. Shivam Mejía MD.        Schedule medications:  enoxaparin, 40 mg, Subcutaneous, Q24H  fluticasone, 2 spray, Nasal, Daily  levoFLOXacin, 750 mg, Intravenous, Q24H  metroNIDAZOLE, 500 mg, Intravenous, Q8H  sodium chloride, 10 mL, Intravenous, Q12H      I have reviewed the patient's current medications.     Assessment/Plan   Assessment  Active Hospital Problems    Diagnosis    • **Acute descending diverticulitis    • Left lower quadrant abdominal pain    • Leukocytosis    • Nausea with vomiting      Treatment Plan  Acute diverticulitis descending colon.  Patient presented to ER 3/25 with abdominal pain and was found to have acute diverticulitis.  He was treated with Cipro, Flagyl, Zofran and discharge.  After returning home, patient continued with left lower quadrant abdominal pain and was not able to tolerate oral medications due to vomiting.  He represented to ER with imaging studies show persistent ascending diverticulitis.  Ringer's fluid bolus, Dilaudid, Zofran, Levaquin given in ER.  Continue Levaquin IV, Flagyl IV.  IV fluids at 100 mL/h.  Clear liquid diet.    Left lower quadrant abdominal pain.  Dilaudid IV for severe pain.    Nausea with vomiting.  Zofran as needed for nausea.  Patient indicated some nausea after receiving Flagyl.    Leukocytosis.  WBC 16 down to 12 today.  Repeat CBC in AM.  Remains afebrile.  Blood cultures in progress.    Lovenox for deep vein thrombosis prophylaxis.  Out of bed and ambulate.    Clear liquid diet.  Will advance diet when nausea improved.  Continue IV fluids.    CBC, BMP in AM.    Medical Decision Making  Number and Complexity of problems:   Acute diverticulitis descending colon: Acute, high complexity  Left lower quadrant abdominal pain: Acute, high complexity  Nausea with vomiting: Acute, moderate  complexity  Leukocytosis: Acute, moderate complexity    Differential Diagnosis: None    Conditions and Status        Condition is unchanged.     [facility] Data  External documents reviewed: ER visit 3/25  Cardiac tracing (EKG, telemetry) interpretation: None  Radiology interpretation: Reviewed radiology interpretation repeat CT scan of abdomen 3/27  Labs reviewed:   CBC 3/28/2023  CMP 3/27/2023  Blood cultures    Any tests that were considered but not ordered: None     Decision rules/scores evaluated (example [id number], Wells, etc): None     Discussed with: Dr. French and patient.     Care Planning  Shared decision making: Dr. French and patient.  Patient agrees to IV antibiotics, IV pain medication, clear liquid diet.  Code status and discussions: Full code  The patient surrogate decision maker is his daughter, Eddie Zamora  Social Determinants of Health that impact treatment or disposition: None  I expect the patient to be discharged to home in 1-2 days.     Electronically signed by KYLER Sigala, 03/28/23, 07:05 CDT.

## 2023-03-29 ENCOUNTER — READMISSION MANAGEMENT (OUTPATIENT)
Dept: CALL CENTER | Facility: HOSPITAL | Age: 48
End: 2023-03-29
Payer: COMMERCIAL

## 2023-03-29 VITALS
WEIGHT: 208 LBS | OXYGEN SATURATION: 97 % | SYSTOLIC BLOOD PRESSURE: 123 MMHG | HEIGHT: 73 IN | RESPIRATION RATE: 16 BRPM | BODY MASS INDEX: 27.57 KG/M2 | TEMPERATURE: 97.7 F | DIASTOLIC BLOOD PRESSURE: 72 MMHG | HEART RATE: 68 BPM

## 2023-03-29 LAB
ANION GAP SERPL CALCULATED.3IONS-SCNC: 7 MMOL/L (ref 5–15)
BASOPHILS # BLD AUTO: 0.15 10*3/MM3 (ref 0–0.2)
BASOPHILS NFR BLD AUTO: 1.6 % (ref 0–1.5)
BUN SERPL-MCNC: 11 MG/DL (ref 6–20)
BUN/CREAT SERPL: 10.4 (ref 7–25)
CALCIUM SPEC-SCNC: 10.4 MG/DL (ref 8.6–10.5)
CHLORIDE SERPL-SCNC: 105 MMOL/L (ref 98–107)
CO2 SERPL-SCNC: 27 MMOL/L (ref 22–29)
CREAT SERPL-MCNC: 1.06 MG/DL (ref 0.76–1.27)
DEPRECATED RDW RBC AUTO: 46.1 FL (ref 37–54)
EGFRCR SERPLBLD CKD-EPI 2021: 87.7 ML/MIN/1.73
EOSINOPHIL # BLD AUTO: 0.71 10*3/MM3 (ref 0–0.4)
EOSINOPHIL NFR BLD AUTO: 7.4 % (ref 0.3–6.2)
ERYTHROCYTE [DISTWIDTH] IN BLOOD BY AUTOMATED COUNT: 14.1 % (ref 12.3–15.4)
GLUCOSE SERPL-MCNC: 101 MG/DL (ref 65–99)
HCT VFR BLD AUTO: 45.1 % (ref 37.5–51)
HGB BLD-MCNC: 14.8 G/DL (ref 13–17.7)
IMM GRANULOCYTES # BLD AUTO: 0.03 10*3/MM3 (ref 0–0.05)
IMM GRANULOCYTES NFR BLD AUTO: 0.3 % (ref 0–0.5)
LYMPHOCYTES # BLD AUTO: 3.88 10*3/MM3 (ref 0.7–3.1)
LYMPHOCYTES NFR BLD AUTO: 40.5 % (ref 19.6–45.3)
MCH RBC QN AUTO: 29.5 PG (ref 26.6–33)
MCHC RBC AUTO-ENTMCNC: 32.8 G/DL (ref 31.5–35.7)
MCV RBC AUTO: 89.8 FL (ref 79–97)
MONOCYTES # BLD AUTO: 1.06 10*3/MM3 (ref 0.1–0.9)
MONOCYTES NFR BLD AUTO: 11.1 % (ref 5–12)
NEUTROPHILS NFR BLD AUTO: 3.75 10*3/MM3 (ref 1.7–7)
NEUTROPHILS NFR BLD AUTO: 39.1 % (ref 42.7–76)
NRBC BLD AUTO-RTO: 0 /100 WBC (ref 0–0.2)
PLATELET # BLD AUTO: 416 10*3/MM3 (ref 140–450)
PMV BLD AUTO: 10.5 FL (ref 6–12)
POTASSIUM SERPL-SCNC: 4 MMOL/L (ref 3.5–5.2)
RBC # BLD AUTO: 5.02 10*6/MM3 (ref 4.14–5.8)
SODIUM SERPL-SCNC: 139 MMOL/L (ref 136–145)
WBC NRBC COR # BLD: 9.58 10*3/MM3 (ref 3.4–10.8)

## 2023-03-29 PROCEDURE — 96366 THER/PROPH/DIAG IV INF ADDON: CPT

## 2023-03-29 PROCEDURE — 25010000002 LEVOFLOXACIN PER 250 MG: Performed by: INTERNAL MEDICINE

## 2023-03-29 PROCEDURE — 80048 BASIC METABOLIC PNL TOTAL CA: CPT | Performed by: NURSE PRACTITIONER

## 2023-03-29 PROCEDURE — G0378 HOSPITAL OBSERVATION PER HR: HCPCS

## 2023-03-29 PROCEDURE — 96361 HYDRATE IV INFUSION ADD-ON: CPT

## 2023-03-29 PROCEDURE — 96376 TX/PRO/DX INJ SAME DRUG ADON: CPT

## 2023-03-29 PROCEDURE — 25010000002 ONDANSETRON PER 1 MG: Performed by: INTERNAL MEDICINE

## 2023-03-29 PROCEDURE — 85025 COMPLETE CBC W/AUTO DIFF WBC: CPT | Performed by: NURSE PRACTITIONER

## 2023-03-29 RX ORDER — HYDROCODONE BITARTRATE AND ACETAMINOPHEN 5; 325 MG/1; MG/1
1 TABLET ORAL EVERY 6 HOURS PRN
Status: DISCONTINUED | OUTPATIENT
Start: 2023-03-29 | End: 2023-03-29 | Stop reason: HOSPADM

## 2023-03-29 RX ORDER — METRONIDAZOLE 500 MG/1
500 TABLET ORAL 3 TIMES DAILY
Qty: 20 TABLET | Refills: 0
Start: 2023-03-29 | End: 2023-04-08

## 2023-03-29 RX ADMIN — Medication 10 ML: at 08:05

## 2023-03-29 RX ADMIN — LEVOFLOXACIN 750 MG: 5 INJECTION, SOLUTION INTRAVENOUS at 06:19

## 2023-03-29 RX ADMIN — HYDROCODONE BITARTRATE AND ACETAMINOPHEN 1 TABLET: 5; 325 TABLET ORAL at 09:46

## 2023-03-29 RX ADMIN — FLUTICASONE PROPIONATE 2 SPRAY: 50 SPRAY, METERED NASAL at 08:05

## 2023-03-29 RX ADMIN — SODIUM CHLORIDE, POTASSIUM CHLORIDE, SODIUM LACTATE AND CALCIUM CHLORIDE 100 ML/HR: 600; 310; 30; 20 INJECTION, SOLUTION INTRAVENOUS at 04:24

## 2023-03-29 RX ADMIN — ONDANSETRON 4 MG: 2 INJECTION INTRAMUSCULAR; INTRAVENOUS at 05:12

## 2023-03-29 RX ADMIN — METRONIDAZOLE 500 MG: 500 INJECTION, SOLUTION INTRAVENOUS at 05:13

## 2023-03-29 NOTE — PLAN OF CARE
Problem: Adult Inpatient Plan of Care  Goal: Plan of Care Review  Outcome: Ongoing, Progressing  Flowsheets (Taken 3/29/2023 3917)  Progress: no change  Plan of Care Reviewed With: patient  Outcome Evaluation: VSS. Pt c/o pain x1. See MAR. PRN N/V medicine given x1. See MAR. Full liquid diet. IVF infusing. Abx. Up ad rosemary. Safety maintained.

## 2023-03-29 NOTE — PLAN OF CARE
Goal Outcome Evaluation:  Plan of Care Reviewed With: patient        Progress: improving  Outcome Evaluation: ivf/ iv abx cont. requests nausea med prior to giving flagyl. minimal pain llq, pain med x1 this shift. nick full liq. voiding without diff. up ad rosemary. cont to monitor.

## 2023-03-29 NOTE — OUTREACH NOTE
Prep Survey    Flowsheet Row Responses   [facility] facility patient discharged from? [place]   Is LACE score < 7 ? Yes   Eligibility [facility]   Date of Admission 03/27/23   Date of Discharge 03/29/23   Discharge Disposition Home or Self Care   Discharge diagnosis Acute descending diverticulitis   Does the patient have one of the following disease processes/diagnoses(primary or secondary)? Other   Does the patient have Home health ordered? No   Is there a DME ordered? No   Prep survey completed? Yes          CHLOE A - Registered Nurse

## 2023-03-29 NOTE — PLAN OF CARE
Goal Outcome Evaluation:  Plan of Care Reviewed With: patient        Progress: improving  Outcome Evaluation: A&OX4, VSS. C/o minor ABD pain at times, no pain med required at this time. GI/Low irritant diet, tolerating well. IVF and IV ABX given. Up ad rosemary, voiding w/o difficulty. On room air and . Lovenox for VTE prevention. D/c home this shift. Call light in reach, safety maintained.

## 2023-03-29 NOTE — DISCHARGE SUMMARY
[facility] Medicine Services  DISCHARGE SUMMARY     Date of Admission: 3/27/2023  Date of Discharge:  3/29/2023  Primary Care Physician: Vivek Stewart DO    Presenting Problem/History of Present Illness:  Left lower quadrant abdominal pain, nausea and vomiting    Final Discharge Diagnoses:  Active Hospital Problems    Diagnosis    • **Acute descending diverticulitis    • Left lower quadrant abdominal pain    • Leukocytosis    • Nausea with vomiting      Consults: None    Procedures Performed: None    Pertinent Test Results:     Imaging Results (All)     Procedure Component Value Units Date/Time    CT Abdomen Pelvis With Contrast [[id number]] Collected: 03/27/23 0951     Updated: 03/27/23 0956    Narrative:      EXAMINATION: CT ABDOMEN PELVIS W CONTRAST-      3/27/2023 9:43 AM CDT     HISTORY: Left lower quadrant abdominal pain and nausea.     In order to have a CT radiation dose as low as reasonably achievable  Automated Exposure Control was utilized for adjustment of the mA and/or  KV according to patient size.     DLP in mGycm= 299.     Abdomen/pelvis CT with IV contrast injection.     Comparison is made with March 25, 2023.     Unchanged appearance of focal diverticulitis at the distal descending  colon.     No free air or abscess.     Normal heart size.  Clear lung bases.  Normal liver, gallbladder, pancreas, adrenal glands, and kidneys.  The spleen is absent.     No appendicitis.     Small fat-containing left inguinal hernia noted.     Summary:  1. Acute diverticulitis changes involving a short segment of the colon  at left lower quadrant. A similar appearance compared with 2 days ago.  2. No free air or abscess.   This report was finalized on 03/27/2023 09:53 by Dr. Shivam Mejía MD.        LAB RESULTS:      Lab 03/29/23  0437 03/28/23  0409 03/27/23  0914 03/25/23  1859 03/25/23  1836   WBC 9.58 12.53* 16.33*  --  18.89*   HEMOGLOBIN 14.8 14.0 15.4  --  14.8    HEMATOCRIT 45.1 44.4 47.6  --  45.3   PLATELETS 416 385 393  --  380   NEUTROS ABS 3.75 6.40 11.43*  --  12.47*   IMMATURE GRANS (ABS) 0.03 0.04  --   --   --    LYMPHS ABS 3.88* 3.74*  --   --   --    MONOS ABS 1.06* 1.50*  --   --   --    EOS ABS 0.71* 0.72* 0.49*  --  0.19   MCV 89.8 91.2 89.1  --  89.3   PROCALCITONIN  --   --   --  0.06  --    LACTATE  --   --  0.8  --  0.8         Lab 03/29/23  0437 03/27/23  0914 03/25/23  1859   SODIUM 139 137 136   POTASSIUM 4.0 4.0 3.9   CHLORIDE 105 105 102   CO2 27.0 22.0 24.0   ANION GAP 7.0 10.0 10.0   BUN 11 11 10   CREATININE 1.06 0.98 1.07   EGFR 87.7 96.3 86.7   GLUCOSE 101* 105* 112*   CALCIUM 10.4 10.4 10.1   MAGNESIUM  --   --  2.1         Lab 03/27/23  0914 03/25/23  1859   TOTAL PROTEIN 7.5 7.2   ALBUMIN 3.9 3.9   GLOBULIN 3.6 3.3   ALT (SGPT) 13 11   AST (SGOT) 14 15   BILIRUBIN 0.5 0.7   ALK PHOS 89 87   LIPASE  --  18                     Brief Urine Lab Results  (Last result in the past 365 days)      Color   Clarity   Blood   Leuk Est   Nitrite   Protein   CREAT   Urine HCG        03/27/23 0939 Dark Yellow   Clear   Negative   Small (1+)   Negative   Negative               Microbiology Results (last 10 days)     Procedure Component Value - Date/Time    Blood Culture - Blood, Hand, Digit Right [[id number]]  (Normal) Collected: 03/27/23 1044    Lab Status: Preliminary result Specimen: Blood from Hand, Digit Right Updated: 03/28/23 1130     Blood Culture No growth at 24 hours    Blood Culture - Blood, Hand, Digit Left [[id number]]  (Normal) Collected: 03/27/23 1044    Lab Status: Preliminary result Specimen: Blood from Hand, Digit Left Updated: 03/28/23 1130     Blood Culture No growth at 24 hours    COVID-19, FLU A/B, RSV PCR - Swab, Nasopharynx [[id number]]  (Normal) Collected: 03/25/23 1858    Lab Status: Final result Specimen: Swab from Nasopharynx Updated: 03/25/23 1950     COVID19 Not Detected     Influenza A PCR Not Detected     Influenza B PCR Not  Detected     RSV, PCR Not Detected    Narrative:      Fact sheet for providers: https://www.fda.gov/media/387588/download    Fact sheet for patients: https://www.fda.gov/media/677452/download    Test performed by PCR.        Hospital Course: Mr. Shepard represented to [facility] emergency room 3/27/2023 with persistent left lower quadrant abdominal pain associate with nausea and vomiting.  Initially, he presented to ER 3/25 with complaints of abdominal pain.  CT scan of the abdomen showed acute diverticulitis.  Patient was treated with Cipro, Flagyl and Zofran.  After returning home, patient continued to have left lower quadrant abdominal pain and was unable to tolerate medications due to nausea and vomiting.  He represented back to the emergency room, WBC 16.33.  CT scan of the abdomen repeated showing acute diverticulitis changes involving a short segment of the colon at the left lower quadrant.  Similar appearance compared to 2 days ago.  No free air or abscess.  Lactated Ringer's fluid bolus, Dilaudid IV, Zofran given in ER.    He was admitted to the medical floor with acute diverticulitis descending colon.  Patient presented to ER 3/25 and treated with Cipro, Flagyl, Zofran and discharged.  After returning home, he had continued left lower quadrant abdominal pain and was not able to tolerate oral medications.  He was admitted for IV antibiotics and pain control.  He received Levaquin IV and Flagyl IV.  Patient did note some mild nausea after receiving Flagyl.  Zofran given with improvement of symptoms.  Patient was started on clear liquid diet on admission, advanced to full liquids and GI bland soft diet.  He will resume Cipro 500 mg twice daily on 3/30/2023 and complete total of 10-day antibiotic treatment.  Flagyl will be transitioned to orally 3 times daily and patient will complete treatment with medication previously prescribed.  Zofran may be continued as needed for nausea.    WBC 16 on  "admission, down to 12 the day after admission.  WBC trended down to 9.58 on date of discharge.  Patient remained afebrile.  Blood cultures remain no growth.  Creatinine 1.06, potassium 4, sodium 139.    Lovenox ordered for deep vein thrombosis prophylaxis.  Patient was ambulatory per self.    On 3/29/2023, patient is stable for discharge to transition back to oral antibiotics Cipro and Flagyl.  He has remained afebrile and white blood cell count has normalized.  Patient will follow-up with Dr. Stewart on 4//23.    Physical Exam on Discharge:  /72 (BP Location: Left arm, Patient Position: Lying)   Pulse 68   Temp 97.7 °F (36.5 °C) (Oral)   Resp 16   Ht 185.4 cm (73\")   Wt 94.3 kg (208 lb)   SpO2 97%   BMI 27.44 kg/m²   Physical Exam  Vitals and nursing note reviewed.   Constitutional:       Comments: Sitting up in bed.  No oxygen use.  No visitors in room.   HENT:      Head: Normocephalic and atraumatic.      Nose: No congestion.      Mouth/Throat:      Pharynx: Oropharynx is clear. No oropharyngeal exudate or posterior oropharyngeal erythema.   Eyes:      Extraocular Movements: Extraocular movements intact.      Pupils: Pupils are equal, round, and reactive to light.   Cardiovascular:      Rate and Rhythm: Normal rate and regular rhythm.      Heart sounds: No murmur heard.  Pulmonary:      Breath sounds: No wheezing, rhonchi or rales.      Comments: No oxygen in use.  Abdominal:      General: There is no distension.      Palpations: Abdomen is soft.      Comments: Very mild left lower quadrant tenderness, continues to improve since admission.   Genitourinary:     Comments: Voiding.  Musculoskeletal:         General: No swelling or tenderness.      Cervical back: Normal range of motion and neck supple.   Skin:     General: Skin is warm and dry.   Neurological:      General: No focal deficit present.      Mental Status: He is alert and oriented to person, place, and time.   Psychiatric:         Mood and " Affect: Mood normal.         Behavior: Behavior normal.         Thought Content: Thought content normal.         Judgment: Judgment normal.       Condition on Discharge: Stable    Discharge Disposition: Home    Discharge Medications:     Discharge Medications      Changes to Medications      Instructions Start Date   metroNIDAZOLE 500 MG tablet  Commonly known as: FLAGYL  What changed: when to take this   500 mg, Oral, 3 Times Daily         Continue These Medications      Instructions Start Date   ciprofloxacin 500 MG tablet  Commonly known as: CIPRO   500 mg, Oral, 2 Times Daily.  Restart 3/30/2023 and complete prescription      fluticasone 50 MCG/ACT nasal spray  Commonly known as: FLONASE   2 sprays, Nasal, Daily      ibuprofen 800 MG tablet  Commonly known as: ADVIL,MOTRIN   800 mg, Oral, Every 8 Hours PRN      ondansetron ODT 4 MG disintegrating tablet  Commonly known as: ZOFRAN-ODT   4 mg, Translingual, Every 6 Hours PRN           Discharge Diet:   Diet Instructions     Diet: Regular/House Diet; Texture: Regular Texture (IDDSI 7); Fluid Consistency: Thin (IDDSI 0)      Discharge Diet: Regular/House Diet    Texture: Regular Texture (IDDSI 7)    Fluid Consistency: Thin (IDDSI 0)    [place] diet        Activity at Discharge:   Activity Instructions     Activity as Tolerated           Discharge instructions:  1.  For recurrent, worsening left lower quadrant abdominal pain, fever, chills seek medical attention.  2.  Restart Cipro 500 mg orally twice daily on 3/30/2023 and complete prescription as previously prescribed.  3.  Resume Flagyl 500 mg and increase frequency to every 8 hours and complete prescription as previously prescribed.  4.  Follow-up with Dr. Stewart 4/4/23.    Follow-up Appointments:   Future Appointments   Date Time Provider Department Center   4/4/2023  9:30 AM Vivek Stewart DO [facility]     Test Results Pending at Discharge: None    Electronically signed by KYLER Sigala,  03/29/23, 09:20 CDT.    Time: 35 minutes.  Discussed with Dr. French and patient.    The above documentation resulted from a face-to-face encounter by TRACY Agustin-BC.

## 2023-03-30 ENCOUNTER — TRANSITIONAL CARE MANAGEMENT TELEPHONE ENCOUNTER (OUTPATIENT)
Dept: CALL CENTER | Facility: HOSPITAL | Age: 48
End: 2023-03-30
Payer: COMMERCIAL

## 2023-03-30 NOTE — OUTREACH NOTE
Call Center TCM Note    Flowsheet Row Responses   [facility] patient discharged from? [place]   Does the patient have one of the following disease processes/diagnoses(primary or secondary)? Other   TCM attempt successful? Yes   Call start time 1259   Call end time 1301   Discharge diagnosis Acute descending diverticulitis   Meds reviewed with patient/caregiver? Yes   Is the patient having any side effects they believe may be caused by any medication additions or changes? No   Does the patient have all medications ordered at discharge? N/A   Is the patient taking all medications as directed (includes completed medication regime)? Yes   Comments Apt scheduled prior to call for 4/4/23 with PCP    Does the patient have an appointment with their PCP within 7 days of discharge? Yes   Has home health visited the patient within 72 hours of discharge? N/A   Psychosocial issues? No   Did the patient receive a copy of their discharge instructions? Yes   Nursing interventions Reviewed instructions with patient   What is the patient's perception of their health status since discharge? Improving   Is the patient/caregiver able to teach back signs and symptoms related to disease process for when to call PCP? Yes   Is the patient/caregiver able to teach back signs and symptoms related to disease process for when to call 911? Yes   Is the patient/caregiver able to teach back the hierarchy of who to call/visit for symptoms/problems? PCP, Specialist, Home health nurse, Urgent Care, ED, 911 Yes   If the patient is a current smoker, are they able to teach back resources for cessation? Not a smoker   TCM call completed? Yes   Call end time 1301          Ca Sawyer RN    3/30/2023, 13:02 CDT

## 2023-04-01 ENCOUNTER — NURSE TRIAGE (OUTPATIENT)
Dept: CALL CENTER | Facility: HOSPITAL | Age: 48
End: 2023-04-01
Payer: COMMERCIAL

## 2023-04-01 LAB
BACTERIA SPEC AEROBE CULT: NORMAL
BACTERIA SPEC AEROBE CULT: NORMAL

## 2023-04-02 NOTE — TELEPHONE ENCOUNTER
"    Reason for Disposition  • [1] Pain at IV site or shooting up arm AND [2] NO redness or swelling AND [3] IV has been removed    Additional Information  • Negative: SEVERE difficulty breathing (e.g., struggling for each breath, speaks in single words)  • Negative: Shock suspected (e.g., cold/pale/clammy skin, too weak to stand, low BP, rapid pulse)  • Negative: Sounds like a life-threatening emergency to the triager  • Negative: IV not running or running slowly  • Negative: [1] Difficulty breathing AND [2] not severe  • Negative: Arm is swollen, new-onset (or leg swelling if IV in lower extremity)  • Negative: Fever > 100.4 F (38.0 C)  • Negative: Patient sounds very sick or weak to the triager  • Negative: Pus or cloudy fluid from IV site  • Negative: [1] Red streak at IV site AND [2] palpable cord  • Negative: [1] Red streak at IV site AND [2] longer than 1 inch (2.5 cm)  • Negative: [1] Skin redness AND [2] extends > 1 inch (2.5 cm) from IV site  • Negative: Skin swelling at IV site (Exception: IV recently removed and small area of skin swelling)  • Negative: [1] Raised bruise at IV site AND [2] size > 2 inches (5 cm) AND [3] expanding  • Negative: [1] Pain at IV site or shooting up arm AND [2] IV running slowly  • Negative: [1] Mild bleeding at IV site AND [2] not stopped after following CARE ADVICE  • Negative: [1] Dressing needs to be changed (e.g., wet, soiled, loose, or open to air) AND [2] unable or unwilling to perform dressing change  • Negative: [1] Small area of skin redness at IV site (<1 inch or 2.5 cm) AND [2] no fever, swelling  • Negative: [1] Pain at IV site or shooting up arm AND [2] NO redness or swelling AND [3]  IV running normally    Answer Assessment - Initial Assessment Questions  1. SYMPTOM:  \"What's the main symptom you're concerned about?\" (e.g., pain, redness, swelling, pus)       Swelling knot in middle  2. ONSET: \"When did the \swelling start?\"      wednesday  3. IV WHAT: \"What kind " "of IV line do you have?\" (e.g., central line, PICC, peripheral IV)      pheriperal  4. IV WHERE: \"Where does the IV enter your body?\"      Left forearm  5. IV WHEN: \"When was this IV put in?\"      Last week  6. IV WHY: \"Why do you have this IV line?\"      Not there anymore  7. IV RUNNING OK: \"Is the IV running OK?\" (e.g., running OK, running slowly, not running, unable to flush)       no  8. PAIN: \"Is there any pain?\" If Yes, ask: \"How bad is the pain?\"   (e.g., scale 1-10; or mild, moderate, severe)      *mod \"4\"9. OTHER SYMPTOMS: \"Do you have any other symptoms?\" (e.g., fever, shaking chills, new weakness, pus)     denies  10. VISITING NURSE: \"Do you have a visiting nurse?\" (e.g., home health nurse, IV infusion nurse)        no    Protocols used: IV SITE (SKIN) SYMPTOMS-ADULT-AH      "

## 2023-04-04 ENCOUNTER — OFFICE VISIT (OUTPATIENT)
Dept: FAMILY MEDICINE CLINIC | Facility: CLINIC | Age: 48
End: 2023-04-04
Payer: COMMERCIAL

## 2023-04-04 VITALS
OXYGEN SATURATION: 97 % | WEIGHT: 206.2 LBS | BODY MASS INDEX: 27.33 KG/M2 | HEIGHT: 73 IN | HEART RATE: 73 BPM | DIASTOLIC BLOOD PRESSURE: 70 MMHG | SYSTOLIC BLOOD PRESSURE: 122 MMHG | TEMPERATURE: 97.3 F

## 2023-04-04 DIAGNOSIS — K57.92 DIVERTICULITIS: ICD-10-CM

## 2023-04-04 DIAGNOSIS — N63.24 MASS OF LOWER INNER QUADRANT OF LEFT BREAST: Primary | ICD-10-CM

## 2023-04-04 PROCEDURE — 99213 OFFICE O/P EST LOW 20 MIN: CPT | Performed by: FAMILY MEDICINE

## 2023-04-04 PROCEDURE — 1160F RVW MEDS BY RX/DR IN RCRD: CPT | Performed by: FAMILY MEDICINE

## 2023-04-04 PROCEDURE — 1159F MED LIST DOCD IN RCRD: CPT | Performed by: FAMILY MEDICINE

## 2023-04-20 NOTE — PROGRESS NOTES
"Chief Complaint  Hospital Follow Up Visit ([facility] admission 3/27/23-3/29/23) and Cyst (Left, breast has grown in size, sore )    Subjective        Saulo Shepard presents to [facility]  History of Present Illness  Abdominal pain improving after hospital admission above for diverticulitis, reports fullness to left breast that has grown in size the does have soreness to palpation  Objective   Vital Signs:  /70 (BP Location: Left arm, Patient Position: Sitting, Cuff Size: Adult)   Pulse 73   Temp 97.3 °F (36.3 °C) (Temporal)   Ht 185.4 cm (73\")   Wt 93.5 kg (206 lb 3.2 oz)   SpO2 97%   BMI 27.20 kg/m²   Estimated body mass index is 27.2 kg/m² as calculated from the following:    Height as of this encounter: 185.4 cm (73\").    Weight as of this encounter: 93.5 kg (206 lb 3.2 oz).             Physical Exam  Vitals and nursing note reviewed.   Constitutional:       General: He is not in acute distress.     Appearance: He is not diaphoretic.   HENT:      Head: Normocephalic and atraumatic.      Nose: Nose normal.   Eyes:      General: No scleral icterus.        Right eye: No discharge.         Left eye: No discharge.      Conjunctiva/sclera: Conjunctivae normal.   Neck:      Trachea: No tracheal deviation.   Pulmonary:      Effort: Pulmonary effort is normal.   Skin:     General: Skin is warm and dry.      Coloration: Skin is not pale.      Comments: 1 cm mass in left breast   Neurological:      Mental Status: He is alert and oriented to person, place, and time.   Psychiatric:         Behavior: Behavior normal.         Thought Content: Thought content normal.         Judgment: Judgment normal.        Result Review :                   Assessment and Plan   Diagnoses and all orders for this visit:    1. Mass of lower inner quadrant of left breast (Primary)  -     Mammo Diagnostic Left With CAD; Future  -     US Breast Left Limited; Future    2. Diverticulitis    Diverticulitis " resolved, work-up above for mass         Follow Up   Return if symptoms worsen or fail to improve.  Patient was given instructions and counseling regarding his condition or for health maintenance advice. Please see specific information pulled into the AVS if appropriate.

## 2023-05-12 ENCOUNTER — HOSPITAL ENCOUNTER (OUTPATIENT)
Dept: MAMMOGRAPHY | Facility: HOSPITAL | Age: 48
Discharge: HOME | End: 2023-05-12
Payer: COMMERCIAL

## 2023-05-12 ENCOUNTER — HOSPITAL ENCOUNTER (OUTPATIENT)
Dept: ULTRASOUND IMAGING | Facility: HOSPITAL | Age: 48
Discharge: HOME | End: 2023-05-12
Payer: COMMERCIAL

## 2023-05-12 DIAGNOSIS — N63.24 MASS OF LOWER INNER QUADRANT OF LEFT BREAST: ICD-10-CM

## 2023-05-12 PROCEDURE — G0279 TOMOSYNTHESIS, MAMMO: HCPCS

## 2023-05-12 PROCEDURE — 77066 DX MAMMO INCL CAD BI: CPT

## 2023-05-12 PROCEDURE — 76642 ULTRASOUND BREAST LIMITED: CPT

## 2023-05-29 DIAGNOSIS — M25.511 PAIN IN RIGHT SHOULDER: ICD-10-CM

## 2023-05-30 RX ORDER — IBUPROFEN 800 MG/1
TABLET ORAL
Qty: 90 TABLET | Refills: 2 | Status: SHIPPED | OUTPATIENT
Start: 2023-05-30

## 2023-06-06 ENCOUNTER — APPOINTMENT (OUTPATIENT)
Dept: CT IMAGING | Facility: HOSPITAL | Age: 48
End: 2023-06-06
Payer: COMMERCIAL

## 2023-06-06 ENCOUNTER — HOSPITAL ENCOUNTER (EMERGENCY)
Facility: HOSPITAL | Age: 48
Discharge: HOME | End: 2023-06-06
Attending: FAMILY MEDICINE | Admitting: FAMILY MEDICINE
Payer: COMMERCIAL

## 2023-06-06 ENCOUNTER — APPOINTMENT (OUTPATIENT)
Dept: GENERAL RADIOLOGY | Facility: HOSPITAL | Age: 48
End: 2023-06-06
Payer: COMMERCIAL

## 2023-06-06 VITALS
WEIGHT: 212 LBS | RESPIRATION RATE: 16 BRPM | BODY MASS INDEX: 28.1 KG/M2 | DIASTOLIC BLOOD PRESSURE: 93 MMHG | SYSTOLIC BLOOD PRESSURE: 144 MMHG | TEMPERATURE: 98.1 F | HEIGHT: 73 IN | HEART RATE: 62 BPM | OXYGEN SATURATION: 98 %

## 2023-06-06 DIAGNOSIS — N20.0 NEPHROLITHIASIS: ICD-10-CM

## 2023-06-06 DIAGNOSIS — K40.20 BILATERAL INGUINAL HERNIA WITHOUT OBSTRUCTION OR GANGRENE, RECURRENCE NOT SPECIFIED: ICD-10-CM

## 2023-06-06 DIAGNOSIS — K42.9 PERIUMBILICAL HERNIA: ICD-10-CM

## 2023-06-06 DIAGNOSIS — R10.12 LEFT UPPER QUADRANT ABDOMINAL PAIN: Primary | ICD-10-CM

## 2023-06-06 DIAGNOSIS — M25.561 ACUTE PAIN OF RIGHT KNEE: ICD-10-CM

## 2023-06-06 LAB
ALBUMIN SERPL-MCNC: 4.1 G/DL (ref 3.5–5.2)
ALBUMIN/GLOB SERPL: 1.2 G/DL
ALP SERPL-CCNC: 95 U/L (ref 39–117)
ALT SERPL W P-5'-P-CCNC: 37 U/L (ref 1–41)
ANION GAP SERPL CALCULATED.3IONS-SCNC: 14 MMOL/L (ref 5–15)
AST SERPL-CCNC: 27 U/L (ref 1–40)
BASOPHILS # BLD AUTO: 0.12 10*3/MM3 (ref 0–0.2)
BASOPHILS NFR BLD AUTO: 1.2 % (ref 0–1.5)
BILIRUB SERPL-MCNC: 0.5 MG/DL (ref 0–1.2)
BILIRUB UR QL STRIP: NEGATIVE
BUN SERPL-MCNC: 12 MG/DL (ref 6–20)
BUN/CREAT SERPL: 12.1 (ref 7–25)
CALCIUM SPEC-SCNC: 10.7 MG/DL (ref 8.6–10.5)
CHLORIDE SERPL-SCNC: 107 MMOL/L (ref 98–107)
CLARITY UR: CLEAR
CO2 SERPL-SCNC: 22 MMOL/L (ref 22–29)
COLOR UR: YELLOW
CREAT SERPL-MCNC: 0.99 MG/DL (ref 0.76–1.27)
DEPRECATED RDW RBC AUTO: 45.6 FL (ref 37–54)
EGFRCR SERPLBLD CKD-EPI 2021: 94.6 ML/MIN/1.73
EOSINOPHIL # BLD AUTO: 0.5 10*3/MM3 (ref 0–0.4)
EOSINOPHIL NFR BLD AUTO: 5.1 % (ref 0.3–6.2)
ERYTHROCYTE [DISTWIDTH] IN BLOOD BY AUTOMATED COUNT: 14.1 % (ref 12.3–15.4)
GLOBULIN UR ELPH-MCNC: 3.4 GM/DL
GLUCOSE SERPL-MCNC: 125 MG/DL (ref 65–99)
GLUCOSE UR STRIP-MCNC: NEGATIVE MG/DL
HCT VFR BLD AUTO: 47.4 % (ref 37.5–51)
HGB BLD-MCNC: 15.5 G/DL (ref 13–17.7)
HGB UR QL STRIP.AUTO: NEGATIVE
IMM GRANULOCYTES # BLD AUTO: 0.02 10*3/MM3 (ref 0–0.05)
IMM GRANULOCYTES NFR BLD AUTO: 0.2 % (ref 0–0.5)
KETONES UR QL STRIP: NEGATIVE
LEUKOCYTE ESTERASE UR QL STRIP.AUTO: NEGATIVE
LIPASE SERPL-CCNC: 29 U/L (ref 13–60)
LYMPHOCYTES # BLD AUTO: 4.04 10*3/MM3 (ref 0.7–3.1)
LYMPHOCYTES NFR BLD AUTO: 41.5 % (ref 19.6–45.3)
MCH RBC QN AUTO: 28.9 PG (ref 26.6–33)
MCHC RBC AUTO-ENTMCNC: 32.7 G/DL (ref 31.5–35.7)
MCV RBC AUTO: 88.3 FL (ref 79–97)
MONOCYTES # BLD AUTO: 1.67 10*3/MM3 (ref 0.1–0.9)
MONOCYTES NFR BLD AUTO: 17.2 % (ref 5–12)
NEUTROPHILS NFR BLD AUTO: 3.38 10*3/MM3 (ref 1.7–7)
NEUTROPHILS NFR BLD AUTO: 34.8 % (ref 42.7–76)
NITRITE UR QL STRIP: NEGATIVE
NRBC BLD AUTO-RTO: 0 /100 WBC (ref 0–0.2)
PH UR STRIP.AUTO: 6.5 [PH] (ref 5–8)
PLATELET # BLD AUTO: 345 10*3/MM3 (ref 140–450)
PMV BLD AUTO: 11.3 FL (ref 6–12)
POTASSIUM SERPL-SCNC: 3.7 MMOL/L (ref 3.5–5.2)
PROT SERPL-MCNC: 7.5 G/DL (ref 6–8.5)
PROT UR QL STRIP: NEGATIVE
RBC # BLD AUTO: 5.37 10*6/MM3 (ref 4.14–5.8)
SODIUM SERPL-SCNC: 143 MMOL/L (ref 136–145)
SP GR UR STRIP: 1.03 (ref 1–1.03)
UROBILINOGEN UR QL STRIP: NORMAL
WBC NRBC COR # BLD: 9.73 10*3/MM3 (ref 3.4–10.8)

## 2023-06-06 PROCEDURE — 80053 COMPREHEN METABOLIC PANEL: CPT

## 2023-06-06 PROCEDURE — 85025 COMPLETE CBC W/AUTO DIFF WBC: CPT

## 2023-06-06 PROCEDURE — 74177 CT ABD & PELVIS W/CONTRAST: CPT

## 2023-06-06 PROCEDURE — 25510000001 IOPAMIDOL 61 % SOLUTION

## 2023-06-06 PROCEDURE — 25010000002 ONDANSETRON PER 1 MG

## 2023-06-06 PROCEDURE — 99283 EMERGENCY DEPT VISIT LOW MDM: CPT

## 2023-06-06 PROCEDURE — 96374 THER/PROPH/DIAG INJ IV PUSH: CPT

## 2023-06-06 PROCEDURE — 81003 URINALYSIS AUTO W/O SCOPE: CPT

## 2023-06-06 PROCEDURE — 73562 X-RAY EXAM OF KNEE 3: CPT

## 2023-06-06 PROCEDURE — 83690 ASSAY OF LIPASE: CPT

## 2023-06-06 RX ORDER — ONDANSETRON 4 MG/1
4 TABLET, ORALLY DISINTEGRATING ORAL EVERY 6 HOURS PRN
Qty: 40 TABLET | Refills: 0 | Status: SHIPPED | OUTPATIENT
Start: 2023-06-06

## 2023-06-06 RX ORDER — NAPROXEN 500 MG/1
500 TABLET ORAL 2 TIMES DAILY PRN
Qty: 20 TABLET | Refills: 0 | Status: SHIPPED | OUTPATIENT
Start: 2023-06-06

## 2023-06-06 RX ORDER — SODIUM CHLORIDE 0.9 % (FLUSH) 0.9 %
10 SYRINGE (ML) INJECTION AS NEEDED
Status: DISCONTINUED | OUTPATIENT
Start: 2023-06-06 | End: 2023-06-06 | Stop reason: HOSPADM

## 2023-06-06 RX ORDER — ONDANSETRON 2 MG/ML
4 INJECTION INTRAMUSCULAR; INTRAVENOUS ONCE
Status: COMPLETED | OUTPATIENT
Start: 2023-06-06 | End: 2023-06-06

## 2023-06-06 RX ADMIN — ONDANSETRON 4 MG: 2 INJECTION INTRAMUSCULAR; INTRAVENOUS at 17:49

## 2023-06-06 RX ADMIN — IOPAMIDOL 100 ML: 612 INJECTION, SOLUTION INTRAVENOUS at 18:32

## 2023-06-06 RX ADMIN — SODIUM CHLORIDE 1000 ML: 9 INJECTION, SOLUTION INTRAVENOUS at 17:50

## 2023-06-06 NOTE — Clinical Note
[facility] EMERGENCY DEPARTMENT  [facility] [facility] [address]  Phone: [phone]    Saulo Shepard was seen and treated in our emergency department on 6/6/2023.  He may return to work on 06/07/2023.         Thank you for choosing [facility].    Teo Harper RN

## 2023-06-07 NOTE — ED PROVIDER NOTES
"Subjective   History of Present Illness  Patient is a 48-year-old male who presents to the ED today complaining of feeling \"sick \"and experiencing nausea and vomiting that started early this morning around 2 AM.  States he also slipped and injured his right knee.  He denies any fever, body aches, or chills; no dysuria or flank pain.  On exam there is tenderness to the left upper quadrant and left lower quadrant.  He reports history of splenectomy.  There is tenderness to the right patella, range of motion of the extremity is intact, pulse, cap refill, sensation, and pedal strength intact.  There is no pain behind the knee, no swelling to the extremity.  The abdomen is soft, nondistended, there is no rebound or guarding noted.  He denies any testicular pain, swelling, or discoloration, no penile discharge. PMH is significant for anxiety, back pain, lumbar spine fracture, head injury, right-sided knee surgery in 2021, and splenectomy.  Differential diagnoses: COVID, viral gastroenteritis, pancreatitis, diverticulitis, UTI, electrolyte imbalance, and other.    History provided by:  Patient   used: No      Review of Systems   Constitutional:  Negative for chills, diaphoresis, fatigue and fever.   HENT:  Negative for congestion and sore throat.    Eyes: Negative.    Respiratory:  Negative for cough, shortness of breath, wheezing and stridor.    Cardiovascular:  Negative for chest pain, palpitations and leg swelling.   Gastrointestinal:  Positive for abdominal pain, nausea and vomiting. Negative for abdominal distention, constipation and diarrhea.   Genitourinary:  Negative for dysuria, flank pain, penile discharge, penile pain, penile swelling, scrotal swelling and testicular pain.   Musculoskeletal:  Positive for arthralgias. Negative for gait problem and joint swelling.   Skin: Negative.    Neurological:  Negative for dizziness, syncope, weakness, numbness and headaches.   Psychiatric/Behavioral: " Negative.       Past Medical History:   Diagnosis Date    Anxiety     Back pain     Fracture of lumbar spine 2002 and 2015    lower lumbar 2002; L1 2015    Head injury     Injury of back     Neck pain     Overweight (BMI 25.0-29.9)        No Known Allergies    Past Surgical History:   Procedure Laterality Date    FACIAL FRACTURE SURGERY      INCISION AND DRAINAGE LEG Right 12/1/2018    Procedure: INCISION AND DRAINAGE OF RIGHT UPPER INNER THIGH, PLACEMENT OF WOUND VAC;  Surgeon: René Dallas MD;  Location: [facility] OR;  Service: General    KNEE SURGERY Right 09/2021    SPLENECTOMY         Family History   Problem Relation Age of Onset    Fibromyalgia Mother     Lung cancer Father        Social History     Socioeconomic History    Marital status: Legally    Tobacco Use    Smoking status: Never    Smokeless tobacco: Never   Vaping Use    Vaping Use: Never used   Substance and Sexual Activity    Alcohol use: No    Drug use: No    Sexual activity: Yes     Partners: Female       Objective   Physical Exam  Vitals and nursing note reviewed.   Constitutional:       General: He is not in acute distress.     Appearance: Normal appearance. He is not ill-appearing, toxic-appearing or diaphoretic.   HENT:      Head: Normocephalic and atraumatic.      Right Ear: External ear normal.      Left Ear: External ear normal.      Nose: Nose normal.   Eyes:      Extraocular Movements: Extraocular movements intact.      Conjunctiva/sclera: Conjunctivae normal.      Pupils: Pupils are equal, round, and reactive to light.   Cardiovascular:      Rate and Rhythm: Normal rate and regular rhythm.      Pulses: Normal pulses.      Heart sounds: Normal heart sounds.   Pulmonary:      Effort: Pulmonary effort is normal. No respiratory distress.      Breath sounds: Normal breath sounds. No stridor. No wheezing, rhonchi or rales.      Comments: Lung sounds clear throughout bilaterally  Abdominal:      General: Bowel sounds are normal.  There is no distension.      Palpations: Abdomen is soft.      Tenderness: There is abdominal tenderness in the left upper quadrant and left lower quadrant. There is no right CVA tenderness, left CVA tenderness, guarding or rebound.   Musculoskeletal:      Cervical back: Normal range of motion.   Skin:     General: Skin is warm and dry.      Capillary Refill: Capillary refill takes less than 2 seconds.   Neurological:      Mental Status: He is alert and oriented to person, place, and time. Mental status is at baseline.      GCS: GCS eye subscore is 4. GCS verbal subscore is 5. GCS motor subscore is 6.   Psychiatric:         Mood and Affect: Mood normal.         Behavior: Behavior normal.         Thought Content: Thought content normal.         Judgment: Judgment normal.     Labs Reviewed   COMPREHENSIVE METABOLIC PANEL - Abnormal; Notable for the following components:       Result Value    Glucose 125 (*)     Calcium 10.7 (*)     All other components within normal limits    Narrative:     GFR Normal >60  Chronic Kidney Disease <60  Kidney Failure <15     CBC WITH AUTO DIFFERENTIAL - Abnormal; Notable for the following components:    Neutrophil % 34.8 (*)     Monocyte % 17.2 (*)     Lymphocytes, Absolute 4.04 (*)     Monocytes, Absolute 1.67 (*)     Eosinophils, Absolute 0.50 (*)     All other components within normal limits   LIPASE - Normal   URINALYSIS W/ CULTURE IF INDICATED - Normal    Narrative:     In absence of clinical symptoms, the presence of pyuria, bacteria, and/or nitrites on the urinalysis result does not correlate with infection.  Urine microscopic not indicated.   CBC AND DIFFERENTIAL    Narrative:     The following orders were created for panel order CBC & Differential.  Procedure                               Abnormality         Status                     ---------                               -----------         ------                     CBC Auto Differential[[id number]]        Abnormal            " Final result                 Please view results for these tests on the individual orders.     CT Abdomen Pelvis With Contrast   Final Result   1. Status post splenectomy.   2. Nonobstructing left-sided nephrolithiasis. No evidence of ureteral   stone or obstructive uropathy.   3. Normal bowel gas pattern with no obstruction or free air. No   localized inflammatory process is present.   4. Small fat-containing periumbilical hernia.   5. Bilateral fat-containing inguinal hernias (left greater than right)..               This report was finalized on 06/06/2023 18:50 by Dr. Lucas Roberts MD.      XR Knee 3 View Right   Final Result   1.. No acute fracture.   2. Prepatellar and infrapatellar soft tissue swelling.   This report was finalized on 06/06/2023 17:54 by Dr. Lucas Roberts MD.        Medications   sodium chloride 0.9 % bolus 1,000 mL (0 mL Intravenous Stopped 6/6/23 1916)   ondansetron (ZOFRAN) injection 4 mg (4 mg Intravenous Given 6/6/23 1749)   iopamidol (ISOVUE-300) 61 % injection 100 mL (100 mL Intravenous Given 6/6/23 1832)         Procedures           ED Course                                           Medical Decision Making  Patient is a 48-year-old male who presents to the ED today complaining of feeling \"sick \"and experiencing nausea and vomiting that started early this morning around 2 AM.  States he also slipped and injured his right knee.  He denies any fever, body aches, or chills; no dysuria or flank pain.  On exam there is tenderness to the left upper quadrant and left lower quadrant.  He reports history of splenectomy.  There is tenderness to the right patella, range of motion of the extremity is intact, pulse, cap refill, sensation, and pedal strength intact.  There is no pain behind the knee, no swelling to the extremity.  The abdomen is soft, nondistended, there is no rebound or guarding noted.  He denies any testicular pain, swelling, or discoloration, no penile discharge. PMH is " significant for anxiety, back pain, lumbar spine fracture, head injury, right-sided knee surgery in 2021, and splenectomy.  Differential diagnoses: COVID, viral gastroenteritis, pancreatitis, diverticulitis, UTI, electrolyte imbalance, and other.    Patient was given IV fluids and Zofran in the ED.    CBC reveals normal white count and H&H.  CMP reveals horrible renal and hepatic function.  Lipase normal.  UA unremarkable, no obvious signs of infection.    X-ray knee reveals no acute fracture.  2. Prepatellar and infrapatellar soft tissue swelling.    CT abdomen pelvis reveals status post splenectomy.  2. Nonobstructing left-sided nephrolithiasis. No evidence of ureteral  stone or obstructive uropathy.  3. Normal bowel gas pattern with no obstruction or free air. No  localized inflammatory process is present.  4. Small fat-containing periumbilical hernia.  5. Bilateral fat-containing inguinal hernias (left greater than right).    Results discussed at bedside.  On reeval pain is very minimal to the abdomen, he declines pain medication.  Patient has likely sprained his knee.  As far as his abdominal pain is concerned I do not feel that it is related to his nephrolithiasis as it has not moved down into the ureter.  He is not septic.  Case discussed with Dr. Castillo.  Ace wrap was applied to the patient's knee for comfort; I am sending in naproxen and Zofran for him.  I provided him with the phone number for orthopedics for follow-up as needed as well.  He will follow-up with his PCP in the coming days and return to the ED with any new, worsening, or persistent symptoms.  He is agreeable and appreciative with this plan of care, discharged in stable condition, ambulatory on discharge without any difficulty.  I also recommended rest, ice, and elevation.            Problems Addressed:  Acute pain of right knee: complicated acute illness or injury  Bilateral inguinal hernia without obstruction or gangrene, recurrence not  specified: complicated acute illness or injury  Left upper quadrant abdominal pain: complicated acute illness or injury  Nephrolithiasis: complicated acute illness or injury  Periumbilical hernia: complicated acute illness or injury    Amount and/or Complexity of Data Reviewed  Labs: ordered.  Radiology: ordered.    Risk  Prescription drug management.        Final diagnoses:   Left upper quadrant abdominal pain   Acute pain of right knee   Nephrolithiasis   Periumbilical hernia   Bilateral inguinal hernia without obstruction or gangrene, recurrence not specified       ED Disposition  ED Disposition       ED Disposition   Discharge    Condition   Stable    Comment   --               Vivek Stewart, DO  [address] [address]  [address]  [facility] [address]  [phone]          Delmar Paniagua MD  [address] [facility] [id number] [phone]               Medication List        New Prescriptions      naproxen 500 MG tablet  Commonly known as: NAPROSYN  Take 1 tablet by mouth 2 (Two) Times a Day As Needed for Mild Pain.            Changed      ondansetron ODT 4 MG disintegrating tablet  Commonly known as: ZOFRAN-ODT  Place 1 tablet on the tongue Every 6 (Six) Hours As Needed for Nausea or Vomiting.  What changed: reasons to take this               Where to Get Your Medications        These medications were sent to [facility]/pharmacy #[id number] - [place], [place] - [address] AT ACROSS FROM [address]  [phone] [facility] [phone]   [address] [facility] [address]      Phone: [phone]   naproxen 500 MG tablet  ondansetron ODT 4 MG disintegrating tablet            Kavitha Buckley, APRN  06/08/23 0258

## 2023-06-07 NOTE — DISCHARGE INSTRUCTIONS
Today you were seen in the ED for your abdominal pain as well as your knee pain.  The x-ray of your knee showed some swelling, we are placing an Ace wrap to your knee and sending in anti-inflammatories for pain.  You may also apply ice to the area, I do also recommend rest and elevation.  I am including a phone number for orthopedics below for follow-up as needed as well.  The CT scan of your abdomen revealed that you have several hernias as well as a stone in your left kidney, this would not be causing your pain at this time, however it may cause pain in the future if you were to pass it.  Your lab work-up did not reveal any concerning findings at this time.  Please follow-up with your PCP in the next 24 to 48 hours and return to the ED with any new, worsening, or persistent symptoms.  I have sent in nausea medicine for you to use as needed as well.

## 2023-07-19 ENCOUNTER — OFFICE VISIT (OUTPATIENT)
Dept: FAMILY MEDICINE CLINIC | Facility: CLINIC | Age: 48
End: 2023-07-19
Payer: COMMERCIAL

## 2023-07-19 VITALS
BODY MASS INDEX: 28.44 KG/M2 | HEIGHT: 73 IN | DIASTOLIC BLOOD PRESSURE: 80 MMHG | RESPIRATION RATE: 16 BRPM | SYSTOLIC BLOOD PRESSURE: 130 MMHG | HEART RATE: 81 BPM | OXYGEN SATURATION: 96 % | WEIGHT: 214.6 LBS

## 2023-07-19 DIAGNOSIS — D72.829 LEUKOCYTOSIS, UNSPECIFIED TYPE: Primary | ICD-10-CM

## 2023-07-19 PROCEDURE — 1159F MED LIST DOCD IN RCRD: CPT | Performed by: FAMILY MEDICINE

## 2023-07-19 PROCEDURE — 99213 OFFICE O/P EST LOW 20 MIN: CPT | Performed by: FAMILY MEDICINE

## 2023-07-19 PROCEDURE — 1160F RVW MEDS BY RX/DR IN RCRD: CPT | Performed by: FAMILY MEDICINE

## 2023-07-19 NOTE — PROGRESS NOTES
"Chief Complaint  Labs Only (Follow up on lab work he had done.)    Subjective        Saulo Shepard presents to [facility]  History of Present Illness  Patient is here to discuss recent white count of 13.56 without any recent fever, chills, but he was recently dealing with erythemic tender pustule consistent with MRSA infection around the time that he had labs.  Otherwise feels well with no further complaints    Objective   Vital Signs:  /80 (BP Location: Left arm, Patient Position: Sitting, Cuff Size: Adult)   Pulse 81   Resp 16   Ht 185.4 cm (72.99\")   Wt 97.3 kg (214 lb 9.6 oz)   SpO2 96%   BMI 28.32 kg/m²   Estimated body mass index is 28.32 kg/m² as calculated from the following:    Height as of this encounter: 185.4 cm (72.99\").    Weight as of this encounter: 97.3 kg (214 lb 9.6 oz).               Physical Exam  Vitals and nursing note reviewed.   Constitutional:       General: He is not in acute distress.     Appearance: He is not diaphoretic.   HENT:      Head: Normocephalic and atraumatic.      Nose: Nose normal.   Eyes:      General: No scleral icterus.        Right eye: No discharge.         Left eye: No discharge.      Conjunctiva/sclera: Conjunctivae normal.   Neck:      Trachea: No tracheal deviation.   Pulmonary:      Effort: Pulmonary effort is normal.   Skin:     General: Skin is warm and dry.      Coloration: Skin is not pale.      Comments: Previous erythemic spot on lower thoracic spine treated for MRSA infection is now healing without concern for abscess formation   Neurological:      Mental Status: He is alert and oriented to person, place, and time.   Psychiatric:         Behavior: Behavior normal.         Thought Content: Thought content normal.         Judgment: Judgment normal.      Result Review :                   Assessment and Plan   Diagnoses and all orders for this visit:    1. Leukocytosis, unspecified type (Primary)    Reassurance, " follow-up as needed

## 2023-08-02 ENCOUNTER — HOSPITAL ENCOUNTER (EMERGENCY)
Facility: HOSPITAL | Age: 48
Discharge: HOME | End: 2023-08-02
Attending: FAMILY MEDICINE
Payer: COMMERCIAL

## 2023-08-02 ENCOUNTER — APPOINTMENT (OUTPATIENT)
Dept: CT IMAGING | Facility: HOSPITAL | Age: 48
End: 2023-08-02
Payer: COMMERCIAL

## 2023-08-02 VITALS
RESPIRATION RATE: 20 BRPM | OXYGEN SATURATION: 98 % | WEIGHT: 210 LBS | HEART RATE: 70 BPM | TEMPERATURE: 98.4 F | DIASTOLIC BLOOD PRESSURE: 81 MMHG | SYSTOLIC BLOOD PRESSURE: 130 MMHG | BODY MASS INDEX: 27.83 KG/M2 | HEIGHT: 73 IN

## 2023-08-02 DIAGNOSIS — M62.830 SPASM OF MUSCLE OF LOWER BACK: ICD-10-CM

## 2023-08-02 DIAGNOSIS — S33.5XXA LUMBAR SPRAIN, INITIAL ENCOUNTER: ICD-10-CM

## 2023-08-02 DIAGNOSIS — S39.012A LUMBAR STRAIN, INITIAL ENCOUNTER: Primary | ICD-10-CM

## 2023-08-02 DIAGNOSIS — M51.36 LUMBAR DEGENERATIVE DISC DISEASE: ICD-10-CM

## 2023-08-02 PROCEDURE — 96372 THER/PROPH/DIAG INJ SC/IM: CPT

## 2023-08-02 PROCEDURE — 25010000002 KETOROLAC TROMETHAMINE PER 15 MG: Performed by: FAMILY MEDICINE

## 2023-08-02 PROCEDURE — 72131 CT LUMBAR SPINE W/O DYE: CPT

## 2023-08-02 PROCEDURE — 99284 EMERGENCY DEPT VISIT MOD MDM: CPT

## 2023-08-02 RX ORDER — DICLOFENAC SODIUM 75 MG/1
75 TABLET, DELAYED RELEASE ORAL 2 TIMES DAILY PRN
Qty: 60 TABLET | Refills: 0 | Status: SHIPPED | OUTPATIENT
Start: 2023-08-02 | End: 2023-09-01

## 2023-08-02 RX ORDER — CYCLOBENZAPRINE HCL 10 MG
10 TABLET ORAL 3 TIMES DAILY PRN
Qty: 21 TABLET | Refills: 0 | Status: SHIPPED | OUTPATIENT
Start: 2023-08-02 | End: 2023-08-09

## 2023-08-02 RX ORDER — KETOROLAC TROMETHAMINE 30 MG/ML
60 INJECTION, SOLUTION INTRAMUSCULAR; INTRAVENOUS ONCE
Status: COMPLETED | OUTPATIENT
Start: 2023-08-02 | End: 2023-08-02

## 2023-08-02 RX ADMIN — KETOROLAC TROMETHAMINE 60 MG: 30 INJECTION, SOLUTION INTRAMUSCULAR at 13:50

## 2023-08-02 NOTE — ED PROVIDER NOTES
HPI:    Patient is a 48-year-old white male with known history of low back pain with degenerative disc as well as disc compressions and vertebral compressions in the lumbar thoracic area and a history of coccygeal fracture presents to the emergency room after a slip and fall on his buttocks.  Patient states he has a lot of pain in his low back and especially just to the right of his lumbar spine.  No loss of bowel or bladder control.  No paresthesias in the groin area.    REVIEW OF SYSTEMS  CONSTITUTIONAL:  No complaints of fever, chills,or weakness  EYES:  No complaints of discharge   ENT: No complaints of sore throat or ear pain  CARDIOVASCULAR:  No complaints of chest pain, palpitations, or swelling  RESPIRATORY:  No complaints of cough or shortness of breath  GI:  No complaints of abdominal pain, nausea, vomiting, or diarrhea  MUSCULOSKELETAL: Positive for status post fall with low back pain greatest midline and to the right.  SKIN:  No complaints of rash  NEUROLOGIC:  No complaints of headache, focal weakness, or sensory changes  ENDOCRINE:  No complaints of polyuria or polydipsia  LYMPHATIC:  No complaints of swollen glands  GENITOURINARY: No complaints of urinary frequency or hematuria        PAST MEDICAL HISTORY  Past Medical History:   Diagnosis Date    Anxiety     Back pain     Fracture of lumbar spine 2002 and 2015    lower lumbar 2002; L1 2015    Head injury     Injury of back     Neck pain     Overweight (BMI 25.0-29.9)        FAMILY HISTORY  Family History   Problem Relation Age of Onset    Fibromyalgia Mother     Lung cancer Father        SOCIAL HISTORY  Social History     Socioeconomic History    Marital status: Single   Tobacco Use    Smoking status: Never    Smokeless tobacco: Never   Vaping Use    Vaping Use: Never used   Substance and Sexual Activity    Alcohol use: No    Drug use: No    Sexual activity: Yes     Partners: Female       IMMUNIZATION HISTORY  Deferred to primary care  "physician.    SURGICAL HISTORY  Past Surgical History:   Procedure Laterality Date    FACIAL FRACTURE SURGERY      INCISION AND DRAINAGE LEG Right 12/1/2018    Procedure: INCISION AND DRAINAGE OF RIGHT UPPER INNER THIGH, PLACEMENT OF WOUND VAC;  Surgeon: René Dallas MD;  Location: [facility];  Service: General    KNEE SURGERY Right 09/2021    SPLENECTOMY         CURRENT MEDICATIONS  No current facility-administered medications for this encounter.    Current Outpatient Medications:     cyclobenzaprine (FLEXERIL) 10 MG tablet, Take 1 tablet by mouth 3 (Three) Times a Day As Needed for Muscle Spasms for up to 7 days., Disp: 21 tablet, Rfl: 0    diclofenac (VOLTAREN) 75 MG EC tablet, Take 1 tablet by mouth 2 (Two) Times a Day As Needed (For pain and back pain) for up to 30 days., Disp: 60 tablet, Rfl: 0    fluticasone (FLONASE) 50 MCG/ACT nasal spray, 2 sprays into the nostril(s) as directed by provider Daily., Disp: 9.9 mL, Rfl: 0    mupirocin (BACTROBAN) 2 % cream, Apply 1 application  topically to the appropriate area as directed 3 (Three) Times a Day., Disp: 30 g, Rfl: 0    ondansetron ODT (ZOFRAN-ODT) 4 MG disintegrating tablet, Place 1 tablet on the tongue Every 6 (Six) Hours As Needed for Nausea or Vomiting., Disp: 40 tablet, Rfl: 0    ALLERGIES  No Known Allergies    Musculoskeletal exam    VITAL SIGNS:   /78 (BP Location: Right arm, Patient Position: Sitting)   Pulse 79   Temp 97.3 øF (36.3 øC) (Oral)   Resp 18   Ht 185.4 cm (73\")   Wt 95.3 kg (210 lb)   SpO2 100%   BMI 27.71 kg/mý     Constitutional: Patient is alert and in no distress.  Patient with moderate low back discomfort.      Cardiovascular: S1-S2 regular rate and rhythm no murmurs rubs or gallops is noted.    Respiratory: Patient is clear to auscultation bilaterally with no wheezing or rhonchi.  Chest wall is nontender.  There is no external lesions on the chest.  There is no crepitance    Abdomen: Soft nontender bowel sounds are " normal in all 4 quadrants there is no rebound or guarding noted.  There is no abdominal distention or hepatosplenomegaly.    Neck: No tenderness to palpation full range of motion    Back: No tenderness to palpation of the thoracic spine.  There is tenderness palpation of the lumbar spine at L2 and 3 in the paralateral right side of the elbow spine.  There is no step-off of the thoracic or lumbar spines.  The straight leg raise on the left causes pain but nonradicular.    Musculoskeletal: No tenderness to palpation over the hips.  There is no leg length discrepancy.    Integumentary: No acute changes noted        RADIOLOGY/PROCEDURES        CT Lumbar Spine Without Contrast   Final Result   Impression:    1. No acute osseous injury or malalignment in the lumbar spine.   2. Mild L1 compression fracture which is chronic.   3. Lumbar spine osteoarthritis changes are fairly mild.   4. Nonobstructing left inferior pole renal stone.           This report was finalized on 08/02/2023 15:14 by Dr Harrison Wellington, .             FUTURE APPOINTMENTS     Future Appointments   Date Time Provider Department Center   8/14/2023  1:15 PM Dina Boyd MD [facility]   8/17/2023  1:15 PM Som Cano APRN [facility] GE [facility]   10/4/2023  9:00 AM Vivek Stewart DO [facility] FM [facility]              COURSE & MEDICAL DECISION MAKING    Patient's partial differential diagnosis can include: Lumbar strain, lumbar compression fracture, sprain, low back spasms, and other     Pain moderately improved after IM Toradol  60 mg.      Patient's level of risk: Low      CRITICAL CARE    CRITICAL CARE: No    CRITICAL CARE TIME: None      No results found for this or any previous visit (from the past 24 hour(s)).        All laboratory, radiologic, and EKG studies that were performed in the Emergency Department were a necessary part of the evaluation needed to exclude unstable or  emergent medical conditions.     Patient was hemodynamically and  neurologically stable in the ED.   Pertinent studies were reviewed as above.     The patient received:  Medications   ketorolac (TORADOL) injection 60 mg (60 mg Intramuscular Given 8/2/23 1350)            ED Disposition       ED Disposition   Discharge    Condition   Stable    Comment   --                 I have reviewed the patient's prescription history via a prescription monitoring program.  This information is consistent with my knowledge of the patient's controlled substance use history.    Patient evaluate during Coronavirus Pandemic. Isolation practices followed according to [facility] policy.    FINAL IMPRESSION   Diagnosis Plan   1. Lumbar strain, initial encounter        2. Lumbar sprain, initial encounter        3. Lumbar degenerative disc disease        4. Spasm of muscle of lower back              MD Rodolfo Hayes Jr, Thomas Mark Jr., MD  08/02/23 9420

## 2023-08-02 NOTE — Clinical Note
[facility] EMERGENCY DEPARTMENT  [address]  [place] [address]  Phone: [phone]    Saulo Shepard was seen and treated in our emergency department on 8/2/2023.  He may return to work on 08/04/2023.         Thank you for choosing [facility].    Ross Reynolds Jr., MD

## 2023-08-10 ENCOUNTER — OFFICE VISIT (OUTPATIENT)
Dept: FAMILY MEDICINE CLINIC | Facility: CLINIC | Age: 48
End: 2023-08-10
Payer: COMMERCIAL

## 2023-08-10 VITALS
WEIGHT: 212.8 LBS | DIASTOLIC BLOOD PRESSURE: 76 MMHG | BODY MASS INDEX: 28.2 KG/M2 | RESPIRATION RATE: 16 BRPM | OXYGEN SATURATION: 98 % | HEIGHT: 73 IN | SYSTOLIC BLOOD PRESSURE: 130 MMHG | HEART RATE: 83 BPM

## 2023-08-10 DIAGNOSIS — S39.012D STRAIN OF LUMBAR REGION, SUBSEQUENT ENCOUNTER: Primary | ICD-10-CM

## 2023-08-10 RX ORDER — HYDROCODONE BITARTRATE AND ACETAMINOPHEN 5; 325 MG/1; MG/1
1 TABLET ORAL NIGHTLY PRN
Qty: 20 TABLET | Refills: 0 | Status: SHIPPED | OUTPATIENT
Start: 2023-08-10

## 2023-08-10 NOTE — PROGRESS NOTES
"Chief Complaint  Follow-up (Back pain)    Subjective        Saulo Shepard presents to [facility]  History of Present Illness  Seen in the ED on 8/2/2023 for lumbar strain without acute findings on CT imaging, his pain is improved but is still having left-sided aching at times and is having interruption of sleep.    Objective   Vital Signs:  /76 (BP Location: Right arm, Patient Position: Sitting, Cuff Size: Adult)   Pulse 83   Resp 16   Ht 185.4 cm (72.99\")   Wt 96.5 kg (212 lb 12.8 oz)   SpO2 98%   BMI 28.08 kg/mý   Estimated body mass index is 28.08 kg/mý as calculated from the following:    Height as of this encounter: 185.4 cm (72.99\").    Weight as of this encounter: 96.5 kg (212 lb 12.8 oz).               Physical Exam  Vitals and nursing note reviewed.   Constitutional:       General: He is not in acute distress.     Appearance: He is not diaphoretic.   HENT:      Head: Normocephalic and atraumatic.      Nose: Nose normal.   Eyes:      General: No scleral icterus.        Right eye: No discharge.         Left eye: No discharge.      Conjunctiva/sclera: Conjunctivae normal.   Neck:      Trachea: No tracheal deviation.   Pulmonary:      Effort: Pulmonary effort is normal.   Musculoskeletal:      Comments: Mildly antalgic gait   Skin:     General: Skin is warm and dry.      Coloration: Skin is not pale.   Neurological:      Mental Status: He is alert and oriented to person, place, and time.   Psychiatric:         Behavior: Behavior normal.         Thought Content: Thought content normal.         Judgment: Judgment normal.      Result Review :                   Assessment and Plan   Diagnoses and all orders for this visit:    1. Strain of lumbar region, subsequent encounter (Primary)  -     HYDROcodone-acetaminophen (NORCO) 5-325 MG per tablet; Take 1 tablet by mouth At Night As Needed for Severe Pain.  Dispense: 20 tablet; Refill: 0      Follow-up as needed      "

## 2023-08-14 ENCOUNTER — OFFICE VISIT (OUTPATIENT)
Dept: SURGERY | Facility: CLINIC | Age: 48
End: 2023-08-14
Payer: COMMERCIAL

## 2023-08-14 VITALS
SYSTOLIC BLOOD PRESSURE: 123 MMHG | WEIGHT: 212 LBS | BODY MASS INDEX: 28.71 KG/M2 | HEIGHT: 72 IN | DIASTOLIC BLOOD PRESSURE: 79 MMHG

## 2023-08-14 DIAGNOSIS — K40.20 BILATERAL INGUINAL HERNIA WITHOUT OBSTRUCTION OR GANGRENE, RECURRENCE NOT SPECIFIED: Primary | ICD-10-CM

## 2023-08-14 PROCEDURE — 1160F RVW MEDS BY RX/DR IN RCRD: CPT | Performed by: STUDENT IN AN ORGANIZED HEALTH CARE EDUCATION/TRAINING PROGRAM

## 2023-08-14 PROCEDURE — 99204 OFFICE O/P NEW MOD 45 MIN: CPT | Performed by: STUDENT IN AN ORGANIZED HEALTH CARE EDUCATION/TRAINING PROGRAM

## 2023-08-14 PROCEDURE — 1159F MED LIST DOCD IN RCRD: CPT | Performed by: STUDENT IN AN ORGANIZED HEALTH CARE EDUCATION/TRAINING PROGRAM

## 2023-08-14 RX ORDER — HEPARIN SODIUM 5000 [USP'U]/ML
5000 INJECTION, SOLUTION INTRAVENOUS; SUBCUTANEOUS ONCE
OUTPATIENT
Start: 2023-08-14 | End: 2023-08-14

## 2023-08-14 NOTE — PATIENT INSTRUCTIONS
Surgery is scheduled for August 22, 2023 at 8:30 a.m.  Prework is scheduled for August 17, 2023 at 9:45 a.m.  Nothing to eat or drink after midnight before surgery.  No Aspirin, Vitamins or Blood Thinners 5 days prior to surgery.  Please report to the hospital main registation for check in on both days.     BMI for Adults  What is BMI?  Body mass index (BMI) is a number that is calculated from a person's weight and height. BMI can help estimate how much of a person's weight is composed of fat. BMI does not measure body fat directly. Rather, it is an alternative to procedures that directly measure body fat, which can be difficult and expensive.  BMI can help identify people who may be at higher risk for certain medical problems.  What are BMI measurements used for?  BMI is used as a screening tool to identify possible weight problems. It helps determine whether a person is obese, overweight, a healthy weight, or underweight.  BMI is useful for:  Identifying a weight problem that may be related to a medical condition or may increase the risk for medical problems.  Promoting changes, such as changes in diet and exercise, to help reach a healthy weight. BMI screening can be repeated to see if these changes are working.  How is BMI calculated?  BMI involves measuring your weight in relation to your height. Both height and weight are measured, and the BMI is calculated from those numbers. This can be done either in English (U.S[place] or metric measurements. Note that charts and online BMI calculators are available to help you find your BMI quickly and easily without having to do these calculations yourself.  To calculate your BMI in English (U.S.) measurements:    Measure your weight in pounds (lb).  Multiply the number of pounds by 703.  For example, for a person who weighs 180 lb, multiply that number by 703, which equals 126,540.  Measure your height in inches. Then multiply that number by itself to get a measurement  "called \"inches squared.\"  For example, for a person who is 70 inches tall, the \"inches squared\" measurement is 70 inches x 70 inches, which equals 4,900 inches squared.  Divide the total from step 2 (number of lb x 703) by the total from step 3 (inches squared): 126,540 ö 4,900 = 25.8. This is your BMI.    To calculate your BMI in metric measurements:  Measure your weight in kilograms (kg).  Measure your height in meters (m). Then multiply that number by itself to get a measurement called \"meters squared.\"  For example, for a person who is 1.75 m tall, the \"meters squared\" measurement is 1.75 m x 1.75 m, which is equal to 3.1 meters squared.  Divide the number of kilograms (your weight) by the meters squared number. In this example: 70 ö 3.1 = 22.6. This is your BMI.  What do the results mean?  BMI charts are used to identify whether you are underweight, normal weight, overweight, or obese. The following guidelines will be used:  Underweight: BMI less than 18.5.  Normal weight: BMI between 18.5 and 24.9.  Overweight: BMI between 25 and 29.9.  Obese: BMI of 30 or above.  Keep these notes in mind:  Weight includes both fat and muscle, so someone with a muscular build, such as an athlete, may have a BMI that is higher than 24.9. In cases like these, BMI is not an accurate measure of body fat.  To determine if excess body fat is the cause of a BMI of 25 or higher, further assessments may need to be done by a health care provider.  BMI is usually interpreted in the same way for men and women.  Where to find more information  For more information about BMI, including tools to quickly calculate your BMI, go to these websites:  Centers for Disease Control and Prevention: www.cdc.gov  American Heart Association: www.heart.org  National Heart, Lung, and Blood [place]: www.nhlbi.nih.gov  Summary  Body mass index (BMI) is a number that is calculated from a person's weight and height.  BMI may help estimate how much of a " person's weight is composed of fat. BMI can help identify those who may be at higher risk for certain medical problems.  BMI can be measured using English measurements or metric measurements.  BMI charts are used to identify whether you are underweight, normal weight, overweight, or obese.  This information is not intended to replace advice given to you by your health care provider. Make sure you discuss any questions you have with your health care provider.  Document Revised: 09/09/2020 Document Reviewed: 07/17/2020  [employer] Patient Education c 2021 [employer]

## 2023-08-14 NOTE — H&P (VIEW-ONLY)
Office New Patient History and Physical:     Referring Provider: Vivek Stewart DO    No chief complaint on file.      Subjective .     History of present illness:  Saulo Shepard is a 48 y.o. male who presents with bilateral inguinal hernias. He has a left groin bulge x one year. He denies a bulge on the right. He reports he has mild intermittent pain from the left. The bulge on the left is enlarging.     His BMI is 28. He is not on blood thinners. He is a non-smoker. PSH on the abdomen includes splenectomy.     History  Past Medical History:   Diagnosis Date    Anxiety     Back pain     Fracture of lumbar spine 2002 and 2015    lower lumbar 2002; L1 2015    Head injury     Injury of back     Neck pain     Overweight (BMI 25.0-29.9)    ,   Past Surgical History:   Procedure Laterality Date    FACIAL FRACTURE SURGERY      INCISION AND DRAINAGE LEG Right 12/1/2018    Procedure: INCISION AND DRAINAGE OF RIGHT UPPER INNER THIGH, PLACEMENT OF WOUND VAC;  Surgeon: René Dallas MD;  Location: [facility];  Service: General    KNEE SURGERY Right 09/2021    SPLENECTOMY     ,   Family History   Problem Relation Age of Onset    Fibromyalgia Mother     Lung cancer Father    ,   Social History     Tobacco Use    Smoking status: Never    Smokeless tobacco: Never   Vaping Use    Vaping Use: Never used   Substance Use Topics    Alcohol use: No    Drug use: No   , (Not in a hospital admission)   and Allergies:  Patient has no known allergies.    Current Outpatient Medications:     diclofenac (VOLTAREN) 75 MG EC tablet, Take 1 tablet by mouth 2 (Two) Times a Day As Needed (For pain and back pain) for up to 30 days., Disp: 60 tablet, Rfl: 0    fluticasone (FLONASE) 50 MCG/ACT nasal spray, 2 sprays into the nostril(s) as directed by provider Daily., Disp: 9.9 mL, Rfl: 0    HYDROcodone-acetaminophen (NORCO) 5-325 MG per tablet, Take 1 tablet by mouth At Night As Needed for Severe Pain., Disp: 20 tablet, Rfl: 0    ondansetron  "ODT (ZOFRAN-ODT) 4 MG disintegrating tablet, Place 1 tablet on the tongue Every 6 (Six) Hours As Needed for Nausea or Vomiting., Disp: 40 tablet, Rfl: 0    Objective     Vital Signs   /79   Ht 182.9 cm (72\")   Wt 96.2 kg (212 lb)   BMI 28.75 kg/mý      Physical Exam:  General appearance - alert, well appearing, and in no distress  Mental status - alert, oriented to person, place, and time  Eyes - pupils equal and reactive, extraocular eye movements intact  Neck - supple, no significant adenopathy  Chest - no tachypnea, retractions or cyanosis  Heart - normal rate and regular rhythm  Abdomen - soft, nontender, nondistended, no masses or organomegaly. + bilateral inguinal hernias on exam   Neurological - alert, oriented, normal speech, no focal findings or movement disorder noted  Musculoskeletal - no joint tenderness, deformity or swelling    Results Review:     The following data was reviewed by: Dina Boyd MD on 08/14/2023:  CT Abdomen Pelvis With Contrast (06/06/2023 18:29)   1. Status post splenectomy.  2. Nonobstructing left-sided nephrolithiasis. No evidence of ureteral  stone or obstructive uropathy.  3. Normal bowel gas pattern with no obstruction or free air. No  localized inflammatory process is present.  4. Small fat-containing periumbilical hernia.  5. Bilateral fat-containing inguinal hernias (left greater than right)..  ED Provider Notes by Ross Reynolds Jr., MD (08/02/2023 13:47)   Progress Notes by Vivek Stewart DO (08/10/2023 11:15)   CBC & Differential (07/14/2023 09:07)     Assessment & Plan       Diagnoses and all orders for this visit:    1. Bilateral inguinal hernia without obstruction or gangrene, recurrence not specified (Primary)  -     Case Request; Standing  -     MRSA Screen Culture (Outpatient) - Swab, Nares; Future  -     XR chest 1 vw; Future  -     ECG 12 Lead; Future  -     ceFAZolin (ANCEF) 2,000 mg in sodium chloride 0.9 % 100 mL IVPB  -     heparin " (porcine) 5000 UNIT/ML injection 5,000 Units  -     CBC & Differential; Future  -     Comprehensive Metabolic Panel; Future    Other orders  -     Follow Anesthesia Guidelines / Protocol; Future  -     Follow Anesthesia Guidelines / Protocol; Standing  -     Verify / Perform Chlorhexidine Skin Prep; Standing  -     Verify / Perform Chlorhexidine Skin Prep if Indicated (If Not Already Completed); Standing  -     Obtain Informed Consent; Future  -     Provide NPO Instructions to Patient; Future  -     Chlorhexidine Skin Prep; Future  -     Notify physician (specify); Standing  -     Instructions on coughing, deep breathing, and incentive spirometry.; Standing  -     Oxygen Therapy-; Standing         Saulo Shepard is a 48 y.o. male with bilateral inguinal hernias without obstruction or gangrene. This is significantly limiting the patient's life-style due to discomfort, and as such the patient wishes to proceed with repair. Risks of surgery including bleeding/hematoma, infection, mesh infection requiring removal, damage to surrounding structures (including the arteries, veins and nerves) with potential chronic post-operative pain, and hernia recurrence have been discussed with the patient. I also discussed the different operative approaches available for inguinal hernia repair and their respective risks and benefits including open repair, laparoscopic repair, and robotic repair. After this discussion, the patient and I have decided to proceed with a bilateral robotic inguinal hernia repair with mesh due to factors including bilateral nature of the hernias. The patient has an appointment for pre-operative CBC, CMP, EKG, CXR. The patient is currently scheduled for the above procedure on 8/22/23 per patient request.     This is a chronic problem with progression. I have reviewed the labs, CT and notes above. I have ordered CBC, CMP, CXR and EKG. He is at increased risk of perioperative complications 2/2 his elevated  BMI of 28.     I also discussed with the patient post operative pain management including multimodal pain control utilizing Tylenol, ibuprofen, and Roxicodone for breakthrough pain. I will plan to give the patient 15 tabs of 5mg Roxicodone post operatively for breakthrough pain.    BMI is >= 25 and <30. (Overweight) The following options were offered after discussion;: weight loss educational material (shared in after visit summary)      Dina Boyd MD  08/14/23  14:13 CDT

## 2023-08-14 NOTE — PROGRESS NOTES
Office New Patient History and Physical:     Referring Provider: Vivek Stewart DO    No chief complaint on file.      Subjective .     History of present illness:  Saulo Shepard is a 48 y.o. male who presents with bilateral inguinal hernias. He has a left groin bulge x one year. He denies a bulge on the right. He reports he has mild intermittent pain from the left. The bulge on the left is enlarging.     His BMI is 28. He is not on blood thinners. He is a non-smoker. PSH on the abdomen includes splenectomy.     History  Past Medical History:   Diagnosis Date    Anxiety     Back pain     Fracture of lumbar spine 2002 and 2015    lower lumbar 2002; L1 2015    Head injury     Injury of back     Neck pain     Overweight (BMI 25.0-29.9)    ,   Past Surgical History:   Procedure Laterality Date    FACIAL FRACTURE SURGERY      INCISION AND DRAINAGE LEG Right 12/1/2018    Procedure: INCISION AND DRAINAGE OF RIGHT UPPER INNER THIGH, PLACEMENT OF WOUND VAC;  Surgeon: René Dallas MD;  Location: [facility];  Service: General    KNEE SURGERY Right 09/2021    SPLENECTOMY     ,   Family History   Problem Relation Age of Onset    Fibromyalgia Mother     Lung cancer Father    ,   Social History     Tobacco Use    Smoking status: Never    Smokeless tobacco: Never   Vaping Use    Vaping Use: Never used   Substance Use Topics    Alcohol use: No    Drug use: No   , (Not in a hospital admission)   and Allergies:  Patient has no known allergies.    Current Outpatient Medications:     diclofenac (VOLTAREN) 75 MG EC tablet, Take 1 tablet by mouth 2 (Two) Times a Day As Needed (For pain and back pain) for up to 30 days., Disp: 60 tablet, Rfl: 0    fluticasone (FLONASE) 50 MCG/ACT nasal spray, 2 sprays into the nostril(s) as directed by provider Daily., Disp: 9.9 mL, Rfl: 0    HYDROcodone-acetaminophen (NORCO) 5-325 MG per tablet, Take 1 tablet by mouth At Night As Needed for Severe Pain., Disp: 20 tablet, Rfl: 0    ondansetron  "ODT (ZOFRAN-ODT) 4 MG disintegrating tablet, Place 1 tablet on the tongue Every 6 (Six) Hours As Needed for Nausea or Vomiting., Disp: 40 tablet, Rfl: 0    Objective     Vital Signs   /79   Ht 182.9 cm (72\")   Wt 96.2 kg (212 lb)   BMI 28.75 kg/mý      Physical Exam:  General appearance - alert, well appearing, and in no distress  Mental status - alert, oriented to person, place, and time  Eyes - pupils equal and reactive, extraocular eye movements intact  Neck - supple, no significant adenopathy  Chest - no tachypnea, retractions or cyanosis  Heart - normal rate and regular rhythm  Abdomen - soft, nontender, nondistended, no masses or organomegaly. + bilateral inguinal hernias on exam   Neurological - alert, oriented, normal speech, no focal findings or movement disorder noted  Musculoskeletal - no joint tenderness, deformity or swelling    Results Review:     The following data was reviewed by: Dina Boyd MD on 08/14/2023:  CT Abdomen Pelvis With Contrast (06/06/2023 18:29)   1. Status post splenectomy.  2. Nonobstructing left-sided nephrolithiasis. No evidence of ureteral  stone or obstructive uropathy.  3. Normal bowel gas pattern with no obstruction or free air. No  localized inflammatory process is present.  4. Small fat-containing periumbilical hernia.  5. Bilateral fat-containing inguinal hernias (left greater than right)..  ED Provider Notes by Ross Reynolds Jr., MD (08/02/2023 13:47)   Progress Notes by Vivek Stewart DO (08/10/2023 11:15)   CBC & Differential (07/14/2023 09:07)     Assessment & Plan       Diagnoses and all orders for this visit:    1. Bilateral inguinal hernia without obstruction or gangrene, recurrence not specified (Primary)  -     Case Request; Standing  -     MRSA Screen Culture (Outpatient) - Swab, Nares; Future  -     XR chest 1 vw; Future  -     ECG 12 Lead; Future  -     ceFAZolin (ANCEF) 2,000 mg in sodium chloride 0.9 % 100 mL IVPB  -     heparin " (porcine) 5000 UNIT/ML injection 5,000 Units  -     CBC & Differential; Future  -     Comprehensive Metabolic Panel; Future    Other orders  -     Follow Anesthesia Guidelines / Protocol; Future  -     Follow Anesthesia Guidelines / Protocol; Standing  -     Verify / Perform Chlorhexidine Skin Prep; Standing  -     Verify / Perform Chlorhexidine Skin Prep if Indicated (If Not Already Completed); Standing  -     Obtain Informed Consent; Future  -     Provide NPO Instructions to Patient; Future  -     Chlorhexidine Skin Prep; Future  -     Notify physician (specify); Standing  -     Instructions on coughing, deep breathing, and incentive spirometry.; Standing  -     Oxygen Therapy-; Standing         Saulo Shepard is a 48 y.o. male with bilateral inguinal hernias without obstruction or gangrene. This is significantly limiting the patient's life-style due to discomfort, and as such the patient wishes to proceed with repair. Risks of surgery including bleeding/hematoma, infection, mesh infection requiring removal, damage to surrounding structures (including the arteries, veins and nerves) with potential chronic post-operative pain, and hernia recurrence have been discussed with the patient. I also discussed the different operative approaches available for inguinal hernia repair and their respective risks and benefits including open repair, laparoscopic repair, and robotic repair. After this discussion, the patient and I have decided to proceed with a bilateral robotic inguinal hernia repair with mesh due to factors including bilateral nature of the hernias. The patient has an appointment for pre-operative CBC, CMP, EKG, CXR. The patient is currently scheduled for the above procedure on 8/22/23 per patient request.     This is a chronic problem with progression. I have reviewed the labs, CT and notes above. I have ordered CBC, CMP, CXR and EKG. He is at increased risk of perioperative complications 2/2 his elevated  BMI of 28.     I also discussed with the patient post operative pain management including multimodal pain control utilizing Tylenol, ibuprofen, and Roxicodone for breakthrough pain. I will plan to give the patient 15 tabs of 5mg Roxicodone post operatively for breakthrough pain.    BMI is >= 25 and <30. (Overweight) The following options were offered after discussion;: weight loss educational material (shared in after visit summary)      Dina Boyd MD  08/14/23  14:13 CDT

## 2023-08-16 NOTE — H&P (VIEW-ONLY)
Chief Complaint   Patient presents with    Colonoscopy     Screening colon       PCP: Vivek Stewart DO  REFER: No ref. provider found    Subjective     HPI    Saulo Shepard is a 48 y.o. male who presents to office for preventative maintenance.  There is not  a personal history of colon polyps.  There is not a history of colon cancer.  He does not have complaints of nausea/vomiting, change in bowels, weight loss, no BRBPR, no melena.  There is not a family history of colon cancer in first degree relative.  There is not a family history of colon polyps in first degree relative.  Saulo Shepard last colonoscopy-no previous colonoscopy .  Bowels do move on regular basis.        Lab Results - Last 18 Months   Lab Units 08/17/23  0338 06/06/23  1748 03/29/23  0437 03/27/23  0914 03/25/23  1859   GLUCOSE mg/dL 97 125* 101* 105* 112*   SODIUM mmol/L 139 143 139 137 136   POTASSIUM mmol/L 3.9 3.7 4.0 4.0 3.9   CREATININE mg/dL 1.07 0.99 1.06 0.98 1.07   BUN mg/dL 12 12 11 11 10   BUN / CREAT RATIO  11.2 12.1 10.4 11.2 9.3   ALK PHOS U/L 98 95  --  89 87   ALT (SGPT) U/L 19 37  --  13 11   AST (SGOT) U/L 21 27  --  14 15   BILIRUBIN mg/dL 0.3 0.5  --  0.5 0.7   ALBUMIN g/dL 4.4 4.1  --  3.9 3.9       No results for input(s): EGFRIFNONA, EGFRIFAFRI, EGFRRESULT in the last 96014 hours.     Past Medical History:   Diagnosis Date    Anxiety     Back pain     Fracture of lumbar spine 2002 and 2015    lower lumbar 2002; L1 2015    Head injury     Injury of back     Neck pain     Overweight (BMI 25.0-29.9)      Past Surgical History:   Procedure Laterality Date    FACIAL FRACTURE SURGERY      INCISION AND DRAINAGE LEG Right 12/1/2018    Procedure: INCISION AND DRAINAGE OF RIGHT UPPER INNER THIGH, PLACEMENT OF WOUND VAC;  Surgeon: René Dallas MD;  Location: [facility] OR;  Service: General    KNEE SURGERY Right 09/2021    SPLENECTOMY       Outpatient Medications Marked as Taking for the 8/17/23 encounter (Office Visit)  with Som Cano APRN   Medication Sig Dispense Refill    diclofenac (VOLTAREN) 75 MG EC tablet Take 1 tablet by mouth 2 (Two) Times a Day As Needed (For pain and back pain) for up to 30 days. 60 tablet 0    fluticasone (FLONASE) 50 MCG/ACT nasal spray 2 sprays into the nostril(s) as directed by provider Daily. (Patient taking differently: 2 sprays into the nostril(s) as directed by provider Daily As Needed.) 9.9 mL 0    HYDROcodone-acetaminophen (NORCO) 5-325 MG per tablet Take 1 tablet by mouth At Night As Needed for Severe Pain. 20 tablet 0    ondansetron ODT (ZOFRAN-ODT) 4 MG disintegrating tablet Place 1 tablet on the tongue Every 6 (Six) Hours As Needed for Nausea or Vomiting. 40 tablet 0     No Known Allergies  Social History     Socioeconomic History    Marital status: Single   Tobacco Use    Smoking status: Never    Smokeless tobacco: Never   Vaping Use    Vaping Use: Never used   Substance and Sexual Activity    Alcohol use: Not Currently     Comment: occ.    Drug use: No    Sexual activity: Yes     Partners: Female     Review of Systems   Constitutional:  Negative for fever and unexpected weight change.   HENT:  Negative for trouble swallowing.    Respiratory:  Negative for shortness of breath.    Cardiovascular:  Negative for chest pain.   Gastrointestinal:  Negative for abdominal pain and anal bleeding.   Objective   Vitals:    08/17/23 1331   BP: 138/84   Pulse: (!) 42   Temp: 98 °F (36.7 °C)   SpO2: 97%     Physical Exam  Constitutional:       Appearance: Normal appearance. He is well-developed.   Eyes:      General: No scleral icterus.  Cardiovascular:      Heart sounds: Normal heart sounds. No murmur heard.  Pulmonary:      Effort: Pulmonary effort is normal.   Abdominal:      General: Bowel sounds are normal. There is no distension.      Palpations: Abdomen is soft.      Tenderness: There is no abdominal tenderness. There is no guarding.   Skin:     General: Skin is warm and dry.       Coloration: Skin is not jaundiced.   Neurological:      Mental Status: He is alert.   Psychiatric:         Behavior: Behavior is cooperative.     Imaging Results (Most Recent)       None          Body mass index is 28.21 kg/m².    Assessment & Plan   Diagnoses and all orders for this visit:    1. Encounter for screening for malignant neoplasm of colon (Primary)  -     Case Request; Standing  -     Case Request    Other orders  -     Implement Anesthesia Orders Day of Procedure; Standing  -     Obtain Informed Consent; Standing      COLONOSCOPY WITH ANESTHESIA (N/A)      Miralax prep     Advised pt to stop use of NSAIDs, Fish Oil, and MV 5 days prior to procedure, per Dr Brush protocol.  Tylenol based products are ok to take.  Pt verbalized understanding.     All risks, benefits, alternatives, and indications of colonoscopy procedure have been discussed with the patient. Risks to include perforation of the colon requiring possible surgery or colostomy, risk of bleeding from biopsies or removal of colon tissue, possibility of missing a colon polyp or cancer, or adverse drug reaction.  Benefits to include the diagnosis and management of disease of the colon and rectum. Alternatives to include barium enema, radiographic evaluation, lab testing or no intervention. He verbalizes understanding and agrees.         Som Cano, APRN  08/17/23        There are no Patient Instructions on file for this visit.

## 2023-08-16 NOTE — H&P (VIEW-ONLY)
Chief Complaint   Patient presents with    Colonoscopy     Screening colon       PCP: Vivek Stewart DO  REFER: No ref. provider found    Subjective     HPI    Saulo Shepard is a 48 y.o. male who presents to office for preventative maintenance.  There is not  a personal history of colon polyps.  There is not a history of colon cancer.  He does not have complaints of nausea/vomiting, change in bowels, weight loss, no BRBPR, no melena.  There is not a family history of colon cancer in first degree relative.  There is not a family history of colon polyps in first degree relative.  Saulo Shepard last colonoscopy-no previous colonoscopy .  Bowels do move on regular basis.        Lab Results - Last 18 Months   Lab Units 08/17/23  0338 06/06/23  1748 03/29/23  0437 03/27/23  0914 03/25/23  1859   GLUCOSE mg/dL 97 125* 101* 105* 112*   SODIUM mmol/L 139 143 139 137 136   POTASSIUM mmol/L 3.9 3.7 4.0 4.0 3.9   CREATININE mg/dL 1.07 0.99 1.06 0.98 1.07   BUN mg/dL 12 12 11 11 10   BUN / CREAT RATIO  11.2 12.1 10.4 11.2 9.3   ALK PHOS U/L 98 95  --  89 87   ALT (SGPT) U/L 19 37  --  13 11   AST (SGOT) U/L 21 27  --  14 15   BILIRUBIN mg/dL 0.3 0.5  --  0.5 0.7   ALBUMIN g/dL 4.4 4.1  --  3.9 3.9       No results for input(s): EGFRIFNONA, EGFRIFAFRI, EGFRRESULT in the last 41477 hours.     Past Medical History:   Diagnosis Date    Anxiety     Back pain     Fracture of lumbar spine 2002 and 2015    lower lumbar 2002; L1 2015    Head injury     Injury of back     Neck pain     Overweight (BMI 25.0-29.9)      Past Surgical History:   Procedure Laterality Date    FACIAL FRACTURE SURGERY      INCISION AND DRAINAGE LEG Right 12/1/2018    Procedure: INCISION AND DRAINAGE OF RIGHT UPPER INNER THIGH, PLACEMENT OF WOUND VAC;  Surgeon: René Dallas MD;  Location: [facility] OR;  Service: General    KNEE SURGERY Right 09/2021    SPLENECTOMY       Outpatient Medications Marked as Taking for the 8/17/23 encounter (Office Visit)  with Som Cano APRN   Medication Sig Dispense Refill    diclofenac (VOLTAREN) 75 MG EC tablet Take 1 tablet by mouth 2 (Two) Times a Day As Needed (For pain and back pain) for up to 30 days. 60 tablet 0    fluticasone (FLONASE) 50 MCG/ACT nasal spray 2 sprays into the nostril(s) as directed by provider Daily. (Patient taking differently: 2 sprays into the nostril(s) as directed by provider Daily As Needed.) 9.9 mL 0    HYDROcodone-acetaminophen (NORCO) 5-325 MG per tablet Take 1 tablet by mouth At Night As Needed for Severe Pain. 20 tablet 0    ondansetron ODT (ZOFRAN-ODT) 4 MG disintegrating tablet Place 1 tablet on the tongue Every 6 (Six) Hours As Needed for Nausea or Vomiting. 40 tablet 0     No Known Allergies  Social History     Socioeconomic History    Marital status: Single   Tobacco Use    Smoking status: Never    Smokeless tobacco: Never   Vaping Use    Vaping Use: Never used   Substance and Sexual Activity    Alcohol use: Not Currently     Comment: occ.    Drug use: No    Sexual activity: Yes     Partners: Female     Review of Systems   Constitutional:  Negative for fever and unexpected weight change.   HENT:  Negative for trouble swallowing.    Respiratory:  Negative for shortness of breath.    Cardiovascular:  Negative for chest pain.   Gastrointestinal:  Negative for abdominal pain and anal bleeding.   Objective   Vitals:    08/17/23 1331   BP: 138/84   Pulse: (!) 42   Temp: 98 °F (36.7 °C)   SpO2: 97%     Physical Exam  Constitutional:       Appearance: Normal appearance. He is well-developed.   Eyes:      General: No scleral icterus.  Cardiovascular:      Heart sounds: Normal heart sounds. No murmur heard.  Pulmonary:      Effort: Pulmonary effort is normal.   Abdominal:      General: Bowel sounds are normal. There is no distension.      Palpations: Abdomen is soft.      Tenderness: There is no abdominal tenderness. There is no guarding.   Skin:     General: Skin is warm and dry.       Coloration: Skin is not jaundiced.   Neurological:      Mental Status: He is alert.   Psychiatric:         Behavior: Behavior is cooperative.     Imaging Results (Most Recent)       None          Body mass index is 28.21 kg/m².    Assessment & Plan   Diagnoses and all orders for this visit:    1. Encounter for screening for malignant neoplasm of colon (Primary)  -     Case Request; Standing  -     Case Request    Other orders  -     Implement Anesthesia Orders Day of Procedure; Standing  -     Obtain Informed Consent; Standing      COLONOSCOPY WITH ANESTHESIA (N/A)      Miralax prep     Advised pt to stop use of NSAIDs, Fish Oil, and MV 5 days prior to procedure, per Dr Brush protocol.  Tylenol based products are ok to take.  Pt verbalized understanding.     All risks, benefits, alternatives, and indications of colonoscopy procedure have been discussed with the patient. Risks to include perforation of the colon requiring possible surgery or colostomy, risk of bleeding from biopsies or removal of colon tissue, possibility of missing a colon polyp or cancer, or adverse drug reaction.  Benefits to include the diagnosis and management of disease of the colon and rectum. Alternatives to include barium enema, radiographic evaluation, lab testing or no intervention. He verbalizes understanding and agrees.         Som Cano, APRN  08/17/23        There are no Patient Instructions on file for this visit.

## 2023-08-16 NOTE — PROGRESS NOTES
Chief Complaint   Patient presents with    Colonoscopy     Screening colon       PCP: Vivek Stewart DO  REFER: No ref. provider found    Subjective     HPI    Saulo Shepard is a 48 y.o. male who presents to office for preventative maintenance.  There is not  a personal history of colon polyps.  There is not a history of colon cancer.  He does not have complaints of nausea/vomiting, change in bowels, weight loss, no BRBPR, no melena.  There is not a family history of colon cancer in first degree relative.  There is not a family history of colon polyps in first degree relative.  Saulo Shepard last colonoscopy-no previous colonoscopy .  Bowels do move on regular basis.        Lab Results - Last 18 Months   Lab Units 08/17/23  0338 06/06/23  1748 03/29/23  0437 03/27/23  0914 03/25/23  1859   GLUCOSE mg/dL 97 125* 101* 105* 112*   SODIUM mmol/L 139 143 139 137 136   POTASSIUM mmol/L 3.9 3.7 4.0 4.0 3.9   CREATININE mg/dL 1.07 0.99 1.06 0.98 1.07   BUN mg/dL 12 12 11 11 10   BUN / CREAT RATIO  11.2 12.1 10.4 11.2 9.3   ALK PHOS U/L 98 95  --  89 87   ALT (SGPT) U/L 19 37  --  13 11   AST (SGOT) U/L 21 27  --  14 15   BILIRUBIN mg/dL 0.3 0.5  --  0.5 0.7   ALBUMIN g/dL 4.4 4.1  --  3.9 3.9       No results for input(s): EGFRIFNONA, EGFRIFAFRI, EGFRRESULT in the last 82597 hours.     Past Medical History:   Diagnosis Date    Anxiety     Back pain     Fracture of lumbar spine 2002 and 2015    lower lumbar 2002; L1 2015    Head injury     Injury of back     Neck pain     Overweight (BMI 25.0-29.9)      Past Surgical History:   Procedure Laterality Date    FACIAL FRACTURE SURGERY      INCISION AND DRAINAGE LEG Right 12/1/2018    Procedure: INCISION AND DRAINAGE OF RIGHT UPPER INNER THIGH, PLACEMENT OF WOUND VAC;  Surgeon: René Dallas MD;  Location: [facility] OR;  Service: General    KNEE SURGERY Right 09/2021    SPLENECTOMY       Outpatient Medications Marked as Taking for the 8/17/23 encounter (Office Visit)  with Som Cano APRN   Medication Sig Dispense Refill    diclofenac (VOLTAREN) 75 MG EC tablet Take 1 tablet by mouth 2 (Two) Times a Day As Needed (For pain and back pain) for up to 30 days. 60 tablet 0    fluticasone (FLONASE) 50 MCG/ACT nasal spray 2 sprays into the nostril(s) as directed by provider Daily. (Patient taking differently: 2 sprays into the nostril(s) as directed by provider Daily As Needed.) 9.9 mL 0    HYDROcodone-acetaminophen (NORCO) 5-325 MG per tablet Take 1 tablet by mouth At Night As Needed for Severe Pain. 20 tablet 0    ondansetron ODT (ZOFRAN-ODT) 4 MG disintegrating tablet Place 1 tablet on the tongue Every 6 (Six) Hours As Needed for Nausea or Vomiting. 40 tablet 0     No Known Allergies  Social History     Socioeconomic History    Marital status: Single   Tobacco Use    Smoking status: Never    Smokeless tobacco: Never   Vaping Use    Vaping Use: Never used   Substance and Sexual Activity    Alcohol use: Not Currently     Comment: occ.    Drug use: No    Sexual activity: Yes     Partners: Female     Review of Systems   Constitutional:  Negative for fever and unexpected weight change.   HENT:  Negative for trouble swallowing.    Respiratory:  Negative for shortness of breath.    Cardiovascular:  Negative for chest pain.   Gastrointestinal:  Negative for abdominal pain and anal bleeding.   Objective   Vitals:    08/17/23 1331   BP: 138/84   Pulse: (!) 42   Temp: 98 øF (36.7 øC)   SpO2: 97%     Physical Exam  Constitutional:       Appearance: Normal appearance. He is well-developed.   Eyes:      General: No scleral icterus.  Cardiovascular:      Heart sounds: Normal heart sounds. No murmur heard.  Pulmonary:      Effort: Pulmonary effort is normal.   Abdominal:      General: Bowel sounds are normal. There is no distension.      Palpations: Abdomen is soft.      Tenderness: There is no abdominal tenderness. There is no guarding.   Skin:     General: Skin is warm and dry.       Coloration: Skin is not jaundiced.   Neurological:      Mental Status: He is alert.   Psychiatric:         Behavior: Behavior is cooperative.     Imaging Results (Most Recent)       None          Body mass index is 28.21 kg/mý.    Assessment & Plan   Diagnoses and all orders for this visit:    1. Encounter for screening for malignant neoplasm of colon (Primary)  -     Case Request; Standing  -     Case Request    Other orders  -     Implement Anesthesia Orders Day of Procedure; Standing  -     Obtain Informed Consent; Standing      COLONOSCOPY WITH ANESTHESIA (N/A)      Miralax prep     Advised pt to stop use of NSAIDs, Fish Oil, and MV 5 days prior to procedure, per Dr Brush protocol.  Tylenol based products are ok to take.  Pt verbalized understanding.     All risks, benefits, alternatives, and indications of colonoscopy procedure have been discussed with the patient. Risks to include perforation of the colon requiring possible surgery or colostomy, risk of bleeding from biopsies or removal of colon tissue, possibility of missing a colon polyp or cancer, or adverse drug reaction.  Benefits to include the diagnosis and management of disease of the colon and rectum. Alternatives to include barium enema, radiographic evaluation, lab testing or no intervention. He verbalizes understanding and agrees.         Som Cano, APRN  08/17/23        There are no Patient Instructions on file for this visit.

## 2023-08-17 ENCOUNTER — HOSPITAL ENCOUNTER (EMERGENCY)
Facility: HOSPITAL | Age: 48
Discharge: HOME | End: 2023-08-17
Attending: STUDENT IN AN ORGANIZED HEALTH CARE EDUCATION/TRAINING PROGRAM
Payer: COMMERCIAL

## 2023-08-17 ENCOUNTER — OFFICE VISIT (OUTPATIENT)
Dept: GASTROENTEROLOGY | Facility: CLINIC | Age: 48
End: 2023-08-17
Payer: COMMERCIAL

## 2023-08-17 ENCOUNTER — APPOINTMENT (OUTPATIENT)
Dept: GENERAL RADIOLOGY | Facility: HOSPITAL | Age: 48
End: 2023-08-17
Payer: COMMERCIAL

## 2023-08-17 ENCOUNTER — PRE-ADMISSION TESTING (OUTPATIENT)
Dept: PREADMISSION TESTING | Facility: HOSPITAL | Age: 48
End: 2023-08-17
Payer: COMMERCIAL

## 2023-08-17 VITALS
HEIGHT: 72 IN | HEART RATE: 42 BPM | OXYGEN SATURATION: 97 % | SYSTOLIC BLOOD PRESSURE: 138 MMHG | DIASTOLIC BLOOD PRESSURE: 84 MMHG | BODY MASS INDEX: 28.17 KG/M2 | WEIGHT: 208 LBS | TEMPERATURE: 98 F

## 2023-08-17 VITALS
TEMPERATURE: 98 F | WEIGHT: 210 LBS | SYSTOLIC BLOOD PRESSURE: 124 MMHG | OXYGEN SATURATION: 98 % | RESPIRATION RATE: 20 BRPM | DIASTOLIC BLOOD PRESSURE: 76 MMHG | HEART RATE: 78 BPM | HEIGHT: 73 IN | BODY MASS INDEX: 27.83 KG/M2

## 2023-08-17 VITALS
BODY MASS INDEX: 28.79 KG/M2 | RESPIRATION RATE: 16 BRPM | WEIGHT: 212.52 LBS | OXYGEN SATURATION: 96 % | DIASTOLIC BLOOD PRESSURE: 88 MMHG | SYSTOLIC BLOOD PRESSURE: 141 MMHG | HEART RATE: 72 BPM | HEIGHT: 72 IN

## 2023-08-17 DIAGNOSIS — Z12.11 ENCOUNTER FOR SCREENING FOR MALIGNANT NEOPLASM OF COLON: Primary | ICD-10-CM

## 2023-08-17 DIAGNOSIS — K40.20 BILATERAL INGUINAL HERNIA WITHOUT OBSTRUCTION OR GANGRENE, RECURRENCE NOT SPECIFIED: ICD-10-CM

## 2023-08-17 DIAGNOSIS — Z77.098 CHEMICAL EXPOSURE: Primary | ICD-10-CM

## 2023-08-17 DIAGNOSIS — R07.9 ACUTE CHEST PAIN: ICD-10-CM

## 2023-08-17 LAB
ALBUMIN SERPL-MCNC: 4.4 G/DL (ref 3.5–5.2)
ALBUMIN/GLOB SERPL: 1.4 G/DL
ALP SERPL-CCNC: 98 U/L (ref 39–117)
ALT SERPL W P-5'-P-CCNC: 19 U/L (ref 1–41)
ANION GAP SERPL CALCULATED.3IONS-SCNC: 12 MMOL/L (ref 5–15)
ANISOCYTOSIS BLD QL: ABNORMAL
AST SERPL-CCNC: 21 U/L (ref 1–40)
BASOPHILS # BLD MANUAL: 0.14 10*3/MM3 (ref 0–0.2)
BASOPHILS NFR BLD MANUAL: 1 % (ref 0–1.5)
BILIRUB SERPL-MCNC: 0.3 MG/DL (ref 0–1.2)
BUN SERPL-MCNC: 12 MG/DL (ref 6–20)
BUN/CREAT SERPL: 11.2 (ref 7–25)
BURR CELLS BLD QL SMEAR: ABNORMAL
CALCIUM SPEC-SCNC: 10.9 MG/DL (ref 8.6–10.5)
CHLORIDE SERPL-SCNC: 105 MMOL/L (ref 98–107)
CO2 SERPL-SCNC: 22 MMOL/L (ref 22–29)
CREAT SERPL-MCNC: 1.07 MG/DL (ref 0.76–1.27)
DEPRECATED RDW RBC AUTO: 46.3 FL (ref 37–54)
EGFRCR SERPLBLD CKD-EPI 2021: 86.1 ML/MIN/1.73
EOSINOPHIL # BLD MANUAL: 0.3 10*3/MM3 (ref 0–0.4)
EOSINOPHIL NFR BLD MANUAL: 2.1 % (ref 0.3–6.2)
ERYTHROCYTE [DISTWIDTH] IN BLOOD BY AUTOMATED COUNT: 14.6 % (ref 12.3–15.4)
GLOBULIN UR ELPH-MCNC: 3.2 GM/DL
GLUCOSE SERPL-MCNC: 97 MG/DL (ref 65–99)
HCT VFR BLD AUTO: 44.7 % (ref 37.5–51)
HGB BLD-MCNC: 14.8 G/DL (ref 13–17.7)
LYMPHOCYTES # BLD MANUAL: 3.49 10*3/MM3 (ref 0.7–3.1)
LYMPHOCYTES NFR BLD MANUAL: 9.3 % (ref 5–12)
MCH RBC QN AUTO: 28.6 PG (ref 26.6–33)
MCHC RBC AUTO-ENTMCNC: 33.1 G/DL (ref 31.5–35.7)
MCV RBC AUTO: 86.5 FL (ref 79–97)
MONOCYTES # BLD: 1.31 10*3/MM3 (ref 0.1–0.9)
NEUTROPHILS # BLD AUTO: 8.89 10*3/MM3 (ref 1.7–7)
NEUTROPHILS NFR BLD MANUAL: 60.8 % (ref 42.7–76)
NEUTS BAND NFR BLD MANUAL: 2.1 % (ref 0–5)
NT-PROBNP SERPL-MCNC: 71.2 PG/ML (ref 0–450)
PLAT MORPH BLD: NORMAL
PLATELET # BLD AUTO: 433 10*3/MM3 (ref 140–450)
PMV BLD AUTO: 10.6 FL (ref 6–12)
POIKILOCYTOSIS BLD QL SMEAR: ABNORMAL
POTASSIUM SERPL-SCNC: 3.9 MMOL/L (ref 3.5–5.2)
PROT SERPL-MCNC: 7.6 G/DL (ref 6–8.5)
QT INTERVAL: 382 MS
QTC INTERVAL: 397 MS
RBC # BLD AUTO: 5.17 10*6/MM3 (ref 4.14–5.8)
SODIUM SERPL-SCNC: 139 MMOL/L (ref 136–145)
TROPONIN T SERPL HS-MCNC: <6 NG/L
VARIANT LYMPHS NFR BLD MANUAL: 1 % (ref 0–5)
VARIANT LYMPHS NFR BLD MANUAL: 23.7 % (ref 19.6–45.3)
WBC MORPH BLD: NORMAL
WBC NRBC COR # BLD: 14.13 10*3/MM3 (ref 3.4–10.8)

## 2023-08-17 PROCEDURE — 84484 ASSAY OF TROPONIN QUANT: CPT | Performed by: STUDENT IN AN ORGANIZED HEALTH CARE EDUCATION/TRAINING PROGRAM

## 2023-08-17 PROCEDURE — 85025 COMPLETE CBC W/AUTO DIFF WBC: CPT | Performed by: STUDENT IN AN ORGANIZED HEALTH CARE EDUCATION/TRAINING PROGRAM

## 2023-08-17 PROCEDURE — 83880 ASSAY OF NATRIURETIC PEPTIDE: CPT | Performed by: STUDENT IN AN ORGANIZED HEALTH CARE EDUCATION/TRAINING PROGRAM

## 2023-08-17 PROCEDURE — 93010 ELECTROCARDIOGRAM REPORT: CPT | Performed by: INTERNAL MEDICINE

## 2023-08-17 PROCEDURE — 71045 X-RAY EXAM CHEST 1 VIEW: CPT

## 2023-08-17 PROCEDURE — 99284 EMERGENCY DEPT VISIT MOD MDM: CPT

## 2023-08-17 PROCEDURE — 87081 CULTURE SCREEN ONLY: CPT

## 2023-08-17 PROCEDURE — 85007 BL SMEAR W/DIFF WBC COUNT: CPT | Performed by: STUDENT IN AN ORGANIZED HEALTH CARE EDUCATION/TRAINING PROGRAM

## 2023-08-17 PROCEDURE — 93005 ELECTROCARDIOGRAM TRACING: CPT | Performed by: STUDENT IN AN ORGANIZED HEALTH CARE EDUCATION/TRAINING PROGRAM

## 2023-08-17 PROCEDURE — 80053 COMPREHEN METABOLIC PANEL: CPT | Performed by: STUDENT IN AN ORGANIZED HEALTH CARE EDUCATION/TRAINING PROGRAM

## 2023-08-17 RX ORDER — PROPARACAINE HYDROCHLORIDE 5 MG/ML
2 SOLUTION/ DROPS OPHTHALMIC ONCE
Status: DISCONTINUED | OUTPATIENT
Start: 2023-08-17 | End: 2023-08-17

## 2023-08-17 RX ORDER — SODIUM CHLORIDE 0.9 % (FLUSH) 0.9 %
10 SYRINGE (ML) INJECTION AS NEEDED
Status: DISCONTINUED | OUTPATIENT
Start: 2023-08-17 | End: 2023-08-17 | Stop reason: HOSPADM

## 2023-08-17 NOTE — DISCHARGE INSTRUCTIONS
Before you come to the hospital        Arrival time: AS DIRECTED BY OFFICE     YOU MAY TAKE THE FOLLOWING MEDICATION(S) THE MORNING OF SURGERY WITH A SIP OF WATER: NORCO           ALL OTHER HOME MEDICATION CHECK WITH YOUR PHYSICIAN (especially if   you are taking diabetes medicines or blood thinners)    Do not take any Erectile Dysfunction medications (EX: CIALIS, VIAGRA) 24 hours prior to surgery.      If you were given and instructed to use a germ- killing soap, use as directed the night before surgery and again the morning of surgery or as directed by your surgeon. (Use one-half of the bottle with each shower.)   See attached information for How to Use Chlorhexidine for Bathing if applicable.            Eating and drinking restrictions prior to scheduled arrival time    2 Hours before arrival time STOP   Drinking Clear liquids (water, black coffee-NO CREAM,  apple juice-no pulp)      8 Hours before arrival time STOP   All food, full liquids, and dairy products    (It is extremely important that you follow these guidelines to prevent delay or cancelation of your procedure)     Clear Liquids  Water and flavored water                                                                      Clear Fruit juices, such as cranberry juice and apple juice.  Black coffee (NO cream of any kind, including powdered).  Plain tea  Clear bouillon or broth.  Flavored gelatin.  Soda.  Gatorade or Powerade.  Full liquid examples  Juices that have pulp.  Frozen ice pops that contain fruit pieces.  Coffee with creamer  Milk.  Yogurt.                MANAGING PAIN AFTER SURGERY    We know you are probably wondering what your pain will be like after surgery.  Following surgery it is unrealistic to expect you will not have pain.   Pain is how our bodies let us know that something is wrong or cautions us to be careful.  That said, our goal is to make your pain tolerable.    Methods we may use to treat your pain include (oral or IV medications,  PCAs, epidurals, nerve blocks, etc.)   While some procedures require IV pain medications for a short time after surgery, transitioning to pain medications by mouth allows for better management of pain.   Your nurse will encourage you to take oral pain medications whenever possible.  IV medications work almost immediately, but only last a short while.  Taking medications by mouth allows for a more constant level of medication in your blood stream for a longer period of time.      Once your pain is out of control it is harder to get back under control.  It is important you are aware when your next dose of pain medication is due.  If you are admitted, your nurse may write the time of your next dose on the white board in your room to help you remember.      We are interested in your pain and encourage you to inform us about aggravating factors during your visit.   Many times a simple repositioning every few hours can make a big difference.    If your physician says it is okay, do not let your pain prevent you from getting out of bed. Be sure to call your nurse for assistance prior to getting up so you do not fall.      Before surgery, please decide your tolerable pain goal.  These faces help describe the pain ratings we use on a 0-10 scale.   Be prepared to tell us your goal and whether or not you take pain or anxiety medications at home.          Preparing for Surgery  Preparing for surgery is an important part of your care. It can make things go more smoothly and help you avoid complications. The steps leading up to surgery may vary among hospitals. Follow all instructions given to you by your health care providers. Ask questions if you do not understand something. Talk about any concerns that you have.  Here are some questions to consider asking before your surgery:  If my surgery is not an emergency (is elective), when would be the best time to have the surgery?  What arrangements do I need to make for work, home,  or school?  What will my recovery be like? How long will it be before I can return to normal activities?  Will I need to prepare my home? Will I need to arrange care for me or my children?  Should I expect to have pain after surgery? What are my pain management options? Are there nonmedical options that I can try for pain?  Tell a health care provider about:  Any allergies you have.  All medicines you are taking, including vitamins, herbs, eye drops, creams, and over-the-counter medicines.  Any problems you or family members have had with anesthetic medicines.  Any blood disorders you have.  Any surgeries you have had.  Any medical conditions you have.  Whether you are pregnant or may be pregnant.  What are the risks?  The risks and complications of surgery depend on the specific procedure that you have. Discuss all the risks with your health care providers before your surgery. Ask about common surgical complications, which may include:  Infection.  Bleeding or a need for blood replacement (transfusion).  Allergic reactions to medicines.  Damage to surrounding nerves, tissues, or structures.  A blood clot.  Scarring.  Failure of the surgery to correct the problem.  Follow these instructions before the procedure:  Several days or weeks before your procedure  You may have a physical exam by your primary health care provider to make sure it is safe for you to have surgery.  You may have testing. This may include a chest X-ray, blood and urine tests, electrocardiogram (ECG), or other testing.  Ask your health care provider about:  Changing or stopping your regular medicines. This is especially important if you are taking diabetes medicines or blood thinners.  Taking medicines such as aspirin and ibuprofen. These medicines can thin your blood. Do not take these medicines unless your health care provider tells you to take them.  Taking over-the-counter medicines, vitamins, herbs, and supplements.  Do not use any products  that contain nicotine or tobacco, such as cigarettes and e-cigarettes. If you need help quitting, ask your health care provider.  Avoid alcohol.  Ask your health care provider if there are exercises you can do to prepare for surgery.  Eat a healthy diet.   Plan to have someone 18 years of age or older to take you home from the hospital. We will need to verify your ride on the morning of surgery if you are being discharged home on the same day. Tell your ride to be expecting a call from the hospital prior to your procedure.   Plan to have a responsible adult care for you for at least 24 hours after you leave the hospital or clinic. This is important.  The day before your procedure  You may be given antibiotic medicine to take by mouth to help prevent infection. Take it as told by your health care provider.  You may be asked to shower with a germ-killing soap.  Follow instructions from your health care provider about eating and drinking restrictions. This includes gum, mints and hard candy.  Pack comfortable clothes according to your procedure.   The day of your procedure  You may need to take another shower with a germ-killing soap before you leave home in the morning.  With a small sip of water, take only the medicines that you are told to take.  Remove all jewelry including rings.   Leave anything you consider valuable at home except hearing aids if needed.  You do not need to bring your home medications into the hospital.   Do not wear any makeup, nail polish, powder, deodorant, lotion, hair accessories, or anything on your skin or body except your clothes.  If you will be staying in the hospital, bring a case to hold your glasses, contacts, or dentures. You may also want to bring your robe and non-skid footwear.       (Do not use denture adhesives since you will be asked to remove them during  surgery).   If you wear oxygen at home, bring it with you the day of surgery.  If instructed by your health care  provider, bring your sleep apnea device with you on the day of your surgery (if this applies to you).  You may want to leave your suitcase and sleep apnea device in the car until after surgery.   Arrive at the hospital as scheduled.  Bring a friend or family member with you who can help to answer questions and be present while you meet with your health care provider.  At the hospital  When you arrive at the hospital:  Go to registration located at the main entrance of the hospital. You will be registered and given a beeper and a sticker sheet. Take the stickers to the Outpatient nurses desk and place in the black tray. This is to notify staff that you have arrived. Then return to the lobby to wait.   When your beeper lights up and vibrates proceed through the double doors, under the stairs, and a member of the Outpatient Surgery staff will escort you to your preoperative room.  You may have to wear compression sleeves. These help to prevent blood clots and reduce swelling in your legs.  An IV may be inserted into one of your veins.              In the operating room, you may be given one or more of the following:        A medicine to help you relax (sedative).        A medicine to numb the area (local anesthetic).        A medicine to make you fall asleep (general anesthetic).        A medicine that is injected into an area of your body to numb everything below the                      injection site (regional anesthetic).  You may be given an antibiotic through your IV to help prevent infection.  Your surgical site will be marked or identified.    Contact a health care provider if you:  Develop a fever of more than 100.4øF (38øC) or other feelings of illness during the 48 hours before your surgery.  Have symptoms that get worse.  Have questions or concerns about your surgery.  Summary  Preparing for surgery can make the procedure go more smoothly and lower your risk of complications.  Before surgery, make a list of  questions and concerns to discuss with your surgeon. Ask about the risks and possible complications.  In the days or weeks before your surgery, follow all instructions from your health care provider. You may need to stop smoking, avoid alcohol, follow eating restrictions, and change or stop your regular medicines.  Contact your surgeon if you develop a fever or other signs of illness during the few days before your surgery.  This information is not intended to replace advice given to you by your health care provider. Make sure you discuss any questions you have with your health care provider.  Document Revised: 12/21/2018 Document Reviewed: 10/23/2018  [employer] Patient Education c 2021 [employer]

## 2023-08-17 NOTE — Clinical Note
[facility] EMERGENCY DEPARTMENT  [address]  [place] [address]  Phone: [phone]    Saulo Shepard was seen and treated in our emergency department on 8/17/2023.  He may return to work on 08/18/2023.         Thank you for choosing [facility].    Nabeel Huff MD

## 2023-08-18 LAB — MRSA SPEC QL CULT: NORMAL

## 2023-08-22 ENCOUNTER — ANESTHESIA EVENT (OUTPATIENT)
Dept: PERIOP | Facility: HOSPITAL | Age: 48
End: 2023-08-22
Payer: COMMERCIAL

## 2023-08-22 ENCOUNTER — ANESTHESIA (OUTPATIENT)
Dept: PERIOP | Facility: HOSPITAL | Age: 48
End: 2023-08-22
Payer: COMMERCIAL

## 2023-08-22 ENCOUNTER — HOSPITAL ENCOUNTER (OUTPATIENT)
Facility: HOSPITAL | Age: 48
Setting detail: HOSPITAL OUTPATIENT SURGERY
Discharge: HOME | End: 2023-08-22
Attending: STUDENT IN AN ORGANIZED HEALTH CARE EDUCATION/TRAINING PROGRAM | Admitting: STUDENT IN AN ORGANIZED HEALTH CARE EDUCATION/TRAINING PROGRAM
Payer: COMMERCIAL

## 2023-08-22 VITALS
RESPIRATION RATE: 18 BRPM | DIASTOLIC BLOOD PRESSURE: 73 MMHG | SYSTOLIC BLOOD PRESSURE: 119 MMHG | HEART RATE: 63 BPM | OXYGEN SATURATION: 96 % | TEMPERATURE: 98 F

## 2023-08-22 DIAGNOSIS — K40.20 NON-RECURRENT BILATERAL INGUINAL HERNIA WITHOUT OBSTRUCTION OR GANGRENE: Primary | ICD-10-CM

## 2023-08-22 DIAGNOSIS — K40.20 BILATERAL INGUINAL HERNIA WITHOUT OBSTRUCTION OR GANGRENE, RECURRENCE NOT SPECIFIED: ICD-10-CM

## 2023-08-22 PROBLEM — Z12.11 ENCOUNTER FOR SCREENING FOR MALIGNANT NEOPLASM OF COLON: Status: ACTIVE | Noted: 2023-08-22

## 2023-08-22 PROCEDURE — S2900 ROBOTIC SURGICAL SYSTEM: HCPCS | Performed by: STUDENT IN AN ORGANIZED HEALTH CARE EDUCATION/TRAINING PROGRAM

## 2023-08-22 PROCEDURE — C1781 MESH (IMPLANTABLE): HCPCS | Performed by: STUDENT IN AN ORGANIZED HEALTH CARE EDUCATION/TRAINING PROGRAM

## 2023-08-22 PROCEDURE — 25010000002 ONDANSETRON PER 1 MG: Performed by: NURSE ANESTHETIST, CERTIFIED REGISTERED

## 2023-08-22 PROCEDURE — 25010000002 DEXAMETHASONE PER 1 MG: Performed by: STUDENT IN AN ORGANIZED HEALTH CARE EDUCATION/TRAINING PROGRAM

## 2023-08-22 PROCEDURE — 25010000002 PROPOFOL 10 MG/ML EMULSION: Performed by: NURSE ANESTHETIST, CERTIFIED REGISTERED

## 2023-08-22 PROCEDURE — 25010000002 CEFAZOLIN PER 500 MG: Performed by: STUDENT IN AN ORGANIZED HEALTH CARE EDUCATION/TRAINING PROGRAM

## 2023-08-22 PROCEDURE — 25010000002 DEXAMETHASONE PER 1 MG: Performed by: NURSE ANESTHETIST, CERTIFIED REGISTERED

## 2023-08-22 PROCEDURE — 25010000002 HEPARIN (PORCINE) PER 1000 UNITS: Performed by: STUDENT IN AN ORGANIZED HEALTH CARE EDUCATION/TRAINING PROGRAM

## 2023-08-22 PROCEDURE — 0 LIDOCAINE 1 % SOLUTION 20 ML VIAL: Performed by: STUDENT IN AN ORGANIZED HEALTH CARE EDUCATION/TRAINING PROGRAM

## 2023-08-22 PROCEDURE — 25010000002 MIDAZOLAM PER 1 MG: Performed by: ANESTHESIOLOGY

## 2023-08-22 PROCEDURE — 25010000002 MORPHINE SULFATE (PF) 2 MG/ML SOLUTION 1 ML CARTRIDGE: Performed by: STUDENT IN AN ORGANIZED HEALTH CARE EDUCATION/TRAINING PROGRAM

## 2023-08-22 PROCEDURE — 25010000002 BUPIVACAINE 0.5 % SOLUTION 50 ML VIAL: Performed by: STUDENT IN AN ORGANIZED HEALTH CARE EDUCATION/TRAINING PROGRAM

## 2023-08-22 PROCEDURE — 49650 LAP ING HERNIA REPAIR INIT: CPT | Performed by: STUDENT IN AN ORGANIZED HEALTH CARE EDUCATION/TRAINING PROGRAM

## 2023-08-22 PROCEDURE — 25010000002 BUPIVACAINE (PF) 0.25 % SOLUTION 30 ML VIAL: Performed by: STUDENT IN AN ORGANIZED HEALTH CARE EDUCATION/TRAINING PROGRAM

## 2023-08-22 PROCEDURE — 25010000002 FENTANYL CITRATE (PF) 250 MCG/5ML SOLUTION: Performed by: NURSE ANESTHETIST, CERTIFIED REGISTERED

## 2023-08-22 DEVICE — IMPLANTABLE DEVICE: Type: IMPLANTABLE DEVICE | Site: ABDOMEN | Status: FUNCTIONAL

## 2023-08-22 DEVICE — 3DMAX™ MID ANATOMICAL MESH, LARGE, RIGHT, 4” X 6”, 10 X 16 CM
Type: IMPLANTABLE DEVICE | Site: ABDOMEN | Status: FUNCTIONAL
Brand: 3DMAX™ MID ANATOMICAL MESH

## 2023-08-22 DEVICE — ABSORBABLE WOUND CLOSURE DEVICE
Type: IMPLANTABLE DEVICE | Site: INGUINAL | Status: FUNCTIONAL
Brand: V-LOC 90

## 2023-08-22 RX ORDER — HYDROCODONE BITARTRATE AND ACETAMINOPHEN 5; 325 MG/1; MG/1
1 TABLET ORAL ONCE AS NEEDED
Status: DISCONTINUED | OUTPATIENT
Start: 2023-08-22 | End: 2023-08-22 | Stop reason: HOSPADM

## 2023-08-22 RX ORDER — LABETALOL HYDROCHLORIDE 5 MG/ML
5 INJECTION, SOLUTION INTRAVENOUS
Status: DISCONTINUED | OUTPATIENT
Start: 2023-08-22 | End: 2023-08-22 | Stop reason: HOSPADM

## 2023-08-22 RX ORDER — PROPOFOL 10 MG/ML
VIAL (ML) INTRAVENOUS AS NEEDED
Status: DISCONTINUED | OUTPATIENT
Start: 2023-08-22 | End: 2023-08-22 | Stop reason: SURG

## 2023-08-22 RX ORDER — IBUPROFEN 200 MG
600 TABLET ORAL EVERY 8 HOURS
Qty: 100 TABLET | Refills: 2
Start: 2023-08-22 | End: 2024-08-21

## 2023-08-22 RX ORDER — DEXAMETHASONE SODIUM PHOSPHATE 4 MG/ML
INJECTION, SOLUTION INTRA-ARTICULAR; INTRALESIONAL; INTRAMUSCULAR; INTRAVENOUS; SOFT TISSUE AS NEEDED
Status: DISCONTINUED | OUTPATIENT
Start: 2023-08-22 | End: 2023-08-22 | Stop reason: SURG

## 2023-08-22 RX ORDER — SODIUM CHLORIDE 0.9 % (FLUSH) 0.9 %
10 SYRINGE (ML) INJECTION AS NEEDED
Status: DISCONTINUED | OUTPATIENT
Start: 2023-08-22 | End: 2023-08-22 | Stop reason: HOSPADM

## 2023-08-22 RX ORDER — HEPARIN SODIUM 5000 [USP'U]/ML
5000 INJECTION, SOLUTION INTRAVENOUS; SUBCUTANEOUS ONCE
Status: COMPLETED | OUTPATIENT
Start: 2023-08-22 | End: 2023-08-22

## 2023-08-22 RX ORDER — OXYCODONE HYDROCHLORIDE 5 MG/1
5 TABLET ORAL EVERY 8 HOURS PRN
Qty: 10 TABLET | Refills: 0 | Status: SHIPPED | OUTPATIENT
Start: 2023-08-22 | End: 2024-08-21

## 2023-08-22 RX ORDER — ONDANSETRON 4 MG/1
4 TABLET, FILM COATED ORAL EVERY 8 HOURS PRN
Qty: 15 TABLET | Refills: 0 | Status: SHIPPED | OUTPATIENT
Start: 2023-08-22 | End: 2024-08-21

## 2023-08-22 RX ORDER — ROCURONIUM BROMIDE 10 MG/ML
INJECTION, SOLUTION INTRAVENOUS AS NEEDED
Status: DISCONTINUED | OUTPATIENT
Start: 2023-08-22 | End: 2023-08-22 | Stop reason: SURG

## 2023-08-22 RX ORDER — FENTANYL CITRATE 50 UG/ML
INJECTION, SOLUTION INTRAMUSCULAR; INTRAVENOUS AS NEEDED
Status: DISCONTINUED | OUTPATIENT
Start: 2023-08-22 | End: 2023-08-22 | Stop reason: SURG

## 2023-08-22 RX ORDER — LIDOCAINE HYDROCHLORIDE 20 MG/ML
INJECTION, SOLUTION EPIDURAL; INFILTRATION; INTRACAUDAL; PERINEURAL AS NEEDED
Status: DISCONTINUED | OUTPATIENT
Start: 2023-08-22 | End: 2023-08-22 | Stop reason: SURG

## 2023-08-22 RX ORDER — MIDAZOLAM HYDROCHLORIDE 1 MG/ML
1 INJECTION INTRAMUSCULAR; INTRAVENOUS
Status: DISCONTINUED | OUTPATIENT
Start: 2023-08-22 | End: 2023-08-22 | Stop reason: HOSPADM

## 2023-08-22 RX ORDER — FLUMAZENIL 0.1 MG/ML
0.2 INJECTION INTRAVENOUS AS NEEDED
Status: DISCONTINUED | OUTPATIENT
Start: 2023-08-22 | End: 2023-08-22 | Stop reason: HOSPADM

## 2023-08-22 RX ORDER — NALOXONE HCL 0.4 MG/ML
0.4 VIAL (ML) INJECTION AS NEEDED
Status: DISCONTINUED | OUTPATIENT
Start: 2023-08-22 | End: 2023-08-22 | Stop reason: HOSPADM

## 2023-08-22 RX ORDER — SODIUM CHLORIDE 0.9 % (FLUSH) 0.9 %
3-10 SYRINGE (ML) INJECTION AS NEEDED
Status: DISCONTINUED | OUTPATIENT
Start: 2023-08-22 | End: 2023-08-22 | Stop reason: HOSPADM

## 2023-08-22 RX ORDER — SODIUM CHLORIDE 0.9 % (FLUSH) 0.9 %
3 SYRINGE (ML) INJECTION EVERY 12 HOURS SCHEDULED
Status: DISCONTINUED | OUTPATIENT
Start: 2023-08-22 | End: 2023-08-22 | Stop reason: HOSPADM

## 2023-08-22 RX ORDER — SODIUM CHLORIDE 9 MG/ML
40 INJECTION, SOLUTION INTRAVENOUS AS NEEDED
Status: DISCONTINUED | OUTPATIENT
Start: 2023-08-22 | End: 2023-08-22 | Stop reason: HOSPADM

## 2023-08-22 RX ORDER — ACETAMINOPHEN 500 MG
1000 TABLET ORAL ONCE
Status: COMPLETED | OUTPATIENT
Start: 2023-08-22 | End: 2023-08-22

## 2023-08-22 RX ORDER — HYDROCODONE BITARTRATE AND ACETAMINOPHEN 10; 325 MG/1; MG/1
1 TABLET ORAL EVERY 4 HOURS PRN
Status: DISCONTINUED | OUTPATIENT
Start: 2023-08-22 | End: 2023-08-22 | Stop reason: HOSPADM

## 2023-08-22 RX ORDER — ONDANSETRON 2 MG/ML
4 INJECTION INTRAMUSCULAR; INTRAVENOUS ONCE AS NEEDED
Status: DISCONTINUED | OUTPATIENT
Start: 2023-08-22 | End: 2023-08-22 | Stop reason: HOSPADM

## 2023-08-22 RX ORDER — SODIUM CHLORIDE, SODIUM LACTATE, POTASSIUM CHLORIDE, CALCIUM CHLORIDE 600; 310; 30; 20 MG/100ML; MG/100ML; MG/100ML; MG/100ML
1000 INJECTION, SOLUTION INTRAVENOUS CONTINUOUS
Status: DISCONTINUED | OUTPATIENT
Start: 2023-08-22 | End: 2023-08-22 | Stop reason: HOSPADM

## 2023-08-22 RX ORDER — ONDANSETRON 2 MG/ML
INJECTION INTRAMUSCULAR; INTRAVENOUS AS NEEDED
Status: DISCONTINUED | OUTPATIENT
Start: 2023-08-22 | End: 2023-08-22 | Stop reason: SURG

## 2023-08-22 RX ORDER — SODIUM CHLORIDE 9 MG/ML
INJECTION, SOLUTION INTRAVENOUS AS NEEDED
Status: DISCONTINUED | OUTPATIENT
Start: 2023-08-22 | End: 2023-08-22 | Stop reason: HOSPADM

## 2023-08-22 RX ORDER — DROPERIDOL 2.5 MG/ML
0.62 INJECTION, SOLUTION INTRAMUSCULAR; INTRAVENOUS ONCE AS NEEDED
Status: DISCONTINUED | OUTPATIENT
Start: 2023-08-22 | End: 2023-08-22 | Stop reason: HOSPADM

## 2023-08-22 RX ORDER — FENTANYL CITRATE 50 UG/ML
25 INJECTION, SOLUTION INTRAMUSCULAR; INTRAVENOUS
Status: DISCONTINUED | OUTPATIENT
Start: 2023-08-22 | End: 2023-08-22 | Stop reason: HOSPADM

## 2023-08-22 RX ORDER — ACETAMINOPHEN 325 MG/1
975 TABLET ORAL EVERY 8 HOURS
Qty: 100 TABLET | Refills: 2
Start: 2023-08-22 | End: 2024-08-21

## 2023-08-22 RX ORDER — IBUPROFEN 600 MG/1
600 TABLET ORAL ONCE AS NEEDED
Status: DISCONTINUED | OUTPATIENT
Start: 2023-08-22 | End: 2023-08-22 | Stop reason: HOSPADM

## 2023-08-22 RX ORDER — SODIUM CHLORIDE 0.9 % (FLUSH) 0.9 %
3 SYRINGE (ML) INJECTION AS NEEDED
Status: DISCONTINUED | OUTPATIENT
Start: 2023-08-22 | End: 2023-08-22 | Stop reason: HOSPADM

## 2023-08-22 RX ORDER — SODIUM CHLORIDE, SODIUM LACTATE, POTASSIUM CHLORIDE, CALCIUM CHLORIDE 600; 310; 30; 20 MG/100ML; MG/100ML; MG/100ML; MG/100ML
100 INJECTION, SOLUTION INTRAVENOUS CONTINUOUS
Status: DISCONTINUED | OUTPATIENT
Start: 2023-08-22 | End: 2023-08-22 | Stop reason: HOSPADM

## 2023-08-22 RX ADMIN — MIDAZOLAM HYDROCHLORIDE 1 MG: 1 INJECTION, SOLUTION INTRAMUSCULAR; INTRAVENOUS at 08:45

## 2023-08-22 RX ADMIN — HEPARIN SODIUM 5000 UNITS: 5000 INJECTION, SOLUTION INTRAVENOUS; SUBCUTANEOUS at 08:36

## 2023-08-22 RX ADMIN — LIDOCAINE HYDROCHLORIDE 100 MG: 20 INJECTION, SOLUTION EPIDURAL; INFILTRATION; INTRACAUDAL; PERINEURAL at 08:49

## 2023-08-22 RX ADMIN — SODIUM CHLORIDE, POTASSIUM CHLORIDE, SODIUM LACTATE AND CALCIUM CHLORIDE 1000 ML: 600; 310; 30; 20 INJECTION, SOLUTION INTRAVENOUS at 08:13

## 2023-08-22 RX ADMIN — ROCURONIUM BROMIDE 40 MG: 10 SOLUTION INTRAVENOUS at 08:51

## 2023-08-22 RX ADMIN — HYDROCODONE BITARTRATE AND ACETAMINOPHEN 1 TABLET: 10; 325 TABLET ORAL at 10:53

## 2023-08-22 RX ADMIN — SODIUM CHLORIDE, POTASSIUM CHLORIDE, SODIUM LACTATE AND CALCIUM CHLORIDE 1000 ML: 600; 310; 30; 20 INJECTION, SOLUTION INTRAVENOUS at 08:10

## 2023-08-22 RX ADMIN — ACETAMINOPHEN 1000 MG: 500 TABLET, FILM COATED ORAL at 08:45

## 2023-08-22 RX ADMIN — ONDANSETRON 4 MG: 2 INJECTION INTRAMUSCULAR; INTRAVENOUS at 10:18

## 2023-08-22 RX ADMIN — DEXAMETHASONE SODIUM PHOSPHATE 4 MG: 4 INJECTION, SOLUTION INTRA-ARTICULAR; INTRALESIONAL; INTRAMUSCULAR; INTRAVENOUS; SOFT TISSUE at 10:18

## 2023-08-22 RX ADMIN — ROCURONIUM BROMIDE 10 MG: 10 SOLUTION INTRAVENOUS at 09:23

## 2023-08-22 RX ADMIN — CEFAZOLIN 2000 MG: 2 INJECTION, POWDER, FOR SOLUTION INTRAMUSCULAR; INTRAVENOUS at 08:57

## 2023-08-22 RX ADMIN — PROPOFOL 150 MG: 10 INJECTION, EMULSION INTRAVENOUS at 08:51

## 2023-08-22 RX ADMIN — ROCURONIUM BROMIDE 20 MG: 10 SOLUTION INTRAVENOUS at 09:36

## 2023-08-22 RX ADMIN — FENTANYL CITRATE 150 MCG: 0.05 INJECTION, SOLUTION INTRAMUSCULAR; INTRAVENOUS at 08:49

## 2023-08-22 RX ADMIN — FENTANYL CITRATE 100 MCG: 0.05 INJECTION, SOLUTION INTRAMUSCULAR; INTRAVENOUS at 09:33

## 2023-08-22 NOTE — DISCHARGE INSTRUCTIONS
Wound:   - you have skin glue on your incisions. Okay to shower tomorrow.   - Leave skin glue in place, it should slowly fall off over 2 weeks   - No swimming/soaking/bathing x 2 weeks to allow incisions to heal.     Activity:   - Activity as tolerated. NO heavy lifting x 4 weeks - no more than 25lb at a time.   - No driving or operating machinery on narcotic pain medication.     Pain medication:   - Take 1000mg of tylenol every 8 hours for 3 days. After three days, take it prn.   - Take 600mg of ibuprofen (motrin) every 8 hours for 3 days. After three days, take it prn.   - You have a prescription for a narcotic. It will be roxicodone 5mg tabs. Take these only as needed after you have taken the tylenol and ibuprofen. If you are taking the roxicodone, make sure to take a stool softener (colace) with it as it can cause constipation.   - The narcotic may make you nauseated, you will have a prescription for zofran in case of nausea.     Follow up:   - make an appointment to see me in 2 weeks  - If you have any concerns before then, call me office at [phone]

## 2023-08-22 NOTE — ANESTHESIA POSTPROCEDURE EVALUATION
Patient: Saulo Shepard    Procedure Summary       Date: 08/22/23 Room / Location:  [facility] OR 06 /  [facility] OR    Anesthesia Start: 0849 Anesthesia Stop: 1031    Procedure: INGUINAL HERNIA BILATERAL REPAIR LAPAROSCOPIC WITH DAVINCI ROBOT WITH MESH (Bilateral: Abdomen) Diagnosis:       Bilateral inguinal hernia without obstruction or gangrene, recurrence not specified      (Bilateral inguinal hernia without obstruction or gangrene, recurrence not specified [K40.20])    Surgeons: Dina Boyd MD Provider: Laura Houston CRNA    Anesthesia Type: general ASA Status: 2            Anesthesia Type: general    Vitals  Vitals Value Taken Time   /79 08/22/23 1105   Temp 98 øF (36.7 øC) 08/22/23 1105   Pulse 70 08/22/23 1110   Resp 12 08/22/23 1110   SpO2 94 % 08/22/23 1110           Post Anesthesia Care and Evaluation    Patient location during evaluation: PACU  Patient participation: complete - patient participated  Level of consciousness: awake and alert  Pain management: adequate    Airway patency: patent  Anesthetic complications: No anesthetic complications    Cardiovascular status: acceptable  Respiratory status: acceptable  Hydration status: acceptable    Comments: Blood pressure 132/79, pulse 70, temperature 98 øF (36.7 øC), temperature source Temporal, resp. rate 12, SpO2 94 %.    Pt discharged from PACU based on murphy score >8

## 2023-08-22 NOTE — OP NOTE
Laparoscopic Robotic-Assisted Bilateral Inguinal Hernia Repair with Mesh:     Patient: Saulo Shepard  MRN: [id number]    YOB: 1975  Age: 48 y.o.  Sex: male  Unit:  [facility] OR Room/Bed: [facility] OR/MAIN OR Location: [facility]    Admitting Physician: JULIAN BOYD    Primary Care Physician: Vivek Stewart DO             INDICATIONS: This is a 48 y.o. male who presents with bilateral inguinal hernias. These hernias are symptomatic and limiting the patient's daily activities. After a discussion of risks, benefits and alternatives (see H&P), consent was obtained.     DATE OF OPERATION: 8/22/2023     Surgeon(s) and Role:     * Julian Boyd MD - Primary    ANESTHESIA: General     PREOPERATIVE DIAGNOSIS: Bilateral inguinal hernia without obstruction or gangrene, recurrence not specified [K40.20]    POSTOPERATIVE DIAGNOSIS: Same, right direct and indirect inguinal hernias and left direct and indirect inguinal hernias    PROCEDURES PERFORMED:    (1) Laparoscopic, robotic-assisted bilateral inguinal hernia repair with mesh     PROCEDURE DETAILS:   After informed consent was obtained, the patient was brought to the operating room. Preoperative antibiotics and subcutaneous heparin were administered. General anesthesia was induced. The abdomen was prepped with ChloraPrep and draped in a sterile fashion. A time-out was performed verifying the correct patient, procedure, and side. An 8 mm incision was made superior to the umbilicus. The Veress was inserted and the abdomen was insufflated to 15 mmHg. An 8 mm trocar was then inserted. A laparoscopic TAP block was performed with my deep local.   Two 8 mm trocars were placed on each side laterally approximately 10 cm lateral to the umbilical port. The patient had bilateral inguinal hernias.  The bed was flexed. We then docked the robot in the usual fashion from the left side. We identified the large bilateral inguinal hernias. The hook was placed in the  right arm, and a ProGrasp was placed in the left arm. The peritoneum was opened high on the abdominal wall.There was a large direct and indirect right inguinal hernia. The peritoneum was taken down and the hernia sac was reduced back into the abdomen.  The cord was identified and preserved. The pubic tubercle was dissected clean as was Luis's ligament on the left side. Once the space was completely dissected out, a right large 3D max mesh was placed into the abdomen. Then using a MegaCut needle , we positioned the mesh in the inguinal space, covering the direct, indirect, and femoral spaces down to the level of the medial takeoff of the Vas. It fit very nicely in the space. A 2-0 Vicryl suture was placed in the abdomen and it was used to suture the mesh in place - 2 sutures on Luis's ligament, a suture medial to the inferior epigastric vessels, a suture lateral to the inferior epigastric vessels, and one suture to cover the direct space.   Turning my attention to the left, there was a large direct and indirect left inguinal hernias. The peritoneum was taken down and the hernia sac was reduced back into the abdomen. The cord was identified and preserved. The pubic tubercle was dissected clean as was Luis's ligament on the left side. Once the space was completed dissected out, a large left 3D max mesh was placed into the abdomen. Then using a MegaCut needle , we positioned the mesh in the inguinal space, covering the direct, indirect, and femoral spaces down to the level of the medial takeoff of the Vas. It fit very nicely in the space. A 2-0 Vicryl suture was placed in the abdomen and it was used to suture the mesh in place - 2 sutures on Luis's ligament, a suture medial to the inferior epigastric vessels, a suture lateral to the inferior epigastric vessels, and one suture to cover the direct space.   The peritoneum was closed with two 3-0 V-Loc absorbable sutures.  The needles were removed from  the abdomen and needle counts were correct. The robot was undocked, the trocars were removed, and the abdomen was desufflated.  The skin of all three incisions was closed with a 4-0 Monocryl suture. Skin glue was placed to all incisions. The patient was transported to PACU in stable condition.      Findings: right direct and indirect inguinal hernias, left direct and indirect inguinal hernias  Estimated Blood Loss:  15mL   Complications: None apparent            Specimens: None      Disposition: PACU - hemodynamically stable.           Condition: stable    Dina Boyd MD  08/14/2023

## 2023-08-22 NOTE — ANESTHESIA PROCEDURE NOTES
Airway  Urgency: elective    Date/Time: 8/22/2023 8:53 AM  Airway not difficult    General Information and Staff    Patient location during procedure: OR  CRNA/CAA: Laura Houston CRNA    Indications and Patient Condition  Indications for airway management: airway protection    Preoxygenated: yes  Mask difficulty assessment: 1 - vent by mask    Final Airway Details  Final airway type: endotracheal airway      Successful airway: ETT  Cuffed: yes   Successful intubation technique: direct laryngoscopy  Facilitating devices/methods: intubating stylet  Endotracheal tube insertion site: oral  Blade: Shadi  Blade size: 3.5.  ETT size (mm): 7.5  Cormack-Lehane Classification: grade I - full view of glottis  Placement verified by: chest auscultation and capnometry   Cuff volume (mL): 6  Measured from: lips  ETT/EBT  to lips (cm): 23  Number of attempts at approach: 1  Assessment: lips, teeth, and gum same as pre-op and atraumatic intubation
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
## 2023-08-26 ENCOUNTER — HOSPITAL ENCOUNTER (EMERGENCY)
Facility: HOSPITAL | Age: 48
Discharge: HOME | End: 2023-08-26
Attending: STUDENT IN AN ORGANIZED HEALTH CARE EDUCATION/TRAINING PROGRAM
Payer: COMMERCIAL

## 2023-08-26 ENCOUNTER — NURSE TRIAGE (OUTPATIENT)
Dept: CALL CENTER | Facility: HOSPITAL | Age: 48
End: 2023-08-26
Payer: COMMERCIAL

## 2023-08-26 ENCOUNTER — APPOINTMENT (OUTPATIENT)
Dept: CT IMAGING | Facility: HOSPITAL | Age: 48
End: 2023-08-26
Payer: COMMERCIAL

## 2023-08-26 VITALS
DIASTOLIC BLOOD PRESSURE: 76 MMHG | SYSTOLIC BLOOD PRESSURE: 132 MMHG | RESPIRATION RATE: 20 BRPM | TEMPERATURE: 98 F | HEART RATE: 76 BPM | HEIGHT: 73 IN | OXYGEN SATURATION: 99 % | WEIGHT: 206 LBS | BODY MASS INDEX: 27.3 KG/M2

## 2023-08-26 DIAGNOSIS — D72.829 LEUKOCYTOSIS, UNSPECIFIED TYPE: ICD-10-CM

## 2023-08-26 DIAGNOSIS — K40.90 LEFT INGUINAL HERNIA: ICD-10-CM

## 2023-08-26 DIAGNOSIS — G89.18 POST-OPERATIVE PAIN: Primary | ICD-10-CM

## 2023-08-26 LAB
ACANTHOCYTES BLD QL SMEAR: ABNORMAL
ALBUMIN SERPL-MCNC: 4.2 G/DL (ref 3.5–5.2)
ALBUMIN/GLOB SERPL: 1.4 G/DL
ALP SERPL-CCNC: 100 U/L (ref 39–117)
ALT SERPL W P-5'-P-CCNC: 18 U/L (ref 1–41)
ANION GAP SERPL CALCULATED.3IONS-SCNC: 10 MMOL/L (ref 5–15)
AST SERPL-CCNC: 18 U/L (ref 1–40)
BASOPHILS # BLD MANUAL: 0.31 10*3/MM3 (ref 0–0.2)
BASOPHILS NFR BLD MANUAL: 2 % (ref 0–1.5)
BILIRUB SERPL-MCNC: 0.4 MG/DL (ref 0–1.2)
BUN SERPL-MCNC: 13 MG/DL (ref 6–20)
BUN/CREAT SERPL: 12.6 (ref 7–25)
BURR CELLS BLD QL SMEAR: ABNORMAL
CALCIUM SPEC-SCNC: 10.8 MG/DL (ref 8.6–10.5)
CHLORIDE SERPL-SCNC: 105 MMOL/L (ref 98–107)
CO2 SERPL-SCNC: 24 MMOL/L (ref 22–29)
CREAT SERPL-MCNC: 1.03 MG/DL (ref 0.76–1.27)
DEPRECATED RDW RBC AUTO: 45.8 FL (ref 37–54)
EGFRCR SERPLBLD CKD-EPI 2021: 90.2 ML/MIN/1.73
EOSINOPHIL # BLD MANUAL: 0.95 10*3/MM3 (ref 0–0.4)
EOSINOPHIL NFR BLD MANUAL: 6.1 % (ref 0.3–6.2)
ERYTHROCYTE [DISTWIDTH] IN BLOOD BY AUTOMATED COUNT: 14.2 % (ref 12.3–15.4)
GLOBULIN UR ELPH-MCNC: 3 GM/DL
GLUCOSE SERPL-MCNC: 103 MG/DL (ref 65–99)
HCT VFR BLD AUTO: 47.4 % (ref 37.5–51)
HGB BLD-MCNC: 15.4 G/DL (ref 13–17.7)
HOLD SPECIMEN: NORMAL
HOLD SPECIMEN: NORMAL
LIPASE SERPL-CCNC: 28 U/L (ref 13–60)
LYMPHOCYTES # BLD MANUAL: 5.84 10*3/MM3 (ref 0.7–3.1)
LYMPHOCYTES NFR BLD MANUAL: 9.2 % (ref 5–12)
MCH RBC QN AUTO: 28.5 PG (ref 26.6–33)
MCHC RBC AUTO-ENTMCNC: 32.5 G/DL (ref 31.5–35.7)
MCV RBC AUTO: 87.6 FL (ref 79–97)
MONOCYTES # BLD: 1.43 10*3/MM3 (ref 0.1–0.9)
NEUTROPHILS # BLD AUTO: 6.96 10*3/MM3 (ref 1.7–7)
NEUTROPHILS NFR BLD MANUAL: 44.9 % (ref 42.7–76)
NRBC BLD AUTO-RTO: 0 /100 WBC (ref 0–0.2)
PLAT MORPH BLD: NORMAL
PLATELET # BLD AUTO: 406 10*3/MM3 (ref 140–450)
PMV BLD AUTO: 10.7 FL (ref 6–12)
POIKILOCYTOSIS BLD QL SMEAR: ABNORMAL
POTASSIUM SERPL-SCNC: 4.2 MMOL/L (ref 3.5–5.2)
PROT SERPL-MCNC: 7.2 G/DL (ref 6–8.5)
RBC # BLD AUTO: 5.41 10*6/MM3 (ref 4.14–5.8)
SODIUM SERPL-SCNC: 139 MMOL/L (ref 136–145)
VARIANT LYMPHS NFR BLD MANUAL: 2 % (ref 0–5)
VARIANT LYMPHS NFR BLD MANUAL: 35.7 % (ref 19.6–45.3)
WBC MORPH BLD: NORMAL
WBC NRBC COR # BLD: 15.5 10*3/MM3 (ref 3.4–10.8)
WHOLE BLOOD HOLD COAG: NORMAL
WHOLE BLOOD HOLD SPECIMEN: NORMAL

## 2023-08-26 PROCEDURE — 85025 COMPLETE CBC W/AUTO DIFF WBC: CPT | Performed by: STUDENT IN AN ORGANIZED HEALTH CARE EDUCATION/TRAINING PROGRAM

## 2023-08-26 PROCEDURE — 25010000002 ONDANSETRON PER 1 MG: Performed by: STUDENT IN AN ORGANIZED HEALTH CARE EDUCATION/TRAINING PROGRAM

## 2023-08-26 PROCEDURE — 74177 CT ABD & PELVIS W/CONTRAST: CPT

## 2023-08-26 PROCEDURE — 96375 TX/PRO/DX INJ NEW DRUG ADDON: CPT

## 2023-08-26 PROCEDURE — 83690 ASSAY OF LIPASE: CPT | Performed by: STUDENT IN AN ORGANIZED HEALTH CARE EDUCATION/TRAINING PROGRAM

## 2023-08-26 PROCEDURE — 96374 THER/PROPH/DIAG INJ IV PUSH: CPT

## 2023-08-26 PROCEDURE — 0 HYDROMORPHONE 1 MG/ML SOLUTION: Performed by: STUDENT IN AN ORGANIZED HEALTH CARE EDUCATION/TRAINING PROGRAM

## 2023-08-26 PROCEDURE — 25510000001 IOPAMIDOL 61 % SOLUTION: Performed by: STUDENT IN AN ORGANIZED HEALTH CARE EDUCATION/TRAINING PROGRAM

## 2023-08-26 PROCEDURE — 85007 BL SMEAR W/DIFF WBC COUNT: CPT | Performed by: STUDENT IN AN ORGANIZED HEALTH CARE EDUCATION/TRAINING PROGRAM

## 2023-08-26 PROCEDURE — 99285 EMERGENCY DEPT VISIT HI MDM: CPT

## 2023-08-26 PROCEDURE — 80053 COMPREHEN METABOLIC PANEL: CPT | Performed by: STUDENT IN AN ORGANIZED HEALTH CARE EDUCATION/TRAINING PROGRAM

## 2023-08-26 RX ORDER — NALOXONE HYDROCHLORIDE 4 MG/.1ML
SPRAY NASAL
Qty: 2 EACH | Refills: 0 | Status: SHIPPED | OUTPATIENT
Start: 2023-08-26

## 2023-08-26 RX ORDER — SODIUM CHLORIDE 0.9 % (FLUSH) 0.9 %
10 SYRINGE (ML) INJECTION AS NEEDED
Status: DISCONTINUED | OUTPATIENT
Start: 2023-08-26 | End: 2023-08-27 | Stop reason: HOSPADM

## 2023-08-26 RX ORDER — HYDROCODONE BITARTRATE AND ACETAMINOPHEN 10; 325 MG/1; MG/1
1 TABLET ORAL EVERY 4 HOURS PRN
Qty: 5 TABLET | Refills: 0 | Status: SHIPPED | OUTPATIENT
Start: 2023-08-26 | End: 2023-08-29

## 2023-08-26 RX ORDER — ONDANSETRON 2 MG/ML
4 INJECTION INTRAMUSCULAR; INTRAVENOUS ONCE
Status: COMPLETED | OUTPATIENT
Start: 2023-08-26 | End: 2023-08-26

## 2023-08-26 RX ADMIN — IOPAMIDOL 100 ML: 612 INJECTION, SOLUTION INTRAVENOUS at 22:04

## 2023-08-26 RX ADMIN — Medication 10 ML: at 22:21

## 2023-08-26 RX ADMIN — ONDANSETRON 4 MG: 2 INJECTION INTRAMUSCULAR; INTRAVENOUS at 22:20

## 2023-08-26 RX ADMIN — HYDROMORPHONE HYDROCHLORIDE 1 MG: 1 INJECTION, SOLUTION INTRAMUSCULAR; INTRAVENOUS; SUBCUTANEOUS at 22:20

## 2023-08-27 LAB — HOLD SPECIMEN: NORMAL

## 2023-08-27 NOTE — ED PROVIDER NOTES
Subjective   History of Present Illness  Patient states that he had a hernia repair here a few days ago.  States that he was discharged and yesterday he sneezed and felt as if something ripped inside.  States that he is having some left-sided pain in the groin.  Denies any hematuria medic easier melena.  Denies any nausea or vomiting.  Denies any other injuries.  Denies any fevers or chills.  Denies any chest pain or shortness of breath    Review of Systems   All other systems reviewed and are negative.    Past Medical History:   Diagnosis Date    Anxiety     Back pain     Fracture of lumbar spine 2002 and 2015    lower lumbar 2002; L1 2015    Head injury     Injury of back     Neck pain     Overweight (BMI 25.0-29.9)        No Known Allergies    Past Surgical History:   Procedure Laterality Date    FACIAL FRACTURE SURGERY      INCISION AND DRAINAGE LEG Right 12/1/2018    Procedure: INCISION AND DRAINAGE OF RIGHT UPPER INNER THIGH, PLACEMENT OF WOUND VAC;  Surgeon: René Dallas MD;  Location:  [facility] OR;  Service: General    INGUINAL HERNIA REPAIR Bilateral 8/22/2023    Procedure: INGUINAL HERNIA BILATERAL REPAIR LAPAROSCOPIC WITH DAVINCI ROBOT WITH MESH;  Surgeon: Dina Boyd MD;  Location:  [facility] OR;  Service: Robotics - DaVinci;  Laterality: Bilateral;    KNEE SURGERY Right 09/2021    SPLENECTOMY         Family History   Problem Relation Age of Onset    Fibromyalgia Mother     Lung cancer Father     Colon cancer Neg Hx     Colon polyps Neg Hx     Esophageal cancer Neg Hx        Social History     Socioeconomic History    Marital status: Single   Tobacco Use    Smoking status: Never    Smokeless tobacco: Never   Vaping Use    Vaping Use: Never used   Substance and Sexual Activity    Alcohol use: Not Currently     Comment: occ.    Drug use: No    Sexual activity: Yes     Partners: Female           Objective   Physical Exam  Vitals and nursing note reviewed.   Constitutional:       General: He is not in  acute distress.     Appearance: Normal appearance. He is not toxic-appearing or diaphoretic.   HENT:      Head: Normocephalic and atraumatic.      Right Ear: External ear normal.      Left Ear: External ear normal.      Nose: Nose normal.      Mouth/Throat:      Mouth: Mucous membranes are moist.   Eyes:      General:         Right eye: No discharge.         Left eye: No discharge.      Extraocular Movements: Extraocular movements intact.      Conjunctiva/sclera: Conjunctivae normal.   Cardiovascular:      Rate and Rhythm: Normal rate.   Pulmonary:      Effort: Pulmonary effort is normal. No respiratory distress.      Breath sounds: No rhonchi.   Abdominal:      General: Abdomen is flat.      Tenderness: There is abdominal tenderness (mild, to left lower quadrant/suprapubic area). There is no guarding or rebound.   Musculoskeletal:         General: No deformity or signs of injury.   Skin:     General: Skin is warm.      Coloration: Skin is not jaundiced.   Neurological:      Mental Status: He is alert and oriented to person, place, and time. Mental status is at baseline.   Psychiatric:         Mood and Affect: Mood normal.         Behavior: Behavior normal.         Thought Content: Thought content normal.         Judgment: Judgment normal.       Procedures           ED Course                                   GOMEZ reviewed by Antoni Mosqueda MD       Medical Decision Making  Saulo Shepard is a very pleasant 48 y.o. male who presents to the ED for abdominal pain after recent surgery.     Patient was non-toxic and not-ill appearing on arrival.     Vital signs stable on arrival.     Patient's presentation raises suspicion for differentials including, but not limited to, abscess, post-operative pain, bleeding.     External (non-ED) record review: recent operative notes reviewed    Given this, Saulo was placed on the monitor. Laboratory studies and imaging studies were ordered including cbc, bmp, CT abdomen/pelvis.      Saulo was given fluids and pain medication.    His CT scan does show a small fat containing inguinal hernia on the left side. Unclear if this was residual from the surgery or if this is new and related to his surgery.     On re-evaluation, patient remained hemodynamically stable and appeared to be comfortable and in no acute distress.    I had a thorough discussion regarding the workup, results so far, and my impression so far. I said that he should follow up with Dr. Boyd and use the abdominal binder for further improvement in his pain. I said that based on the current workup, there is not an unstable medical condition requiring admission to the hospital.     I spent an appropriate amount of time necessary at the bedside preceding discharge so I could explain aftercare instructions, provide more patient eduction, give explanations of my interpretation of the evaluation, and to ensure that everyone understood the plan of care. I also discussed the fact that even after being discharged there could be an acute emergent condition that arises requiring further evaluation, admission, or even surgical intervention.     However, I said that it is VERY IMPORTANT that Saulo follows up, by CALLING as soon as possible to set up an appointment, with the primary care doctor within the next few days or as soon as reasonably possible so that the symptoms can be re-evaluated for improvement or for any other questions.     I also gave Saulo common sense return precautions and encouraged a quick return to the emergency department within 24 - 48hrs if there are ANY concerns, worsening of condition, new complaints, or if unable to seek follow-up in a timely fashion.     The patient verbalized understanding of the discharge instructions and agreed with them.     Saulo was discharged in stable condition.        Signed by:   Antoni Mosqueda MD 8/27/2023 20:55 CDT   Emergency Medicine Physician    Dragon disclaimer:  Part of this  note may be an electronic transcription/translation of spoken language to printed text using the Dragon Dictation System.         Problems Addressed:  Left inguinal hernia: complicated acute illness or injury  Leukocytosis, unspecified type: complicated acute illness or injury  Post-operative pain: complicated acute illness or injury    Amount and/or Complexity of Data Reviewed  Labs: ordered.  Radiology: ordered.    Risk  Prescription drug management.        Final diagnoses:   Post-operative pain   Left inguinal hernia   Leukocytosis, unspecified type       ED Disposition  ED Disposition       ED Disposition   Discharge    Condition   Stable    Comment   --               Vivek Stewart DO  [address] [facility] [address]  [address]  [place] [id number]  [phone]    Call in 1 day  As needed, If symptoms worsen    Dina Boyd MD  [address] [facility]  [address]  [place] [id number]  [phone]    Call in 1 day  As needed, For wound re-check, If symptoms worsen         Medication List        New Prescriptions      HYDROcodone-acetaminophen  MG per tablet  Commonly known as: NORCO  Take 1 tablet by mouth Every 4 (Four) Hours As Needed for Moderate Pain for up to 3 days.  Replaces: HYDROcodone-acetaminophen 5-325 MG per tablet     naloxone 4 MG/0.1ML nasal spray  Commonly known as: NARCAN  Call 911. Don't prime. Mound in 1 nostril for overdose. Repeat in 2-3 minutes in other nostril if no or minimal breathing/responsiveness.            Changed      fluticasone 50 MCG/ACT nasal spray  Commonly known as: FLONASE  2 sprays into the nostril(s) as directed by provider Daily.  What changed:   when to take this  reasons to take this            Stop      HYDROcodone-acetaminophen 5-325 MG per tablet  Commonly known as: NORCO  Replaced by: HYDROcodone-acetaminophen  MG per tablet               Where to Get Your Medications        These medications were sent to [facility] - [address]  [address] [address]      Hours: 7AM-5PM Mon-Fri Phone: [phone]   HYDROcodone-acetaminophen  MG per tablet  naloxone 4 MG/0.1ML nasal spray            Antoni Mosqueda MD  08/27/23 1547

## 2023-08-27 NOTE — DISCHARGE INSTRUCTIONS
It was very nice to meet you, Saulo. Thank you for allowing us to take care of you today at [facility].    Your evaluation today did not show any emergent findings or have any emergent indications for admission to the hospital.  Please follow-up with Dr. Boyd in clinic.  Please use the abdominal binder.  Please try not to strain until you follow-up with Dr. Boyd or get further guidance.    Please understand that an ER evaluation is just the start of your evaluation. We will do what we can, but we are often unable to fully figure out what is causing your symptoms from one evaluation. Thus, our primary goal is to determine whether you need to be evaluated in the hospital or if it is safe for you to go home and see other doctors such as a primary care physician or a specialist on an outpatient basis.     Like we discussed, it is VERY IMPORTANT that you follow up with your primary care doctor (call them to set up an appointment) within the next few days or as soon as possible so that you can be re-evaluated for improvement in your symptoms or for any other questions.     A copy of your results should be included in your paperwork. If you were prescribed any medications, please take them as directed or call us back with any questions.    Please return to the emergency room within 12-48 hours if you experience fever, chills, chest pain or shortness of breath, pain with inspiration/expiration, pain that travels to your arms, neck or back, nausea, vomiting, severe headache, tearing pain in your chest, dizziness, feel as though you are about to pass out, have any worsening symptoms, or any other concerns.

## 2023-08-28 DIAGNOSIS — N50.811 PAIN IN RIGHT TESTICLE: Primary | ICD-10-CM

## 2023-08-31 ENCOUNTER — ANESTHESIA EVENT (OUTPATIENT)
Dept: GASTROENTEROLOGY | Facility: HOSPITAL | Age: 48
End: 2023-08-31
Payer: COMMERCIAL

## 2023-08-31 ENCOUNTER — ANESTHESIA (OUTPATIENT)
Dept: GASTROENTEROLOGY | Facility: HOSPITAL | Age: 48
End: 2023-08-31
Payer: COMMERCIAL

## 2023-08-31 ENCOUNTER — TELEPHONE (OUTPATIENT)
Dept: GASTROENTEROLOGY | Facility: CLINIC | Age: 48
End: 2023-08-31
Payer: COMMERCIAL

## 2023-08-31 ENCOUNTER — HOSPITAL ENCOUNTER (OUTPATIENT)
Facility: HOSPITAL | Age: 48
Setting detail: HOSPITAL OUTPATIENT SURGERY
Discharge: HOME | End: 2023-08-31
Attending: INTERNAL MEDICINE | Admitting: INTERNAL MEDICINE
Payer: COMMERCIAL

## 2023-08-31 VITALS
OXYGEN SATURATION: 96 % | WEIGHT: 207 LBS | DIASTOLIC BLOOD PRESSURE: 89 MMHG | SYSTOLIC BLOOD PRESSURE: 125 MMHG | TEMPERATURE: 96.6 F | BODY MASS INDEX: 27.43 KG/M2 | HEART RATE: 74 BPM | RESPIRATION RATE: 11 BRPM | HEIGHT: 73 IN

## 2023-08-31 DIAGNOSIS — Z12.11 ENCOUNTER FOR SCREENING FOR MALIGNANT NEOPLASM OF COLON: Primary | ICD-10-CM

## 2023-08-31 PROCEDURE — 45378 DIAGNOSTIC COLONOSCOPY: CPT | Performed by: INTERNAL MEDICINE

## 2023-08-31 PROCEDURE — 25010000002 PROPOFOL 10 MG/ML EMULSION: Performed by: NURSE ANESTHETIST, CERTIFIED REGISTERED

## 2023-08-31 RX ORDER — SODIUM CHLORIDE 0.9 % (FLUSH) 0.9 %
10 SYRINGE (ML) INJECTION AS NEEDED
Status: DISCONTINUED | OUTPATIENT
Start: 2023-08-31 | End: 2023-08-31 | Stop reason: HOSPADM

## 2023-08-31 RX ORDER — SODIUM CHLORIDE 9 MG/ML
500 INJECTION, SOLUTION INTRAVENOUS CONTINUOUS PRN
Status: DISCONTINUED | OUTPATIENT
Start: 2023-08-31 | End: 2023-08-31 | Stop reason: HOSPADM

## 2023-08-31 RX ORDER — PROPOFOL 10 MG/ML
VIAL (ML) INTRAVENOUS AS NEEDED
Status: DISCONTINUED | OUTPATIENT
Start: 2023-08-31 | End: 2023-08-31 | Stop reason: SURG

## 2023-08-31 RX ORDER — LIDOCAINE HYDROCHLORIDE 20 MG/ML
INJECTION, SOLUTION EPIDURAL; INFILTRATION; INTRACAUDAL; PERINEURAL AS NEEDED
Status: DISCONTINUED | OUTPATIENT
Start: 2023-08-31 | End: 2023-08-31 | Stop reason: SURG

## 2023-08-31 RX ADMIN — SODIUM CHLORIDE 500 ML: 9 INJECTION, SOLUTION INTRAVENOUS at 07:22

## 2023-08-31 RX ADMIN — PROPOFOL INJECTABLE EMULSION 100 MG: 10 INJECTION, EMULSION INTRAVENOUS at 07:39

## 2023-08-31 RX ADMIN — LIDOCAINE HYDROCHLORIDE 100 MG: 20 INJECTION, SOLUTION EPIDURAL; INFILTRATION; INTRACAUDAL; PERINEURAL at 07:39

## 2023-08-31 RX ADMIN — PROPOFOL INJECTABLE EMULSION 50 MG: 10 INJECTION, EMULSION INTRAVENOUS at 07:41
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
## 2023-09-01 ENCOUNTER — ANESTHESIA (OUTPATIENT)
Dept: GASTROENTEROLOGY | Facility: HOSPITAL | Age: 48
End: 2023-09-01
Payer: COMMERCIAL

## 2023-09-01 ENCOUNTER — OFFICE VISIT (OUTPATIENT)
Dept: FAMILY MEDICINE CLINIC | Facility: CLINIC | Age: 48
End: 2023-09-01
Payer: COMMERCIAL

## 2023-09-01 ENCOUNTER — HOSPITAL ENCOUNTER (OUTPATIENT)
Facility: HOSPITAL | Age: 48
Setting detail: HOSPITAL OUTPATIENT SURGERY
Discharge: HOME | End: 2023-09-01
Attending: INTERNAL MEDICINE | Admitting: INTERNAL MEDICINE
Payer: COMMERCIAL

## 2023-09-01 ENCOUNTER — ANESTHESIA EVENT (OUTPATIENT)
Dept: GASTROENTEROLOGY | Facility: HOSPITAL | Age: 48
End: 2023-09-01
Payer: COMMERCIAL

## 2023-09-01 VITALS
WEIGHT: 204 LBS | RESPIRATION RATE: 18 BRPM | OXYGEN SATURATION: 98 % | TEMPERATURE: 97 F | HEIGHT: 73 IN | HEART RATE: 75 BPM | DIASTOLIC BLOOD PRESSURE: 75 MMHG | BODY MASS INDEX: 27.04 KG/M2 | SYSTOLIC BLOOD PRESSURE: 128 MMHG

## 2023-09-01 VITALS
SYSTOLIC BLOOD PRESSURE: 146 MMHG | DIASTOLIC BLOOD PRESSURE: 82 MMHG | RESPIRATION RATE: 16 BRPM | BODY MASS INDEX: 27.67 KG/M2 | OXYGEN SATURATION: 97 % | WEIGHT: 208.8 LBS | HEIGHT: 73 IN | HEART RATE: 87 BPM

## 2023-09-01 DIAGNOSIS — N50.811 RIGHT TESTICULAR PAIN: Primary | ICD-10-CM

## 2023-09-01 DIAGNOSIS — Z12.11 ENCOUNTER FOR SCREENING FOR MALIGNANT NEOPLASM OF COLON: ICD-10-CM

## 2023-09-01 PROCEDURE — 25010000002 PROPOFOL 10 MG/ML EMULSION: Performed by: NURSE ANESTHETIST, CERTIFIED REGISTERED

## 2023-09-01 PROCEDURE — 45378 DIAGNOSTIC COLONOSCOPY: CPT | Performed by: INTERNAL MEDICINE

## 2023-09-01 RX ORDER — LIDOCAINE HYDROCHLORIDE 20 MG/ML
INJECTION, SOLUTION EPIDURAL; INFILTRATION; INTRACAUDAL; PERINEURAL AS NEEDED
Status: DISCONTINUED | OUTPATIENT
Start: 2023-09-01 | End: 2023-09-01 | Stop reason: SURG

## 2023-09-01 RX ORDER — SODIUM CHLORIDE 9 MG/ML
500 INJECTION, SOLUTION INTRAVENOUS CONTINUOUS PRN
Status: DISCONTINUED | OUTPATIENT
Start: 2023-09-01 | End: 2023-09-01 | Stop reason: HOSPADM

## 2023-09-01 RX ORDER — PROPOFOL 10 MG/ML
VIAL (ML) INTRAVENOUS AS NEEDED
Status: DISCONTINUED | OUTPATIENT
Start: 2023-09-01 | End: 2023-09-01 | Stop reason: SURG

## 2023-09-01 RX ORDER — DICLOFENAC SODIUM 75 MG/1
75 TABLET, DELAYED RELEASE ORAL 2 TIMES DAILY PRN
Qty: 60 TABLET | Refills: 0 | Status: SHIPPED | OUTPATIENT
Start: 2023-09-01 | End: 2023-10-05

## 2023-09-01 RX ADMIN — SODIUM CHLORIDE 500 ML: 9 INJECTION, SOLUTION INTRAVENOUS at 07:38

## 2023-09-01 RX ADMIN — LIDOCAINE HYDROCHLORIDE 50 MG: 20 INJECTION, SOLUTION EPIDURAL; INFILTRATION; INTRACAUDAL; PERINEURAL at 08:12

## 2023-09-01 RX ADMIN — PROPOFOL INJECTABLE EMULSION 200 MG: 10 INJECTION, EMULSION INTRAVENOUS at 08:13

## 2023-09-01 NOTE — ANESTHESIA POSTPROCEDURE EVALUATION
Patient: Saulo Shepard    Procedure Summary       Date: 09/01/23 Room / Location: [facility] ENDOSCOPY 5 / [facility]    Anesthesia Start: 0810 Anesthesia Stop: 0822    Procedure: COLONOSCOPY WITH ANESTHESIA Diagnosis:       Encounter for screening for malignant neoplasm of colon      (Encounter for screening for malignant neoplasm of colon [Z12.11])    Surgeons: Kristopher Brush DO Provider: CHE Dixon CRNA    Anesthesia Type: MAC ASA Status: 2            Anesthesia Type: MAC    Vitals  No vitals data found for the desired time range.          Post Anesthesia Care and Evaluation    Patient location during evaluation: PACU  Patient participation: complete - patient participated  Level of consciousness: awake and alert  Pain score: 0  Pain management: adequate    Airway patency: patent  Anesthetic complications: No anesthetic complications    Cardiovascular status: acceptable and stable  Respiratory status: acceptable and unassisted  Hydration status: acceptable

## 2023-09-01 NOTE — ANESTHESIA PREPROCEDURE EVALUATION
Anesthesia Evaluation     no history of anesthetic complications:   NPO Solid Status: > 8 hours  NPO Liquid Status: > 2 hours           Airway   Mallampati: I  TM distance: >3 FB  Neck ROM: full  No difficulty expected  Dental      Pulmonary    (-) sleep apnea, not a smoker  Cardiovascular   Exercise tolerance: good (4-7 METS)    (-) hypertension, CAD      Neuro/Psych  (+) headaches, psychiatric history Anxiety  (-) seizures, TIA, CVA  GI/Hepatic/Renal/Endo    (-) liver disease, no renal disease, diabetes    Musculoskeletal     (+) back pain  Abdominal    Substance History      OB/GYN          Other   arthritis,                   Anesthesia Plan    ASA 2     MAC     intravenous induction     Anesthetic plan, risks, benefits, and alternatives have been provided, discussed and informed consent has been obtained with: patient.    CODE STATUS:

## 2023-09-01 NOTE — DISCHARGE INSTRUCTIONS
Dr gunter office will call you to schedule your repeat colonoscopy within 3 months    Take miralax daily

## 2023-09-04 ENCOUNTER — HOSPITAL ENCOUNTER (EMERGENCY)
Facility: HOSPITAL | Age: 48
Discharge: HOME | End: 2023-09-04
Admitting: EMERGENCY MEDICINE
Payer: COMMERCIAL

## 2023-09-04 ENCOUNTER — APPOINTMENT (OUTPATIENT)
Dept: CT IMAGING | Facility: HOSPITAL | Age: 48
End: 2023-09-04
Payer: COMMERCIAL

## 2023-09-04 VITALS
WEIGHT: 208 LBS | BODY MASS INDEX: 27.57 KG/M2 | HEIGHT: 73 IN | RESPIRATION RATE: 20 BRPM | HEART RATE: 65 BPM | TEMPERATURE: 97.7 F | OXYGEN SATURATION: 96 % | DIASTOLIC BLOOD PRESSURE: 83 MMHG | SYSTOLIC BLOOD PRESSURE: 121 MMHG

## 2023-09-04 DIAGNOSIS — Z98.890 S/P HERNIA REPAIR: ICD-10-CM

## 2023-09-04 DIAGNOSIS — Z87.19 S/P HERNIA REPAIR: ICD-10-CM

## 2023-09-04 DIAGNOSIS — R10.9 ABDOMINAL PAIN, UNSPECIFIED ABDOMINAL LOCATION: ICD-10-CM

## 2023-09-04 DIAGNOSIS — K57.92 DIVERTICULITIS: Primary | ICD-10-CM

## 2023-09-04 LAB
ALBUMIN SERPL-MCNC: 4.1 G/DL (ref 3.5–5.2)
ALBUMIN/GLOB SERPL: 1.5 G/DL
ALP SERPL-CCNC: 101 U/L (ref 39–117)
ALT SERPL W P-5'-P-CCNC: 14 U/L (ref 1–41)
ANION GAP SERPL CALCULATED.3IONS-SCNC: 9 MMOL/L (ref 5–15)
AST SERPL-CCNC: 14 U/L (ref 1–40)
BILIRUB SERPL-MCNC: 0.5 MG/DL (ref 0–1.2)
BUN SERPL-MCNC: 9 MG/DL (ref 6–20)
BUN/CREAT SERPL: 9.1 (ref 7–25)
BURR CELLS BLD QL SMEAR: ABNORMAL
CALCIUM SPEC-SCNC: 10.4 MG/DL (ref 8.6–10.5)
CHLORIDE SERPL-SCNC: 109 MMOL/L (ref 98–107)
CO2 SERPL-SCNC: 24 MMOL/L (ref 22–29)
CREAT SERPL-MCNC: 0.99 MG/DL (ref 0.76–1.27)
DEPRECATED RDW RBC AUTO: 46.4 FL (ref 37–54)
EGFRCR SERPLBLD CKD-EPI 2021: 94.6 ML/MIN/1.73
EOSINOPHIL # BLD MANUAL: 0.77 10*3/MM3 (ref 0–0.4)
EOSINOPHIL NFR BLD MANUAL: 5.9 % (ref 0.3–6.2)
ERYTHROCYTE [DISTWIDTH] IN BLOOD BY AUTOMATED COUNT: 14.5 % (ref 12.3–15.4)
GLOBULIN UR ELPH-MCNC: 2.7 GM/DL
GLUCOSE SERPL-MCNC: 102 MG/DL (ref 65–99)
HCT VFR BLD AUTO: 43.1 % (ref 37.5–51)
HGB BLD-MCNC: 14.3 G/DL (ref 13–17.7)
LYMPHOCYTES # BLD MANUAL: 2.81 10*3/MM3 (ref 0.7–3.1)
LYMPHOCYTES NFR BLD MANUAL: 14.9 % (ref 5–12)
MCH RBC QN AUTO: 28.9 PG (ref 26.6–33)
MCHC RBC AUTO-ENTMCNC: 33.2 G/DL (ref 31.5–35.7)
MCV RBC AUTO: 87.2 FL (ref 79–97)
MONOCYTES # BLD: 1.93 10*3/MM3 (ref 0.1–0.9)
NEUTROPHILS # BLD AUTO: 7.44 10*3/MM3 (ref 1.7–7)
NEUTROPHILS NFR BLD MANUAL: 57.4 % (ref 42.7–76)
PLAT MORPH BLD: NORMAL
PLATELET # BLD AUTO: 380 10*3/MM3 (ref 140–450)
PMV BLD AUTO: 10.4 FL (ref 6–12)
POIKILOCYTOSIS BLD QL SMEAR: ABNORMAL
POLYCHROMASIA BLD QL SMEAR: ABNORMAL
POTASSIUM SERPL-SCNC: 4 MMOL/L (ref 3.5–5.2)
PROT SERPL-MCNC: 6.8 G/DL (ref 6–8.5)
RBC # BLD AUTO: 4.94 10*6/MM3 (ref 4.14–5.8)
SODIUM SERPL-SCNC: 142 MMOL/L (ref 136–145)
VARIANT LYMPHS NFR BLD MANUAL: 15.8 % (ref 19.6–45.3)
VARIANT LYMPHS NFR BLD MANUAL: 5.9 % (ref 0–5)
WBC MORPH BLD: NORMAL
WBC NRBC COR # BLD: 12.97 10*3/MM3 (ref 3.4–10.8)

## 2023-09-04 PROCEDURE — 96374 THER/PROPH/DIAG INJ IV PUSH: CPT

## 2023-09-04 PROCEDURE — 80053 COMPREHEN METABOLIC PANEL: CPT | Performed by: NURSE PRACTITIONER

## 2023-09-04 PROCEDURE — 96375 TX/PRO/DX INJ NEW DRUG ADDON: CPT

## 2023-09-04 PROCEDURE — 85007 BL SMEAR W/DIFF WBC COUNT: CPT | Performed by: NURSE PRACTITIONER

## 2023-09-04 PROCEDURE — 25010000002 ONDANSETRON PER 1 MG: Performed by: NURSE PRACTITIONER

## 2023-09-04 PROCEDURE — 85025 COMPLETE CBC W/AUTO DIFF WBC: CPT | Performed by: NURSE PRACTITIONER

## 2023-09-04 PROCEDURE — 99285 EMERGENCY DEPT VISIT HI MDM: CPT

## 2023-09-04 PROCEDURE — 25510000001 IOPAMIDOL 61 % SOLUTION: Performed by: NURSE PRACTITIONER

## 2023-09-04 PROCEDURE — 0 HYDROMORPHONE 1 MG/ML SOLUTION: Performed by: NURSE PRACTITIONER

## 2023-09-04 PROCEDURE — 74177 CT ABD & PELVIS W/CONTRAST: CPT

## 2023-09-04 RX ORDER — ONDANSETRON 2 MG/ML
4 INJECTION INTRAMUSCULAR; INTRAVENOUS ONCE
Status: COMPLETED | OUTPATIENT
Start: 2023-09-04 | End: 2023-09-04

## 2023-09-04 RX ORDER — METRONIDAZOLE 500 MG/1
500 TABLET ORAL 3 TIMES DAILY
Qty: 30 TABLET | Refills: 0 | Status: SHIPPED | OUTPATIENT
Start: 2023-09-04 | End: 2023-09-15

## 2023-09-04 RX ORDER — CIPROFLOXACIN 500 MG/1
500 TABLET, FILM COATED ORAL 2 TIMES DAILY
Qty: 20 TABLET | Refills: 0 | Status: SHIPPED | OUTPATIENT
Start: 2023-09-04 | End: 2023-09-15

## 2023-09-04 RX ORDER — SODIUM CHLORIDE 0.9 % (FLUSH) 0.9 %
10 SYRINGE (ML) INJECTION AS NEEDED
Status: DISCONTINUED | OUTPATIENT
Start: 2023-09-04 | End: 2023-09-04 | Stop reason: HOSPADM

## 2023-09-04 RX ADMIN — ONDANSETRON 4 MG: 2 INJECTION INTRAMUSCULAR; INTRAVENOUS at 17:27

## 2023-09-04 RX ADMIN — IOPAMIDOL 100 ML: 612 INJECTION, SOLUTION INTRAVENOUS at 17:58

## 2023-09-04 RX ADMIN — HYDROMORPHONE HYDROCHLORIDE 1 MG: 1 INJECTION, SOLUTION INTRAMUSCULAR; INTRAVENOUS; SUBCUTANEOUS at 17:27

## 2023-09-04 NOTE — Clinical Note
[facility] EMERGENCY DEPARTMENT  [address]  [facility] [address]  Phone: [phone]    Saulo Shepard was seen and treated in our emergency department on 9/4/2023.  He may return to work on 09/08/2023.         Thank you for choosing [facility].    Madalyn Cobb APRN

## 2023-09-04 NOTE — ED PROVIDER NOTES
"Subjective   History of Present Illness  Patient is a 48-year-old male presents emergency department with abdominal pain.  Patient states that he had bilateral inguinal hernia repair on August 22 per Dr. Dina Boyd.  He states he was doing well until he slipped today and twisted and felt like he \"ripped something open.\"  He states this happened around 4:00 this afternoon.  He denies any vomiting although he has had intermittent nausea.  He denies any black or tarry stools.  No fever or chills.    History provided by:  Patient   used: No      Review of Systems   Constitutional: Negative.    HENT: Negative.     Eyes: Negative.    Respiratory: Negative.     Cardiovascular: Negative.    Gastrointestinal:         Patient is a 48-year-old male presents emergency department with abdominal pain.  Patient states that he had bilateral inguinal hernia repair on August 22 per Dr. Dina Boyd.  He states he was doing well until he slipped today and twisted and felt like he \"ripped something open.\"  He states this happened around 4:00 this afternoon.  He denies any vomiting although he has had intermittent nausea.  He denies any black or tarry stools.  No fever or chills.     Endocrine: Negative.    Genitourinary: Negative.    Musculoskeletal: Negative.    Skin: Negative.    Allergic/Immunologic: Negative.    Neurological: Negative.    Hematological: Negative.    Psychiatric/Behavioral: Negative.     All other systems reviewed and are negative.    Past Medical History:   Diagnosis Date    Anxiety     Back pain     Fracture of lumbar spine 2002 and 2015    lower lumbar 2002; L1 2015    Head injury     Injury of back     Neck pain     Overweight (BMI 25.0-29.9)        No Known Allergies    Past Surgical History:   Procedure Laterality Date    COLONOSCOPY N/A 8/31/2023    Procedure: COLONOSCOPY WITH ANESTHESIA;  Surgeon: Kristopher Brush DO;  Location: [facility] ENDOSCOPY;  Service: Gastroenterology;  " Laterality: N/A;  preop; screening   postop  PCP Joseph Stewart    COLONOSCOPY N/A 9/1/2023    Procedure: COLONOSCOPY WITH ANESTHESIA;  Surgeon: Kristopher Brush DO;  Location: [facility] ENDOSCOPY;  Service: Gastroenterology;  Laterality: N/A;  Pre: Encounter for screening for malignant neoplasm of colon;  Post: Inadequate prep;  Vivek Stewart DO    FACIAL FRACTURE SURGERY      INCISION AND DRAINAGE LEG Right 12/1/2018    Procedure: INCISION AND DRAINAGE OF RIGHT UPPER INNER THIGH, PLACEMENT OF WOUND VAC;  Surgeon: René Dallas MD;  Location: [facility] OR;  Service: General    INGUINAL HERNIA REPAIR Bilateral 8/22/2023    Procedure: INGUINAL HERNIA BILATERAL REPAIR LAPAROSCOPIC WITH DAVINCI ROBOT WITH MESH;  Surgeon: Dina Boyd MD;  Location:  [facility] OR;  Service: Robotics - DaVinci;  Laterality: Bilateral;    KNEE SURGERY Right 09/2021    SPLENECTOMY         Family History   Problem Relation Age of Onset    Fibromyalgia Mother     Lung cancer Father     Colon cancer Neg Hx     Colon polyps Neg Hx     Esophageal cancer Neg Hx        Social History     Socioeconomic History    Marital status: Single   Tobacco Use    Smoking status: Never    Smokeless tobacco: Never   Vaping Use    Vaping Use: Never used   Substance and Sexual Activity    Alcohol use: Not Currently     Comment: occ.    Drug use: No    Sexual activity: Yes     Partners: Female       Prior to Admission medications    Medication Sig Start Date End Date Taking? Authorizing Provider   acetaminophen (Tylenol) 325 MG tablet Take 3 tablets by mouth Every 8 (Eight) Hours. Take every 8 hours for 3 days then take prn as needed. 8/22/23 8/21/24  Dina Boyd MD   diclofenac (VOLTAREN) 75 MG EC tablet Take 1 tablet by mouth 2 (Two) Times a Day As Needed (For pain and back pain) for up to 30 days. 9/1/23 10/1/23  Vivek Stewart DO   fluticasone (FLONASE) 50 MCG/ACT nasal spray 2 sprays into the nostril(s) as directed by provider  "Daily.  Patient taking differently: 2 sprays into the nostril(s) as directed by provider Daily As Needed. 2/13/23   Rica Crump APRN   ibuprofen (Motrin IB) 200 MG tablet Take 3 tablets by mouth Every 8 (Eight) Hours. Take every 8 hours for three days then take as needed. 8/22/23 8/21/24  Dina Boyd MD   naloxone (NARCAN) 4 MG/0.1ML nasal spray Call 911. Don't prime. Glenn in 1 nostril for overdose. Repeat in 2-3 minutes in other nostril if no or minimal breathing/responsiveness. 8/26/23   Antoni Mosqueda MD   ondansetron (Zofran) 4 MG tablet Take 1 tablet by mouth Every 8 (Eight) Hours As Needed for Nausea or Vomiting. 8/22/23 8/21/24  Dina Boyd MD   ondansetron ODT (ZOFRAN-ODT) 4 MG disintegrating tablet Place 1 tablet on the tongue Every 6 (Six) Hours As Needed for Nausea or Vomiting. 6/6/23   Kavitha Buckley APRN   oxyCODONE (Roxicodone) 5 MG immediate release tablet Take 1 tablet by mouth Every 8 (Eight) Hours As Needed for Severe Pain. 8/22/23 8/21/24  Dina Boyd MD   polyethylene glycol-electrolytes (NULYTELY) 420 g solution Take as directed by prescriber 8/31/23          /83   Pulse 65   Temp 97.7 °F (36.5 °C) (Temporal)   Resp 20   Ht 185.4 cm (73\")   Wt 94.3 kg (208 lb)   SpO2 96%   BMI 27.44 kg/m²     Objective   Physical Exam  Vitals and nursing note reviewed.   Constitutional:       Appearance: He is well-developed.      Comments: Nontoxic-appearing.  Appears to be in pain.   HENT:      Head: Normocephalic and atraumatic.   Eyes:      Conjunctiva/sclera: Conjunctivae normal.      Pupils: Pupils are equal, round, and reactive to light.   Cardiovascular:      Rate and Rhythm: Normal rate and regular rhythm.      Heart sounds: Normal heart sounds.   Pulmonary:      Effort: Pulmonary effort is normal.      Breath sounds: Normal breath sounds.   Abdominal:      General: Bowel sounds are normal.      Palpations: Abdomen is soft.      Comments: Abdomen is " soft, nondistended.  He has tenderness in the bilateral lower quadrants.  There is no guarding or rebound.  Surgical puncture wounds have healed well.  There is no signs of infection.   Musculoskeletal:         General: Normal range of motion.      Cervical back: Normal range of motion and neck supple.   Skin:     General: Skin is warm and dry.   Neurological:      Mental Status: He is alert and oriented to person, place, and time.      Deep Tendon Reflexes: Reflexes are normal and symmetric.   Psychiatric:         Behavior: Behavior normal.         Thought Content: Thought content normal.         Judgment: Judgment normal.       Procedures         Lab Results (last 24 hours)       Procedure Component Value Units Date/Time    CBC & Differential [[id number]]  (Abnormal) Collected: 09/04/23 1726    Specimen: Blood Updated: 09/04/23 1803    Narrative:      The following orders were created for panel order CBC & Differential.  Procedure                               Abnormality         Status                     ---------                               -----------         ------                     CBC Auto Differential[[id number]]        Abnormal            Final result                 Please view results for these tests on the individual orders.    Comprehensive Metabolic Panel [[id number]]  (Abnormal) Collected: 09/04/23 1726    Specimen: Blood Updated: 09/04/23 1802     Glucose 102 mg/dL      BUN 9 mg/dL      Creatinine 0.99 mg/dL      Sodium 142 mmol/L      Potassium 4.0 mmol/L      Chloride 109 mmol/L      CO2 24.0 mmol/L      Calcium 10.4 mg/dL      Total Protein 6.8 g/dL      Albumin 4.1 g/dL      ALT (SGPT) 14 U/L      AST (SGOT) 14 U/L      Alkaline Phosphatase 101 U/L      Total Bilirubin 0.5 mg/dL      Globulin 2.7 gm/dL      A/G Ratio 1.5 g/dL      BUN/Creatinine Ratio 9.1     Anion Gap 9.0 mmol/L      eGFR 94.6 mL/min/1.73     Narrative:      GFR Normal >60  Chronic Kidney Disease <60  Kidney Failure <15       CBC Auto Differential [[id number]]  (Abnormal) Collected: 09/04/23 1726    Specimen: Blood Updated: 09/04/23 1803     WBC 12.97 10*3/mm3      RBC 4.94 10*6/mm3      Hemoglobin 14.3 g/dL      Hematocrit 43.1 %      MCV 87.2 fL      MCH 28.9 pg      MCHC 33.2 g/dL      RDW 14.5 %      RDW-SD 46.4 fl      MPV 10.4 fL      Platelets 380 10*3/mm3     Narrative:      The previously reported component NRBC is no longer being reported. Previous result was 0.0 /100 WBC (Reference Range: 0.0-0.2 /100 WBC) on 9/4/2023 at 1749 CDT.    Manual Differential [[id number]]  (Abnormal) Collected: 09/04/23 1726    Specimen: Blood Updated: 09/04/23 1803     Neutrophil % 57.4 %      Lymphocyte % 15.8 %      Monocyte % 14.9 %      Eosinophil % 5.9 %      Atypical Lymphocyte % 5.9 %      Neutrophils Absolute 7.44 10*3/mm3      Lymphocytes Absolute 2.81 10*3/mm3      Monocytes Absolute 1.93 10*3/mm3      Eosinophils Absolute 0.77 10*3/mm3      Crenated RBC's Slight/1+     Poikilocytes Mod/2+     Polychromasia Slight/1+     WBC Morphology Normal     Platelet Morphology Normal            CT Abdomen Pelvis With Contrast   Final Result       1.  Focal wall thickening and inflammation involving the distal   descending colon. Suspect this is related to mild acute uncomplicated   diverticulitis but this could also represent inflammation related to   recent LEFT inguinal hernia repair.       2.  Postoperative change of LEFT inguinal hernia repair with small   residual postoperative fluid collection in the LEFT inguinal canal.        3.  4 mm nonobstructing LEFT lower pole renal calculus.   This report was finalized on 09/04/2023 18:09 by Dr. Kieran Dubois MD.          ED Course  ED Course as of 09/05/23 1010   Mon Sep 04, 2023   1848 1.  Focal wall thickening and inflammation involving the distal  descending colon. Suspect this is related to mild acute uncomplicated  diverticulitis but this could also represent inflammation related to  recent LEFT  "inguinal hernia repair.     2.  Postoperative change of LEFT inguinal hernia repair with small  residual postoperative fluid collection in the LEFT inguinal canal.      3.  4 mm nonobstructing LEFT lower pole renal calculus.  This report was finalized on 09/04/2023 18:09 by Dr. Kieran Dubois MD.                 [CW]   1848 Patient has mild leukocytosis with a white count of 12.  CMP is unremarkable.  CT of the abdomen pelvis shows focal wall thickening and inflammation involving the distal descending colon I suspect this is related to mild acute uncomplicated diverticulitis but could also represent inflammation related to the recent left inguinal hernia repair I reviewed the studies with the patient.  Advised we will go ahead and place on antibiotics to cover diverticulitis.  Patient has a follow-up appointment with Dr. Boyd on September 7.  He will follow-up with her at that time.  Patient verbalized understanding of instructions.  He will be discharged in stable condition. [CW]      ED Course User Index  [CW] Madalyn Cobb APRN        Medical Decision Making  Patient is a 48-year-old male presents emergency department with abdominal pain.  Patient states that he had bilateral inguinal hernia repair on August 22 per Dr. Dina Boyd.  He states he was doing well until he slipped today and twisted and felt like he \"ripped something open.\"  He states this happened around 4:00 this afternoon.  He denies any vomiting although he has had intermittent nausea.  He denies any black or tarry stools.  No fever or chills.  Course of treatment in the er: On exam patient is well-developed 48-year-old male presents with abdominal pain.  He is nontoxic-appearing.  His abdomen is soft and nondistended.  His Punt surgical puncture wounds are healed well.  There is no signs of infection.  He has tenderness in the bilateral lower quadrants.  No guarding or rebound.  Lab studies: Labs Reviewed  COMPREHENSIVE " METABOLIC PANEL - Abnormal; Notable for the following components:     Glucose                       102 (*)                Chloride                      109 (*)             All other components within normal limits         Narrative: GFR Normal >60                  Chronic Kidney Disease <60                  Kidney Failure <15                    CBC WITH AUTO DIFFERENTIAL - Abnormal; Notable for the following components:     WBC                           12.97 (*)            All other components within normal limits         Narrative: The previously reported component NRBC is no longer being reported. Previous result was 0.0 /100 WBC (Reference Range: 0.0-0.2 /100 WBC) on 9/4/2023 at 1749 CDT.  MANUAL DIFFERENTIAL - Abnormal; Notable for the following components:     Lymphocyte %                  15.8 (*)               Monocyte %                    14.9 (*)               Atypical Lymphocyte %         5.9 (*)                Neutrophils Absolute          7.44 (*)               Monocytes Absolute            1.93 (*)               Eosinophils Absolute          0.77 (*)            All other components within normal limits  CBC AND DIFFERENTIAL  CT Abdomen Pelvis With Contrast   Final Result         1.  Focal wall thickening and inflammation involving the distal    descending colon. Suspect this is related to mild acute uncomplicated    diverticulitis but this could also represent inflammation related to    recent LEFT inguinal hernia repair.         2.  Postoperative change of LEFT inguinal hernia repair with small    residual postoperative fluid collection in the LEFT inguinal canal.          3.  4 mm nonobstructing LEFT lower pole renal calculus.    This report was finalized on 09/04/2023 18:09 by Dr. Kieran Dubois MD.     His CT of the abdomen pelvis showed a focal wall thickening and inflammation involving the distal descending colon suspect mild be mild acute uncomplicated diverticulitis however this could also  represent inflammation around the related to recent left inguinal hernia repair.  Patient had mild leukocytosis with a white count of 11.  Placed the patient on Cipro and Flagyl.  He has an appointment with Dr. Boyd on September 7 for follow-up.  Advised the patient to keep his follow-up appointment.  Increase oral fluids.  Advised to return the emergency department if increased pain, vomiting, fever.  Patient is in agreement with the care plan and verbalizes understanding of instructions.  He was discharged in stable condition.  Differential diagnosis: Small bowel obstruction; abdominal abscess; colitis; constipation; appendicitis; cholecystitis; pyelonephritis    Problems Addressed:  Abdominal pain, unspecified abdominal location: complicated acute illness or injury  Diverticulitis: complicated acute illness or injury  S/P hernia repair: complicated acute illness or injury    Amount and/or Complexity of Data Reviewed  Labs: ordered. Decision-making details documented in ED Course.  Radiology: ordered. Decision-making details documented in ED Course.    Risk  Prescription drug management.         Final diagnoses:   Diverticulitis   Abdominal pain, unspecified abdominal location   S/P hernia repair          Madalyn Cobb, APRN  09/05/23 1010

## 2023-09-06 ENCOUNTER — OFFICE VISIT (OUTPATIENT)
Dept: SURGERY | Facility: CLINIC | Age: 48
End: 2023-09-06
Payer: COMMERCIAL

## 2023-09-06 VITALS
HEIGHT: 73 IN | DIASTOLIC BLOOD PRESSURE: 87 MMHG | SYSTOLIC BLOOD PRESSURE: 151 MMHG | BODY MASS INDEX: 27.57 KG/M2 | WEIGHT: 208 LBS | HEART RATE: 85 BPM | OXYGEN SATURATION: 97 %

## 2023-09-06 DIAGNOSIS — Z87.19 S/P BILATERAL INGUINAL HERNIA REPAIR: Primary | ICD-10-CM

## 2023-09-06 DIAGNOSIS — Z98.890 S/P BILATERAL INGUINAL HERNIA REPAIR: Primary | ICD-10-CM

## 2023-09-06 RX ORDER — GABAPENTIN 100 MG/1
100 CAPSULE ORAL 3 TIMES DAILY
Qty: 90 CAPSULE | Refills: 1 | Status: SHIPPED | OUTPATIENT
Start: 2023-09-06 | End: 2023-11-05

## 2023-09-06 NOTE — PROGRESS NOTES
"Patient: Saulo Shepard    YOB: 1975    Date: 09/06/2023    Primary Care Provider: Vivek Stewart DO    Vital Signs:   Vitals:    09/06/23 1331   BP: 151/87   Pulse: 85   SpO2: 97%   Weight: 94.3 kg (208 lb)   Height: 185.4 cm (73\")       Overall doing well s/p bilateral robotic inguinal hernia repair on 8/22. He was seen in the ER on 8/26 after he sneezed and felt a \"ripping pain.\" He was again seen in the ER on 9/4 after he slipped and twisted and felt like he \"ripped something open.\" CT scans done both times show hernia repairs intact. No fevers. Tolerating diet. Energy improving. Pain improving. No troubles with bowel or bladder function. Wounds healing well. Repair intact. He does have burning pain.     Results Review:   Pathology and operative findings reviewed with patient.        Assessment / Plan:  It has been reiterated that the patient should limit strenuous activity during the post op period and limit lifting to <35 pounds for the first four weeks post op then slowly return to normal.  Diagnoses and all orders for this visit:    1. S/P bilateral inguinal hernia repair (Primary)  -     gabapentin (Neurontin) 100 MG capsule; Take 1 capsule by mouth 3 (Three) Times a Day for 60 days.  Dispense: 90 capsule; Refill: 1      He is doing well. Bilateral inguinal hernia repairs intact. Follow up in 3 months.     Electronically signed by Dina Boyd MD  09/06/23  13:48 CDT                  "

## 2023-09-06 NOTE — PATIENT INSTRUCTIONS

## 2023-09-08 ENCOUNTER — TELEPHONE (OUTPATIENT)
Dept: GASTROENTEROLOGY | Facility: CLINIC | Age: 48
End: 2023-09-08
Payer: COMMERCIAL

## 2023-09-17 NOTE — PROGRESS NOTES
"Chief Complaint  Follow-up (Right testicle is swollen and sore after hernia surgery. Surgery was on the 22nd, at [facility].  )    Subjective        Saulo Shepard presents to [facility]  History of Present Illness  Recent hernia repair with testicular pain following repair that is slowly improving, mild swelling in the area that is getting better    Objective   Vital Signs:  /82 (BP Location: Right arm, Patient Position: Sitting, Cuff Size: Adult)   Pulse 87   Resp 16   Ht 185.4 cm (72.99\")   Wt 94.7 kg (208 lb 12.8 oz)   SpO2 97%   BMI 27.55 kg/m²   Estimated body mass index is 27.55 kg/m² as calculated from the following:    Height as of this encounter: 185.4 cm (72.99\").    Weight as of this encounter: 94.7 kg (208 lb 12.8 oz).               Physical Exam  Vitals and nursing note reviewed.   Constitutional:       General: He is not in acute distress.     Appearance: He is not diaphoretic.   HENT:      Head: Normocephalic and atraumatic.      Nose: Nose normal.   Eyes:      General: No scleral icterus.        Right eye: No discharge.         Left eye: No discharge.      Conjunctiva/sclera: Conjunctivae normal.   Neck:      Trachea: No tracheal deviation.   Pulmonary:      Effort: Pulmonary effort is normal.   Genitourinary:     Testes: Normal.   Skin:     General: Skin is warm and dry.      Coloration: Skin is not pale.   Neurological:      Mental Status: He is alert and oriented to person, place, and time.   Psychiatric:         Behavior: Behavior normal.         Thought Content: Thought content normal.         Judgment: Judgment normal.      Result Review :                   Assessment and Plan   Diagnoses and all orders for this visit:    1. Right testicular pain (Primary)  -     diclofenac (VOLTAREN) 75 MG EC tablet; Take 1 tablet by mouth 2 (Two) Times a Day As Needed (For pain and back pain) for up to 30 days.  Dispense: 60 tablet; Refill: 0    F/u PRN            "

## 2023-09-28 NOTE — PROGRESS NOTES
Subjective    Mr. Shepard is 48 y.o. male    Chief Complaint: Pain in right testicle    History of Present Illness    48-year-old male new patient referred for right testicular pain.  Onset within 1 week after bilateral inguinal hernia repair by Dr. Boyd 8/22/2023.  Patient reports experienced right testicular swelling and pain to touch.  Denies any blood in ejaculate or painful ejaculation.  Patient reports was recently seen in the emergency room due to abdominal pain was found to have acute diverticulitis was started on ciprofloxacin and since then the pain in the testicle has improved and the swelling has resolved.  Denies any difficulty with urination.  CT of the abdomen and pelvis completed prior.  No scrotal ultrasound.    The following portions of the patient's history were reviewed and updated as appropriate: allergies, current medications, past family history, past medical history, past social history, past surgical history and problem list.    Review of Systems   Constitutional:  Negative for chills and fever.   Gastrointestinal:  Negative for abdominal pain, anal bleeding and blood in stool.   Genitourinary:  Positive for scrotal swelling and testicular pain. Negative for decreased urine volume, difficulty urinating, dysuria, flank pain, frequency, hematuria and urgency.         Current Outpatient Medications:     acetaminophen (Tylenol) 325 MG tablet, Take 3 tablets by mouth Every 8 (Eight) Hours. Take every 8 hours for 3 days then take prn as needed., Disp: 100 tablet, Rfl: 2    ciprofloxacin (CIPRO) 500 MG tablet, Take 1 tablet by mouth Every 12 (Twelve) Hours for 10 days., Disp: 20 tablet, Rfl: 0    fluticasone (FLONASE) 50 MCG/ACT nasal spray, 2 sprays into the nostril(s) as directed by provider Daily. (Patient taking differently: 2 sprays into the nostril(s) as directed by provider Daily As Needed.), Disp: 9.9 mL, Rfl: 0    gabapentin (Neurontin) 100 MG capsule, Take 1 capsule by mouth 3 (Three)  Times a Day for 60 days., Disp: 90 capsule, Rfl: 1    ibuprofen (Motrin IB) 200 MG tablet, Take 3 tablets by mouth Every 8 (Eight) Hours. Take every 8 hours for three days then take as needed., Disp: 100 tablet, Rfl: 2    metroNIDAZOLE (FLAGYL) 500 MG tablet, Take 1 tablet by mouth 2 (Two) Times a Day for 10 days., Disp: 20 tablet, Rfl: 0    naloxone (NARCAN) 4 MG/0.1ML nasal spray, Call 911. Don't prime. [place] in 1 nostril for overdose. Repeat in 2-3 minutes in other nostril if no or minimal breathing/responsiveness., Disp: 2 each, Rfl: 0    ondansetron (Zofran) 4 MG tablet, Take 1 tablet by mouth Every 8 (Eight) Hours As Needed for Nausea or Vomiting., Disp: 15 tablet, Rfl: 0    oxyCODONE (Roxicodone) 5 MG immediate release tablet, Take 1 tablet by mouth Every 8 (Eight) Hours As Needed for Severe Pain., Disp: 10 tablet, Rfl: 0    polyethylene glycol-electrolytes (NULYTELY) 420 g solution, Take as directed by prescriber, Disp: 4000 mL, Rfl: 0    Past Medical History:   Diagnosis Date    Anxiety     Back pain     Fracture of lumbar spine 2002 and 2015    lower lumbar 2002; L1 2015    Head injury     Injury of back     Neck pain     Overweight (BMI 25.0-29.9)        Past Surgical History:   Procedure Laterality Date    COLONOSCOPY N/A 8/31/2023    Procedure: COLONOSCOPY WITH ANESTHESIA;  Surgeon: Kristopher Brush DO;  Location: [facility] ENDOSCOPY;  Service: Gastroenterology;  Laterality: N/A;  preop; screening   postop  PCP Joseph Stewart    COLONOSCOPY N/A 9/1/2023    Procedure: COLONOSCOPY WITH ANESTHESIA;  Surgeon: Kristopher Brush DO;  Location: [facility] ENDOSCOPY;  Service: Gastroenterology;  Laterality: N/A;  Pre: Encounter for screening for malignant neoplasm of colon;  Post: Inadequate prep;  Vivek Stewart DO    FACIAL FRACTURE SURGERY      INCISION AND DRAINAGE LEG Right 12/1/2018    Procedure: INCISION AND DRAINAGE OF RIGHT UPPER INNER THIGH, PLACEMENT OF WOUND VAC;  Surgeon: René Dallas MD;   "Location:  [facility] OR;  Service: General    INGUINAL HERNIA REPAIR Bilateral 8/22/2023    Procedure: INGUINAL HERNIA BILATERAL REPAIR LAPAROSCOPIC WITH DAVINCI ROBOT WITH MESH;  Surgeon: Dina Boyd MD;  Location:  [facility] OR;  Service: Robotics - DaVinci;  Laterality: Bilateral;    KNEE SURGERY Right 09/2021    SPLENECTOMY         Social History     Socioeconomic History    Marital status: Single   Tobacco Use    Smoking status: Never    Smokeless tobacco: Never   Vaping Use    Vaping Use: Never used   Substance and Sexual Activity    Alcohol use: Not Currently     Comment: occ.    Drug use: No    Sexual activity: Yes     Partners: Female       Family History   Problem Relation Age of Onset    Fibromyalgia Mother     Lung cancer Father     Colon cancer Neg Hx     Colon polyps Neg Hx     Esophageal cancer Neg Hx        Objective    Temp 97.7 øF (36.5 øC)   Ht 185.4 cm (72.99\")   Wt 93.8 kg (206 lb 12.8 oz)   BMI 27.29 kg/mý     Physical Exam  Constitutional:       Appearance: Normal appearance.   Abdominal:      Tenderness: There is no right CVA tenderness or left CVA tenderness.   Genitourinary:     Penis: Normal.       Testes:         Right: Tenderness, swelling, testicular hydrocele or varicocele not present.         Left: Tenderness, swelling, testicular hydrocele or varicocele not present.      Epididymis:      Right: Not enlarged. No mass or tenderness.      Left: Not enlarged. No mass or tenderness.   Skin:     General: Skin is warm and dry.   Neurological:      Mental Status: He is alert and oriented to person, place, and time.   Psychiatric:         Mood and Affect: Mood normal.         Behavior: Behavior normal.             Results for orders placed or performed during the hospital encounter of 10/01/23   Comprehensive Metabolic Panel    Specimen: Blood   Result Value Ref Range    Glucose 85 65 - 99 mg/dL    BUN 10 6 - 20 mg/dL    Creatinine 1.00 0.76 - 1.27 mg/dL    Sodium 139 136 - 145 mmol/L    " Potassium 3.7 3.5 - 5.2 mmol/L    Chloride 104 98 - 107 mmol/L    CO2 25.0 22.0 - 29.0 mmol/L    Calcium 10.1 8.6 - 10.5 mg/dL    Total Protein 7.1 6.0 - 8.5 g/dL    Albumin 3.9 3.5 - 5.2 g/dL    ALT (SGPT) 13 1 - 41 U/L    AST (SGOT) 16 1 - 40 U/L    Alkaline Phosphatase 109 39 - 117 U/L    Total Bilirubin 0.8 0.0 - 1.2 mg/dL    Globulin 3.2 gm/dL    A/G Ratio 1.2 g/dL    BUN/Creatinine Ratio 10.0 7.0 - 25.0    Anion Gap 10.0 5.0 - 15.0 mmol/L    eGFR 93.4 >60.0 mL/min/1.73   Lipase    Specimen: Blood   Result Value Ref Range    Lipase 24 13 - 60 U/L   Urinalysis With Microscopic If Indicated (No Culture) - Urine, Clean Catch    Specimen: Urine, Clean Catch   Result Value Ref Range    Color, UA Yellow Yellow, Straw    Appearance, UA Turbid (A) Clear    pH, UA 7.5 5.0 - 8.0    Specific Gravity, UA 1.014 1.005 - 1.030    Glucose, UA Negative Negative    Ketones, UA Negative Negative    Bilirubin, UA Negative Negative    Blood, UA Negative Negative    Protein, UA Negative Negative    Leuk Esterase, UA Negative Negative    Nitrite, UA Negative Negative    Urobilinogen, UA 1.0 E.U./dL 0.2 - 1.0 E.U./dL   CBC Auto Differential    Specimen: Blood   Result Value Ref Range    WBC 16.87 (H) 3.40 - 10.80 10*3/mm3    RBC 5.21 4.14 - 5.80 10*6/mm3    Hemoglobin 14.9 13.0 - 17.7 g/dL    Hematocrit 46.4 37.5 - 51.0 %    MCV 89.1 79.0 - 97.0 fL    MCH 28.6 26.6 - 33.0 pg    MCHC 32.1 31.5 - 35.7 g/dL    RDW 14.1 12.3 - 15.4 %    RDW-SD 46.4 37.0 - 54.0 fl    MPV 11.1 6.0 - 12.0 fL    Platelets 348 140 - 450 10*3/mm3    Neutrophil % 59.5 42.7 - 76.0 %    Lymphocyte % 25.5 19.6 - 45.3 %    Monocyte % 12.7 (H) 5.0 - 12.0 %    Eosinophil % 1.2 0.3 - 6.2 %    Basophil % 0.7 0.0 - 1.5 %    Immature Grans % 0.4 0.0 - 0.5 %    Neutrophils, Absolute 10.03 (H) 1.70 - 7.00 10*3/mm3    Lymphocytes, Absolute 4.30 (H) 0.70 - 3.10 10*3/mm3    Monocytes, Absolute 2.15 (H) 0.10 - 0.90 10*3/mm3    Eosinophils, Absolute 0.20 0.00 - 0.40 10*3/mm3     Basophils, Absolute 0.12 0.00 - 0.20 10*3/mm3    Immature Grans, Absolute 0.07 (H) 0.00 - 0.05 10*3/mm3    nRBC 0.0 0.0 - 0.2 /100 WBC     Assessment and Plan    Diagnoses and all orders for this visit:    1. Pain in right testicle (Primary)  -     POC Urinalysis Dipstick, Multipro      48-year-old male new patient referred for right testicular pain.  Onset within 1 week after bilateral inguinal hernia repair by Dr. Boyd 8/22/2023.     Pain and swelling has improved since starting ciprofloxacin for diverticulitis    Patient likely experienced acute epididymitis following the inguinal hernia repair    Recommend completing the previously prescribed full course of ciprofloxacin and have encouraged if mild pain returns to use short course of NSAID x2 weeks as well as good scrotal support.    If scrotal swelling and pain returns recommend return to our office however as for now feel as though patient can follow-up on an as-needed basis

## 2023-10-01 ENCOUNTER — APPOINTMENT (OUTPATIENT)
Dept: CT IMAGING | Facility: HOSPITAL | Age: 48
End: 2023-10-01
Payer: COMMERCIAL

## 2023-10-01 ENCOUNTER — HOSPITAL ENCOUNTER (EMERGENCY)
Facility: HOSPITAL | Age: 48
Discharge: HOME | End: 2023-10-01
Admitting: EMERGENCY MEDICINE
Payer: COMMERCIAL

## 2023-10-01 VITALS
SYSTOLIC BLOOD PRESSURE: 120 MMHG | TEMPERATURE: 97.8 F | DIASTOLIC BLOOD PRESSURE: 73 MMHG | BODY MASS INDEX: 27.04 KG/M2 | HEIGHT: 73 IN | RESPIRATION RATE: 20 BRPM | OXYGEN SATURATION: 94 % | WEIGHT: 204 LBS | HEART RATE: 85 BPM

## 2023-10-01 DIAGNOSIS — K57.92 DIVERTICULITIS: Primary | ICD-10-CM

## 2023-10-01 LAB
ALBUMIN SERPL-MCNC: 3.9 G/DL (ref 3.5–5.2)
ALBUMIN/GLOB SERPL: 1.2 G/DL
ALP SERPL-CCNC: 109 U/L (ref 39–117)
ALT SERPL W P-5'-P-CCNC: 13 U/L (ref 1–41)
ANION GAP SERPL CALCULATED.3IONS-SCNC: 10 MMOL/L (ref 5–15)
AST SERPL-CCNC: 16 U/L (ref 1–40)
BASOPHILS # BLD AUTO: 0.12 10*3/MM3 (ref 0–0.2)
BASOPHILS NFR BLD AUTO: 0.7 % (ref 0–1.5)
BILIRUB SERPL-MCNC: 0.8 MG/DL (ref 0–1.2)
BILIRUB UR QL STRIP: NEGATIVE
BUN SERPL-MCNC: 10 MG/DL (ref 6–20)
BUN/CREAT SERPL: 10 (ref 7–25)
CALCIUM SPEC-SCNC: 10.1 MG/DL (ref 8.6–10.5)
CHLORIDE SERPL-SCNC: 104 MMOL/L (ref 98–107)
CLARITY UR: ABNORMAL
CO2 SERPL-SCNC: 25 MMOL/L (ref 22–29)
COLOR UR: YELLOW
CREAT SERPL-MCNC: 1 MG/DL (ref 0.76–1.27)
DEPRECATED RDW RBC AUTO: 46.4 FL (ref 37–54)
EGFRCR SERPLBLD CKD-EPI 2021: 93.4 ML/MIN/1.73
EOSINOPHIL # BLD AUTO: 0.2 10*3/MM3 (ref 0–0.4)
EOSINOPHIL NFR BLD AUTO: 1.2 % (ref 0.3–6.2)
ERYTHROCYTE [DISTWIDTH] IN BLOOD BY AUTOMATED COUNT: 14.1 % (ref 12.3–15.4)
GLOBULIN UR ELPH-MCNC: 3.2 GM/DL
GLUCOSE SERPL-MCNC: 85 MG/DL (ref 65–99)
GLUCOSE UR STRIP-MCNC: NEGATIVE MG/DL
HCT VFR BLD AUTO: 46.4 % (ref 37.5–51)
HGB BLD-MCNC: 14.9 G/DL (ref 13–17.7)
HGB UR QL STRIP.AUTO: NEGATIVE
IMM GRANULOCYTES # BLD AUTO: 0.07 10*3/MM3 (ref 0–0.05)
IMM GRANULOCYTES NFR BLD AUTO: 0.4 % (ref 0–0.5)
KETONES UR QL STRIP: NEGATIVE
LEUKOCYTE ESTERASE UR QL STRIP.AUTO: NEGATIVE
LIPASE SERPL-CCNC: 24 U/L (ref 13–60)
LYMPHOCYTES # BLD AUTO: 4.3 10*3/MM3 (ref 0.7–3.1)
LYMPHOCYTES NFR BLD AUTO: 25.5 % (ref 19.6–45.3)
MCH RBC QN AUTO: 28.6 PG (ref 26.6–33)
MCHC RBC AUTO-ENTMCNC: 32.1 G/DL (ref 31.5–35.7)
MCV RBC AUTO: 89.1 FL (ref 79–97)
MONOCYTES # BLD AUTO: 2.15 10*3/MM3 (ref 0.1–0.9)
MONOCYTES NFR BLD AUTO: 12.7 % (ref 5–12)
NEUTROPHILS NFR BLD AUTO: 10.03 10*3/MM3 (ref 1.7–7)
NEUTROPHILS NFR BLD AUTO: 59.5 % (ref 42.7–76)
NITRITE UR QL STRIP: NEGATIVE
NRBC BLD AUTO-RTO: 0 /100 WBC (ref 0–0.2)
PH UR STRIP.AUTO: 7.5 [PH] (ref 5–8)
PLATELET # BLD AUTO: 348 10*3/MM3 (ref 140–450)
PMV BLD AUTO: 11.1 FL (ref 6–12)
POTASSIUM SERPL-SCNC: 3.7 MMOL/L (ref 3.5–5.2)
PROT SERPL-MCNC: 7.1 G/DL (ref 6–8.5)
PROT UR QL STRIP: NEGATIVE
RBC # BLD AUTO: 5.21 10*6/MM3 (ref 4.14–5.8)
SODIUM SERPL-SCNC: 139 MMOL/L (ref 136–145)
SP GR UR STRIP: 1.01 (ref 1–1.03)
UROBILINOGEN UR QL STRIP: ABNORMAL
WBC NRBC COR # BLD: 16.87 10*3/MM3 (ref 3.4–10.8)

## 2023-10-01 PROCEDURE — 80053 COMPREHEN METABOLIC PANEL: CPT | Performed by: NURSE PRACTITIONER

## 2023-10-01 PROCEDURE — 25010000002 METHYLPREDNISOLONE PER 40 MG: Performed by: NURSE PRACTITIONER

## 2023-10-01 PROCEDURE — 83690 ASSAY OF LIPASE: CPT | Performed by: NURSE PRACTITIONER

## 2023-10-01 PROCEDURE — 85025 COMPLETE CBC W/AUTO DIFF WBC: CPT | Performed by: NURSE PRACTITIONER

## 2023-10-01 PROCEDURE — 74177 CT ABD & PELVIS W/CONTRAST: CPT

## 2023-10-01 PROCEDURE — 25510000001 IOPAMIDOL 61 % SOLUTION: Performed by: NURSE PRACTITIONER

## 2023-10-01 PROCEDURE — 96365 THER/PROPH/DIAG IV INF INIT: CPT

## 2023-10-01 PROCEDURE — 81003 URINALYSIS AUTO W/O SCOPE: CPT | Performed by: NURSE PRACTITIONER

## 2023-10-01 PROCEDURE — 25010000002 DIPHENHYDRAMINE PER 50 MG: Performed by: NURSE PRACTITIONER

## 2023-10-01 PROCEDURE — 96375 TX/PRO/DX INJ NEW DRUG ADDON: CPT

## 2023-10-01 PROCEDURE — 25010000002 LEVOFLOXACIN PER 250 MG: Performed by: NURSE PRACTITIONER

## 2023-10-01 PROCEDURE — 99285 EMERGENCY DEPT VISIT HI MDM: CPT

## 2023-10-01 RX ORDER — FAMOTIDINE 10 MG/ML
20 INJECTION, SOLUTION INTRAVENOUS ONCE
Status: COMPLETED | OUTPATIENT
Start: 2023-10-01 | End: 2023-10-01

## 2023-10-01 RX ORDER — METRONIDAZOLE 500 MG/1
500 TABLET ORAL 2 TIMES DAILY
Qty: 20 TABLET | Refills: 0 | Status: SHIPPED | OUTPATIENT
Start: 2023-10-01 | End: 2023-10-11

## 2023-10-01 RX ORDER — SODIUM CHLORIDE 0.9 % (FLUSH) 0.9 %
10 SYRINGE (ML) INJECTION AS NEEDED
Status: DISCONTINUED | OUTPATIENT
Start: 2023-10-01 | End: 2023-10-01 | Stop reason: HOSPADM

## 2023-10-01 RX ORDER — DIPHENHYDRAMINE HYDROCHLORIDE 50 MG/ML
25 INJECTION INTRAMUSCULAR; INTRAVENOUS ONCE
Status: COMPLETED | OUTPATIENT
Start: 2023-10-01 | End: 2023-10-01

## 2023-10-01 RX ORDER — CIPROFLOXACIN 500 MG/1
500 TABLET, FILM COATED ORAL EVERY 12 HOURS
Qty: 20 TABLET | Refills: 0 | Status: SHIPPED | OUTPATIENT
Start: 2023-10-01 | End: 2023-10-11

## 2023-10-01 RX ORDER — METHYLPREDNISOLONE SODIUM SUCCINATE 40 MG/ML
40 INJECTION, POWDER, LYOPHILIZED, FOR SOLUTION INTRAMUSCULAR; INTRAVENOUS ONCE
Status: COMPLETED | OUTPATIENT
Start: 2023-10-01 | End: 2023-10-01

## 2023-10-01 RX ORDER — LEVOFLOXACIN 5 MG/ML
750 INJECTION, SOLUTION INTRAVENOUS ONCE
Status: COMPLETED | OUTPATIENT
Start: 2023-10-01 | End: 2023-10-01

## 2023-10-01 RX ADMIN — FAMOTIDINE 20 MG: 10 INJECTION INTRAVENOUS at 18:20

## 2023-10-01 RX ADMIN — DIPHENHYDRAMINE HYDROCHLORIDE 25 MG: 50 INJECTION, SOLUTION INTRAMUSCULAR; INTRAVENOUS at 18:26

## 2023-10-01 RX ADMIN — LEVOFLOXACIN 750 MG: 750 INJECTION, SOLUTION INTRAVENOUS at 18:53

## 2023-10-01 RX ADMIN — METHYLPREDNISOLONE SODIUM SUCCINATE 40 MG: 40 INJECTION, POWDER, FOR SOLUTION INTRAMUSCULAR; INTRAVENOUS at 18:23

## 2023-10-01 RX ADMIN — IOPAMIDOL 100 ML: 612 INJECTION, SOLUTION INTRAVENOUS at 18:07

## 2023-10-01 NOTE — DISCHARGE INSTRUCTIONS
Begin antibiotics tomorrow- you received a dose of levaquin in the ER. Close f/u with pcp for re-evaluation; return with any worsening sxs  Continue benadryl as directed for the next few days since you may have had a reaction to the IV contrast

## 2023-10-02 NOTE — ED PROVIDER NOTES
Subjective   History of Present Illness  Patient is a 48-year-old male who presents to the ER with chief complaints of abdominal pain.  He complains of sharp left lower quadrant abdominal pain when having a bowel movement.  He states he does not note the pain until he actually needs to have a bowel movement.  He has had nausea without any vomiting.  He states that his stools have been soft however does not believe he is having any diarrhea.  He reports history of diverticulitis and states symptoms were similar with diverticulitis.  Patient states he drank a lot of water because previously this seemed to help with his symptoms.  Patient states however he has had no real change in his abdominal discomfort.  Patient denies any recorded fevers.  Past medical history significant for anxiety, neck and back pain, diverticulitis      Review of Systems   Constitutional: Negative.  Negative for fever.   HENT: Negative.  Negative for congestion.    Respiratory: Negative.  Negative for cough and shortness of breath.    Cardiovascular: Negative.  Negative for chest pain.   Gastrointestinal:  Positive for abdominal pain. Negative for constipation, diarrhea, nausea and vomiting.        Reports soft stools   Genitourinary: Negative.  Negative for dysuria.   Musculoskeletal: Negative.  Negative for back pain and myalgias.   Skin: Negative.  Negative for rash.   All other systems reviewed and are negative.    Past Medical History:   Diagnosis Date    Anxiety     Back pain     Fracture of lumbar spine 2002 and 2015    lower lumbar 2002; L1 2015    Head injury     Injury of back     Neck pain     Overweight (BMI 25.0-29.9)        No Known Allergies    Past Surgical History:   Procedure Laterality Date    COLONOSCOPY N/A 8/31/2023    Procedure: COLONOSCOPY WITH ANESTHESIA;  Surgeon: Kristopher Brush DO;  Location: [facility] ENDOSCOPY;  Service: Gastroenterology;  Laterality: N/A;  preop; screening   postop  PCP Joseph Stewart     COLONOSCOPY N/A 9/1/2023    Procedure: COLONOSCOPY WITH ANESTHESIA;  Surgeon: Kristopher Brush DO;  Location:  [facility] ENDOSCOPY;  Service: Gastroenterology;  Laterality: N/A;  Pre: Encounter for screening for malignant neoplasm of colon;  Post: Inadequate prep;  Vivek Stewart DO    FACIAL FRACTURE SURGERY      INCISION AND DRAINAGE LEG Right 12/1/2018    Procedure: INCISION AND DRAINAGE OF RIGHT UPPER INNER THIGH, PLACEMENT OF WOUND VAC;  Surgeon: René Dallas MD;  Location:  [facility] OR;  Service: General    INGUINAL HERNIA REPAIR Bilateral 8/22/2023    Procedure: INGUINAL HERNIA BILATERAL REPAIR LAPAROSCOPIC WITH DAVINCI ROBOT WITH MESH;  Surgeon: Dina Boyd MD;  Location:  [facility] OR;  Service: Robotics - DaVinci;  Laterality: Bilateral;    KNEE SURGERY Right 09/2021    SPLENECTOMY         Family History   Problem Relation Age of Onset    Fibromyalgia Mother     Lung cancer Father     Colon cancer Neg Hx     Colon polyps Neg Hx     Esophageal cancer Neg Hx        Social History     Socioeconomic History    Marital status: Single   Tobacco Use    Smoking status: Never    Smokeless tobacco: Never   Vaping Use    Vaping Use: Never used   Substance and Sexual Activity    Alcohol use: Not Currently     Comment: occ.    Drug use: No    Sexual activity: Yes     Partners: Female           Objective   Physical Exam  Vitals and nursing note reviewed.   Constitutional:       General: He is not in acute distress.     Appearance: He is well-developed. He is not diaphoretic.   HENT:      Head: Normocephalic and atraumatic.      Nose: Nose normal.   Eyes:      General: No scleral icterus.     Extraocular Movements: Extraocular movements intact.      Conjunctiva/sclera: Conjunctivae normal.      Pupils: Pupils are equal, round, and reactive to light.   Neck:      Thyroid: No thyromegaly.      Vascular: No JVD.   Cardiovascular:      Rate and Rhythm: Normal rate and regular rhythm.      Heart sounds: Normal heart  sounds. No murmur heard.  Pulmonary:      Effort: Pulmonary effort is normal. No respiratory distress.      Breath sounds: Normal breath sounds. No wheezing or rales.   Chest:      Chest wall: No tenderness.   Abdominal:      General: Bowel sounds are normal. There is no distension.      Palpations: Abdomen is soft. There is no mass.      Tenderness: There is no abdominal tenderness in the left lower quadrant. There is no guarding or rebound.   Musculoskeletal:         General: Normal range of motion.      Cervical back: Normal range of motion and neck supple.   Lymphadenopathy:      Cervical: No cervical adenopathy.   Skin:     General: Skin is warm and dry.      Capillary Refill: Capillary refill takes less than 2 seconds.      Coloration: Skin is not pale.      Findings: No erythema or rash.   Neurological:      General: No focal deficit present.      Mental Status: He is alert and oriented to person, place, and time.      Cranial Nerves: No cranial nerve deficit.      Coordination: Coordination normal.      Deep Tendon Reflexes: Reflexes are normal and symmetric.   Psychiatric:         Mood and Affect: Mood normal.         Behavior: Behavior normal.         Thought Content: Thought content normal.         Judgment: Judgment normal.       Procedures           ED Course  ED Course as of 10/01/23 2338   Sun Oct 01, 2023   1850 Patient has no shortness of breath or rash on re-exam. Patient will be discharged home shortly in stable condition. [TW]      ED Course User Index  [TW] Rica Crump APRN                                           Medical Decision Making  Patient is a 48-year-old male who presents to the ER with chief complaints of abdominal pain.  He complains of sharp left lower quadrant abdominal pain when having a bowel movement.  He states he does not note the pain until he actually needs to have a bowel movement.  He has had nausea without any vomiting.  He states that his stools have been soft  however does not believe he is having any diarrhea.  He reports history of diverticulitis and states symptoms were similar with diverticulitis.  Patient states he drank a lot of water because previously this seemed to help with his symptoms.  Patient states however he has had no real change in his abdominal discomfort.  Patient denies any recorded fevers.  Past medical history significant for anxiety, neck and back pain, diverticulitis  Differential diagnosis: Diverticulitis, colitis, UTI, viral illness, and other    Labs Reviewed  URINALYSIS W/ MICROSCOPIC IF INDICATED (NO CULTURE) - Abnormal; Notable for the following components:     Appearance, UA                Turbid (*)            All other components within normal limits         Narrative: Urine microscopic not indicated.  CBC WITH AUTO DIFFERENTIAL - Abnormal; Notable for the following components:     WBC                           16.87 (*)               Monocyte %                    12.7 (*)               Neutrophils, Absolute         10.03 (*)               Lymphocytes, Absolute         4.30 (*)               Monocytes, Absolute           2.15 (*)               Immature Grans, Absolute      0.07 (*)            All other components within normal limits  LIPASE - Normal  COMPREHENSIVE METABOLIC PANEL         Narrative: GFR Normal >60                  Chronic Kidney Disease <60                  Kidney Failure <15                    CBC AND DIFFERENTIAL   CT Abdomen Pelvis With Contrast   Final Result    1. Acute segmental colitis/diverticulitis involving the junction of the    descending and sigmoid colon without evidence of perforation or abscess.    There is mild induration of the pericolonic fat planes at that location    as well as trace free fluid in the pelvis.    2. Evidence of previous splenectomy.    3. 2 mm nonobstructing nephrolithiasis at the inferior pole of the left    kidney.         This report was signed and finalized on 10/1/2023 6:15 PM CDT  by Dr. Zachary Giles MD.       Patient has a leukocytosis of 16.87.  H&H is within normal limits.  Patient CMP is all within normal limits, lipase within normal limits.  Urinalysis is negative for infection.  Patient CT scan is consistent with colitis/diverticulitis.  No abscess identified.  Patient has had no nausea or vomiting.  He has had no recorded fevers.  He received IV Levaquin in the emergency department.  Reviewed with Dr. Summers.  Plan at this time is to discharge with antibiotics and he will need close follow-up with PCP for reevaluation and/or return with any worsening symptoms.  Patient expressed understanding.    Problems Addressed:  Diverticulitis: acute illness or injury    Amount and/or Complexity of Data Reviewed  Labs: ordered. Decision-making details documented in ED Course.  Radiology: ordered. Decision-making details documented in ED Course.    Risk  Prescription drug management.        Final diagnoses:   Diverticulitis       ED Disposition  ED Disposition       ED Disposition   Discharge    Condition   Good    Comment   --               No follow-up provider specified.       Medication List        New Prescriptions      ciprofloxacin 500 MG tablet  Commonly known as: CIPRO  Take 1 tablet by mouth Every 12 (Twelve) Hours for 10 days.     metroNIDAZOLE 500 MG tablet  Commonly known as: FLAGYL  Take 1 tablet by mouth 2 (Two) Times a Day for 10 days.            Changed      fluticasone 50 MCG/ACT nasal spray  Commonly known as: FLONASE  2 sprays into the nostril(s) as directed by provider Daily.  What changed:   when to take this  reasons to take this            Stop      ondansetron ODT 4 MG disintegrating tablet  Commonly known as: ZOFRAN-ODT               Where to Get Your Medications        These medications were sent to [id number] - [place], [place] - [address]  - [phone] [facility] [phone] FX  [address], [facility]  [address]      Phone: [phone]   ciprofloxacin 500 MG tablet  metroNIDAZOLE 500 MG tablet            Rica Crump, APRN  10/01/23 3655

## 2023-10-02 NOTE — TELEPHONE ENCOUNTER
"    Reason for Disposition  • Pain also present in shoulder(s) or arm(s) or jaw  (Exception: pain is clearly made worse by movement)    Additional Information  • Negative: Severe difficulty breathing (e.g., struggling for each breath, speaks in single words)  • Negative: Difficult to awaken or acting confused (e.g., disoriented, slurred speech)  • Negative: Shock suspected (e.g., cold/pale/clammy skin, too weak to stand, low BP, rapid pulse)  • Negative: [1] Chest pain lasts > 5 minutes AND [2] history of heart disease  (i.e., heart attack, bypass surgery, angina, angioplasty, CHF; not just a heart murmur)  • Negative: [1] Chest pain lasts > 5 minutes AND [2] described as crushing, pressure-like, or heavy  • Negative: [1] Chest pain lasts > 5 minutes AND [2] age > 50  • Negative: [1] Chest pain lasts > 5 minutes AND [2] age > 30 AND [3] at least one cardiac risk factor (i.e., hypertension, diabetes, obesity, smoker or strong family history of heart disease)  • Negative: [1] Chest pain lasts > 5 minutes AND [2] not relieved with nitroglycerin  • Negative: Passed out (i.e., lost consciousness, collapsed and was not responding)  • Negative: Heart beating < 50 beats per minute OR > 140 beats per minute  • Negative: Visible sweat on face or sweat dripping down face  • Negative: Sounds like a life-threatening emergency to the triager  • Negative: Followed a chest injury  • Negative: SEVERE chest pain  • Negative: [1] Intermittent  chest pain or \"angina\" AND [2] increasing in severity or frequency  (Exception: pains lasting a few seconds)    Answer Assessment - Initial Assessment Questions  1. LOCATION: \"Where does it hurt?\"        Middle of chest   2. RADIATION: \"Does the pain go anywhere else?\" (e.g., into neck, jaw, arms, back)      Left arm pit   3. ONSET: \"When did the chest pain begin?\" (Minutes, hours or days)       Just a little bit ago   4. PATTERN \"Does the pain come and go, or has it been constant since it " "started?\"  \"Does it get worse with exertion?\"       Constant   5. DURATION: \"How long does it last\" (e.g., seconds, minutes, hours)      Constant   6. SEVERITY: \"How bad is the pain?\"  (e.g., Scale 1-10; mild, moderate, or severe)     - MILD (1-3): doesn't interfere with normal activities      - MODERATE (4-7): interferes with normal activities or awakens from sleep     - SEVERE (8-10): excruciating pain, unable to do any normal activities        Moderate   7. CARDIAC RISK FACTORS: \"Do you have any history of heart problems or risk factors for heart disease?\" (e.g., prior heart attack, angina; high blood pressure, diabetes, being overweight, high cholesterol, smoking, or strong family history of heart disease)      No   8. PULMONARY RISK FACTORS: \"Do you have any history of lung disease?\"  (e.g., blood clots in lung, asthma, emphysema, birth control pills)      No   9. CAUSE: \"What do you think is causing the chest pain?\"      Unknown   10. OTHER SYMPTOMS: \"Do you have any other symptoms?\" (e.g., dizziness, nausea, vomiting, sweating, fever, difficulty breathing, cough)        Numbness in left arm pit; hurts to take a deep breath   11. PREGNANCY: \"Is there any chance you are pregnant?\" \"When was your last menstrual period?\"        No    Protocols used: CHEST PAIN-ADULT-AH      " Benzoyl Peroxide Pregnancy And Lactation Text: This medication is Pregnancy Category C. It is unknown if benzoyl peroxide is excreted in breast milk.

## 2023-10-09 ENCOUNTER — OFFICE VISIT (OUTPATIENT)
Dept: UROLOGY | Facility: CLINIC | Age: 48
End: 2023-10-09
Payer: COMMERCIAL

## 2023-10-09 VITALS — WEIGHT: 206.8 LBS | TEMPERATURE: 97.7 F | BODY MASS INDEX: 27.41 KG/M2 | HEIGHT: 73 IN

## 2023-10-09 DIAGNOSIS — N50.811 PAIN IN RIGHT TESTICLE: Primary | ICD-10-CM

## 2023-10-10 ENCOUNTER — OFFICE VISIT (OUTPATIENT)
Dept: FAMILY MEDICINE CLINIC | Facility: CLINIC | Age: 48
End: 2023-10-10
Payer: COMMERCIAL

## 2023-10-10 VITALS
SYSTOLIC BLOOD PRESSURE: 132 MMHG | HEART RATE: 84 BPM | DIASTOLIC BLOOD PRESSURE: 70 MMHG | HEIGHT: 73 IN | RESPIRATION RATE: 16 BRPM | WEIGHT: 207.8 LBS | OXYGEN SATURATION: 98 % | BODY MASS INDEX: 27.54 KG/M2

## 2023-10-10 DIAGNOSIS — Z87.19 S/P INGUINAL HERNIA REPAIR: ICD-10-CM

## 2023-10-10 DIAGNOSIS — K57.92 DIVERTICULITIS: Primary | ICD-10-CM

## 2023-10-10 DIAGNOSIS — Z98.890 S/P INGUINAL HERNIA REPAIR: ICD-10-CM

## 2023-10-10 NOTE — PROGRESS NOTES
KYLER Thorpe  [facility]   Family Medicine  [address]  [place] [address]  Phone: [phone]  Fax: [phone]         Chief Complaint:  Chief Complaint   Patient presents with    Follow-up     Hospital fu        History:  Saulo Shepard is a 48 y.o. male that is an established patient. He  is here for evaluation of the above complaint.    HPI     Saulo Shepard is here in f/u from recent ED visit. He presented to the ED on 10-1-2023 for ABD pain. CT of the ABD/pelvis showed diverticulits/colitis, was given Levaquin IV in the ED and is almost done with a course of Cipro and Flagyl. ABD pain has resolved. He will f/u with GI as needed.    Scrotal pain following bilateral inguinal hernia repair has improved.               ROS:  Review of Systems   Constitutional: Negative.    HENT: Negative.     Eyes: Negative.    Respiratory: Negative.     Cardiovascular: Negative.    Gastrointestinal: Negative.    Endocrine: Negative.    Genitourinary: Negative.    Musculoskeletal: Negative.    Skin: Negative.    Allergic/Immunologic: Negative.    Neurological: Negative.    Hematological: Negative.    Psychiatric/Behavioral: Negative.           reports that he has never smoked. He has never used smokeless tobacco. He reports that he does not currently use alcohol. He reports that he does not use drugs.    Current Outpatient Medications   Medication Instructions    acetaminophen (TYLENOL) 975 mg, Oral, Every 8 Hours, Take every 8 hours for 3 days then take prn as needed.    ciprofloxacin (CIPRO) 500 mg, Oral, Every 12 Hours    fluticasone (FLONASE) 50 MCG/ACT nasal spray 2 sprays, Nasal, Daily    gabapentin (NEURONTIN) 100 mg, Oral, 3 Times Daily    ibuprofen (MOTRIN IB) 600 mg, Oral, Every 8 Hours, Take every 8 hours for three days then take as needed.    metroNIDAZOLE (FLAGYL) 500 mg, Oral, 2 Times Daily    naloxone (NARCAN) 4 MG/0.1ML nasal spray Call 911. Don't prime. Spray in 1 nostril  "for overdose. Repeat in 2-3 minutes in other nostril if no or minimal breathing/responsiveness.    ondansetron (ZOFRAN) 4 mg, Oral, Every 8 Hours PRN    oxyCODONE (ROXICODONE) 5 mg, Oral, Every 8 Hours PRN    polyethylene glycol-electrolytes (NULYTELY) 420 g solution Take as directed by prescriber       OBJECTIVE:  /70 (BP Location: Right arm, Patient Position: Sitting, Cuff Size: Adult)   Pulse 84   Resp 16   Ht 185.4 cm (72.99\")   Wt 94.3 kg (207 lb 12.8 oz)   SpO2 98%   BMI 27.42 kg/mý    Physical Exam  Abdominal:      General: Bowel sounds are normal. There is no distension.      Palpations: Abdomen is soft. There is no mass.      Tenderness: There is no abdominal tenderness. There is no right CVA tenderness, left CVA tenderness, guarding or rebound. Negative signs include Gonzalez's sign.         Procedures    Assessment/Plan:     Diagnoses and all orders for this visit:    1. Diverticulitis (Primary)    2. S/P inguinal hernia repair      BMI is >= 25 and <30. (Overweight) The following options were offered after discussion;: exercise counseling/recommendations and nutrition counseling/recommendations       An After Visit Summary was printed and given to the patient at discharge.  Return for Next scheduled follow up.       There are no Patient Instructions on file for this visit.      Discussion:     Will keep scheduled appt    I spent 22 minutes caring for Saulo on this date of service. This time includes time spent by me in the following activities: preparing for the visit, reviewing tests, obtaining and/or reviewing a separately obtained history, performing a medically appropriate examination and/or evaluation, counseling and educating the patient/family/caregiver, ordering medications, tests, or procedures, documenting information in the medical record, and independently interpreting results and communicating that information with the patient/family/caregiver     Angelica CHÁVEZ 10/10/2023 "   Electronically signed.

## 2023-10-19 ENCOUNTER — HOSPITAL ENCOUNTER (OUTPATIENT)
Dept: GENERAL RADIOLOGY | Facility: HOSPITAL | Age: 48
Discharge: HOME | End: 2023-10-19
Admitting: NURSE PRACTITIONER
Payer: COMMERCIAL

## 2023-10-19 ENCOUNTER — TELEPHONE (OUTPATIENT)
Dept: FAMILY MEDICINE CLINIC | Facility: CLINIC | Age: 48
End: 2023-10-19

## 2023-10-19 ENCOUNTER — OFFICE VISIT (OUTPATIENT)
Dept: INTERNAL MEDICINE | Facility: CLINIC | Age: 48
End: 2023-10-19
Payer: COMMERCIAL

## 2023-10-19 ENCOUNTER — TELEPHONE (OUTPATIENT)
Dept: FAMILY MEDICINE CLINIC | Facility: CLINIC | Age: 48
End: 2023-10-19
Payer: COMMERCIAL

## 2023-10-19 VITALS
RESPIRATION RATE: 16 BRPM | WEIGHT: 210 LBS | HEART RATE: 95 BPM | BODY MASS INDEX: 27.83 KG/M2 | TEMPERATURE: 97.7 F | HEIGHT: 73 IN | DIASTOLIC BLOOD PRESSURE: 82 MMHG | OXYGEN SATURATION: 98 % | SYSTOLIC BLOOD PRESSURE: 136 MMHG

## 2023-10-19 DIAGNOSIS — M25.511 ACUTE PAIN OF RIGHT SHOULDER: ICD-10-CM

## 2023-10-19 DIAGNOSIS — M25.511 ACUTE PAIN OF RIGHT SHOULDER: Primary | ICD-10-CM

## 2023-10-19 PROCEDURE — 73030 X-RAY EXAM OF SHOULDER: CPT

## 2023-10-19 RX ORDER — IBUPROFEN 200 MG
600 TABLET ORAL EVERY 8 HOURS
Start: 2023-10-19 | End: 2023-10-20 | Stop reason: SDUPTHER
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
## 2023-10-19 NOTE — TELEPHONE ENCOUNTER
Caller: Saulo Shepard    Relationship: Self    Best call back number: [phone]     What is the best time to reach you: ANYTIME    Who are you requesting to speak with (clinical staff, provider,  specific staff member): CLINICAL      What was the call regarding: ibuprofen (Motrin IB) 200 MG tablet STILL NOT AT PHARMACY    Is it okay if the provider responds through MyChart: CALL ONCE SENT    [facility] #[id number] - [place], [place] - [address]  - [phone] [facility] [phone] FX

## 2023-10-19 NOTE — TELEPHONE ENCOUNTER
----- Message from KYLER Thorpe sent at 10/19/2023  1:55 PM CDT -----  Xray is normal, continue antiinflammatories for now, we can refer to orthopedics in the future if needed

## 2023-10-19 NOTE — PROGRESS NOTES
Xray is normal, continue antiinflammatories for now, we can refer to orthopedics in the future if needed

## 2023-10-20 DIAGNOSIS — M25.511 ACUTE PAIN OF RIGHT SHOULDER: ICD-10-CM

## 2023-10-20 RX ORDER — IBUPROFEN 200 MG
600 TABLET ORAL EVERY 8 HOURS
Qty: 30 TABLET | Refills: 2
Start: 2023-10-20 | End: 2024-10-19

## 2023-10-21 ENCOUNTER — HOSPITAL ENCOUNTER (EMERGENCY)
Facility: HOSPITAL | Age: 48
Discharge: HOME | End: 2023-10-21
Payer: COMMERCIAL

## 2023-10-21 ENCOUNTER — APPOINTMENT (OUTPATIENT)
Dept: CT IMAGING | Facility: HOSPITAL | Age: 48
End: 2023-10-21
Payer: COMMERCIAL

## 2023-10-21 VITALS
RESPIRATION RATE: 18 BRPM | WEIGHT: 208 LBS | BODY MASS INDEX: 26.69 KG/M2 | HEIGHT: 74 IN | SYSTOLIC BLOOD PRESSURE: 133 MMHG | OXYGEN SATURATION: 94 % | DIASTOLIC BLOOD PRESSURE: 76 MMHG | HEART RATE: 63 BPM | TEMPERATURE: 98 F

## 2023-10-21 DIAGNOSIS — R91.1 PULMONARY NODULE: ICD-10-CM
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
## 2023-10-21 DIAGNOSIS — S39.012A STRAIN OF LUMBAR REGION, INITIAL ENCOUNTER: ICD-10-CM

## 2023-10-21 LAB
ANION GAP SERPL CALCULATED.3IONS-SCNC: 9 MMOL/L (ref 5–15)
ANISOCYTOSIS BLD QL: ABNORMAL
BUN SERPL-MCNC: 13 MG/DL (ref 6–20)
BUN/CREAT SERPL: 13.3 (ref 7–25)
BURR CELLS BLD QL SMEAR: ABNORMAL
C3 FRG RBC-MCNC: ABNORMAL
CALCIUM SPEC-SCNC: 10.6 MG/DL (ref 8.6–10.5)
CHLORIDE SERPL-SCNC: 108 MMOL/L (ref 98–107)
CLUMPED PLATELETS: PRESENT
CO2 SERPL-SCNC: 24 MMOL/L (ref 22–29)
CREAT SERPL-MCNC: 0.98 MG/DL (ref 0.76–1.27)
DEPRECATED RDW RBC AUTO: 46.9 FL (ref 37–54)
EGFRCR SERPLBLD CKD-EPI 2021: 95.7 ML/MIN/1.73
EOSINOPHIL # BLD MANUAL: 0.4 10*3/MM3 (ref 0–0.4)
EOSINOPHIL NFR BLD MANUAL: 3 % (ref 0.3–6.2)
ERYTHROCYTE [DISTWIDTH] IN BLOOD BY AUTOMATED COUNT: 14.6 % (ref 12.3–15.4)
GIANT PLATELETS: ABNORMAL
GLUCOSE SERPL-MCNC: 100 MG/DL (ref 65–99)
HCT VFR BLD AUTO: 44.7 % (ref 37.5–51)
HGB BLD-MCNC: 14.7 G/DL (ref 13–17.7)
LYMPHOCYTES # BLD MANUAL: 4.72 10*3/MM3 (ref 0.7–3.1)
LYMPHOCYTES NFR BLD MANUAL: 7.1 % (ref 5–12)
MCH RBC QN AUTO: 29.1 PG (ref 26.6–33)
MCHC RBC AUTO-ENTMCNC: 32.9 G/DL (ref 31.5–35.7)
MCV RBC AUTO: 88.3 FL (ref 79–97)
MONOCYTES # BLD: 0.95 10*3/MM3 (ref 0.1–0.9)
NEUTROPHILS # BLD AUTO: 7.29 10*3/MM3 (ref 1.7–7)
NEUTROPHILS NFR BLD MANUAL: 54.5 % (ref 42.7–76)
PAPPENHEIMER BOD BLD QL SMEAR: PRESENT
PLATELET # BLD AUTO: 374 10*3/MM3 (ref 140–450)
PMV BLD AUTO: 10.6 FL (ref 6–12)
POIKILOCYTOSIS BLD QL SMEAR: ABNORMAL
POLYCHROMASIA BLD QL SMEAR: ABNORMAL
POTASSIUM SERPL-SCNC: 4 MMOL/L (ref 3.5–5.2)
RBC # BLD AUTO: 5.06 10*6/MM3 (ref 4.14–5.8)
ROULEAUX BLD QL SMEAR: ABNORMAL
SMALL PLATELETS BLD QL SMEAR: ADEQUATE
SMUDGE CELLS BLD QL SMEAR: ABNORMAL
SODIUM SERPL-SCNC: 141 MMOL/L (ref 136–145)
VARIANT LYMPHS NFR BLD MANUAL: 14.1 % (ref 0–5)
VARIANT LYMPHS NFR BLD MANUAL: 21.2 % (ref 19.6–45.3)
WBC NRBC COR # BLD: 13.38 10*3/MM3 (ref 3.4–10.8)

## 2023-10-21 PROCEDURE — 99285 EMERGENCY DEPT VISIT HI MDM: CPT

## 2023-10-21 PROCEDURE — 72131 CT LUMBAR SPINE W/O DYE: CPT

## 2023-10-21 PROCEDURE — 96375 TX/PRO/DX INJ NEW DRUG ADDON: CPT

## 2023-10-21 PROCEDURE — 25010000002 METHYLPREDNISOLONE PER 125 MG: Performed by: STUDENT IN AN ORGANIZED HEALTH CARE EDUCATION/TRAINING PROGRAM

## 2023-10-21 PROCEDURE — 85025 COMPLETE CBC W/AUTO DIFF WBC: CPT | Performed by: PHYSICIAN ASSISTANT

## 2023-10-21 PROCEDURE — 80048 BASIC METABOLIC PNL TOTAL CA: CPT | Performed by: PHYSICIAN ASSISTANT

## 2023-10-21 PROCEDURE — 25010000002 DIPHENHYDRAMINE PER 50 MG: Performed by: PHYSICIAN ASSISTANT

## 2023-10-21 PROCEDURE — 72128 CT CHEST SPINE W/O DYE: CPT

## 2023-10-21 PROCEDURE — 25010000002 LORAZEPAM PER 2 MG: Performed by: STUDENT IN AN ORGANIZED HEALTH CARE EDUCATION/TRAINING PROGRAM

## 2023-10-21 PROCEDURE — 96374 THER/PROPH/DIAG INJ IV PUSH: CPT

## 2023-10-21 PROCEDURE — 85007 BL SMEAR W/DIFF WBC COUNT: CPT | Performed by: PHYSICIAN ASSISTANT

## 2023-10-21 PROCEDURE — 25010000002 KETOROLAC TROMETHAMINE PER 15 MG: Performed by: STUDENT IN AN ORGANIZED HEALTH CARE EDUCATION/TRAINING PROGRAM

## 2023-10-21 PROCEDURE — 25510000001 IOPAMIDOL 61 % SOLUTION: Performed by: STUDENT IN AN ORGANIZED HEALTH CARE EDUCATION/TRAINING PROGRAM

## 2023-10-21 PROCEDURE — 71260 CT THORAX DX C+: CPT

## 2023-10-21 RX ORDER — HYDROCODONE BITARTRATE AND ACETAMINOPHEN 5; 325 MG/1; MG/1
1 TABLET ORAL EVERY 4 HOURS PRN
Qty: 15 TABLET | Refills: 0 | Status: SHIPPED | OUTPATIENT
Start: 2023-10-21

## 2023-10-21 RX ORDER — DIPHENHYDRAMINE HYDROCHLORIDE 50 MG/ML
25 INJECTION INTRAMUSCULAR; INTRAVENOUS ONCE
Status: COMPLETED | OUTPATIENT
Start: 2023-10-21 | End: 2023-10-21

## 2023-10-21 RX ORDER — METHYLPREDNISOLONE SODIUM SUCCINATE 125 MG/2ML
125 INJECTION, POWDER, LYOPHILIZED, FOR SOLUTION INTRAMUSCULAR; INTRAVENOUS ONCE
Status: COMPLETED | OUTPATIENT
Start: 2023-10-21 | End: 2023-10-21

## 2023-10-21 RX ORDER — KETOROLAC TROMETHAMINE 15 MG/ML
10 INJECTION, SOLUTION INTRAMUSCULAR; INTRAVENOUS ONCE
Status: COMPLETED | OUTPATIENT
Start: 2023-10-21 | End: 2023-10-21

## 2023-10-21 RX ORDER — LORAZEPAM 2 MG/ML
1 INJECTION INTRAMUSCULAR ONCE
Status: COMPLETED | OUTPATIENT
Start: 2023-10-21 | End: 2023-10-21

## 2023-10-21 RX ORDER — CYCLOBENZAPRINE HCL 10 MG
10 TABLET ORAL 3 TIMES DAILY PRN
Qty: 15 TABLET | Refills: 0 | Status: SHIPPED | OUTPATIENT
Start: 2023-10-21

## 2023-10-21 RX ADMIN — METHYLPREDNISOLONE SODIUM SUCCINATE 125 MG: 125 INJECTION, POWDER, LYOPHILIZED, FOR SOLUTION INTRAMUSCULAR; INTRAVENOUS at 20:26

## 2023-10-21 RX ADMIN — LORAZEPAM 1 MG: 2 INJECTION INTRAMUSCULAR; INTRAVENOUS at 17:48

## 2023-10-21 RX ADMIN — DIPHENHYDRAMINE HYDROCHLORIDE 25 MG: 50 INJECTION, SOLUTION INTRAMUSCULAR; INTRAVENOUS at 19:33

## 2023-10-21 RX ADMIN — IOPAMIDOL 100 ML: 612 INJECTION, SOLUTION INTRAVENOUS at 20:15

## 2023-10-21 RX ADMIN — KETOROLAC TROMETHAMINE 10 MG: 15 INJECTION, SOLUTION INTRAMUSCULAR; INTRAVENOUS at 17:48
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
## 2023-10-22 NOTE — DISCHARGE INSTRUCTIONS
F/u with pcp regarding pulmonary nodule; f/u with spine specialist with continued back pain; warm moist heat; avoid heavy lifting greater than 10 lbs for 7 days
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
## 2023-11-01 ENCOUNTER — OFFICE VISIT (OUTPATIENT)
Dept: FAMILY MEDICINE CLINIC | Facility: CLINIC | Age: 48
End: 2023-11-01
Payer: COMMERCIAL

## 2023-11-01 VITALS
OXYGEN SATURATION: 92 % | SYSTOLIC BLOOD PRESSURE: 152 MMHG | DIASTOLIC BLOOD PRESSURE: 88 MMHG | TEMPERATURE: 98.4 F | BODY MASS INDEX: 26.69 KG/M2 | WEIGHT: 208 LBS | HEART RATE: 87 BPM | HEIGHT: 74 IN

## 2023-11-01 DIAGNOSIS — M99.04 SOMATIC DYSFUNCTION OF SPINE, SACRAL: ICD-10-CM

## 2023-11-01 DIAGNOSIS — M53.3 COCCYDYNIA: Primary | ICD-10-CM

## 2023-11-01 DIAGNOSIS — M99.05 SOMATIC DYSFUNCTION OF PELVIC REGION: ICD-10-CM

## 2023-11-01 PROCEDURE — 99213 OFFICE O/P EST LOW 20 MIN: CPT | Performed by: FAMILY MEDICINE

## 2023-11-01 PROCEDURE — 98925 OSTEOPATH MANJ 1-2 REGIONS: CPT | Performed by: FAMILY MEDICINE

## 2023-11-01 NOTE — PROGRESS NOTES
"Chief Complaint  Fall (Pt states had fallen down stairs, and is in pain in the butt area )    Subjective        Saulo Shepard presents to [facility]  History of Present Illness  Moderate coccydynia with mild improvement after fall downstairs over the past couple weeks.    Objective   Vital Signs:  /88   Pulse 87   Temp 98.4 °F (36.9 °C)   Ht 188 cm (74\")   Wt 94.3 kg (208 lb)   SpO2 92%   BMI 26.71 kg/m²   Estimated body mass index is 26.71 kg/m² as calculated from the following:    Height as of this encounter: 188 cm (74\").    Weight as of this encounter: 94.3 kg (208 lb).               Physical Exam  Vitals and nursing note reviewed.   Constitutional:       General: He is not in acute distress.     Appearance: He is not diaphoretic.   HENT:      Head: Normocephalic and atraumatic.      Nose: Nose normal.   Eyes:      General: No scleral icterus.        Right eye: No discharge.         Left eye: No discharge.      Conjunctiva/sclera: Conjunctivae normal.   Neck:      Trachea: No tracheal deviation.   Pulmonary:      Effort: Pulmonary effort is normal.   Skin:     General: Skin is warm and dry.      Coloration: Skin is not pale.   Neurological:      Mental Status: He is alert and oriented to person, place, and time.   Psychiatric:         Behavior: Behavior normal.         Thought Content: Thought content normal.         Judgment: Judgment normal.      Osteopathic Structural Exam  Procedure Note for Osteopathic Manipulative Treatment    Pre-procedure diagnoses: Somatic dysfunctions as listed below.  Consent: Oral consent given for Osteopathic Treatment  Post-procedure diagnoses: same  Complications of procedure: none, patient tolerated procedure well    The evaluation including the history, physical exam and the management decisions, indicate than an appropriate intervention on this date of service is osteopathic manipulative treatment. Oral permission for the " osteopathic procedure was obtained. The following treatment methods and the responses for each body region are listed below.        Region Somatic Dysfunction Severity OMT technique Response      Sacral Sacrococcygeal compression Moderate Balanced ligamentous tension Improved   Pelvic Pelvic diaphragm rotated L Moderate Balanced ligamentous tension Improved       Result Review :                   Assessment and Plan   Diagnoses and all orders for this visit:    1. Coccydynia (Primary)    2. Somatic dysfunction of spine, sacral    3. Somatic dysfunction of pelvic region    OMT to balance autonomic tone, improve fascial symmetry and respiratory/circulatory/lymphatic compliance  OTC pain meds PRN  F/u PRN

## 2023-11-19 ENCOUNTER — HOSPITAL ENCOUNTER (EMERGENCY)
Facility: HOSPITAL | Age: 48
Discharge: HOME | End: 2023-11-19
Admitting: INTERNAL MEDICINE
Payer: COMMERCIAL

## 2023-11-19 ENCOUNTER — APPOINTMENT (OUTPATIENT)
Dept: CT IMAGING | Facility: HOSPITAL | Age: 48
End: 2023-11-19
Payer: COMMERCIAL

## 2023-11-19 VITALS
WEIGHT: 210 LBS | RESPIRATION RATE: 18 BRPM | SYSTOLIC BLOOD PRESSURE: 135 MMHG | HEIGHT: 72 IN | OXYGEN SATURATION: 96 % | HEART RATE: 71 BPM | BODY MASS INDEX: 28.44 KG/M2 | DIASTOLIC BLOOD PRESSURE: 82 MMHG | TEMPERATURE: 98 F

## 2023-11-19 DIAGNOSIS — G89.29 CHRONIC LOW BACK PAIN, UNSPECIFIED BACK PAIN LATERALITY, UNSPECIFIED WHETHER SCIATICA PRESENT: Primary | ICD-10-CM

## 2023-11-19 DIAGNOSIS — M54.50 CHRONIC LOW BACK PAIN, UNSPECIFIED BACK PAIN LATERALITY, UNSPECIFIED WHETHER SCIATICA PRESENT: Primary | ICD-10-CM

## 2023-11-19 PROCEDURE — 99284 EMERGENCY DEPT VISIT MOD MDM: CPT

## 2023-11-19 PROCEDURE — 72128 CT CHEST SPINE W/O DYE: CPT

## 2023-11-19 PROCEDURE — 72131 CT LUMBAR SPINE W/O DYE: CPT

## 2023-11-19 PROCEDURE — 25010000002 DEXAMETHASONE PER 1 MG

## 2023-11-19 PROCEDURE — 96372 THER/PROPH/DIAG INJ SC/IM: CPT

## 2023-11-19 PROCEDURE — 25010000002 KETOROLAC TROMETHAMINE PER 15 MG

## 2023-11-19 PROCEDURE — 25010000002 ORPHENADRINE CITRATE PER 60 MG

## 2023-11-19 RX ORDER — ORPHENADRINE CITRATE 30 MG/ML
60 INJECTION INTRAMUSCULAR; INTRAVENOUS ONCE
Status: COMPLETED | OUTPATIENT
Start: 2023-11-19 | End: 2023-11-19

## 2023-11-19 RX ORDER — METHOCARBAMOL 500 MG/1
500 TABLET, FILM COATED ORAL 4 TIMES DAILY PRN
Qty: 20 TABLET | Refills: 0 | Status: SHIPPED | OUTPATIENT
Start: 2023-11-19 | End: 2023-11-24

## 2023-11-19 RX ORDER — KETOROLAC TROMETHAMINE 30 MG/ML
30 INJECTION, SOLUTION INTRAMUSCULAR; INTRAVENOUS ONCE
Status: COMPLETED | OUTPATIENT
Start: 2023-11-19 | End: 2023-11-19

## 2023-11-19 RX ORDER — PREDNISONE 20 MG/1
20 TABLET ORAL 2 TIMES DAILY
Qty: 10 TABLET | Refills: 0 | Status: SHIPPED | OUTPATIENT
Start: 2023-11-19 | End: 2023-11-24

## 2023-11-19 RX ORDER — LIDOCAINE 50 MG/G
1 PATCH TOPICAL ONCE
Status: DISCONTINUED | OUTPATIENT
Start: 2023-11-19 | End: 2023-11-19 | Stop reason: HOSPADM

## 2023-11-19 RX ORDER — DEXAMETHASONE SODIUM PHOSPHATE 10 MG/ML
10 INJECTION INTRAMUSCULAR; INTRAVENOUS ONCE
Status: COMPLETED | OUTPATIENT
Start: 2023-11-19 | End: 2023-11-19

## 2023-11-19 RX ADMIN — DEXAMETHASONE SODIUM PHOSPHATE 10 MG: 10 INJECTION INTRAMUSCULAR; INTRAVENOUS at 11:45

## 2023-11-19 RX ADMIN — ORPHENADRINE CITRATE 60 MG: 60 INJECTION INTRAMUSCULAR; INTRAVENOUS at 11:43

## 2023-11-19 RX ADMIN — KETOROLAC TROMETHAMINE 30 MG: 30 INJECTION, SOLUTION INTRAMUSCULAR; INTRAVENOUS at 11:45

## 2023-11-19 RX ADMIN — LIDOCAINE PATCH 5% 1 PATCH: 700 PATCH TOPICAL at 11:39

## 2023-11-19 NOTE — DISCHARGE INSTRUCTIONS
It was very nice to meet you, Saluo. Thank you for allowing us to take care of you today at [facility].    Today you were seen in the emergency department for your symptoms. Please understand that an ER evaluation is just the start of your evaluation. We do the best we can, but we are often unable to fully find what is causing your symptoms from one evaluation.  Because of this, the goal is to determine whether you need to be evaluated in the hospital or if it is safe for you to go home and see other doctors provided such as primary care physicians or specialist on an outpatient basis.     Like we discussed, I strongly urge that you follow up with your primary care doctor. Please call their office to set up an appointment as soon as possible so that you can be re-evaluated for improvement in your symptoms or for any other questions.  I have provided the primary care provider information needed, including phone number, to call to set up an appointment below in these discharge papers.     Educational material has also been provided in the following pages regarding what we have discussed today.     MEDICATIONS PRESCRIBED: Steroids and muscle relaxer    Please return to the emergency room within 12-48 hours if you experience symptoms such as the following:   Fever, chills, chest pain or shortness of breath, pain with inspiration/expiration, pain that travels to your arms, neck or back, nausea, vomiting, severe headache, tearing pain in your chest, dizziness, feel as though you are about to pass out, OR if you have any worsening symptoms, or any other concerns.

## 2023-11-19 NOTE — Clinical Note
[facility] EMERGENCY DEPARTMENT  [address]  [facility] [address]  Phone: [phone]    Saulo Shepard was seen and treated in our emergency department on 11/19/2023.  He may return to work on 11/24/2023.         Thank you for choosing [facility].    Piter Parra, APRN
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
## 2023-11-29 DIAGNOSIS — Z12.11 ENCOUNTER FOR SCREENING FOR MALIGNANT NEOPLASM OF COLON: Primary | ICD-10-CM

## 2023-11-30 ENCOUNTER — TELEPHONE (OUTPATIENT)
Dept: FAMILY MEDICINE CLINIC | Facility: CLINIC | Age: 48
End: 2023-11-30

## 2023-11-30 NOTE — TELEPHONE ENCOUNTER
Caller: Saulo Shepard    Relationship to patient: Self    Best call back number: [phone]     Chief complaint: 6 MONTH FOLLOW-UP    Type of visit: OFFICE VISIT     Requested date: AFTERNOON ON 12.22.23     If rescheduling, when is the original appointment: 11.30.23     Additional notes:    HUB NO AVAILABILITY ON REQUESTED DATE. PLEASE CONTACT PATIENT FOR SCHEDULING.

## 2023-12-06 ENCOUNTER — OFFICE VISIT (OUTPATIENT)
Dept: SURGERY | Facility: CLINIC | Age: 48
End: 2023-12-06
Payer: COMMERCIAL

## 2023-12-06 ENCOUNTER — OFFICE VISIT (OUTPATIENT)
Dept: FAMILY MEDICINE CLINIC | Facility: CLINIC | Age: 48
End: 2023-12-06
Payer: COMMERCIAL

## 2023-12-06 VITALS
BODY MASS INDEX: 28.85 KG/M2 | OXYGEN SATURATION: 95 % | DIASTOLIC BLOOD PRESSURE: 82 MMHG | SYSTOLIC BLOOD PRESSURE: 123 MMHG | WEIGHT: 213 LBS | HEIGHT: 72 IN | HEART RATE: 89 BPM

## 2023-12-06 VITALS
SYSTOLIC BLOOD PRESSURE: 116 MMHG | BODY MASS INDEX: 28.85 KG/M2 | WEIGHT: 213 LBS | OXYGEN SATURATION: 98 % | DIASTOLIC BLOOD PRESSURE: 78 MMHG | HEART RATE: 83 BPM | TEMPERATURE: 97.7 F | HEIGHT: 72 IN

## 2023-12-06 DIAGNOSIS — Z98.890 S/P BILATERAL INGUINAL HERNIA REPAIR: Primary | ICD-10-CM

## 2023-12-06 DIAGNOSIS — G25.81 RLS (RESTLESS LEGS SYNDROME): Primary | ICD-10-CM

## 2023-12-06 DIAGNOSIS — Z87.19 S/P BILATERAL INGUINAL HERNIA REPAIR: Primary | ICD-10-CM

## 2023-12-06 RX ORDER — ROPINIROLE 0.25 MG/1
0.25 TABLET, FILM COATED ORAL 3 TIMES DAILY
Qty: 90 TABLET | Refills: 2 | Status: SHIPPED | OUTPATIENT
Start: 2023-12-06

## 2023-12-07 NOTE — PATIENT INSTRUCTIONS

## 2023-12-07 NOTE — PROGRESS NOTES
Office Established Patient Note:     Referring Provider: No ref. provider found    No chief complaint on file.      Subjective .     History of present illness:  Saulo Shepard is a 48 y.o. male s/p robotic bilateral inguinal hernia repair with mesh on 8/22/23 who developed a post op seroma. He returns in follow up today and reports that the seroma is resolved. No pain. No bulges.     History  Past Medical History:   Diagnosis Date    Anxiety     Back pain     Fracture of lumbar spine 2002 and 2015    lower lumbar 2002; L1 2015    Head injury     Injury of back     Nausea with vomiting 03/28/2023    Neck pain     Overweight (BMI 25.0-29.9)    ,   Past Surgical History:   Procedure Laterality Date    COLONOSCOPY N/A 8/31/2023    Procedure: COLONOSCOPY WITH ANESTHESIA;  Surgeon: Kristopher Brush DO;  Location:  [facility];  Service: Gastroenterology;  Laterality: N/A;  preop; screening   postop  PCP Joseph Stewart    COLONOSCOPY N/A 9/1/2023    Procedure: COLONOSCOPY WITH ANESTHESIA;  Surgeon: Kristopher Brush DO;  Location:  [facility];  Service: Gastroenterology;  Laterality: N/A;  Pre: Encounter for screening for malignant neoplasm of colon;  Post: Inadequate prep;  Vivek Stewart DO    FACIAL FRACTURE SURGERY      INCISION AND DRAINAGE LEG Right 12/1/2018    Procedure: INCISION AND DRAINAGE OF RIGHT UPPER INNER THIGH, PLACEMENT OF WOUND VAC;  Surgeon: René Dallas MD;  Location: [facility] OR;  Service: General    INGUINAL HERNIA REPAIR Bilateral 8/22/2023    Procedure: INGUINAL HERNIA BILATERAL REPAIR LAPAROSCOPIC WITH DAVINCI ROBOT WITH MESH;  Surgeon: Dina Boyd MD;  Location:  [facility] OR;  Service: Robotics - DaVinci;  Laterality: Bilateral;    KNEE SURGERY Right 09/2021    SPLENECTOMY     ,   Family History   Problem Relation Age of Onset    Fibromyalgia Mother     Lung cancer Father     Colon cancer Neg Hx     Colon polyps Neg Hx     Esophageal cancer Neg Hx    ,   Social  "History     Tobacco Use    Smoking status: Never    Smokeless tobacco: Never   Vaping Use    Vaping Use: Never used   Substance Use Topics    Alcohol use: Not Currently     Comment: occ.    Drug use: No   , (Not in a hospital admission)   and Allergies:  Ct contrast    Current Outpatient Medications:     acetaminophen (Tylenol) 325 MG tablet, Take 3 tablets by mouth Every 8 (Eight) Hours. Take every 8 hours for 3 days then take prn as needed., Disp: 100 tablet, Rfl: 2    fluticasone (FLONASE) 50 MCG/ACT nasal spray, 2 sprays into the nostril(s) as directed by provider Daily. (Patient taking differently: 2 sprays into the nostril(s) as directed by provider Daily As Needed.), Disp: 9.9 mL, Rfl: 0    HYDROcodone-acetaminophen (NORCO) 5-325 MG per tablet, Take 1 tablet by mouth Every 4 (Four) Hours As Needed for Moderate Pain., Disp: 15 tablet, Rfl: 0    ibuprofen (ADVIL,MOTRIN) 800 MG tablet, Take 1 tablet by mouth Every 8 (Eight) Hours As Needed for Mild Pain for up to 90 doses. Take every 8 hours for three days then take as needed., Disp: 90 tablet, Rfl: 0    naloxone (NARCAN) 4 MG/0.1ML nasal spray, Call 911. Don't prime. [place] in 1 nostril for overdose. Repeat in 2-3 minutes in other nostril if no or minimal breathing/responsiveness., Disp: 2 each, Rfl: 0    rOPINIRole (REQUIP) 0.25 MG tablet, Take 1 tablet by mouth 3 (Three) Times a Day. Take 1 hour before bedtime., Disp: 90 tablet, Rfl: 2    Objective     Vital Signs   /82 (BP Location: Right arm, Patient Position: Sitting, Cuff Size: Adult)   Pulse 89   Ht 182.9 cm (72\")   Wt 96.6 kg (213 lb)   SpO2 95%   BMI 28.89 kg/m²      Physical Exam:  General appearance - alert, well appearing, and in no distress  Mental status - alert, oriented to person, place, and time  Eyes - pupils equal and reactive, extraocular eye movements intact  Neck - supple, no significant adenopathy  Chest - no tachypnea, retractions or cyanosis  Heart - normal rate and regular " rhythm  Abdomen - soft, nontender, nondistended, no masses or organomegaly  No hernia recurrence on exam. Incisions well healed.   Neurological - alert, oriented, normal speech, no focal findings or movement disorder noted    Results Review:     The following data was reviewed by: Dina Boyd MD on 12/06/2023:  None     Assessment & Plan       Diagnoses and all orders for this visit:    1. S/P bilateral inguinal hernia repair (Primary)       Mr. Shepard is s/p bilateral robotic inguinal hernia repair with mesh. No evidence of recurrence on exam. Seroma resolved. Follow up as needed. Call with questions or concerns.        Dina Boyd MD  12/06/23  21:16 CST

## 2023-12-18 ENCOUNTER — OFFICE VISIT (OUTPATIENT)
Dept: FAMILY MEDICINE CLINIC | Facility: CLINIC | Age: 48
End: 2023-12-18
Payer: COMMERCIAL

## 2023-12-18 VITALS
RESPIRATION RATE: 18 BRPM | SYSTOLIC BLOOD PRESSURE: 132 MMHG | HEART RATE: 88 BPM | DIASTOLIC BLOOD PRESSURE: 82 MMHG | OXYGEN SATURATION: 97 % | HEIGHT: 72 IN | BODY MASS INDEX: 28.71 KG/M2 | TEMPERATURE: 97.7 F | WEIGHT: 212 LBS

## 2023-12-18 DIAGNOSIS — M25.511 ACUTE PAIN OF RIGHT SHOULDER: Primary | ICD-10-CM

## 2023-12-18 PROCEDURE — 99213 OFFICE O/P EST LOW 20 MIN: CPT | Performed by: NURSE PRACTITIONER

## 2023-12-18 NOTE — PROGRESS NOTES
KYLER Thorpe  [facility]   Family Medicine  [address]  [place] [address]  Phone: [phone]  Fax: [phone]         Chief Complaint:  Chief Complaint   Patient presents with    Shoulder Pain     Right shoulder pain started this morning        History:  Saulo Shepard is a 48 y.o. male that is an established patient. He  is here for evaluation of the above complaint.    HPI     Saulo is here in return, having more right shoulder pain today. He originally injured his shoulder at work, was seen in our office on 10-, Xray was negative and he did well with Ibuprofen after that. The injury involved him holding onto a piece of equipment and was pulled back suddenly.     He started having pain again this morning, sharp pain when raising the arm/shoulder laterally. ROM is decreased; no swelling, erythema.            ROS:  Review of Systems   Constitutional: Negative.    HENT: Negative.     Respiratory: Negative.     Cardiovascular: Negative.    Gastrointestinal: Negative.    Genitourinary: Negative.    Musculoskeletal:         Right shoulder pain         reports that he has never smoked. He has never used smokeless tobacco. He reports that he does not currently use alcohol. He reports that he does not use drugs.    Current Outpatient Medications   Medication Instructions    acetaminophen (TYLENOL) 975 mg, Oral, Every 8 Hours, Take every 8 hours for 3 days then take prn as needed.    fluticasone (FLONASE) 50 MCG/ACT nasal spray 2 sprays, Nasal, Daily    HYDROcodone-acetaminophen (NORCO) 5-325 MG per tablet 1 tablet, Oral, Every 4 Hours PRN    ibuprofen (ADVIL,MOTRIN) 800 mg, Oral, Every 8 Hours PRN, Take every 8 hours for three days then take as needed.    naloxone (NARCAN) 4 MG/0.1ML nasal spray Call 911. Don't prime. [place] in 1 nostril for overdose. Repeat in 2-3 minutes in other nostril if no or minimal breathing/responsiveness.    rOPINIRole (REQUIP) 0.25 mg, Oral, 3  "Times Daily, Take 1 hour before bedtime.       OBJECTIVE:  /82   Pulse 88   Temp 97.7 °F (36.5 °C) (Temporal)   Resp 18   Ht 182.9 cm (72\")   Wt 96.2 kg (212 lb)   SpO2 97%   BMI 28.75 kg/m²    Physical Exam  Vitals and nursing note reviewed.   Constitutional:       Appearance: Normal appearance.   Neurological:      Mental Status: He is alert.         Procedures    Assessment/Plan:     Diagnoses and all orders for this visit:    1. Acute pain of right shoulder (Primary)  -     Ambulatory Referral to Orthopedic Surgery  -     Ambulatory Referral to Physical Therapy Evaluate and treat      BMI is >= 25 and <30. (Overweight) The following options were offered after discussion;: exercise counseling/recommendations and nutrition counseling/recommendations       An After Visit Summary was printed and given to the patient at discharge.  Return for Next scheduled follow up.       Patient Instructions   Refer to physical therapy  Refer to orthopedics       Discussion:     I spent 25 minutes caring for Sauol on this date of service. This time includes time spent by me in the following activities: preparing for the visit, reviewing tests, obtaining and/or reviewing a separately obtained history, performing a medically appropriate examination and/or evaluation, counseling and educating the patient/family/caregiver, referring and communicating with other health care professionals, and documenting information in the medical record     Angelica CHÁVEZ 12/18/2023   Electronically signed.  "

## 2023-12-21 ENCOUNTER — TELEPHONE (OUTPATIENT)
Dept: GASTROENTEROLOGY | Facility: CLINIC | Age: 48
End: 2023-12-21
Payer: COMMERCIAL

## 2023-12-26 NOTE — PROGRESS NOTES
"Chief Complaint  Follow-up (Pt states doing ok, right leg is shaking at random times )    Subjective        Saulo Shepard presents to [facility]  History of Present Illness  When he stries to rest he feels like his R leg shakes, twitches, prevents him from relaxing. No recent injury    Objective   Vital Signs:  /78   Pulse 83   Temp 97.7 °F (36.5 °C)   Ht 182.9 cm (72\")   Wt 96.6 kg (213 lb)   SpO2 98%   BMI 28.89 kg/m²   Estimated body mass index is 28.89 kg/m² as calculated from the following:    Height as of this encounter: 182.9 cm (72\").    Weight as of this encounter: 96.6 kg (213 lb).               Physical Exam  Vitals and nursing note reviewed.   Constitutional:       General: He is not in acute distress.     Appearance: He is not diaphoretic.   HENT:      Head: Normocephalic and atraumatic.      Nose: Nose normal.   Eyes:      General: No scleral icterus.        Right eye: No discharge.         Left eye: No discharge.      Conjunctiva/sclera: Conjunctivae normal.   Neck:      Trachea: No tracheal deviation.   Pulmonary:      Effort: Pulmonary effort is normal.   Skin:     General: Skin is warm and dry.      Coloration: Skin is not pale.   Neurological:      Mental Status: He is alert and oriented to person, place, and time.   Psychiatric:         Behavior: Behavior normal.         Thought Content: Thought content normal.         Judgment: Judgment normal.        Result Review :                   Assessment and Plan   Diagnoses and all orders for this visit:    1. RLS (restless legs syndrome) (Primary)  -     rOPINIRole (REQUIP) 0.25 MG tablet; Take 1 tablet by mouth 3 (Three) Times a Day. Take 1 hour before bedtime.  Dispense: 90 tablet; Refill: 2             Follow Up   Return if symptoms worsen or fail to improve.  Patient was given instructions and counseling regarding his condition or for health maintenance advice. Please see specific information pulled " into the AVS if appropriate.

## 2024-01-29 ENCOUNTER — APPOINTMENT (OUTPATIENT)
Dept: CT IMAGING | Facility: HOSPITAL | Age: 49
End: 2024-01-29
Payer: COMMERCIAL

## 2024-01-29 ENCOUNTER — APPOINTMENT (OUTPATIENT)
Dept: GENERAL RADIOLOGY | Facility: HOSPITAL | Age: 49
End: 2024-01-29
Payer: COMMERCIAL

## 2024-01-29 ENCOUNTER — HOSPITAL ENCOUNTER (EMERGENCY)
Facility: HOSPITAL | Age: 49
Discharge: HOME | End: 2024-01-29
Admitting: EMERGENCY MEDICINE
Payer: COMMERCIAL

## 2024-01-29 VITALS
HEIGHT: 73 IN | DIASTOLIC BLOOD PRESSURE: 76 MMHG | HEART RATE: 67 BPM | TEMPERATURE: 98.3 F | SYSTOLIC BLOOD PRESSURE: 123 MMHG | OXYGEN SATURATION: 98 % | RESPIRATION RATE: 16 BRPM | BODY MASS INDEX: 28.03 KG/M2 | WEIGHT: 211.5 LBS

## 2024-01-29 DIAGNOSIS — R10.32 LEFT LOWER QUADRANT ABDOMINAL PAIN: ICD-10-CM
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
## 2024-01-29 LAB
ALBUMIN SERPL-MCNC: 3.5 G/DL (ref 3.5–5.2)
ALBUMIN/GLOB SERPL: 1.5 G/DL
ALP SERPL-CCNC: 94 U/L (ref 39–117)
ALT SERPL W P-5'-P-CCNC: 19 U/L (ref 1–41)
ANION GAP SERPL CALCULATED.3IONS-SCNC: 7 MMOL/L (ref 5–15)
AST SERPL-CCNC: 16 U/L (ref 1–40)
BASOPHILS # BLD AUTO: 0.13 10*3/MM3 (ref 0–0.2)
BASOPHILS NFR BLD AUTO: 0.8 % (ref 0–1.5)
BILIRUB SERPL-MCNC: 0.2 MG/DL (ref 0–1.2)
BUN SERPL-MCNC: 10 MG/DL (ref 6–20)
BUN/CREAT SERPL: 9.2 (ref 7–25)
CALCIUM SPEC-SCNC: 10.2 MG/DL (ref 8.6–10.5)
CHLORIDE SERPL-SCNC: 108 MMOL/L (ref 98–107)
CO2 SERPL-SCNC: 25 MMOL/L (ref 22–29)
CREAT SERPL-MCNC: 1.09 MG/DL (ref 0.76–1.27)
DEPRECATED RDW RBC AUTO: 49.2 FL (ref 37–54)
EGFRCR SERPLBLD CKD-EPI 2021: 84.2 ML/MIN/1.73
EOSINOPHIL # BLD AUTO: 0.3 10*3/MM3 (ref 0–0.4)
EOSINOPHIL NFR BLD AUTO: 2 % (ref 0.3–6.2)
ERYTHROCYTE [DISTWIDTH] IN BLOOD BY AUTOMATED COUNT: 15.1 % (ref 12.3–15.4)
GLOBULIN UR ELPH-MCNC: 2.3 GM/DL
GLUCOSE SERPL-MCNC: 98 MG/DL (ref 65–99)
HCT VFR BLD AUTO: 46.2 % (ref 37.5–51)
HGB BLD-MCNC: 15.4 G/DL (ref 13–17.7)
IMM GRANULOCYTES # BLD AUTO: 0.05 10*3/MM3 (ref 0–0.05)
IMM GRANULOCYTES NFR BLD AUTO: 0.3 % (ref 0–0.5)
LYMPHOCYTES # BLD AUTO: 5.58 10*3/MM3 (ref 0.7–3.1)
LYMPHOCYTES NFR BLD AUTO: 36.5 % (ref 19.6–45.3)
MCH RBC QN AUTO: 29.5 PG (ref 26.6–33)
MCHC RBC AUTO-ENTMCNC: 33.3 G/DL (ref 31.5–35.7)
MCV RBC AUTO: 88.5 FL (ref 79–97)
MONOCYTES # BLD AUTO: 1.67 10*3/MM3 (ref 0.1–0.9)
MONOCYTES NFR BLD AUTO: 10.9 % (ref 5–12)
NEUTROPHILS NFR BLD AUTO: 49.5 % (ref 42.7–76)
NEUTROPHILS NFR BLD AUTO: 7.57 10*3/MM3 (ref 1.7–7)
NRBC BLD AUTO-RTO: 0 /100 WBC (ref 0–0.2)
PLATELET # BLD AUTO: 323 10*3/MM3 (ref 140–450)
PMV BLD AUTO: 10.6 FL (ref 6–12)
POTASSIUM SERPL-SCNC: 4.1 MMOL/L (ref 3.5–5.2)
PROT SERPL-MCNC: 5.8 G/DL (ref 6–8.5)
RBC # BLD AUTO: 5.22 10*6/MM3 (ref 4.14–5.8)
SODIUM SERPL-SCNC: 140 MMOL/L (ref 136–145)
WBC NRBC COR # BLD AUTO: 15.3 10*3/MM3 (ref 3.4–10.8)

## 2024-01-29 PROCEDURE — 96375 TX/PRO/DX INJ NEW DRUG ADDON: CPT

## 2024-01-29 PROCEDURE — 25010000002 DIPHENHYDRAMINE PER 50 MG

## 2024-01-29 PROCEDURE — 85025 COMPLETE CBC W/AUTO DIFF WBC: CPT

## 2024-01-29 PROCEDURE — 74177 CT ABD & PELVIS W/CONTRAST: CPT

## 2024-01-29 PROCEDURE — 99285 EMERGENCY DEPT VISIT HI MDM: CPT

## 2024-01-29 PROCEDURE — 25010000002 ONDANSETRON PER 1 MG

## 2024-01-29 PROCEDURE — 73502 X-RAY EXAM HIP UNI 2-3 VIEWS: CPT

## 2024-01-29 PROCEDURE — 96376 TX/PRO/DX INJ SAME DRUG ADON: CPT

## 2024-01-29 PROCEDURE — 96374 THER/PROPH/DIAG INJ IV PUSH: CPT

## 2024-01-29 PROCEDURE — 80053 COMPREHEN METABOLIC PANEL: CPT

## 2024-01-29 PROCEDURE — 25010000002 METHYLPREDNISOLONE PER 40 MG

## 2024-01-29 PROCEDURE — 25510000001 IOPAMIDOL 61 % SOLUTION

## 2024-01-29 RX ORDER — DIPHENHYDRAMINE HYDROCHLORIDE 50 MG/ML
12.5 INJECTION INTRAMUSCULAR; INTRAVENOUS ONCE
Status: COMPLETED | OUTPATIENT
Start: 2024-01-29 | End: 2024-01-29

## 2024-01-29 RX ORDER — ONDANSETRON 2 MG/ML
4 INJECTION INTRAMUSCULAR; INTRAVENOUS ONCE
Status: COMPLETED | OUTPATIENT
Start: 2024-01-29 | End: 2024-01-29

## 2024-01-29 RX ORDER — METHYLPREDNISOLONE SODIUM SUCCINATE 40 MG/ML
40 INJECTION, POWDER, LYOPHILIZED, FOR SOLUTION INTRAMUSCULAR; INTRAVENOUS ONCE
Status: COMPLETED | OUTPATIENT
Start: 2024-01-29 | End: 2024-01-29

## 2024-01-29 RX ADMIN — IOPAMIDOL 100 ML: 612 INJECTION, SOLUTION INTRAVENOUS at 21:12

## 2024-01-29 RX ADMIN — DIPHENHYDRAMINE HYDROCHLORIDE 12.5 MG: 50 INJECTION, SOLUTION INTRAMUSCULAR; INTRAVENOUS at 21:27

## 2024-01-29 RX ADMIN — ONDANSETRON 4 MG: 2 INJECTION INTRAMUSCULAR; INTRAVENOUS at 20:41

## 2024-01-29 RX ADMIN — METHYLPREDNISOLONE SODIUM SUCCINATE 40 MG: 40 INJECTION, POWDER, FOR SOLUTION INTRAMUSCULAR; INTRAVENOUS at 20:33

## 2024-01-29 RX ADMIN — DIPHENHYDRAMINE HYDROCHLORIDE 12.5 MG: 50 INJECTION, SOLUTION INTRAMUSCULAR; INTRAVENOUS at 20:33

## 2024-01-30 NOTE — ED PROVIDER NOTES
Subjective   History of Present Illness  Patient is a 48-year-old male who presents emergency department after a fall.  He states that he went to eat and he tripped over a skateboard and landed on his left hip.  States that he landed on a step.  He is also having left lower quadrant abdominal pain and is tender on exam.  No bruising present.  No bruising present on his left hip.  He does have full range of motion and is able to walk.  Denies any numbness or tingling in his lower extremities.  He also reports that he feels nauseated.        Review of Systems   Gastrointestinal:  Positive for abdominal pain and nausea.   Musculoskeletal:  Positive for arthralgias.   All other systems reviewed and are negative.      Past Medical History:   Diagnosis Date    Anxiety     Back pain     Fracture of lumbar spine 2002 and 2015    lower lumbar 2002; L1 2015    Head injury     Injury of back     Nausea with vomiting 03/28/2023    Neck pain     Overweight (BMI 25.0-29.9)     Restless leg syndrome        Allergies   Allergen Reactions    Ct Contrast Itching       Past Surgical History:   Procedure Laterality Date    COLONOSCOPY N/A 8/31/2023    Procedure: COLONOSCOPY WITH ANESTHESIA;  Surgeon: Kristopher Brush DO;  Location: [facility];  Service: Gastroenterology;  Laterality: N/A;  preop; screening   postop  PCP Joseph Stewart    COLONOSCOPY N/A 9/1/2023    Procedure: COLONOSCOPY WITH ANESTHESIA;  Surgeon: Kristopher Brush DO;  Location: [facility] ENDOSCOPY;  Service: Gastroenterology;  Laterality: N/A;  Pre: Encounter for screening for malignant neoplasm of colon;  Post: Inadequate prep;  Vivek Stewart DO    FACIAL FRACTURE SURGERY      INCISION AND DRAINAGE LEG Right 12/1/2018    Procedure: INCISION AND DRAINAGE OF RIGHT UPPER INNER THIGH, PLACEMENT OF WOUND VAC;  Surgeon: René Dallas MD;  Location: [facility] OR;  Service: General    INGUINAL HERNIA REPAIR Bilateral 8/22/2023    Procedure: INGUINAL HERNIA  BILATERAL REPAIR LAPAROSCOPIC WITH DAVINCI ROBOT WITH MESH;  Surgeon: Dina Boyd MD;  Location: [facility] OR;  Service: Robotics - DaVinci;  Laterality: Bilateral;    KNEE SURGERY Right 09/2021    SPLENECTOMY         Family History   Problem Relation Age of Onset    Fibromyalgia Mother     Lung cancer Father     Colon cancer Neg Hx     Colon polyps Neg Hx     Esophageal cancer Neg Hx        Social History     Socioeconomic History    Marital status: Single   Tobacco Use    Smoking status: Never    Smokeless tobacco: Never   Vaping Use    Vaping Use: Never used   Substance and Sexual Activity    Alcohol use: Not Currently    Drug use: No    Sexual activity: Yes     Partners: Female           Objective   Physical Exam  Vitals and nursing note reviewed.   Constitutional:       General: He is not in acute distress.     Appearance: Normal appearance. He is normal weight. He is not ill-appearing or toxic-appearing.   HENT:      Head: Normocephalic.   Cardiovascular:      Rate and Rhythm: Normal rate and regular rhythm.      Pulses: Normal pulses.      Heart sounds: Normal heart sounds.   Pulmonary:      Effort: Pulmonary effort is normal.      Breath sounds: Normal breath sounds.   Abdominal:      General: Abdomen is flat. Bowel sounds are normal. There is no distension.      Palpations: Abdomen is soft.      Tenderness: There is abdominal tenderness (Left lower quadrant).   Musculoskeletal:         General: No swelling, tenderness or deformity. Normal range of motion.      Cervical back: Normal range of motion and neck supple.      Right lower leg: No edema.      Left lower leg: No edema.   Skin:     General: Skin is warm and dry.      Findings: No bruising or erythema.   Neurological:      General: No focal deficit present.      Mental Status: He is alert and oriented to person, place, and time. Mental status is at baseline.   Psychiatric:         Mood and Affect: Mood normal.         Behavior: Behavior normal.          Thought Content: Thought content normal.         Judgment: Judgment normal.         Procedures           ED Course                                             Medical Decision Making  Patient is a 48-year-old male who presents emergency department after a fall.  He states that he went to eat and he tripped over a skateboard and landed on his left hip.  States that he landed on a step.  He is also having left lower quadrant abdominal pain and is tender on exam.  No bruising present.  No bruising present on his left hip.  He does have full range of motion and is able to walk.  Denies any numbness or tingling in his lower extremities.  He also reports that he feels nauseated.    Patient was non-toxic and not-ill appearing on arrival. Vital signs stable.     Patient's presentation raises suspicion for differentials including, but not limited to, hip fracture, internal bleeding, dislocation.     External (non-ED) record review: None    Given this, Saulo was placed on the monitor. Laboratory studies and imaging studies were ordered including CBC, CMP, x-ray left hip, CT abdomen pelvis with contrast.     Saulo was given IV Benadryl and IV Solu-Medrol for contrast allergy.  Patient was also given IV Zofran in the ER.    Imaging was reviewed by radiologist.  X-ray left hip revealed no acute findings.  CT abdomen pelvis revealed No acute findings in the abdomen/pelvis. Jejunum is located within the right upper quadrant, previously left upper quadrant without evidence of obstruction or secondary signs of ischemia. This is most likely an incidental finding. Nonobstructing left nephrolithiasis.    Labs were reviewed.  Leukocytosis on CBC, could likely be reactive from fall.  CMP unremarkable.  Patient denied any urinary symptoms.  No report of fevers at home.  On re-evaluation, patient remained hemodynamically stable and appeared to be comfortable and in no acute distress.    At this point, after reviewing the workup, I  have a low suspicion for hip fracture based on x-ray findings.  Patient was able to walk.  Low suspicion for any internal bleeding based on CT results.  There was no hematoma or bruising present to the abdomen.    I discussed all of the lab and imaging results with the patient during this visit in the emergency department. I answered all the questions regarding the emergency department evaluation, diagnosis, and treatment plan. We talked about how crucial it is for the patient to follow up by calling their primary care provider as soon as possible to schedule an appointment for within the next few days or as soon as possible so that the symptoms can be reassessed to see if they have improved or to answer any additional questions. I also provided the patient with advice on returning safely and urged the patient to visit the emergency department right away if any worsening or new symptoms appeared. The patient verbalized understanding of the discharge instructions and agreed with them. Saulo was discharged in stable condition.    Signed by:   KYLER Kim 1/29/2024 23:56 CST     Dragon disclaimer:  Part of this note may be an electronic transcription/translation of spoken language to printed text using the Dragon Dictation System.    Problems Addressed:  Fall, initial encounter: complicated acute illness or injury  Left lower quadrant abdominal pain: complicated acute illness or injury    Amount and/or Complexity of Data Reviewed  Labs: ordered.  Radiology: ordered.    Risk  Prescription drug management.        Final diagnoses:   Fall, initial encounter   Left lower quadrant abdominal pain       ED Disposition  ED Disposition       ED Disposition   Discharge    Condition   Stable    Comment   --               Vivek Stewart DO  [address] [address]  [address]  [facility] [address]  [phone]    Schedule an appointment as soon as possible for a visit in 1 day      [facility] EMERGENCY  DEPARTMENT  [address] [place] [address] Nancy  [facility] [address] [phone]  Go to   If symptoms worsen         Medication List        Changed      fluticasone 50 MCG/ACT nasal spray  Commonly known as: FLONASE  2 sprays into the nostril(s) as directed by provider Daily.  What changed:   when to take this  reasons to take this                 Angella Sarvaia, KYLER  01/29/24 [id number]

## 2024-02-01 ENCOUNTER — APPOINTMENT (OUTPATIENT)
Dept: GENERAL RADIOLOGY | Facility: HOSPITAL | Age: 49
End: 2024-02-01
Payer: COMMERCIAL

## 2024-02-01 ENCOUNTER — HOSPITAL ENCOUNTER (EMERGENCY)
Facility: HOSPITAL | Age: 49
Discharge: HOME | End: 2024-02-01
Payer: COMMERCIAL

## 2024-02-01 VITALS
HEIGHT: 73 IN | SYSTOLIC BLOOD PRESSURE: 132 MMHG | DIASTOLIC BLOOD PRESSURE: 78 MMHG | OXYGEN SATURATION: 100 % | HEART RATE: 82 BPM | WEIGHT: 208 LBS | TEMPERATURE: 98.1 F | BODY MASS INDEX: 27.57 KG/M2 | RESPIRATION RATE: 18 BRPM

## 2024-02-01 DIAGNOSIS — S86.911A KNEE STRAIN, RIGHT, INITIAL ENCOUNTER: Primary | ICD-10-CM

## 2024-02-01 PROCEDURE — 73590 X-RAY EXAM OF LOWER LEG: CPT

## 2024-02-01 PROCEDURE — 73562 X-RAY EXAM OF KNEE 3: CPT

## 2024-02-01 PROCEDURE — 99283 EMERGENCY DEPT VISIT LOW MDM: CPT

## 2024-02-01 RX ORDER — KETOROLAC TROMETHAMINE 10 MG/1
10 TABLET, FILM COATED ORAL EVERY 6 HOURS PRN
Qty: 15 TABLET | Refills: 0 | Status: SHIPPED | OUTPATIENT
Start: 2024-02-01 | End: 2024-02-05 | Stop reason: SDUPTHER

## 2024-02-02 NOTE — ED PROVIDER NOTES
Subjective   History of Present Illness  Patient is a 48-year-old male who presents to the ER with complaints of right knee and right lower leg pain.  Patient states he was working outside on his vehicle.  He states he turned quickly and noted a pop to the right knee.  He states he has previous history of right fibula fracture.  He complains of pain with walking to the right knee and the right lower leg.  Due to symptoms described he came to the ER for evaluation and treatment.  Past medical history significant for anxiety, restless leg syndrome, fracture of lumbar spine, neck pain, hx of fibula fracture        Review of Systems   Constitutional: Negative.  Negative for fever.   HENT: Negative.  Negative for congestion.    Eyes: Negative.    Respiratory: Negative.  Negative for cough and shortness of breath.    Cardiovascular: Negative.  Negative for chest pain.   Gastrointestinal: Negative.  Negative for abdominal pain, constipation, diarrhea, nausea and vomiting.   Genitourinary: Negative.  Negative for dysuria.   Musculoskeletal:         Positive for right knee and right lower leg pain   Skin: Negative.  Negative for wound.   All other systems reviewed and are negative.      Past Medical History:   Diagnosis Date    Anxiety     Back pain     Fracture of lumbar spine 2002 and 2015    lower lumbar 2002; L1 2015    Head injury     Injury of back     Nausea with vomiting 03/28/2023    Neck pain     Overweight (BMI 25.0-29.9)     Restless leg syndrome        Allergies   Allergen Reactions    Ct Contrast Itching       Past Surgical History:   Procedure Laterality Date    COLONOSCOPY N/A 8/31/2023    Procedure: COLONOSCOPY WITH ANESTHESIA;  Surgeon: Kristopher Brush DO;  Location: [facility] ENDOSCOPY;  Service: Gastroenterology;  Laterality: N/A;  preop; screening   postop  PCP Joseph Stewart    COLONOSCOPY N/A 9/1/2023    Procedure: COLONOSCOPY WITH ANESTHESIA;  Surgeon: Kristopher Brush DO;  Location: [facility]  ENDOSCOPY;  Service: Gastroenterology;  Laterality: N/A;  Pre: Encounter for screening for malignant neoplasm of colon;  Post: Inadequate prep;  Vivek Stewart DO    FACIAL FRACTURE SURGERY      INCISION AND DRAINAGE LEG Right 12/1/2018    Procedure: INCISION AND DRAINAGE OF RIGHT UPPER INNER THIGH, PLACEMENT OF WOUND VAC;  Surgeon: René Dallas MD;  Location:  [facility] OR;  Service: General    INGUINAL HERNIA REPAIR Bilateral 8/22/2023    Procedure: INGUINAL HERNIA BILATERAL REPAIR LAPAROSCOPIC WITH DAVINCI ROBOT WITH MESH;  Surgeon: Dina Boyd MD;  Location:  [facility] OR;  Service: Robotics - DaVinci;  Laterality: Bilateral;    KNEE SURGERY Right 09/2021    SPLENECTOMY         Family History   Problem Relation Age of Onset    Fibromyalgia Mother     Lung cancer Father     Colon cancer Neg Hx     Colon polyps Neg Hx     Esophageal cancer Neg Hx        Social History     Socioeconomic History    Marital status: Single   Tobacco Use    Smoking status: Never    Smokeless tobacco: Never   Vaping Use    Vaping Use: Never used   Substance and Sexual Activity    Alcohol use: Not Currently    Drug use: No    Sexual activity: Yes     Partners: Female           Objective   Physical Exam  Vitals and nursing note reviewed.   Constitutional:       General: He is not in acute distress.     Appearance: Normal appearance. He is well-developed. He is not diaphoretic.   HENT:      Head: Normocephalic and atraumatic.      Right Ear: External ear normal.      Left Ear: External ear normal.      Nose: Nose normal.      Mouth/Throat:      Pharynx: Oropharynx is clear.   Eyes:      General: No scleral icterus.     Extraocular Movements: Extraocular movements intact.      Conjunctiva/sclera: Conjunctivae normal.      Pupils: Pupils are equal, round, and reactive to light.   Neck:      Thyroid: No thyromegaly.      Vascular: No JVD.   Cardiovascular:      Rate and Rhythm: Normal rate and regular rhythm.      Heart sounds:  Normal heart sounds. No murmur heard.  Pulmonary:      Effort: Pulmonary effort is normal. No respiratory distress.      Breath sounds: Normal breath sounds. No wheezing or rales.   Chest:      Chest wall: No tenderness.   Abdominal:      General: Bowel sounds are normal. There is no distension.      Palpations: Abdomen is soft. There is no mass.      Tenderness: There is no abdominal tenderness. There is no guarding or rebound.   Musculoskeletal:         General: Tenderness and signs of injury present.      Cervical back: Normal range of motion and neck supple.      Comments: Pain to palpation to the anterior aspect of the right knee and right tib-fib region, pulses palpable bilaterally, no obvious dislocation noted, patient is neurologically neurovascularly intact   Lymphadenopathy:      Cervical: No cervical adenopathy.   Skin:     General: Skin is warm and dry.      Capillary Refill: Capillary refill takes less than 2 seconds.      Coloration: Skin is not pale.      Findings: No erythema or rash.   Neurological:      Mental Status: He is alert and oriented to person, place, and time.      Cranial Nerves: No cranial nerve deficit.      Coordination: Coordination normal.      Deep Tendon Reflexes: Reflexes are normal and symmetric.   Psychiatric:         Mood and Affect: Mood normal.         Behavior: Behavior normal.         Thought Content: Thought content normal.         Judgment: Judgment normal.         Procedures           ED Course                                             Medical Decision Making  Patient is a 48-year-old male who presents to the ER with complaints of right knee and right lower leg pain.  Patient states he was working outside on his vehicle.  He states he turned quickly and noted a pop to the right knee.  He states he has previous history of right fibula fracture.  He complains of pain with walking to the right knee and the right lower leg.  Due to symptoms described he came to the ER for  evaluation and treatment.  Past medical history significant for anxiety, restless leg syndrome, fracture of lumbar spine, neck pain, hx of fibula fracture  Differential diagnosis: Bony fracture, dislocation, ligamentous strain or tear, muscle strain, and other    XR Tibia Fibula 2 View Right   Final Result         No acute osseous finding.              This report was signed and finalized on 2/1/2024 6:14 PM by Fahad Zapata.          XR Knee 3 View Right   Final Result         No acute osseous finding.               This report was signed and finalized on 2/1/2024 6:13 PM by Fahad Zapata.           X-rays are negative for fractures.  Patient was given a knee brace and crutches.  We will prescribe anti-inflammatories and recommend follow-up with orthopedics for further evaluation of his pain.  He will be discharged home shortly in stable condition.    Problems Addressed:  Knee strain, right, initial encounter: acute illness or injury    Amount and/or Complexity of Data Reviewed  Radiology: ordered. Decision-making details documented in ED Course.    Risk  Prescription drug management.        Final diagnoses:   Knee strain, right, initial encounter       ED Disposition  ED Disposition       ED Disposition   Discharge    Condition   Good    Comment   --               Luis Enrique Zazueta MD  [address] [place] [address] [address] [place] [id number] [phone]               Medication List        New Prescriptions      ketorolac 10 MG tablet  Commonly known as: TORADOL  Take 1 tablet by mouth Every 6 (Six) Hours As Needed for Moderate Pain.            Stop      fluticasone 50 MCG/ACT nasal spray  Commonly known as: FLONASE     HYDROcodone-acetaminophen 5-325 MG per tablet  Commonly known as: NORCO     naloxone 4 MG/0.1ML nasal spray  Commonly known as: NARCAN               Where to Get Your Medications        These medications were sent to [id number] - [place], [place] - [address] - [phone]  - [phone] FX  [address], [facility] [address]      Phone: [phone]   ketorolac 10 MG tablet            Rica Crump, APRN  02/02/24 1606

## 2024-02-02 NOTE — DISCHARGE INSTRUCTIONS
F/u with Dr. Zazueta for further evaluation of the right knee pain. Tonight the xray of the right knee and right tibula/fibula were both negative for fractures (no broken bones or dislocation). You may need further testing for evaluation of your pain. Elevate extremity at rest. Ice for 20 minutes on and 20 minutes off for first 24-48 hours. Slowly advance to bearing weight and if unable and no better with treatments tried you will need to f/u with orthopedics.

## 2024-02-05 ENCOUNTER — TREATMENT (OUTPATIENT)
Dept: PHYSICAL THERAPY | Facility: CLINIC | Age: 49
End: 2024-02-05
Payer: COMMERCIAL

## 2024-02-05 ENCOUNTER — OFFICE VISIT (OUTPATIENT)
Dept: FAMILY MEDICINE CLINIC | Facility: CLINIC | Age: 49
End: 2024-02-05
Payer: COMMERCIAL

## 2024-02-05 VITALS
HEIGHT: 73 IN | DIASTOLIC BLOOD PRESSURE: 78 MMHG | OXYGEN SATURATION: 97 % | RESPIRATION RATE: 18 BRPM | HEART RATE: 94 BPM | WEIGHT: 211 LBS | SYSTOLIC BLOOD PRESSURE: 124 MMHG | BODY MASS INDEX: 27.96 KG/M2 | TEMPERATURE: 97.5 F

## 2024-02-05 DIAGNOSIS — M25.511 ACUTE PAIN OF RIGHT SHOULDER: ICD-10-CM

## 2024-02-05 DIAGNOSIS — M25.561 ACUTE PAIN OF RIGHT KNEE: Primary | ICD-10-CM

## 2024-02-05 DIAGNOSIS — H10.31 ACUTE CONJUNCTIVITIS OF RIGHT EYE, UNSPECIFIED ACUTE CONJUNCTIVITIS TYPE: ICD-10-CM

## 2024-02-05 DIAGNOSIS — M75.41 IMPINGEMENT SYNDROME OF RIGHT SHOULDER REGION: ICD-10-CM

## 2024-02-05 DIAGNOSIS — G54.0 THORACIC OUTLET SYNDROME OF RIGHT THORACIC OUTLET: Primary | ICD-10-CM

## 2024-02-05 PROCEDURE — 99214 OFFICE O/P EST MOD 30 MIN: CPT | Performed by: NURSE PRACTITIONER

## 2024-02-05 PROCEDURE — 97162 PT EVAL MOD COMPLEX 30 MIN: CPT

## 2024-02-05 RX ORDER — KETOROLAC TROMETHAMINE 10 MG/1
10 TABLET, FILM COATED ORAL EVERY 6 HOURS PRN
Qty: 15 TABLET | Refills: 0 | Status: SHIPPED | OUTPATIENT
Start: 2024-02-05 | End: 2024-02-12

## 2024-02-05 RX ORDER — POLYMYXIN B SULFATE AND TRIMETHOPRIM 1; 10000 MG/ML; [USP'U]/ML
1 SOLUTION OPHTHALMIC EVERY 4 HOURS
Qty: 10 ML | Refills: 0 | Status: SHIPPED | OUTPATIENT
Start: 2024-02-05

## 2024-02-05 RX ORDER — IBUPROFEN 800 MG/1
800 TABLET ORAL EVERY 8 HOURS PRN
Qty: 90 TABLET | Refills: 0 | Status: SHIPPED | OUTPATIENT
Start: 2024-02-05

## 2024-02-05 NOTE — PROGRESS NOTES
KYLER Thorpe  [facility]   Family Medicine  [address]  [place] [address]  Phone: [phone]  Fax: [phone]         Chief Complaint:  Chief Complaint   Patient presents with    Hospital Follow Up Visit        History:  Saulo Shepard is a 48 y.o. male that is an established patient. He  is here for evaluation of the above complaint.    FRANCISCO Vernon had an ED visit 2/3/2024 due to feeling a pop in the right knee while working on his vehicle at home. He has a history of fracture in the right fibula in February 2021, right knee surgery in 9/2022. Xray right tibia/fibula and right knee at the ED were negative. He has an appt with [facility] this afternoon. He has a brace and is walking with crutches    Saturday he developed conjunctivitis in the right eye, with redness, watery, itching, pain.     He continues with pain in the right shoulder as described in previous visit notes, had PT this morning.             ROS:  Review of Systems   Constitutional: Negative.    HENT: Negative.     Eyes:  Positive for pain, discharge, redness and itching.   Respiratory: Negative.     Cardiovascular: Negative.    Gastrointestinal: Negative.    Musculoskeletal:         Right knee pain    Right shoulder pain         reports that he has never smoked. He has never used smokeless tobacco. He reports that he does not currently use alcohol. He reports that he does not use drugs.    Current Outpatient Medications   Medication Instructions    acetaminophen (TYLENOL) 975 mg, Oral, Every 8 Hours, Take every 8 hours for 3 days then take prn as needed.    ibuprofen (ADVIL,MOTRIN) 800 mg, Oral, Every 8 Hours PRN, Take every 8 hours for three days then take as needed.    ketorolac (TORADOL) 10 mg, Oral, Every 6 Hours PRN    rOPINIRole (REQUIP) 0.25 mg, Oral, 3 Times Daily, Take 1 hour before bedtime.    trimethoprim-polymyxin b (Polytrim) 08305-4.1 UNIT/ML-% ophthalmic solution 1 drop, Right Eye, Every 4 Hours  "      OBJECTIVE:  /78   Pulse 94   Temp 97.5 °F (36.4 °C) (Temporal)   Resp 18   Ht 185.4 cm (73\")   Wt 95.7 kg (211 lb)   SpO2 97%   BMI 27.84 kg/m²    Physical Exam  Vitals and nursing note reviewed.   Constitutional:       Appearance: Normal appearance.   Eyes:      General:         Right eye: Discharge present.      Conjunctiva/sclera:      Right eye: Right conjunctiva is injected.   Cardiovascular:      Rate and Rhythm: Normal rate and regular rhythm.   Pulmonary:      Effort: Pulmonary effort is normal.      Breath sounds: Normal breath sounds.   Musculoskeletal:      Right knee: Swelling present. Tenderness present.      Left knee: Normal.   Neurological:      General: No focal deficit present.      Mental Status: He is alert and oriented to person, place, and time.   Psychiatric:         Mood and Affect: Mood normal.         Behavior: Behavior normal.         Procedures    Assessment/Plan:     Diagnoses and all orders for this visit:    1. Acute pain of right knee (Primary)  -     ibuprofen (ADVIL,MOTRIN) 800 MG tablet; Take 1 tablet by mouth Every 8 (Eight) Hours As Needed for Mild Pain for up to 90 doses. Take every 8 hours for three days then take as needed.  Dispense: 90 tablet; Refill: 0  -     ketorolac (TORADOL) 10 MG tablet; Take 1 tablet by mouth Every 6 (Six) Hours As Needed for Moderate Pain.  Dispense: 15 tablet; Refill: 0    2. Acute pain of right shoulder  -     ibuprofen (ADVIL,MOTRIN) 800 MG tablet; Take 1 tablet by mouth Every 8 (Eight) Hours As Needed for Mild Pain for up to 90 doses. Take every 8 hours for three days then take as needed.  Dispense: 90 tablet; Refill: 0    3. Acute conjunctivitis of right eye, unspecified acute conjunctivitis type  -     trimethoprim-polymyxin b (Polytrim) 70353-5.1 UNIT/ML-% ophthalmic solution; Administer 1 drop to the right eye Every 4 (Four) Hours.  Dispense: 10 mL; Refill: 0      BMI is >= 25 and <30. (Overweight) The following options " were offered after discussion;: exercise counseling/recommendations and nutrition counseling/recommendations       An After Visit Summary was printed and given to the patient at discharge.  No follow-ups on file.       There are no Patient Instructions on file for this visit.      Discussion:     I spent 32 minutes caring for Saulo on this date of service. This time includes time spent by me in the following activities: preparing for the visit, reviewing tests, obtaining and/or reviewing a separately obtained history, performing a medically appropriate examination and/or evaluation, counseling and educating the patient/family/caregiver, ordering medications, tests, or procedures, documenting information in the medical record, and independently interpreting results and communicating that information with the patient/family/caregiver     Angelica CHÁVEZ 2/5/2024   Electronically signed.

## 2024-02-05 NOTE — PROGRESS NOTES
Physical Therapy Initial Evaluation and Plan of Care  [address] [address]    Patient: Saulo Shepard               : 1975  Visit Date: 2024  Referring practitioner: KYLER Thorpe  Date of Initial Visit: 2024  Patient seen for 1 sessions    Visit Diagnoses:    ICD-10-CM ICD-9-CM   1. Thoracic outlet syndrome of right thoracic outlet  G54.0 353.0   2. Impingement syndrome of right shoulder region  M75.41 726.2     Past Medical History:   Diagnosis Date    Anxiety     Back pain     Fracture of lumbar spine  and     lower lumbar ; L1     Head injury     Injury of back     Nausea with vomiting 2023    Neck pain     Overweight (BMI 25.0-29.9)     Restless leg syndrome      Past Surgical History:   Procedure Laterality Date    COLONOSCOPY N/A 2023    Procedure: COLONOSCOPY WITH ANESTHESIA;  Surgeon: Kristopher Brush DO;  Location: [facility] ENDOSCOPY;  Service: Gastroenterology;  Laterality: N/A;  preop; screening   postop  PCP Joseph Stewart    COLONOSCOPY N/A 2023    Procedure: COLONOSCOPY WITH ANESTHESIA;  Surgeon: Kristopher Brush DO;  Location: [facility] ENDOSCOPY;  Service: Gastroenterology;  Laterality: N/A;  Pre: Encounter for screening for malignant neoplasm of colon;  Post: Inadequate prep;  Vivek Stewart DO    FACIAL FRACTURE SURGERY      INCISION AND DRAINAGE LEG Right 2018    Procedure: INCISION AND DRAINAGE OF RIGHT UPPER INNER THIGH, PLACEMENT OF WOUND VAC;  Surgeon: René Dallas MD;  Location: [facility] OR;  Service: General    INGUINAL HERNIA REPAIR Bilateral 2023    Procedure: INGUINAL HERNIA BILATERAL REPAIR LAPAROSCOPIC WITH DAVINCI ROBOT WITH MESH;  Surgeon: Dian Boyd MD;  Location: [facility] OR;  Service: Robotics - DaVinci;  Laterality: Bilateral;    KNEE SURGERY Right 2021    SPLENECTOMY           SUBJECTIVE     Subjective Evaluation    History of Present Illness  Mechanism of  injury: Pt reports that many years ago, he dislocated his R shoulder and potentially broke it as well. It has recently gotten worse, and at times its like he can hardly move the arm. 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 He tries not to do much with it, though he is Right handed, so it is difficult. Notes increased n/t with elevated or [place] tasks, including driving.      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 Quality of life: good    Pain  Current pain rating: 3  At best pain ratin  At worst pain rating: 10 (25/10)  Location: R shoulder  Quality: sharp (shooting)  Aggravating factors: overhead activity, outstretched reach, repetitive movement, movement and lifting  Progression: no change    Hand dominance: right    Treatments  No previous or current treatments  Patient Goals  Patient goals for therapy: decreased pain and increased motion         Outcome Measure:     PT G-Codes  Outcome Measure Options: Quick DASH  Quick DASH Score: 61.36    OBJECTIVE     Objective     POSTURAL ASSESSMENT/OBSERVATIONS:   forward head hyperkyphotic thoracic spine rounded shoulders    PALPATION:  RIGHT:   tenderness along posterior capsule, causes n/t down the arm         UE ROM and MMT Left Right   SHOULDER AROM PROM MMT AROM PROM MMT   Flexion 160  5 135*  4+*   Extension           5 110*  4+*   ER To mid thoracic  5 To CTJ*  4*   IR To mid thoracic  5 To LT junction  4+*            *pain     SPECIAL TESTS  RIGHT: Empty can (positive) Full can (negative) Unger/Harley (positive) Neer's (negative) Drop arm test (negative) Sulcus sign (negative) Elma (positive) Spurling's test (    Male  strength (pounds)  AGE Right Hand* RH Norms Left Hand LH Norms   45-49 50 110+23.0 101 101+22.8   (TIANA Segura et al; Hand Dynometer: Effects of trials and sessions.  Perpetual and Motor Skills 61:195-8, 1985)  Key  * = Dominant hand  > = Intervention      Therapy Education/Self Care 126[address]   Education offered today Nature of condition, POC moving forward, HEP provided and trained    [employer] Code    Ongoing HEP   Access Code: [email or web address]  URL: [email or web address]  Date: 02/05/2024  Prepared by: Trena López    Exercises  - Supine Shoulder Flexion Extension AAROM with Dowel  - 1 x daily - 7 x weekly - 2 sets - 10 reps  - Standing Shoulder Abduction AAROM with Dowel  - 1 x daily - 7 x weekly - 2 sets - 10 reps  - Shoulder External Rotation and Scapular Retraction with Resistance  - 1 x daily - 7 x weekly - 2 sets - 10 reps  - First Rib Mobilization with Strap  - 1 x daily - 7 x weekly - 2-3 sets - 10 reps - 5 hold   Timed Minutes        Total Timed Treatment:     0   mins  Total Time of Visit:            45   mins    ASSESSMENT/PLAN     GOALS:  Goals                                                    Progress Note due by 3/6/24                                                                Recert due by 5/4/24   STG by: 4 weeks Comments Date Status   Decrease subjective pain to 0/10       Decreased muscle guarding  throughout shoulder girdle       Improve mobility of shoulder girdle to avoid impingement               LTG by: 8 weeks       Symmetrical blanca shoulder elevation actively       Able to resume household and work activities without exacerbation of symptoms      Gross shoulder MMT at 5/5      Understands improved ergonomics for work and HEP for flexibility and stability       Improve QuickDASH score to 30 or less  IE: 61.36     Reports no pain except occasional minor twinges no more than 2/10         Assessment & Plan       Assessment  Impairments: abnormal muscle firing, abnormal or restricted ROM, activity intolerance, impaired physical strength, lacks appropriate home exercise program and pain with function   Functional limitations: carrying objects, lifting, pulling, pushing, uncomfortable because of pain, sitting, stooping, reaching behind back,  reaching overhead and unable to perform repetitive tasks   Assessment details: Pt is a 47 y/o male presenting with 4 month hx of worsening R shoulder pain with c/o n/t with this as well. His S&S are consistent with a combination of things, including thoracic outlet syndrome along with additional GHJ impingement with postural contributions. His posture presents with significantly rounded shoulders and forward posture. He has tenderness to palpation in posterior GHJ and first rib regions. The Pt would benefit from skilled PTx to decrease pain, improve functional mobility, progress toward goals, and increase overall quality of life.    Prognosis: good    Plan  Therapy options: will be seen for skilled therapy services  Planned modality interventions: cryotherapy, dry needling, electrical stimulation/Russian stimulation and low level laser therapy  Planned therapy interventions: abdominal trunk stabilization, balance/weight-bearing training, body mechanics training, fine motor coordination training, flexibility, functional ROM exercises, gait training, home exercise program, joint mobilization, manual therapy, motor coordination training, neuromuscular re-education, orthotic fitting/training, postural training, soft tissue mobilization, spinal/joint mobilization, strengthening, stretching and therapeutic activities  Frequency: 2x week  Duration in weeks: 8  Plan details: Begin working toward increasing R GHJ and first rib mobility, as well as postural muscle strength. Consider attaining scapular mm MMTs.         SIGNATURE: Trena López PT DPT, [place] License #: [id number]    Electronically Signed on 2/5/2024      Initial Certification  Certification Period: 2/5/2024 through 5/4/2024  I certify that the therapy services are furnished while this patient is under my care.  The services outlined above are required by this patient, and will be reviewed every 90 days.     PHYSICIAN: Angelica Almonte APRN (NPI:  [id number])    Signature____________________________________________DATE: _________     Please sign and return via fax to [phone].   [email or web address]          [facility] [address] [id number]  [phone]

## 2024-02-07 ENCOUNTER — TREATMENT (OUTPATIENT)
Dept: PHYSICAL THERAPY | Facility: CLINIC | Age: 49
End: 2024-02-07
Payer: COMMERCIAL

## 2024-02-07 DIAGNOSIS — M75.41 IMPINGEMENT SYNDROME OF RIGHT SHOULDER REGION: ICD-10-CM

## 2024-02-07 DIAGNOSIS — G54.0 THORACIC OUTLET SYNDROME OF RIGHT THORACIC OUTLET: Primary | ICD-10-CM

## 2024-02-07 PROCEDURE — 97110 THERAPEUTIC EXERCISES: CPT | Performed by: PHYSICAL THERAPIST

## 2024-02-07 PROCEDURE — 97140 MANUAL THERAPY 1/> REGIONS: CPT | Performed by: PHYSICAL THERAPIST

## 2024-02-07 NOTE — PROGRESS NOTES
Physical Therapy Treatment Note  [address] [place] [address]    Patient: Saulo Shepard                                                 Visit Date: 2024  :     1975    Referring practitioner:    KYLER Thorpe  Date of Initial Visit:          Type: THERAPY  Noted: 2024    Patient seen for 2 sessions    Visit Diagnoses:    ICD-10-CM ICD-9-CM   1. Thoracic outlet syndrome of right thoracic outlet  G54.0 353.0   2. Impingement syndrome of right shoulder region  M75.41 726.2     SUBJECTIVE     Subjective:  He says his shoulder isn't too bad today. He has numbness/tingling 2-3x/week.     PAIN: 1/10         OBJECTIVE     Objective     Manual Therapy     79977  Comments   Prone thoracic ext mobs Foam roll and man   Prone CT ext mob    Supine right 1st rib depression mob Tropic release   Supine right GH post caudal mob Gr3 sustained       Timed Minutes 30        Therapeutic Exercises    77304 Units Comments   Thoracic ext stretch  Crunch position over horizontal foam roll; sustain about T6-T9   Pect stretch lying over vertical foam roll  Arms in T and Y   Standing wall lat stretch blanca     Wall angels  Sustained at different levels; didn't go above 90 deg        Timed Minutes 23         Therapy Education/Self Care 17162   Education offered today Nature of condition, POC moving forward, HEP provided and trained    Medbride Code    Ongoing HEP   Access Code: [email or web address]  URL: [email or web address]  Date: 2024  Prepared by: Trena López    Exercises  - Supine Shoulder Flexion Extension AAROM with Dowel  - 1 x daily - 7 x weekly - 2 sets - 10 reps  - Standing Shoulder Abduction AAROM with Dowel  - 1 x daily - 7 x weekly - 2 sets - 10 reps  - Shoulder External Rotation and Scapular Retraction with Resistance  - 1 x daily - 7 x weekly - 2 sets - 10 reps  - First Rib Mobilization with Strap  - 1 x daily - 7 x weekly - 2-3 sets  - 10 reps - 5 hold   Timed Minutes        Total Timed Treatment:     53   mins  Total Time of Visit:            53   mins    ASSESSMENT/PLAN     GOALS:  Goals                                                    Progress Note due by 3/6/24                                                                Recert due by 5/4/24   STG by: 4 weeks Comments Date Status   Decrease subjective pain to 0/10       Decreased muscle guarding  throughout shoulder girdle       Improve mobility of shoulder girdle to avoid impingement               LTG by: 8 weeks       Symmetrical blanca shoulder elevation actively       Able to resume household and work activities without exacerbation of symptoms      Gross shoulder MMT at 5/5      Understands improved ergonomics for work and HEP for flexibility and stability  reinforced his HEP and importance of flexibility 2/7 ongoing   Improve QuickDASH score to 30 or less  IE: 61.36     Reports no pain except occasional minor twinges no more than 2/10         Assessment/Plan     ASSESSMENT:   Today was his first visit so no goals met. We focused on mobility today with some postural retraining. His right first rib depression felt like it released with mobs.     PLAN:   Cont working on postural flexibility and progress with stability.     SIGNATURE: Shiva Patel, PT, [place] License #: [id number]  Electronically Signed on 2/7/2024        [address] [place] [address] [place] [address]  [phone]

## 2024-02-11 ENCOUNTER — HOSPITAL ENCOUNTER (EMERGENCY)
Facility: HOSPITAL | Age: 49
Discharge: HOME | End: 2024-02-11
Attending: EMERGENCY MEDICINE | Admitting: EMERGENCY MEDICINE
Payer: COMMERCIAL

## 2024-02-11 ENCOUNTER — APPOINTMENT (OUTPATIENT)
Dept: CT IMAGING | Facility: HOSPITAL | Age: 49
End: 2024-02-11
Payer: COMMERCIAL

## 2024-02-11 VITALS
DIASTOLIC BLOOD PRESSURE: 91 MMHG | SYSTOLIC BLOOD PRESSURE: 123 MMHG | RESPIRATION RATE: 18 BRPM | OXYGEN SATURATION: 94 % | WEIGHT: 209 LBS | HEART RATE: 88 BPM | TEMPERATURE: 98.5 F | HEIGHT: 67 IN | BODY MASS INDEX: 32.8 KG/M2

## 2024-02-11 DIAGNOSIS — M25.561 ACUTE PAIN OF RIGHT KNEE: Primary | ICD-10-CM

## 2024-02-11 PROCEDURE — 99284 EMERGENCY DEPT VISIT MOD MDM: CPT

## 2024-02-11 PROCEDURE — 25010000002 KETOROLAC TROMETHAMINE PER 15 MG: Performed by: EMERGENCY MEDICINE

## 2024-02-11 PROCEDURE — 96372 THER/PROPH/DIAG INJ SC/IM: CPT

## 2024-02-11 PROCEDURE — 73700 CT LOWER EXTREMITY W/O DYE: CPT

## 2024-02-11 RX ORDER — KETOROLAC TROMETHAMINE 30 MG/ML
30 INJECTION, SOLUTION INTRAMUSCULAR; INTRAVENOUS ONCE
Status: COMPLETED | OUTPATIENT
Start: 2024-02-11 | End: 2024-02-11

## 2024-02-11 RX ADMIN — KETOROLAC TROMETHAMINE 30 MG: 30 INJECTION, SOLUTION INTRAMUSCULAR; INTRAVENOUS at 20:10

## 2024-02-12 ENCOUNTER — OFFICE VISIT (OUTPATIENT)
Dept: FAMILY MEDICINE CLINIC | Facility: CLINIC | Age: 49
End: 2024-02-12
Payer: COMMERCIAL

## 2024-02-12 VITALS
HEIGHT: 67 IN | TEMPERATURE: 97.8 F | BODY MASS INDEX: 33.27 KG/M2 | HEART RATE: 75 BPM | RESPIRATION RATE: 18 BRPM | DIASTOLIC BLOOD PRESSURE: 90 MMHG | WEIGHT: 212 LBS | SYSTOLIC BLOOD PRESSURE: 132 MMHG | OXYGEN SATURATION: 97 %

## 2024-02-12 DIAGNOSIS — M25.561 ACUTE PAIN OF RIGHT KNEE: Primary | ICD-10-CM

## 2024-02-12 RX ORDER — HYDROCODONE BITARTRATE AND ACETAMINOPHEN 5; 325 MG/1; MG/1
1 TABLET ORAL EVERY 4 HOURS PRN
Qty: 20 TABLET | Refills: 0 | Status: SHIPPED | OUTPATIENT
Start: 2024-02-12

## 2024-02-14 ENCOUNTER — TREATMENT (OUTPATIENT)
Dept: PHYSICAL THERAPY | Facility: CLINIC | Age: 49
End: 2024-02-14
Payer: COMMERCIAL

## 2024-02-14 DIAGNOSIS — M75.41 IMPINGEMENT SYNDROME OF RIGHT SHOULDER REGION: ICD-10-CM

## 2024-02-14 DIAGNOSIS — G54.0 THORACIC OUTLET SYNDROME OF RIGHT THORACIC OUTLET: Primary | ICD-10-CM

## 2024-02-16 ENCOUNTER — TREATMENT (OUTPATIENT)
Dept: PHYSICAL THERAPY | Facility: CLINIC | Age: 49
End: 2024-02-16
Payer: COMMERCIAL

## 2024-02-16 DIAGNOSIS — M75.41 IMPINGEMENT SYNDROME OF RIGHT SHOULDER REGION: ICD-10-CM

## 2024-02-16 DIAGNOSIS — G54.0 THORACIC OUTLET SYNDROME OF RIGHT THORACIC OUTLET: Primary | ICD-10-CM

## 2024-02-19 ENCOUNTER — TREATMENT (OUTPATIENT)
Dept: PHYSICAL THERAPY | Facility: CLINIC | Age: 49
End: 2024-02-19
Payer: COMMERCIAL

## 2024-02-19 DIAGNOSIS — M75.41 IMPINGEMENT SYNDROME OF RIGHT SHOULDER REGION: ICD-10-CM

## 2024-02-19 DIAGNOSIS — G54.0 THORACIC OUTLET SYNDROME OF RIGHT THORACIC OUTLET: Primary | ICD-10-CM

## 2024-02-19 PROCEDURE — 97032 APPL MODALITY 1+ESTIM EA 15: CPT | Performed by: PHYSICAL THERAPIST

## 2024-02-19 PROCEDURE — 97140 MANUAL THERAPY 1/> REGIONS: CPT | Performed by: PHYSICAL THERAPIST

## 2024-02-19 PROCEDURE — 97110 THERAPEUTIC EXERCISES: CPT | Performed by: PHYSICAL THERAPIST

## 2024-02-19 NOTE — PROGRESS NOTES
"                                                                Physical Therapy Treatment Note  [address] [place] [address]    Patient: Saulo Shepard                                                 Visit Date: 2024  :     1975    Referring practitioner:    KYLER Thorpe  Date of Initial Visit:          Type: THERAPY  Noted: 2024    Patient seen for 5 sessions    Visit Diagnoses:    ICD-10-CM ICD-9-CM   1. Thoracic outlet syndrome of right thoracic outlet  G54.0 353.0   2. Impingement syndrome of right shoulder region  M75.41 726.2     SUBJECTIVE     Subjective:He reports he wasn't bad over the weekend. Reports a little pain in the front of his R shoulder      PAIN: 1/10         OBJECTIVE     Objective       Modalities Comments   Laser/Estim Combo treatment  to R shoulder, bursa     Tx 2   Minutes 10     Manual Therapy     26654  Comments   STM to R GHJ grossly  supine                   Timed Minutes 15     Therapeutic Exercises    13015 Units Comments   B pec and thoracic stretches with 1/2 roller in sitting      Supine R serratus punches #3 x 20      Supine \"t's\" with green T band x 20      R Reverse Codmans # 3 x 10 each     Isometric R shoulder flexion holds with manual perturbations                Timed Minutes 20          Therapy Education/Self Care 68446   Education offered today    Medbride Code    Ongoing HEP   Access Code: [email or web address]  URL: [email or web address]  Date: 2024  Prepared by: Trena López     Exercises  - Supine Shoulder Flexion Extension AAROM with Dowel  - 1 x daily - 7 x weekly - 2 sets - 10 reps  - Standing Shoulder Abduction AAROM with Dowel  - 1 x daily - 7 x weekly - 2 sets - 10 reps  - Shoulder External Rotation and Scapular Retraction with Resistance  - 1 x daily - 7 x weekly - 2 sets - 10 reps  - First Rib Mobilization with Strap  - 1 x daily - 7 x weekly - 2-3 sets - 10 reps - 5 hold   Timed Minutes        Total Timed Treatment:     45   " mins  Total Time of Visit:             45   mins         ASSESSMENT/PLAN     GOALS  Goals                                                    Progress Note due by 3/6/24                                                                Recert due by 5/4/24   STG by: 4 weeks Comments Date Status   Decrease subjective pain to 0/10         Decreased muscle guarding  throughout shoulder girdle         Improve mobility of shoulder girdle to avoid impingement                   LTG by: 8 weeks         Symmetrical blanca shoulder elevation actively         Able to resume household and work activities without exacerbation of symptoms         Gross shoulder MMT at 5/5         Understands improved ergonomics for work and HEP for flexibility and stability  reinforced his HEP and importance of flexibility 2/7 ongoing   Improve QuickDASH score to 30 or less  IE: 61.36       Reports no pain except occasional minor twinges no more than 2/10  1/10 today  2/19         Assessment/Plan     ASSESSMENT:   He is doing well and noticing improvements in his symptoms.     PLAN:   Continue to progress as symptoms allow.    SIGNATURE: Juanito Boateng PTA, [place] License #: [id number]  Electronically Signed on 2/19/2024        [address] [place] [address] [place] [address]  [phone]

## 2024-02-23 ENCOUNTER — TREATMENT (OUTPATIENT)
Dept: PHYSICAL THERAPY | Facility: CLINIC | Age: 49
End: 2024-02-23
Payer: COMMERCIAL

## 2024-02-23 DIAGNOSIS — G54.0 THORACIC OUTLET SYNDROME OF RIGHT THORACIC OUTLET: Primary | ICD-10-CM

## 2024-02-23 DIAGNOSIS — M75.41 IMPINGEMENT SYNDROME OF RIGHT SHOULDER REGION: ICD-10-CM

## 2024-02-23 NOTE — PROGRESS NOTES
Physical Therapy Treatment Note  [address] [place] [address]    Patient: Saulo Shepard                                                 Visit Date: 2024  :     1975    Referring practitioner:    KYLER Thorpe  Date of Initial Visit:          Type: THERAPY  Noted: 2024    Patient seen for 6 sessions    Visit Diagnoses:    ICD-10-CM ICD-9-CM   1. Thoracic outlet syndrome of right thoracic outlet  G54.0 353.0   2. Impingement syndrome of right shoulder region  M75.41 726.2       SUBJECTIVE     Subjective: Pt reports that he can now lift his arm up higher. His arm was a little sore after last session, but the next day, it is better.       PAIN: 0-10         OBJECTIVE     Objective     Therapeutic Exercises    54501 Units Comments   SciFit UE ergometer lvl 4 6 min  3 min fwd, 3 min bwd, switching direction each min    HL horizontal abduction  GTB on pink roller 2x10                    Timed Minutes 10     Manual Therapy     76875  Comments   Seated manual pec stretch with half roller     STM to R GHJ grossly   supine   R GHJ inferior glides  Reports inc n/t with this   R GHJ LAD +prom Oscillatory            Timed Minutes 15     Neuromuscular Reeducation     60596 Comments   RUE rhythmic stabilization  2x2 min ea    HL on foam roller W arm angels   3 min                Timed Minutes 15            Therapy Education/Self Care 05182   Education offered today    Medbride Code    Ongoing HEP   Access Code: [email or web address]  URL: [email or web address]  Date: 2024  Prepared by: Trena López     Exercises  - Supine Shoulder Flexion Extension AAROM with Dowel  - 1 x daily - 7 x weekly - 2 sets - 10 reps  - Standing Shoulder Abduction AAROM with Dowel  - 1 x daily - 7 x weekly - 2 sets - 10 reps  - Shoulder External Rotation and Scapular Retraction with Resistance  - 1 x daily - 7 x weekly - 2 sets - 10 reps  - First Rib  Mobilization with Strap  - 1 x daily - 7 x weekly - 2-3 sets - 10 reps - 5 hold   Timed Minutes        Total Timed Treatment:     40   mins  Total Time of Visit:             40   mins         ASSESSMENT/PLAN     GOALS  Goals                                                    Progress Note due by 3/6/24                                                                Recert due by 5/4/24   STG by: 4 weeks Comments Date Status   Decrease subjective pain to 0/10  0-1/10 today  2/23 Ongoing   Decreased muscle guarding  throughout shoulder girdle         Improve mobility of shoulder girdle to avoid impingement                   LTG by: 8 weeks         Symmetrical blanca shoulder elevation actively         Able to resume household and work activities without exacerbation of symptoms         Gross shoulder MMT at 5/5         Understands improved ergonomics for work and HEP for flexibility and stability  reinforced his HEP and importance of flexibility 2/7 ongoing   Improve QuickDASH score to 30 or less  IE: 61.36       Reports no pain except occasional minor twinges no more than 2/10  1/10 today  2/19         Assessment/Plan     ASSESSMENT:   His shoulder continues to fatigue quickly, though this is improving. Continues to have impingement partway through ROM. Noted continued tightness and tenderness through R anterior shoulder.     PLAN:   Cont working on postural flexibility and progress with stability.     SIGNATURE: Trena López PT DPT, [place] License #: [id number]    Electronically Signed on 2/23/2024        [address] [place] [address], [place] [address]  [phone]

## 2024-02-26 ENCOUNTER — TREATMENT (OUTPATIENT)
Dept: PHYSICAL THERAPY | Facility: CLINIC | Age: 49
End: 2024-02-26
Payer: COMMERCIAL

## 2024-02-26 DIAGNOSIS — M75.41 IMPINGEMENT SYNDROME OF RIGHT SHOULDER REGION: ICD-10-CM

## 2024-02-26 DIAGNOSIS — G54.0 THORACIC OUTLET SYNDROME OF RIGHT THORACIC OUTLET: Primary | ICD-10-CM

## 2024-02-26 PROCEDURE — 97110 THERAPEUTIC EXERCISES: CPT

## 2024-02-26 PROCEDURE — 97140 MANUAL THERAPY 1/> REGIONS: CPT

## 2024-02-26 PROCEDURE — 97112 NEUROMUSCULAR REEDUCATION: CPT

## 2024-02-26 NOTE — PROGRESS NOTES
Physical Therapy Treatment Note  [address] [place] [address]    Patient: Saulo Shepard                                                 Visit Date: 2024  :     1975    Referring practitioner:    KYLER Thorpe  Date of Initial Visit:          Type: THERAPY  Noted: 2024    Patient seen for 7 sessions    Visit Diagnoses:    ICD-10-CM ICD-9-CM   1. Thoracic outlet syndrome of right thoracic outlet  G54.0 353.0   2. Impingement syndrome of right shoulder region  M75.41 726.2         SUBJECTIVE     Subjective: Pt reports that his shoulder has been alright. Notes a tingle in ulnar distribution       PAIN: 1/10         OBJECTIVE     Objective     Manual Therapy     66314  Comments   Scratch collapse testing  Positive R carpal tunnels, pronator teres   Scraping to R forearm                Timed Minutes 13       Therapeutic Exercises    61164 Units Comments   SciFit UE ergometer lvl 4 6 min  3 min fwd, 3 min bwd, switching direction each min    Cybex lat pulls lvl 3  2x10 ea    Incline YTW  x10 ea Very difficult    GTB face pulls anchored on cybex  2x10    Seated manual pec stretches with half roller                    Timed Minutes 20     Neuromuscular Reeducation     89798 Comments   BOSU dome down incline circles on mat table  4x5 circles CW/CCW    SA wall rolls pink roller doubled YTB  Difficulty keeping right in place               Timed Minutes 12              Therapy Education/Self Care 16341   Education offered today    Medbride Code    Ongoing HEP   Access Code: [email or web address]  URL: [email or web address]  Date: 2024  Prepared by: Trena López     Exercises  - Supine Shoulder Flexion Extension AAROM with Dowel  - 1 x daily - 7 x weekly - 2 sets - 10 reps  - Standing Shoulder Abduction AAROM with Dowel  - 1 x daily - 7 x weekly - 2 sets - 10 reps  - Shoulder External Rotation and Scapular Retraction with  Resistance  - 1 x daily - 7 x weekly - 2 sets - 10 reps  - First Rib Mobilization with Strap  - 1 x daily - 7 x weekly - 2-3 sets - 10 reps - 5 hold   Timed Minutes        Total Timed Treatment:     45   mins  Total Time of Visit:             45   mins         ASSESSMENT/PLAN     GOALS  Goals                                                    Progress Note due by 3/6/24                                                                Recert due by 5/4/24   STG by: 4 weeks Comments Date Status   Decrease subjective pain to 0/10  0-1/10 today  2/23 Ongoing   Decreased muscle guarding  throughout shoulder girdle         Improve mobility of shoulder girdle to avoid impingement                   LTG by: 8 weeks         Symmetrical blanca shoulder elevation actively         Able to resume household and work activities without exacerbation of symptoms         Gross shoulder MMT at 5/5         Understands improved ergonomics for work and HEP for flexibility and stability  reinforced his HEP and importance of flexibility 2/7 ongoing   Improve QuickDASH score to 30 or less  IE: 61.36       Reports no pain except occasional minor twinges no more than 2/10  1/10 today  2/19         Assessment/Plan     ASSESSMENT: Assessed scratch collapse test today, which was positive at R pronator teres and lightly positive at carpal tunnel region. Discussed sleeping with R arm straighter, including bracing with towel roll if necessary. His posture continues to exacerbate his condition.     PLAN:   Cont working on postural flexibility and progress with stability.     SIGNATURE: Trena López PT DPT, [place] License #: [id number]    Electronically Signed on 2/26/2024        [facility] [place] [address]  [phone]

## 2024-02-28 ENCOUNTER — TREATMENT (OUTPATIENT)
Dept: PHYSICAL THERAPY | Facility: CLINIC | Age: 49
End: 2024-02-28
Payer: COMMERCIAL

## 2024-02-28 DIAGNOSIS — G54.0 THORACIC OUTLET SYNDROME OF RIGHT THORACIC OUTLET: Primary | ICD-10-CM

## 2024-02-28 DIAGNOSIS — M75.41 IMPINGEMENT SYNDROME OF RIGHT SHOULDER REGION: ICD-10-CM

## 2024-02-28 NOTE — PROGRESS NOTES
Physical Therapy Treatment Note  [address] [place] [address]    Patient: Saulo Shepard                                                 Visit Date: 2024  :     1975    Referring practitioner:    KYLER Thorpe  Date of Initial Visit:          Type: THERAPY  Noted: 2024    Patient seen for 8 sessions    Visit Diagnoses:    ICD-10-CM ICD-9-CM   1. Thoracic outlet syndrome of right thoracic outlet  G54.0 353.0   2. Impingement syndrome of right shoulder region  M75.41 726.2           SUBJECTIVE     Subjective: Today, he woke up and his R arm was hurting from his elbow down in an ulnar distribution. He knows that he fell asleep with it bent up. He was a little sore after last session, though itdid feel a lot better.       PAIN: 3/10         OBJECTIVE     Objective     Manual Therapy     24800  Comments   Scraping to R forearm with shark tooth guasha    PROM with LAD, oscillatory             Timed Minutes 13     Modalities Comments   Laser/Estim Combo treatment  to R forearm, along ulnar nerve path     Tx 1   Minutes 10       Therapeutic Exercises    34343 Units Comments   SciFit UE ergometer lvl 4 6 min  3 min fwd, 3 min bwd, switching direction each min    Standing rows with GTB x20    Seated scapular depression  2x10x5' Difficult                        Timed Minutes 20                Therapy Education/Self Care 53508   Education offered today    Medbride Code    Ongoing HEP   Access Code: [email or web address]  URL: [email or web address]  Date: 2024  Prepared by: Trena López     Exercises  - Supine Shoulder Flexion Extension AAROM with Dowel  - 1 x daily - 7 x weekly - 2 sets - 10 reps  - Standing Shoulder Abduction AAROM with Dowel  - 1 x daily - 7 x weekly - 2 sets - 10 reps  - Shoulder External Rotation and Scapular Retraction with Resistance  - 1 x daily - 7 x weekly - 2 sets - 10 reps  - First Rib Mobilization with  Strap  - 1 x daily - 7 x weekly - 2-3 sets - 10 reps - 5 hold   Timed Minutes        Total Timed Treatment:     43   mins  Total Time of Visit:             43   mins         ASSESSMENT/PLAN     GOALS  Goals                                                    Progress Note due by 3/6/24                                                                Recert due by 5/4/24   STG by: 4 weeks Comments Date Status   Decrease subjective pain to 0/10  0-1/10 today  2/23 Ongoing   Decreased muscle guarding  throughout shoulder girdle         Improve mobility of shoulder girdle to avoid impingement                   LTG by: 8 weeks         Symmetrical blanca shoulder elevation actively         Able to resume household and work activities without exacerbation of symptoms         Gross shoulder MMT at 5/5 to improve ability to perform lifting job tasks         Understands improved ergonomics for work and HEP for flexibility and stability  reinforced his HEP and importance of flexibility 2/7 ongoing   Improve QuickDASH score to 30 or less to demonstrate improved ability to complete home care tasks like caring for his son   IE: 61.36       Reports no pain except occasional minor twinges no more than 2/10  1/10 today  2/19         Assessment/Plan     ASSESSMENT: Pt arived with increased pain in FA along ulnar nerve distribution this date. Prioritized this with scraping and Laser/Estim combo today, and worked toward improving scapular strength to decrease postural strain and increase space in thoracic outlet.     PLAN: Progress note at next visit!  Cont working on postural flexibility and progress with stability. Assess response to towel splinting at night to prevent prolonged flexion of R elbow.     SIGNATURE: Trena López PT DPT, [place] License #: [id number]    Electronically Signed on 2/28/2024        [address] [place] [address]. [address]  [phone]

## 2024-03-01 ENCOUNTER — HOSPITAL ENCOUNTER (OUTPATIENT)
Dept: MRI IMAGING | Facility: HOSPITAL | Age: 49
Discharge: HOME | End: 2024-03-01
Payer: COMMERCIAL

## 2024-03-01 DIAGNOSIS — M25.561 ACUTE PAIN OF RIGHT KNEE: ICD-10-CM

## 2024-03-01 PROCEDURE — 73721 MRI JNT OF LWR EXTRE W/O DYE: CPT

## 2024-03-04 ENCOUNTER — TELEPHONE (OUTPATIENT)
Dept: FAMILY MEDICINE CLINIC | Facility: CLINIC | Age: 49
End: 2024-03-04

## 2024-03-04 NOTE — TELEPHONE ENCOUNTER
Called and informed patient that Dr Stewart has not reviewed the MRI just yet but our office will call once resulted. Patient voiced understanding.

## 2024-03-04 NOTE — TELEPHONE ENCOUNTER
Caller: Saulo Shepard    Relationship: Self    Best call back number: [phone]     What is the best time to reach you: ANY    Who are you requesting to speak with (clinical staff, provider,  specific staff member): CLINICAL STAFF     What was the call regarding:     PATIENT WOULD LIKE TO FOLLOW-UP ON HIS TEST RESULTS WITH CLINICAL STAFF.     Is it okay if the provider responds through MyChart: PREFERS PHONE CALL

## 2024-03-06 ENCOUNTER — TREATMENT (OUTPATIENT)
Dept: PHYSICAL THERAPY | Facility: CLINIC | Age: 49
End: 2024-03-06
Payer: COMMERCIAL

## 2024-03-06 DIAGNOSIS — M75.41 IMPINGEMENT SYNDROME OF RIGHT SHOULDER REGION: ICD-10-CM

## 2024-03-06 DIAGNOSIS — G54.0 THORACIC OUTLET SYNDROME OF RIGHT THORACIC OUTLET: Primary | ICD-10-CM

## 2024-03-06 NOTE — PROGRESS NOTES
Physical Therapy Treatment Note  [address] [place] [address]    Patient: Saulo Shepard                                                 Visit Date: 3/6/2024  :     1975    Referring practitioner:    KYLER Thorpe  Date of Initial Visit:          Type: THERAPY  Noted: 2024    Patient seen for 9 sessions    Visit Diagnoses:    ICD-10-CM ICD-9-CM   1. Thoracic outlet syndrome of right thoracic outlet  G54.0 353.0   2. Impingement syndrome of right shoulder region  M75.41 726.2             SUBJECTIVE     Subjective: Pt only got 3 hours of sleep last night, so he is tired. His shoulder hasn't been too bad, though he did reach behind himself into the back seat of the car, which was rather painful.        PAIN: 2/10         OBJECTIVE     Objective     Therapeutic Exercises    58390 Units Comments   SciFit UE ergometer lvl 4.5 6 min 3 min fwd, 3 min bwd, switching direction each min    IR stretch with roller strap 4x30 sec    ER isometric stepouts  10 trips Red tubing             Timed Minutes 15     Therapeutic Activities    15812 Comments   Rope sled pulls/pushes  50# 20 ft x5    Goals assessed for PN                 Timed Minutes 15     Manual Therapy     30984  Comments   PROM with LAD, oscillatory with OP in all planes     PA R GHJ mobs gr 3-4 Improved IR ROM        Timed Minutes 15     Neuromuscular Reeducation     35797 Comments   End range AAROM + Eccentric lowering flexion 1# 2x10    End range AAROM + eccentric lowering abduction 1# bar  x15               Timed Minutes 15         UE ROM and MMT Left Right   SHOULDER AROM PROM MMT AROM PROM MMT   Flexion 162   5 162*   4+*   Extension                   5 160   4+*   ER To mid thoracic   5 To mid thoracic   4*   IR To mid thoracic   5 To mid thoracic   4+*                         Therapy Education/Self Care 16836   Education offered today    Medbride Code    Ongoing  HEP   Access Code: [email or web address]  URL: [email or web address]  Date: 02/05/2024  Prepared by: Trena López     Exercises  - Supine Shoulder Flexion Extension AAROM with Dowel  - 1 x daily - 7 x weekly - 2 sets - 10 reps  - Standing Shoulder Abduction AAROM with Dowel  - 1 x daily - 7 x weekly - 2 sets - 10 reps  - Shoulder External Rotation and Scapular Retraction with Resistance  - 1 x daily - 7 x weekly - 2 sets - 10 reps  - First Rib Mobilization with Strap  - 1 x daily - 7 x weekly - 2-3 sets - 10 reps - 5 hold   Timed Minutes        Total Timed Treatment:     60   mins  Total Time of Visit:             60   mins         ASSESSMENT/PLAN     GOALS  Goals                                                    Progress Note due by 4/5/24                                                                Recert due by 5/4/24   STG by: 4 weeks Comments Date Status   Decrease subjective pain to 0/10  2/10 today  3/6 Ongoing   Decreased muscle guarding  throughout shoulder girdle  improved, but some guarding still present   3/6  MET   Improve mobility of shoulder girdle to avoid impingement  greatly improved mobility   3/6  MET             LTG by: 8 weeks         Symmetrical blanca shoulder elevation actively  162 deg B   3/6 MET   Able to resume household and work activities without exacerbation of symptoms  reports that this hasn't been too bad, though he tries to take it easy.   3/6  Ongoing    Gross shoulder MMT at 5/5 to improve ability to perform lifting job tasks  Grossly 4+/5 with pain   3/6  Ongoing   Understands improved ergonomics for work and HEP for flexibility and stability  he feels this is helping 3/6 ongoing   Improve QuickDASH score to 30 or less to demonstrate improved ability to complete home care tasks like caring for his son   IE: 61.36  3/6: 27.27  3/6  MET   Reports no pain except occasional minor twinges no more than 2/10  2/10 today  3/6  Ongoing       Assessment/Plan     ASSESSMENT: Goals assessed  for Progress Note Today. 2/3 STG and 1/6 LT goals met at this time.   ROM is now equal to that of unaffected arm, though the end range is slow and somewhat pain limited. Up to this point, we have prioritized increasing his ROM, to which he has responded well. We are now at a point in his recovery where he is ready to progress with strengthening. Introduced some end range strength today. The Pt would benefit from skilled PTx to decrease pain, improve functional mobility, progress toward goals, and increase overall quality of life.      PLAN:   Cont working on postural flexibility and progress with stability. Assess response to towel splinting at night to prevent prolonged flexion of R elbow. Work toward strengthening, especially at end ranges.     SIGNATURE: Trena López PT DPT, [place] License #: [id number]    Electronically Signed on 3/6/2024        [address] [place] [address]  [phone]

## 2024-03-08 ENCOUNTER — TREATMENT (OUTPATIENT)
Dept: PHYSICAL THERAPY | Facility: CLINIC | Age: 49
End: 2024-03-08
Payer: COMMERCIAL

## 2024-03-08 DIAGNOSIS — S83.241D ACUTE MEDIAL MENISCUS TEAR OF RIGHT KNEE, SUBSEQUENT ENCOUNTER: Primary | ICD-10-CM

## 2024-03-08 DIAGNOSIS — M75.41 IMPINGEMENT SYNDROME OF RIGHT SHOULDER REGION: ICD-10-CM

## 2024-03-08 DIAGNOSIS — G54.0 THORACIC OUTLET SYNDROME OF RIGHT THORACIC OUTLET: Primary | ICD-10-CM

## 2024-03-08 NOTE — PROGRESS NOTES
Physical Therapy Treatment Note  [address] [place] [address]    Patient: Saulo Sheaprd                                                 Visit Date: 3/8/2024  :     1975    Referring practitioner:    KYLER Thorpe  Date of Initial Visit:          Type: THERAPY  Noted: 2024    Patient seen for 10 sessions    Visit Diagnoses:    ICD-10-CM ICD-9-CM   1. Thoracic outlet syndrome of right thoracic outlet  G54.0 353.0   2. Impingement syndrome of right shoulder region  M75.41 726.2           SUBJECTIVE     Subjective:  Pt has been doing okay, he has been able to lift his arm higher!        PAIN: 1/10         OBJECTIVE     Objective     Therapeutic Exercises    32045 Units Comments   SciFit UE ergometer lvl 4.5 6 min 3 min fwd, 3 min bwd, switching direction each min    SL ER 2# weight  2x15    HL horizontal abduction GTB 2x15    HL vertical flexion RTB  2x10              Timed Minutes 20     Manual Therapy     05818  Comments   R first rib mobs     STM to R pec minor                 Timed Minutes 10     Neuromuscular Reeducation     47265 Comments   PNF rhythmic stabilization  Slow and fast    End range AAROM + Eccentric lowering flexion 1# X10    End range AAROM + eccentric lowering abduction 1# bar  X10            Timed Minutes 12           Therapy Education/Self Care 64067   Education offered today    Medbride Code    Ongoing HEP   Access Code: [email or web address]  URL: [email or web address]  Date: 2024  Prepared by: Trena López     Exercises  - Supine Shoulder Flexion Extension AAROM with Dowel  - 1 x daily - 7 x weekly - 2 sets - 10 reps  - Standing Shoulder Abduction AAROM with Dowel  - 1 x daily - 7 x weekly - 2 sets - 10 reps  - Shoulder External Rotation and Scapular Retraction with Resistance  - 1 x daily - 7 x weekly - 2 sets - 10 reps  - First Rib Mobilization with Strap  - 1 x daily - 7 x weekly - 2-3 sets - 10  reps - 5 hold   Timed Minutes        Total Timed Treatment:     42   mins  Total Time of Visit:             42   mins         ASSESSMENT/PLAN     GOALS  Goals                                                    Progress Note due by 4/5/24                                                                Recert due by 5/4/24   STG by: 4 weeks Comments Date Status   Decrease subjective pain to 0/10  2/10 today  3/6 Ongoing   Decreased muscle guarding  throughout shoulder girdle  improved, but some guarding still present   3/6  MET   Improve mobility of shoulder girdle to avoid impingement  greatly improved mobility   3/6  MET             LTG by: 8 weeks         Symmetrical blanca shoulder elevation actively  162 deg B   3/6 MET   Able to resume household and work activities without exacerbation of symptoms  reports that this hasn't been too bad, though he tries to take it easy.   3/6  Ongoing    Gross shoulder MMT at 5/5 to improve ability to perform lifting job tasks  Grossly 4+/5 with pain   3/6  Ongoing   Understands improved ergonomics for work and HEP for flexibility and stability  he feels this is helping 3/6 ongoing   Improve QuickDASH score to 30 or less to demonstrate improved ability to complete home care tasks like caring for his son   IE: 61.36  3/6: 27.27  3/6  MET   Reports no pain except occasional minor twinges no more than 2/10  2/10 today  3/6  Ongoing       Assessment/Plan     ASSESSMENT: Eccentric strengthening was much easier for him today, and will likely progress to utilizing full range concentric + eccentric at next session. He will also be ready for HEP progression at next visit       PLAN: Progress HEP to greater strengthening and postural control  Cont working on postural flexibility and progress with stability. Assess response to towel splinting at night to prevent prolonged flexion of R elbow. Work toward strengthening, especially at end ranges.     SIGNATURE: Trena López, PT DPT, [place] License  #: [id number]    Electronically Signed on 3/8/2024        [address] [place] [address]  [phone]

## 2024-03-13 ENCOUNTER — TREATMENT (OUTPATIENT)
Dept: PHYSICAL THERAPY | Facility: CLINIC | Age: 49
End: 2024-03-13
Payer: COMMERCIAL

## 2024-03-13 DIAGNOSIS — M75.41 IMPINGEMENT SYNDROME OF RIGHT SHOULDER REGION: ICD-10-CM

## 2024-03-13 DIAGNOSIS — G54.0 THORACIC OUTLET SYNDROME OF RIGHT THORACIC OUTLET: Primary | ICD-10-CM

## 2024-03-13 NOTE — PROGRESS NOTES
Physical Therapy Treatment Note  [address] [place] [address]    Patient: Saulo Shepard                                                 Visit Date: 3/13/2024  :     1975    Referring practitioner:    KYLER Thorpe  Date of Initial Visit:          Type: THERAPY  Noted: 2024    Patient seen for 11 sessions    Visit Diagnoses:    ICD-10-CM ICD-9-CM   1. Thoracic outlet syndrome of right thoracic outlet  G54.0 353.0   2. Impingement syndrome of right shoulder region  M75.41 726.2       SUBJECTIVE     Subjective: Pt notes that he hasn't had any problems since last visit. He continues to be able to raise the arm higher, and he hasn't been having any n/t since last visit.         PAIN: 0.5-1/10         OBJECTIVE     Objective       Therapeutic Exercises    03384 Units Comments   NEW HEP provided and trained   See education tab for details                       Timed Minutes 30     Manual Therapy     94888  Comments   R shoulder LAD with oscillatory ROM + OP     Inferior, posterior glides R GHJ    ER/IR stretching            Timed Minutes 13        Neuromuscular Reeducation     99232 Comments   PNF rhythmic stabilization  Slow and fast    PNF D2 2# weight  2x10, supine               Timed Minutes 10           Therapy Education/Self Care 66817   Education offered today    Medbride Code    Ongoing HEP   Access Code: [email or web address]  URL: [email or web address]  Date: 2024  Prepared by: Trena López    Program Notes  Don't have to do all in one day - split into two or three sections and perform one section at a time.     Exercises  - First Rib Mobilization with Strap  - 1 x daily - 7 x weekly - 2-3 sets - 10 reps - 5 hold  - Standing Scapular Depression  - 1 x daily - 7 x weekly - 2 sets - 10 reps - 5 hold  - Standing Shoulder Flexion with Resistance  - 1 x daily - 7 x weekly - 2 sets - 10 reps  - Squatting High Shoulder Row  with Resistance  - 1 x daily - 7 x weekly - 2 sets - 10 reps  - Shoulder Extension with Resistance - Palms Forward  - 1 x daily - 7 x weekly - 2 sets - 10 reps  - Shoulder Internal Rotation with Resistance  - 1 x daily - 7 x weekly - 2 sets - 10 reps  - Shoulder External Rotation and Scapular Retraction with Resistance  - 1 x daily - 7 x weekly - 2 sets - 10 reps  - Dynamic Hug with Resistance  - 1 x daily - 7 x weekly - 2 sets - 10 reps  - Wall [place]  - 1 x daily - 7 x weekly - 2 sets - 10 reps  - Standing Shoulder Horizontal Abduction with Resistance  - 1 x daily - 7 x weekly - 2 sets - 10 reps   Timed Minutes        Total Timed Treatment:     53   mins  Total Time of Visit:             53   mins         ASSESSMENT/PLAN     GOALS  Goals                                                    Progress Note due by 4/5/24                                                                Recert due by 5/4/24   STG by: 4 weeks Comments Date Status   Decrease subjective pain to 0/10  2/10 today  3/6 Ongoing   Decreased muscle guarding  throughout shoulder girdle  improved, but some guarding still present   3/6  MET   Improve mobility of shoulder girdle to avoid impingement  greatly improved mobility   3/6  MET             LTG by: 8 weeks         Symmetrical blanca shoulder elevation actively  162 deg B   3/6 MET   Able to resume household and work activities without exacerbation of symptoms  reports that this hasn't been too bad, though he tries to take it easy.   3/6  Ongoing    Gross shoulder MMT at 5/5 to improve ability to perform lifting job tasks  Grossly 4+/5 with pain   3/6  Ongoing   Understands improved ergonomics for work and HEP for flexibility and stability  he feels this is helping 3/6 ongoing   Improve QuickDASH score to 30 or less to demonstrate improved ability to complete home care tasks like caring for his son   IE: 61.36  3/6: 27.27  3/6  MET   Reports no pain except occasional minor twinges no more than 2/10   2/10 today  3/6  Ongoing       Assessment/Plan     ASSESSMENT: Progressed HEP for comprehensive shoulder girdle and postural muscle strengthening this date, and continued with manual techniques and stretching for ROM. He is progressing quite well.       PLAN: Assess response to new HEP.   Cont working on postural flexibility and progress with stability. Work toward strengthening, especially at end ranges.     SIGNATURE: Trena López PT DPT, [place] License #: [id number]    Electronically Signed on 3/13/2024        [address] [place] [address]  [phone]

## 2024-03-20 ENCOUNTER — TREATMENT (OUTPATIENT)
Dept: PHYSICAL THERAPY | Facility: CLINIC | Age: 49
End: 2024-03-20
Payer: COMMERCIAL

## 2024-03-20 DIAGNOSIS — M75.41 IMPINGEMENT SYNDROME OF RIGHT SHOULDER REGION: ICD-10-CM

## 2024-03-20 DIAGNOSIS — G54.0 THORACIC OUTLET SYNDROME OF RIGHT THORACIC OUTLET: Primary | ICD-10-CM

## 2024-03-20 NOTE — PROGRESS NOTES
Physical Therapy Treatment Note  [address] [place] [address]    Patient: Saulo Shepard                                                 Visit Date: 3/20/2024  :     1975    Referring practitioner:    KYLER Thorpe  Date of Initial Visit:          Type: THERAPY  Noted: 2024    Patient seen for 12 sessions    Visit Diagnoses:    ICD-10-CM ICD-9-CM   1. Thoracic outlet syndrome of right thoracic outlet  G54.0 353.0   2. Impingement syndrome of right shoulder region  M75.41 726.2         SUBJECTIVE     Subjective: Pt reports that he's been really busy at work. He had to do some shoveling at work, and this was a LOT better than the last time he had to do that. He didn't get much sleep last night due to overtime (he worked 22 hours yesterday). New HEP is going pretty well.        PAIN: 1/10         OBJECTIVE     Objective     Neuromuscular Reeducation     60576 Comments   PNF D1/D2 flexion (lvl 1)/ext (lvl 2) Cybex  2x10    Ball on wall circles  2x20 cw/ccw                Timed Minutes 20     Therapeutic Exercises    78640 Units Comments   Quadruped over ball ITYs  2x10 ea                         Timed Minutes 9         Manual Therapy     47056  Comments   R shoulder LAD with oscillatory ROM + OP     Percussive IASTM using PowerBoost lvl 1 using ball attachment to R shoulder grossly, luis anterior      ER/IR stretching            Timed Minutes 15           Therapy Education/Self Care 32667   Education offered today    Medbride Code    Ongoing HEP   Access Code: [email or web address]  URL: [email or web address]  Date: 2024  Prepared by: Trena López    Program Notes  Don't have to do all in one day - split into two or three sections and perform one section at a time.     Exercises  - First Rib Mobilization with Strap  - 1 x daily - 7 x weekly - 2-3 sets - 10 reps - 5 hold  - Standing Scapular Depression  - 1 x daily - 7 x weekly -  2 sets - 10 reps - 5 hold  - Standing Shoulder Flexion with Resistance  - 1 x daily - 7 x weekly - 2 sets - 10 reps  - Squatting High Shoulder Row with Resistance  - 1 x daily - 7 x weekly - 2 sets - 10 reps  - Shoulder Extension with Resistance - Palms Forward  - 1 x daily - 7 x weekly - 2 sets - 10 reps  - Shoulder Internal Rotation with Resistance  - 1 x daily - 7 x weekly - 2 sets - 10 reps  - Shoulder External Rotation and Scapular Retraction with Resistance  - 1 x daily - 7 x weekly - 2 sets - 10 reps  - Dynamic Hug with Resistance  - 1 x daily - 7 x weekly - 2 sets - 10 reps  - Wall [place]  - 1 x daily - 7 x weekly - 2 sets - 10 reps  - Standing Shoulder Horizontal Abduction with Resistance  - 1 x daily - 7 x weekly - 2 sets - 10 reps   Timed Minutes        Total Timed Treatment:     44   mins  Total Time of Visit:             44   mins         ASSESSMENT/PLAN     GOALS  Goals                                                    Progress Note due by 4/5/24                                                                Recert due by 5/4/24   STG by: 4 weeks Comments Date Status   Decrease subjective pain to 0/10  2/10 today  3/6 Ongoing   Decreased muscle guarding  throughout shoulder girdle  improved, but some guarding still present   3/6  MET   Improve mobility of shoulder girdle to avoid impingement  greatly improved mobility   3/6  MET             LTG by: 8 weeks         Symmetrical blanca shoulder elevation actively  162 deg B   3/6 MET   Able to resume household and work activities without exacerbation of symptoms  reports that this hasn't been too bad, though he tries to take it easy.   3/6  Ongoing    Gross shoulder MMT at 5/5 to improve ability to perform lifting job tasks  Grossly 4+/5 with pain   3/6  Ongoing   Understands improved ergonomics for work and HEP for flexibility and stability  he feels this is helping 3/6 ongoing   Improve QuickDASH score to 30 or less to demonstrate improved ability to  complete home care tasks like caring for his son   IE: 61.36  3/6: 27.27  3/6  MET   Reports no pain except occasional minor twinges no more than 2/10  2/10 today  3/6  Ongoing       Assessment/Plan     ASSESSMENT: Continued to progress with strengthening of shoulder girdle and postural muscles to decrease shoulder impingement. He is progressing well, and should be ready for d/c after next visit.      PLAN: Plan to d/c home at next visit.   Cont working on postural flexibility and progress with stability. Work toward strengthening, especially at end ranges.     SIGNATURE: Trena López PT DPT, [place] License #: [id number]    Electronically Signed on 3/20/2024        [address] [place] [address]  [phone]

## 2024-03-27 ENCOUNTER — TREATMENT (OUTPATIENT)
Dept: PHYSICAL THERAPY | Facility: CLINIC | Age: 49
End: 2024-03-27
Payer: COMMERCIAL

## 2024-03-27 DIAGNOSIS — M75.41 IMPINGEMENT SYNDROME OF RIGHT SHOULDER REGION: ICD-10-CM

## 2024-03-27 DIAGNOSIS — G54.0 THORACIC OUTLET SYNDROME OF RIGHT THORACIC OUTLET: Primary | ICD-10-CM

## 2024-03-27 NOTE — PROGRESS NOTES
Physical Therapy Treatment Note, 30 Day Progress Note, and Discharge Note  [address] [place] [address]    Patient: Saulo Shepard                                                 Visit Date: 3/27/2024  :     1975    Referring practitioner:    KYLER Thorpe  Date of Initial Visit:          Type: THERAPY  Noted: 2024    Patient seen for 13 sessions    Visit Diagnoses:    ICD-10-CM ICD-9-CM   1. Thoracic outlet syndrome of right thoracic outlet  G54.0 353.0   2. Impingement syndrome of right shoulder region  M75.41 726.2           SUBJECTIVE     Subjective: Pt reports that he did some hammering the other day. His shoulder was sore afterwards, though this was more muscle soreness than the pain he was having before. Reports 90% improvement since initiating physical therapy.  He feels confident that with his HEP, he will be able to continue his recovery independently.     PAIN: 1.5/10 muscle soreness          OBJECTIVE     Objective     Therapeutic Exercises    37577 Units Comments   TRX  decline rows  2x10     Cybex Lat pulls  2x10 Lvl 6   Knee Plank rows  2x10 ea Cybex lvl 3              Timed Minutes 15     Neuromuscular Reeducation     03698 Comments   Body blade with movement  X10 ea flexion, abd   Incline ball on table stir the pots  2x10 each direction               Timed Minutes 10     Manual Therapy     69297  Comments   R shoulder LAD with oscillatory ROM + OP     Percussive IASTM using PowerBoost lvl 1 using ball attachment to R shoulder grossly, luis anterior      ER/IR stretching            Timed Minutes 15           Therapy Education/Self Care 57749   Education offered today    Medbride Code    Ongoing HEP   Access Code: [email or web address]  URL: [email or web address]  Date: 2024  Prepared by: Trena López    Program Notes  Don't have to do all in one day - split into two or three sections and perform one section  at a time.     Exercises  - First Rib Mobilization with Strap  - 1 x daily - 7 x weekly - 2-3 sets - 10 reps - 5 hold  - Standing Scapular Depression  - 1 x daily - 7 x weekly - 2 sets - 10 reps - 5 hold  - Standing Shoulder Flexion with Resistance  - 1 x daily - 7 x weekly - 2 sets - 10 reps  - Squatting High Shoulder Row with Resistance  - 1 x daily - 7 x weekly - 2 sets - 10 reps  - Shoulder Extension with Resistance - Palms Forward  - 1 x daily - 7 x weekly - 2 sets - 10 reps  - Shoulder Internal Rotation with Resistance  - 1 x daily - 7 x weekly - 2 sets - 10 reps  - Shoulder External Rotation and Scapular Retraction with Resistance  - 1 x daily - 7 x weekly - 2 sets - 10 reps  - Dynamic Hug with Resistance  - 1 x daily - 7 x weekly - 2 sets - 10 reps  - Wall [place]  - 1 x daily - 7 x weekly - 2 sets - 10 reps  - Standing Shoulder Horizontal Abduction with Resistance  - 1 x daily - 7 x weekly - 2 sets - 10 reps   Timed Minutes        Total Timed Treatment:     40   mins  Total Time of Visit:             40   mins         ASSESSMENT/PLAN     GOALS  Goals                                                    Progress Note due by 4/5/24                                                                Recert due by 5/4/24   STG by: 4 weeks Comments Date Status   Decrease subjective pain to 0/10  1.5/10 today  3/27 Not met    Decreased muscle guarding  throughout shoulder girdle  improved, but some guarding still present   3/6  MET   Improve mobility of shoulder girdle to avoid impingement  greatly improved mobility   3/6  MET             LTG by: 8 weeks         Symmetrical blanca shoulder elevation actively  162 deg B   3/6 MET   Able to resume household and work activities without exacerbation of symptoms Resumed heavy duties, including hammering   3/27  MET    Gross shoulder MMT at 5/5 to improve ability to perform lifting job tasks  Grossly 5/5, pain free    3/27  MET   Understands improved ergonomics for work and HEP  for flexibility and stability Has progressive HEP finalized and is confident to progress independently. 3/27 MET   Improve QuickDASH score to 30 or less to demonstrate improved ability to complete home care tasks like caring for his son   IE: 61.36  3/6: 27.27  3/6  MET   Reports no pain except occasional minor twinges no more than 2/10  1.5/10 today  3/27  MET       Assessment/Plan     ASSESSMENT: Remaining goals met for Progress note and discharge today. All remaining goals with the exception of one met at this time, and Saulo is confident in self progressing with his comprehensive HEP. He has been able to resume heavier work around the house, including hammering with a 3# maul. He did have some muscle soreness after this, though not the pain that he was having. He is now appropriate for discharge and self progression at home.       PLAN: D/c home with HEP.     DISCHARGE SUMMARY   Discharge date 3/27/2024   Dates of this episode 2/5/24 through 3/27/24   Number of visits on this episode 13   Reason for discharge all goals met  Independent  All goals met or almost met    Outcomes achieved Refer to the goals table for specifics on goals   Discharge plan Continue with current home exercise program as instructed   Summary of care See Assessment    Discharge instruction See plan      SIGNATURE: Trena López PT DPT, [place] License #: [id number]    Electronically Signed on 3/27/2024          [place]. [id number]  [phone]

## 2024-04-10 ENCOUNTER — HOSPITAL ENCOUNTER (INPATIENT)
Facility: HOSPITAL | Age: 49
LOS: 2 days | Discharge: HOME | DRG: 392 | End: 2024-04-12
Attending: SPECIALIST | Admitting: SPECIALIST
Payer: COMMERCIAL

## 2024-04-10 ENCOUNTER — APPOINTMENT (OUTPATIENT)
Dept: CT IMAGING | Facility: HOSPITAL | Age: 49
DRG: 392 | End: 2024-04-10
Payer: COMMERCIAL

## 2024-04-10 ENCOUNTER — NURSE TRIAGE (OUTPATIENT)
Dept: CALL CENTER | Facility: HOSPITAL | Age: 49
End: 2024-04-10
Payer: COMMERCIAL

## 2024-04-10 DIAGNOSIS — K57.92 DIVERTICULITIS: Primary | ICD-10-CM

## 2024-04-10 LAB
ALBUMIN SERPL-MCNC: 4.1 G/DL (ref 3.5–5.2)
ALBUMIN/GLOB SERPL: 1.5 G/DL
ALP SERPL-CCNC: 90 U/L (ref 39–117)
ALT SERPL W P-5'-P-CCNC: 24 U/L (ref 1–41)
ANION GAP SERPL CALCULATED.3IONS-SCNC: 8 MMOL/L (ref 5–15)
AST SERPL-CCNC: 21 U/L (ref 1–40)
BASOPHILS # BLD AUTO: 0.13 10*3/MM3 (ref 0–0.2)
BASOPHILS NFR BLD AUTO: 0.6 % (ref 0–1.5)
BILIRUB SERPL-MCNC: 0.7 MG/DL (ref 0–1.2)
BILIRUB UR QL STRIP: NEGATIVE
BUN SERPL-MCNC: 9 MG/DL (ref 6–20)
BUN/CREAT SERPL: 8 (ref 7–25)
CALCIUM SPEC-SCNC: 10.4 MG/DL (ref 8.6–10.5)
CHLORIDE SERPL-SCNC: 103 MMOL/L (ref 98–107)
CLARITY UR: CLEAR
CO2 SERPL-SCNC: 28 MMOL/L (ref 22–29)
COLOR UR: YELLOW
CREAT SERPL-MCNC: 1.12 MG/DL (ref 0.76–1.27)
CRP SERPL-MCNC: 3.8 MG/DL (ref 0–0.5)
D-LACTATE SERPL-SCNC: 0.8 MMOL/L (ref 0.5–2)
DEPRECATED RDW RBC AUTO: 48 FL (ref 37–54)
EGFRCR SERPLBLD CKD-EPI 2021: 81.5 ML/MIN/1.73
EOSINOPHIL # BLD AUTO: 0.23 10*3/MM3 (ref 0–0.4)
EOSINOPHIL NFR BLD AUTO: 1 % (ref 0.3–6.2)
ERYTHROCYTE [DISTWIDTH] IN BLOOD BY AUTOMATED COUNT: 14.8 % (ref 12.3–15.4)
GLOBULIN UR ELPH-MCNC: 2.8 GM/DL
GLUCOSE SERPL-MCNC: 101 MG/DL (ref 65–99)
GLUCOSE UR STRIP-MCNC: NEGATIVE MG/DL
HCT VFR BLD AUTO: 50.2 % (ref 37.5–51)
HGB BLD-MCNC: 16.8 G/DL (ref 13–17.7)
HGB UR QL STRIP.AUTO: NEGATIVE
HOLD SPECIMEN: NORMAL
HOLD SPECIMEN: NORMAL
IMM GRANULOCYTES # BLD AUTO: 0.1 10*3/MM3 (ref 0–0.05)
IMM GRANULOCYTES NFR BLD AUTO: 0.5 % (ref 0–0.5)
KETONES UR QL STRIP: ABNORMAL
LEUKOCYTE ESTERASE UR QL STRIP.AUTO: NEGATIVE
LIPASE SERPL-CCNC: 24 U/L (ref 13–60)
LYMPHOCYTES # BLD AUTO: 3.82 10*3/MM3 (ref 0.7–3.1)
LYMPHOCYTES NFR BLD AUTO: 17.3 % (ref 19.6–45.3)
MCH RBC QN AUTO: 29.5 PG (ref 26.6–33)
MCHC RBC AUTO-ENTMCNC: 33.5 G/DL (ref 31.5–35.7)
MCV RBC AUTO: 88.2 FL (ref 79–97)
MONOCYTES # BLD AUTO: 2.21 10*3/MM3 (ref 0.1–0.9)
MONOCYTES NFR BLD AUTO: 10 % (ref 5–12)
NEUTROPHILS NFR BLD AUTO: 15.64 10*3/MM3 (ref 1.7–7)
NEUTROPHILS NFR BLD AUTO: 70.6 % (ref 42.7–76)
NITRITE UR QL STRIP: NEGATIVE
NRBC BLD AUTO-RTO: 0 /100 WBC (ref 0–0.2)
PH UR STRIP.AUTO: 8 [PH] (ref 5–8)
PLATELET # BLD AUTO: 394 10*3/MM3 (ref 140–450)
PMV BLD AUTO: 10.5 FL (ref 6–12)
POTASSIUM SERPL-SCNC: 4 MMOL/L (ref 3.5–5.2)
PROT SERPL-MCNC: 6.9 G/DL (ref 6–8.5)
PROT UR QL STRIP: NEGATIVE
RBC # BLD AUTO: 5.69 10*6/MM3 (ref 4.14–5.8)
SODIUM SERPL-SCNC: 139 MMOL/L (ref 136–145)
SP GR UR STRIP: 1.02 (ref 1–1.03)
UROBILINOGEN UR QL STRIP: ABNORMAL
WBC NRBC COR # BLD AUTO: 22.13 10*3/MM3 (ref 3.4–10.8)
WHOLE BLOOD HOLD COAG: NORMAL

## 2024-04-10 PROCEDURE — 25010000002 ONDANSETRON PER 1 MG: Performed by: NURSE PRACTITIONER

## 2024-04-10 PROCEDURE — 86140 C-REACTIVE PROTEIN: CPT | Performed by: SPECIALIST

## 2024-04-10 PROCEDURE — 25010000002 HYDROMORPHONE PER 4 MG: Performed by: SPECIALIST

## 2024-04-10 PROCEDURE — 25010000002 CEFOXITIN PER 1 G: Performed by: SPECIALIST

## 2024-04-10 PROCEDURE — 81003 URINALYSIS AUTO W/O SCOPE: CPT | Performed by: NURSE PRACTITIONER

## 2024-04-10 PROCEDURE — 85025 COMPLETE CBC W/AUTO DIFF WBC: CPT | Performed by: NURSE PRACTITIONER

## 2024-04-10 PROCEDURE — 25010000002 ENOXAPARIN PER 10 MG: Performed by: SPECIALIST

## 2024-04-10 PROCEDURE — 96375 TX/PRO/DX INJ NEW DRUG ADDON: CPT

## 2024-04-10 PROCEDURE — 25010000002 LEVOFLOXACIN PER 250 MG: Performed by: NURSE PRACTITIONER

## 2024-04-10 PROCEDURE — 96372 THER/PROPH/DIAG INJ SC/IM: CPT

## 2024-04-10 PROCEDURE — 80053 COMPREHEN METABOLIC PANEL: CPT | Performed by: NURSE PRACTITIONER

## 2024-04-10 PROCEDURE — 74176 CT ABD & PELVIS W/O CONTRAST: CPT

## 2024-04-10 PROCEDURE — 36415 COLL VENOUS BLD VENIPUNCTURE: CPT

## 2024-04-10 PROCEDURE — 96365 THER/PROPH/DIAG IV INF INIT: CPT

## 2024-04-10 PROCEDURE — 96376 TX/PRO/DX INJ SAME DRUG ADON: CPT

## 2024-04-10 PROCEDURE — 25010000002 METRONIDAZOLE 500 MG/100ML SOLUTION: Performed by: NURSE PRACTITIONER

## 2024-04-10 PROCEDURE — 83690 ASSAY OF LIPASE: CPT | Performed by: NURSE PRACTITIONER

## 2024-04-10 PROCEDURE — 99222 1ST HOSP IP/OBS MODERATE 55: CPT | Performed by: SPECIALIST

## 2024-04-10 PROCEDURE — 99285 EMERGENCY DEPT VISIT HI MDM: CPT

## 2024-04-10 PROCEDURE — 25810000003 LACTATED RINGERS SOLUTION: Performed by: SPECIALIST

## 2024-04-10 PROCEDURE — 25010000002 HYDROMORPHONE PER 4 MG: Performed by: NURSE PRACTITIONER

## 2024-04-10 PROCEDURE — 83605 ASSAY OF LACTIC ACID: CPT | Performed by: NURSE PRACTITIONER

## 2024-04-10 PROCEDURE — 87040 BLOOD CULTURE FOR BACTERIA: CPT | Performed by: NURSE PRACTITIONER

## 2024-04-10 RX ORDER — POLYETHYLENE GLYCOL 3350 17 G/17G
17 POWDER, FOR SOLUTION ORAL DAILY PRN
Status: DISCONTINUED | OUTPATIENT
Start: 2024-04-10 | End: 2024-04-12 | Stop reason: HOSPADM

## 2024-04-10 RX ORDER — SODIUM CHLORIDE 9 MG/ML
40 INJECTION, SOLUTION INTRAVENOUS AS NEEDED
Status: DISCONTINUED | OUTPATIENT
Start: 2024-04-10 | End: 2024-04-12 | Stop reason: HOSPADM

## 2024-04-10 RX ORDER — METRONIDAZOLE 500 MG/100ML
500 INJECTION, SOLUTION INTRAVENOUS ONCE
Status: COMPLETED | OUTPATIENT
Start: 2024-04-10 | End: 2024-04-10

## 2024-04-10 RX ORDER — HYDROMORPHONE HYDROCHLORIDE 1 MG/ML
0.5 INJECTION, SOLUTION INTRAMUSCULAR; INTRAVENOUS; SUBCUTANEOUS ONCE
Status: COMPLETED | OUTPATIENT
Start: 2024-04-10 | End: 2024-04-10

## 2024-04-10 RX ORDER — BISACODYL 10 MG
10 SUPPOSITORY, RECTAL RECTAL DAILY PRN
Status: DISCONTINUED | OUTPATIENT
Start: 2024-04-10 | End: 2024-04-12 | Stop reason: HOSPADM

## 2024-04-10 RX ORDER — FAMOTIDINE 10 MG/ML
20 INJECTION, SOLUTION INTRAVENOUS EVERY 12 HOURS SCHEDULED
Status: DISCONTINUED | OUTPATIENT
Start: 2024-04-10 | End: 2024-04-12 | Stop reason: HOSPADM

## 2024-04-10 RX ORDER — BISACODYL 5 MG/1
5 TABLET, DELAYED RELEASE ORAL DAILY PRN
Status: DISCONTINUED | OUTPATIENT
Start: 2024-04-10 | End: 2024-04-12 | Stop reason: HOSPADM

## 2024-04-10 RX ORDER — ONDANSETRON 4 MG/1
8 TABLET, ORALLY DISINTEGRATING ORAL EVERY 6 HOURS PRN
Status: DISCONTINUED | OUTPATIENT
Start: 2024-04-10 | End: 2024-04-12 | Stop reason: HOSPADM

## 2024-04-10 RX ORDER — SODIUM CHLORIDE 0.9 % (FLUSH) 0.9 %
10 SYRINGE (ML) INJECTION AS NEEDED
Status: DISCONTINUED | OUTPATIENT
Start: 2024-04-10 | End: 2024-04-12 | Stop reason: HOSPADM

## 2024-04-10 RX ORDER — SUCRALFATE 1 G/1
1 TABLET ORAL
Status: DISCONTINUED | OUTPATIENT
Start: 2024-04-10 | End: 2024-04-12 | Stop reason: HOSPADM

## 2024-04-10 RX ORDER — HYDROMORPHONE HYDROCHLORIDE 1 MG/ML
0.5 INJECTION, SOLUTION INTRAMUSCULAR; INTRAVENOUS; SUBCUTANEOUS
Status: DISCONTINUED | OUTPATIENT
Start: 2024-04-10 | End: 2024-04-12 | Stop reason: HOSPADM

## 2024-04-10 RX ORDER — DEXTROSE MONOHYDRATE, SODIUM CHLORIDE, AND POTASSIUM CHLORIDE 50; 1.49; 4.5 G/1000ML; G/1000ML; G/1000ML
125 INJECTION, SOLUTION INTRAVENOUS CONTINUOUS
Status: DISCONTINUED | OUTPATIENT
Start: 2024-04-10 | End: 2024-04-12 | Stop reason: HOSPADM

## 2024-04-10 RX ORDER — AMOXICILLIN 250 MG
2 CAPSULE ORAL 2 TIMES DAILY
Status: DISCONTINUED | OUTPATIENT
Start: 2024-04-10 | End: 2024-04-12 | Stop reason: HOSPADM

## 2024-04-10 RX ORDER — ONDANSETRON 2 MG/ML
4 INJECTION INTRAMUSCULAR; INTRAVENOUS ONCE
Status: COMPLETED | OUTPATIENT
Start: 2024-04-10 | End: 2024-04-10

## 2024-04-10 RX ORDER — ENOXAPARIN SODIUM 100 MG/ML
30 INJECTION SUBCUTANEOUS EVERY 12 HOURS SCHEDULED
Status: DISCONTINUED | OUTPATIENT
Start: 2024-04-10 | End: 2024-04-12 | Stop reason: HOSPADM

## 2024-04-10 RX ORDER — LEVOFLOXACIN 5 MG/ML
750 INJECTION, SOLUTION INTRAVENOUS ONCE
Status: COMPLETED | OUTPATIENT
Start: 2024-04-10 | End: 2024-04-10

## 2024-04-10 RX ORDER — SIMETHICONE 80 MG
80 TABLET,CHEWABLE ORAL 4 TIMES DAILY PRN
Status: DISCONTINUED | OUTPATIENT
Start: 2024-04-10 | End: 2024-04-12 | Stop reason: HOSPADM

## 2024-04-10 RX ORDER — SODIUM CHLORIDE 0.9 % (FLUSH) 0.9 %
10 SYRINGE (ML) INJECTION EVERY 12 HOURS SCHEDULED
Status: DISCONTINUED | OUTPATIENT
Start: 2024-04-10 | End: 2024-04-12 | Stop reason: HOSPADM

## 2024-04-10 RX ORDER — LORAZEPAM 2 MG/ML
1 INJECTION INTRAMUSCULAR EVERY 4 HOURS PRN
Status: DISCONTINUED | OUTPATIENT
Start: 2024-04-10 | End: 2024-04-12 | Stop reason: HOSPADM

## 2024-04-10 RX ORDER — METRONIDAZOLE 500 MG/100ML
500 INJECTION, SOLUTION INTRAVENOUS EVERY 6 HOURS
Qty: 2000 ML | Refills: 0 | Status: DISCONTINUED | OUTPATIENT
Start: 2024-04-11 | End: 2024-04-12 | Stop reason: HOSPADM

## 2024-04-10 RX ORDER — FAMOTIDINE 20 MG/1
20 TABLET, FILM COATED ORAL EVERY 12 HOURS SCHEDULED
Status: DISCONTINUED | OUTPATIENT
Start: 2024-04-10 | End: 2024-04-12 | Stop reason: HOSPADM

## 2024-04-10 RX ADMIN — FAMOTIDINE 20 MG: 10 INJECTION INTRAVENOUS at 21:20

## 2024-04-10 RX ADMIN — HYDROMORPHONE HYDROCHLORIDE 0.5 MG: 1 INJECTION, SOLUTION INTRAMUSCULAR; INTRAVENOUS; SUBCUTANEOUS at 18:28

## 2024-04-10 RX ADMIN — ENOXAPARIN SODIUM 30 MG: 100 INJECTION SUBCUTANEOUS at 21:20

## 2024-04-10 RX ADMIN — ONDANSETRON 4 MG: 2 INJECTION INTRAMUSCULAR; INTRAVENOUS at 18:28

## 2024-04-10 RX ADMIN — SUCRALFATE 1 G: 1 TABLET ORAL at 21:38

## 2024-04-10 RX ADMIN — METRONIDAZOLE 500 MG: 500 INJECTION, SOLUTION INTRAVENOUS at 19:37

## 2024-04-10 RX ADMIN — Medication 10 ML: at 21:20

## 2024-04-10 RX ADMIN — HYDROMORPHONE HYDROCHLORIDE 0.5 MG: 1 INJECTION, SOLUTION INTRAMUSCULAR; INTRAVENOUS; SUBCUTANEOUS at 22:35

## 2024-04-10 RX ADMIN — POTASSIUM CHLORIDE, DEXTROSE MONOHYDRATE AND SODIUM CHLORIDE 125 ML/HR: 150; 5; 450 INJECTION, SOLUTION INTRAVENOUS at 21:19

## 2024-04-10 RX ADMIN — DOCUSATE SODIUM AND SENNOSIDES 2 TABLET: 8.6; 5 TABLET, FILM COATED ORAL at 21:20

## 2024-04-10 RX ADMIN — CEFOXITIN 2000 MG: 2 INJECTION, POWDER, FOR SOLUTION INTRAVENOUS at 21:19

## 2024-04-10 RX ADMIN — LEVOFLOXACIN 750 MG: 750 INJECTION, SOLUTION INTRAVENOUS at 20:25

## 2024-04-10 RX ADMIN — SODIUM CHLORIDE, POTASSIUM CHLORIDE, SODIUM LACTATE AND CALCIUM CHLORIDE 1000 ML: 600; 310; 30; 20 INJECTION, SOLUTION INTRAVENOUS at 20:03

## 2024-04-10 NOTE — TELEPHONE ENCOUNTER
"Reason for Disposition   [1] SEVERE pain (e.g., excruciating) AND [2] present > 1 hour    Additional Information   Negative: Shock suspected (e.g., cold/pale/clammy skin, too weak to stand, low BP, rapid pulse)   Negative: Difficult to awaken or acting confused (e.g., disoriented, slurred speech)   Negative: Passed out (i.e., lost consciousness, collapsed and was not responding)   Negative: Sounds like a life-threatening emergency to the triager   Negative: Chest pain   Negative: Pain is mainly in upper abdomen  (if needed ask: \"is it mainly above the belly button?\")   Negative: Followed an abdomen (stomach) injury   Negative: Abdomen bloating or swelling are main symptoms    Answer Assessment - Initial Assessment Questions  1. LOCATION: \"Where does it hurt?\"       Abd pain on left side  2. RADIATION: \"Does the pain shoot anywhere else?\" (e.g., chest, back)      Up to hip  3. ONSET: \"When did the pain begin?\" (Minutes, hours or days ago)       today  4. SUDDEN: \"Gradual or sudden onset?\"      Sudden onset  5. PATTERN \"Does the pain come and go, or is it constant?\"     - If it comes and goes: \"How long does it last?\" \"Do you have pain now?\"      (Note: Comes and goes means the pain is intermittent. It goes away completely between bouts.)     - If constant: \"Is it getting better, staying the same, or getting worse?\"       (Note: Constant means the pain never goes away completely; most serious pain is constant and gets worse.)       constisant  6. SEVERITY: \"How bad is the pain?\"  (e.g., Scale 1-10; mild, moderate, or severe)     - MILD (1-3): Doesn't interfere with normal activities, abdomen soft and not tender to touch.      - MODERATE (4-7): Interferes with normal activities or awakens from sleep, abdomen tender to touch.      - SEVERE (8-10): Excruciating pain, doubled over, unable to do any normal activities.        7  7. RECURRENT SYMPTOM: \"Have you ever had this type of stomach pain before?\" If Yes, ask: \"When " "was the last time?\" and \"What happened that time?\"       denies  8. CAUSE: \"What do you think is causing the stomach pain?\"      unknown  9. RELIEVING/AGGRAVATING FACTORS: \"What makes it better or worse?\" (e.g., antacids, bending or twisting motion, bowel movement)      nothing  10. OTHER SYMPTOMS: \"Do you have any other symptoms?\" (e.g., back pain, diarrhea, fever, urination pain, vomiting)        no    Protocols used: Abdominal Pain - Male-ADULT-AH    "

## 2024-04-10 NOTE — LETTER
April 12, 2024     Patient: Saulo Shepard   YOB: 1975   Date of Visit: 4/10/2024       To Whom It May Concern:    It is my medical opinion that Saulo Shepard may return to work Monday, April 15, 2024.           Sincerely,          Dr. ISA Boyd

## 2024-04-11 LAB
ALBUMIN SERPL-MCNC: 3.3 G/DL (ref 3.5–5.2)
ALBUMIN/GLOB SERPL: 1.4 G/DL
ALP SERPL-CCNC: 72 U/L (ref 39–117)
ALT SERPL W P-5'-P-CCNC: 17 U/L (ref 1–41)
AMYLASE SERPL-CCNC: 58 U/L (ref 28–100)
ANION GAP SERPL CALCULATED.3IONS-SCNC: 8 MMOL/L (ref 5–15)
AST SERPL-CCNC: 15 U/L (ref 1–40)
BASOPHILS # BLD AUTO: 0.11 10*3/MM3 (ref 0–0.2)
BASOPHILS NFR BLD AUTO: 0.6 % (ref 0–1.5)
BILIRUB SERPL-MCNC: 1.2 MG/DL (ref 0–1.2)
BUN SERPL-MCNC: 8 MG/DL (ref 6–20)
BUN/CREAT SERPL: 8 (ref 7–25)
CALCIUM SPEC-SCNC: 9.5 MG/DL (ref 8.6–10.5)
CHLORIDE SERPL-SCNC: 103 MMOL/L (ref 98–107)
CO2 SERPL-SCNC: 25 MMOL/L (ref 22–29)
CREAT SERPL-MCNC: 1 MG/DL (ref 0.76–1.27)
DEPRECATED RDW RBC AUTO: 48.1 FL (ref 37–54)
EGFRCR SERPLBLD CKD-EPI 2021: 93.4 ML/MIN/1.73
EOSINOPHIL # BLD AUTO: 0.29 10*3/MM3 (ref 0–0.4)
EOSINOPHIL NFR BLD AUTO: 1.7 % (ref 0.3–6.2)
ERYTHROCYTE [DISTWIDTH] IN BLOOD BY AUTOMATED COUNT: 14.7 % (ref 12.3–15.4)
ERYTHROCYTE [SEDIMENTATION RATE] IN BLOOD: 10 MM/HR (ref 0–15)
GLOBULIN UR ELPH-MCNC: 2.4 GM/DL
GLUCOSE SERPL-MCNC: 109 MG/DL (ref 65–99)
HCT VFR BLD AUTO: 45.9 % (ref 37.5–51)
HGB BLD-MCNC: 15 G/DL (ref 13–17.7)
IMM GRANULOCYTES # BLD AUTO: 0.1 10*3/MM3 (ref 0–0.05)
IMM GRANULOCYTES NFR BLD AUTO: 0.6 % (ref 0–0.5)
LIPASE SERPL-CCNC: 17 U/L (ref 13–60)
LYMPHOCYTES # BLD AUTO: 4.1 10*3/MM3 (ref 0.7–3.1)
LYMPHOCYTES NFR BLD AUTO: 23.6 % (ref 19.6–45.3)
MCH RBC QN AUTO: 29.1 PG (ref 26.6–33)
MCHC RBC AUTO-ENTMCNC: 32.7 G/DL (ref 31.5–35.7)
MCV RBC AUTO: 89.1 FL (ref 79–97)
MONOCYTES # BLD AUTO: 2.53 10*3/MM3 (ref 0.1–0.9)
MONOCYTES NFR BLD AUTO: 14.6 % (ref 5–12)
NEUTROPHILS NFR BLD AUTO: 10.21 10*3/MM3 (ref 1.7–7)
NEUTROPHILS NFR BLD AUTO: 58.9 % (ref 42.7–76)
NRBC BLD AUTO-RTO: 0 /100 WBC (ref 0–0.2)
PLATELET # BLD AUTO: 358 10*3/MM3 (ref 140–450)
PMV BLD AUTO: 10.9 FL (ref 6–12)
POTASSIUM SERPL-SCNC: 3.9 MMOL/L (ref 3.5–5.2)
PROT SERPL-MCNC: 5.7 G/DL (ref 6–8.5)
RBC # BLD AUTO: 5.15 10*6/MM3 (ref 4.14–5.8)
SODIUM SERPL-SCNC: 136 MMOL/L (ref 136–145)
WBC NRBC COR # BLD AUTO: 17.34 10*3/MM3 (ref 3.4–10.8)

## 2024-04-11 PROCEDURE — 25010000002 HYDROMORPHONE PER 4 MG: Performed by: SPECIALIST

## 2024-04-11 PROCEDURE — 25010000002 METRONIDAZOLE 500 MG/100ML SOLUTION: Performed by: SPECIALIST

## 2024-04-11 PROCEDURE — 99232 SBSQ HOSP IP/OBS MODERATE 35: CPT

## 2024-04-11 PROCEDURE — 96372 THER/PROPH/DIAG INJ SC/IM: CPT

## 2024-04-11 PROCEDURE — 83690 ASSAY OF LIPASE: CPT | Performed by: SPECIALIST

## 2024-04-11 PROCEDURE — 96376 TX/PRO/DX INJ SAME DRUG ADON: CPT

## 2024-04-11 PROCEDURE — 36415 COLL VENOUS BLD VENIPUNCTURE: CPT | Performed by: SPECIALIST

## 2024-04-11 PROCEDURE — 82150 ASSAY OF AMYLASE: CPT | Performed by: SPECIALIST

## 2024-04-11 PROCEDURE — 80053 COMPREHEN METABOLIC PANEL: CPT | Performed by: SPECIALIST

## 2024-04-11 PROCEDURE — 85652 RBC SED RATE AUTOMATED: CPT | Performed by: SPECIALIST

## 2024-04-11 PROCEDURE — 25010000002 ENOXAPARIN PER 10 MG: Performed by: SPECIALIST

## 2024-04-11 PROCEDURE — 85025 COMPLETE CBC W/AUTO DIFF WBC: CPT | Performed by: SPECIALIST

## 2024-04-11 PROCEDURE — 25010000002 CEFOXITIN PER 1 G: Performed by: SPECIALIST

## 2024-04-11 PROCEDURE — 25010000002 CEFTRIAXONE PER 250 MG

## 2024-04-11 RX ORDER — MELOXICAM 15 MG/1
15 TABLET ORAL DAILY
COMMUNITY

## 2024-04-11 RX ADMIN — CEFOXITIN 2000 MG: 2 INJECTION, POWDER, FOR SOLUTION INTRAVENOUS at 08:35

## 2024-04-11 RX ADMIN — SUCRALFATE 1 G: 1 TABLET ORAL at 11:16

## 2024-04-11 RX ADMIN — Medication 10 ML: at 08:38

## 2024-04-11 RX ADMIN — CEFOXITIN 2000 MG: 2 INJECTION, POWDER, FOR SOLUTION INTRAVENOUS at 14:10

## 2024-04-11 RX ADMIN — SUCRALFATE 1 G: 1 TABLET ORAL at 17:32

## 2024-04-11 RX ADMIN — ENOXAPARIN SODIUM 30 MG: 100 INJECTION SUBCUTANEOUS at 08:17

## 2024-04-11 RX ADMIN — METRONIDAZOLE 500 MG: 5 INJECTION, SOLUTION INTRAVENOUS at 14:11

## 2024-04-11 RX ADMIN — ENOXAPARIN SODIUM 30 MG: 100 INJECTION SUBCUTANEOUS at 20:30

## 2024-04-11 RX ADMIN — SUCRALFATE 1 G: 1 TABLET ORAL at 21:06

## 2024-04-11 RX ADMIN — SUCRALFATE 1 G: 1 TABLET ORAL at 08:14

## 2024-04-11 RX ADMIN — CEFOXITIN 2000 MG: 2 INJECTION, POWDER, FOR SOLUTION INTRAVENOUS at 01:16

## 2024-04-11 RX ADMIN — METRONIDAZOLE 500 MG: 5 INJECTION, SOLUTION INTRAVENOUS at 08:35

## 2024-04-11 RX ADMIN — DOCUSATE SODIUM AND SENNOSIDES 2 TABLET: 8.6; 5 TABLET, FILM COATED ORAL at 08:15

## 2024-04-11 RX ADMIN — HYDROMORPHONE HYDROCHLORIDE 0.5 MG: 1 INJECTION, SOLUTION INTRAMUSCULAR; INTRAVENOUS; SUBCUTANEOUS at 08:32

## 2024-04-11 RX ADMIN — Medication 10 ML: at 20:30

## 2024-04-11 RX ADMIN — FAMOTIDINE 20 MG: 10 INJECTION INTRAVENOUS at 20:29

## 2024-04-11 RX ADMIN — CEFTRIAXONE SODIUM 2000 MG: 2 INJECTION, POWDER, FOR SOLUTION INTRAMUSCULAR; INTRAVENOUS at 17:32

## 2024-04-11 RX ADMIN — METRONIDAZOLE 500 MG: 5 INJECTION, SOLUTION INTRAVENOUS at 02:16

## 2024-04-11 RX ADMIN — HYDROMORPHONE HYDROCHLORIDE 0.5 MG: 1 INJECTION, SOLUTION INTRAMUSCULAR; INTRAVENOUS; SUBCUTANEOUS at 19:47

## 2024-04-11 RX ADMIN — METRONIDAZOLE 500 MG: 5 INJECTION, SOLUTION INTRAVENOUS at 20:30

## 2024-04-11 RX ADMIN — POTASSIUM CHLORIDE, DEXTROSE MONOHYDRATE AND SODIUM CHLORIDE 125 ML/HR: 150; 5; 450 INJECTION, SOLUTION INTRAVENOUS at 14:11

## 2024-04-11 RX ADMIN — FAMOTIDINE 20 MG: 20 TABLET, FILM COATED ORAL at 08:13

## 2024-04-11 RX ADMIN — DOCUSATE SODIUM AND SENNOSIDES 2 TABLET: 8.6; 5 TABLET, FILM COATED ORAL at 20:29

## 2024-04-11 NOTE — ED NOTES
Nursing report ED to floor  Saulo Shepard  48 y.o.  male    HPI:   Chief Complaint   Patient presents with    Abdominal Pain       Admitting doctor:   René Dallas MD    Consulting provider(s):  Consults       No orders found from 3/12/2024 to 4/11/2024.             Admitting diagnosis:   The encounter diagnosis was Diverticulitis.    Code status:   Current Code Status       Date Active Code Status Order ID Comments User Context       4/10/2024 1942 CPR (Attempt to Resuscitate) [id number]  René Dallas MD ED        Question Answer    Code Status (Patient has no pulse and is not breathing) CPR (Attempt to Resuscitate)    Medical Interventions (Patient has pulse or is breathing) Full Support    Level Of Support Discussed With Patient                    Allergies:   Ct contrast    Intake and Output  No intake or output data in the 24 hours ending 04/10/24 1959    Weight:       04/10/24  1734   Weight: 99.3 kg (219 lb)       Most recent vitals:   Vitals:    04/10/24 1848 04/10/24 1901 04/10/24 1916 04/10/24 1948   BP: 139/81 125/85 126/87 116/72   Pulse: 96 94 98 98   Resp:       Temp:       SpO2: 95% 96% 96% 96%   Weight:       Height:         Oxygen Therapy: .    Active LDAs/IV Access:   Lines, Drains & Airways       Active LDAs       Name Placement date Placement time Site Days    Peripheral IV 04/10/24 1819 Right Antecubital 04/10/24  1819  Antecubital  less than 1                    Labs (abnormal labs have a star):   Labs Reviewed   URINALYSIS W/ MICROSCOPIC IF INDICATED (NO CULTURE) - Abnormal; Notable for the following components:       Result Value    Ketones, UA Trace (*)     All other components within normal limits    Narrative:     Urine microscopic not indicated.   COMPREHENSIVE METABOLIC PANEL - Abnormal; Notable for the following components:    Glucose 101 (*)     All other components within normal limits    Narrative:     GFR Normal >60  Chronic Kidney Disease <60  Kidney Failure <15     CBC  WITH AUTO DIFFERENTIAL - Abnormal; Notable for the following components:    WBC 22.13 (*)     Lymphocyte % 17.3 (*)     Neutrophils, Absolute 15.64 (*)     Lymphocytes, Absolute 3.82 (*)     Monocytes, Absolute 2.21 (*)     Immature Grans, Absolute 0.10 (*)     All other components within normal limits   C-REACTIVE PROTEIN - Abnormal; Notable for the following components:    C-Reactive Protein 3.80 (*)     All other components within normal limits   LIPASE - Normal   LACTIC ACID, PLASMA - Normal   BLOOD CULTURE   BLOOD CULTURE   RAINBOW DRAW    Narrative:     The following orders were created for panel order [place] Draw.  Procedure                               Abnormality         Status                     ---------                               -----------         ------                     Red Top[[id number]]                                          Final result               Gray Top[[id number]]                                         In process                 Light Blue Top[[id number]]                                   Final result                 Please view results for these tests on the individual orders.   CBC AND DIFFERENTIAL    Narrative:     The following orders were created for panel order CBC & Differential.  Procedure                               Abnormality         Status                     ---------                               -----------         ------                     CBC Auto Differential[[id number]]        Abnormal            Final result                 Please view results for these tests on the individual orders.   RED TOP   LIGHT BLUE TOP   GRAY TOP       Meds given in ED:   Medications   sodium chloride 0.9 % flush 10 mL (has no administration in time range)   levoFLOXacin (LEVAQUIN) 750 mg/150 mL D5W (premix) (LEVAQUIN) 750 mg (has no administration in time range)   metroNIDAZOLE (FLAGYL) IVPB 500 mg (500 mg Intravenous New Bag 4/10/24 1937)   sodium chloride 0.9 % flush 10 mL (has no  administration in time range)   sodium chloride 0.9 % flush 10 mL (has no administration in time range)   sodium chloride 0.9 % infusion 40 mL (has no administration in time range)   Enoxaparin Sodium (LOVENOX) syringe 30 mg (has no administration in time range)   lactated ringers bolus 1,000 mL (has no administration in time range)   dextrose 5 % and sodium chloride 0.45 % with KCl 20 mEq/L infusion (has no administration in time range)   sennosides-docusate (PERICOLACE) 8.6-50 MG per tablet 2 tablet (has no administration in time range)     And   polyethylene glycol (MIRALAX) packet 17 g (has no administration in time range)     And   bisacodyl (DULCOLAX) EC tablet 5 mg (has no administration in time range)     And   bisacodyl (DULCOLAX) suppository 10 mg (has no administration in time range)   cefOXItin (MEFOXIN) 2,000 mg in sodium chloride 0.9 % 100 mL MBP (has no administration in time range)   famotidine (PEPCID) injection 20 mg (has no administration in time range)     Or   famotidine (PEPCID) tablet 20 mg (has no administration in time range)   HYDROmorphone (DILAUDID) injection 0.5 mg (has no administration in time range)   LORazepam (ATIVAN) injection 1 mg (has no administration in time range)   metroNIDAZOLE (FLAGYL) IVPB 500 mg (has no administration in time range)   ondansetron ODT (ZOFRAN-ODT) disintegrating tablet 8 mg (has no administration in time range)   ondansetron (ZOFRAN) 8 mg/50 mL NS IVPB (has no administration in time range)   simethicone (MYLICON) chewable tablet 80 mg (has no administration in time range)   sucralfate (CARAFATE) tablet 1 g (has no administration in time range)   HYDROmorphone (DILAUDID) injection 0.5 mg (0.5 mg Intravenous Given 4/10/24 1828)   ondansetron (ZOFRAN) injection 4 mg (4 mg Intravenous Given 4/10/24 1828)     dextrose 5 % and sodium chloride 0.45 % with KCl 20 mEq/L, 125 mL/hr         NIH Stroke Scale:       Isolation/Infection(s):  No active isolations   MRSA      COVID Testing  Collected .  Resulted .    Nursing report ED to floor:  Mental status: A&O x4  Ambulatory status: Independent   Precautions: .    ED nurse phone extentsion- ..

## 2024-04-11 NOTE — PAYOR COMM NOTE
"  4/11/24 [facility] [phone]  [phone]    ER ADMIT ON 4/10/24. INPATIENT ORDER. FAXING FOR INPATIENT REVIEW.            Abiola Vallejo (48 y.o. Male)       Date of Birth   1975    Social Security Number       Address   [address] [place] [address]    Home Phone   [phone]    MRN   [id number]       [employer]   [employer]    Marital Status   Single                            Admission Date   4/10/24    Admission Type   Emergency    Admitting Provider   René Dallas MD    Attending Provider   Dina Boyd MD    Department, Room/Bed   [facility] 4B, 401/1       Discharge Date       Discharge Disposition       Discharge Destination                                 Attending Provider: Dina Boyd MD    Allergies: Ct Contrast    Isolation: None   Infection: MRSA (12/03/18)   Code Status: CPR    Ht: 182.9 cm (72\")   Wt: 99.1 kg (218 lb 6.4 oz)    Admission Cmt: None   Principal Problem: Acute descending diverticulitis [K57.92]                   Active Insurance as of 4/10/2024       Primary Coverage       Payor Plan Insurance Group Employer/Plan Group    [employer] [place] [employer] MEDICAID [id number]       Payor Plan Address Payor Plan Phone Number Payor Plan Fax Number Effective Dates    [address] [address] [phone]  6/14/2019 - None Entered    [facility] [id number]         Subscriber Name Subscriber Birth Date Member ID       ABIOLA VALLEJO 1975 [id number]                     Emergency Contacts        (Rel.) Home Phone Work Phone Mobile Phone    Harleen Cerda (Mother) -- -- [phone]    roberta monae (Significant Other) -- -- [phone]          Time Temp Pulse Resp BP Patient Position Device (Oxygen Therapy) SpO2   04/11/24 0735 98.8 (37.1) 83 16 107/60 Lying room air 95   04/11/24 0510 98.5 (36.9) 88 18 115/68 Lying room air 95   04/10/24 2336 98.7 (37.1) 103  18 112/66 Lying room air 96   Pulse: Roberta RN notified " at 04/10/24 2336   04/10/24 2018 97.9 (36.6) 90 18 135/82 Sitting room air 95   04/10/                Rica Crump APRN   Nurse Practitioner  Emergency Medicine     ED Provider Notes     Attested     Date of Service: 04/10/24 1924  Creation Time: 04/10/24 1924     Attested           Attestation signed by Ross Summers Jr., MD at 04/10/24 1932           SUPERVISE: For this patient encounter, I reviewed the APC's documentation, treatment plan, and medical decision making.  Ross Summers Jr, MD 4/10/2024 19:32 CDT                                   Expand All Collapse All       Subjective  History of Present Illness  Patient is a 48-year-old male who presents to the ER with chief complaints of abdominal pain.  He states he began experiencing left lower quadrant abdominal pain this morning.  He states the pain woke him up from his sleep.  He describes the pain as sharp in nature and then when the sharpness subsides he has a constant dull aching sensation.  He reports nausea without any vomiting or diarrhea.  He reports history of diverticulitis and states he has been eating appropriately however is concerned this may be another episode of diverticulitis given the symptoms.  Patient denies any recorded fevers.  Past medical history significant for anxiety, back pain, neck pain, restless leg syndrome, diverticulitis           Review of Systems   Constitutional: Negative.  Negative for fever.   HENT: Negative.  Negative for congestion.    Eyes: Negative.    Respiratory: Negative.  Negative for cough and shortness of breath.    Cardiovascular: Negative.  Negative for chest pain.   Gastrointestinal:  Positive for abdominal pain and nausea. Negative for constipation, diarrhea and vomiting.   Genitourinary: Negative.  Negative for dysuria.   Musculoskeletal: Negative.  Negative for back pain.   Skin: Negative.    All other systems reviewed and are negative.        Medical History        Past Medical History:    Diagnosis Date    Anxiety      Back pain      Fracture of lumbar spine 2002 and 2015     lower lumbar 2002; L1 2015    Head injury      Injury of back      Nausea with vomiting 03/28/2023    Neck pain      Overweight (BMI 25.0-29.9)      Restless leg syndrome              Allergies        Allergies   Allergen Reactions    Ct Contrast Itching            Surgical History         Past Surgical History:   Procedure Laterality Date    COLONOSCOPY N/A 8/31/2023     Procedure: COLONOSCOPY WITH ANESTHESIA;  Surgeon: Kristopher Brush DO;  Location:  [facility];  Service: Gastroenterology;  Laterality: N/A;  preop; screening   postop  PCP Joseph Stewart    COLONOSCOPY N/A 9/1/2023     Procedure: COLONOSCOPY WITH ANESTHESIA;  Surgeon: Kristopher Brush DO;  Location:  [facility];  Service: Gastroenterology;  Laterality: N/A;  Pre: Encounter for screening for malignant neoplasm of colon;  Post: Inadequate prep;  Vivek Stewart DO    FACIAL FRACTURE SURGERY        INCISION AND DRAINAGE LEG Right 12/1/2018     Procedure: INCISION AND DRAINAGE OF RIGHT UPPER INNER THIGH, PLACEMENT OF WOUND VAC;  Surgeon: René Dallas MD;  Location:  [facility] OR;  Service: General    INGUINAL HERNIA REPAIR Bilateral 8/22/2023     Procedure: INGUINAL HERNIA BILATERAL REPAIR LAPAROSCOPIC WITH DAVINCI ROBOT WITH MESH;  Surgeon: Dina Boyd MD;  Location:  [facility];  Service: Robotics - DaVinci;  Laterality: Bilateral;    KNEE SURGERY Right 09/2021    SPLENECTOMY                      Family History   Problem Relation Age of Onset    Fibromyalgia Mother      Lung cancer Father      Colon cancer Neg Hx      Colon polyps Neg Hx      Esophageal cancer Neg Hx           Social History   Social History            Socioeconomic History    Marital status: Single   Tobacco Use    Smoking status: Never    Smokeless tobacco: Never   Vaping Use    Vaping status: Never Used   Substance and Sexual Activity    Alcohol use: Not Currently     Drug use: No    Sexual activity: Yes       Partners: Female                        Objective[]Expand by Default  Physical Exam  Vitals and nursing note reviewed.   Constitutional:       General: He is in acute distress.      Appearance: He is well-developed. He is not ill-appearing or diaphoretic.   HENT:      Head: Normocephalic and atraumatic.      Nose: Nose normal.      Mouth/Throat:      Mouth: Mucous membranes are moist.   Eyes:      General: No scleral icterus.     Conjunctiva/sclera: Conjunctivae normal.      Pupils: Pupils are equal, round, and reactive to light.   Neck:      Thyroid: No thyromegaly.      Vascular: No JVD.   Cardiovascular:      Rate and Rhythm: Normal rate and regular rhythm.      Heart sounds: Normal heart sounds. No murmur heard.  Pulmonary:      Effort: Pulmonary effort is normal. No respiratory distress.      Breath sounds: Normal breath sounds. No wheezing or rales.   Chest:      Chest wall: No tenderness.   Abdominal:      General: Bowel sounds are normal. There is no distension.      Palpations: Abdomen is soft. There is no mass.      Tenderness: There is abdominal tenderness in the left lower quadrant. There is no guarding or rebound.   Musculoskeletal:         General: Normal range of motion.      Cervical back: Normal range of motion and neck supple.   Lymphadenopathy:      Cervical: No cervical adenopathy.   Skin:     General: Skin is warm and dry.      Coloration: Skin is not pale.      Findings: No erythema or rash.   Neurological:      Mental Status: He is alert and oriented to person, place, and time.      Cranial Nerves: No cranial nerve deficit.      Coordination: Coordination normal.      Deep Tendon Reflexes: Reflexes are normal and symmetric.   Psychiatric:         Behavior: Behavior normal.         Thought Content: Thought content normal.         Judgment: Judgment normal.            Procedures                 ED Course      ED Course as of 04/10/24 1931   Wed Apr  10, 2024   1913 Dr. Dallas reviewed ct scan and wanted patient admitted under his services.  [TW]       ED Course User Index  [TW] Rica Crump APRN                                           Medical Decision Making  Patient is a 48-year-old male who presents to the ER with chief complaints of abdominal pain.  He states he began experiencing left lower quadrant abdominal pain this morning.  He states the pain woke him up from his sleep.  He describes the pain as sharp in nature and then when the sharpness subsides he has a constant dull aching sensation.  He reports nausea without any vomiting or diarrhea.  He reports history of diverticulitis and states he has been eating appropriately however is concerned this may be another episode of diverticulitis given the symptoms.  Patient denies any recorded fevers.  Past medical history significant for anxiety, back pain, neck pain, restless leg syndrome, diverticulitis  Differential diagnosis: Diverticulitis, constipation, colitis, viral illness, and other     Labs Reviewed  URINALYSIS W/ MICROSCOPIC IF INDICATED (NO CULTURE) - Abnormal; Notable for the following components:     Ketones, UA                   Trace (*)            All other components within normal limits          Narrative: Urine microscopic not indicated.  COMPREHENSIVE METABOLIC PANEL - Abnormal; Notable for the following components:     Glucose                       101 (*)             All other components within normal limits          Narrative: GFR Normal >60                  Chronic Kidney Disease <60                  Kidney Failure <15                    CBC WITH AUTO DIFFERENTIAL - Abnormal; Notable for the following components:     WBC                           22.13 (*)               Lymphocyte %                  17.3 (*)               Neutrophils, Absolute         15.64 (*)               Lymphocytes, Absolute         3.82 (*)               Monocytes, Absolute           2.21 (*)                Immature Grans, Absolute      0.10 (*)            All other components within normal limits  LIPASE - Normal  BLOOD CULTURE  BLOOD CULTURE  RAINBOW DRAW          Narrative: The following orders were created for panel order [place] Draw.                  Procedure                               Abnormality         Status                                     ---------                               -----------         ------                                     Red Top[[id number]]                                          In process                                 Leahy Top[[id number]]                                         In process                                 Light Blue Top[[id number]]                                   In process                                                   Please view results for these tests on the individual orders.  LACTIC ACID, PLASMA  CBC AND DIFFERENTIAL          Narrative: The following orders were created for panel order CBC & Differential.                  Procedure                               Abnormality         Status                                     ---------                               -----------         ------                                     CBC Auto Differential[[id number]]        Abnormal            Final result                                                 Please view results for these tests on the individual orders.  RED TOP  GRAY TOP  LIGHT BLUE TOP      CT Abdomen Pelvis Without Contrast   Final Result         Acute uncomplicated diverticulitis of the distal descending colon.              This report was signed and finalized on 4/10/2024 6:53 PM by Dr. Kieran Dubois MD.       Patient has an elevated white blood count of 22.13.  CT scan is consistent with acute diverticulitis.  Dr. Dallas with general surgery was agreeable for admission.  Patient has received IV fluids, IV antibiotics, and pain medication in the ER.  Please see his note for details.      Problems Addressed:  Diverticulitis: acute illness or injury     Amount and/or Complexity of Data Reviewed  Labs: ordered. Decision-making details documented in ED Course.  Radiology: ordered. Decision-making details documented in ED Course.  Discussion of management or test interpretation with external provider(s): Dr. Cowan on call for general surgery     Risk  Prescription drug management.  Decision regarding hospitalization.           Final diagnoses:   Diverticulitis         ED Disposition  ED Disposition         ED Disposition   Decision to Admit    Condition   --    Comment   Level of Care: Med/Surg [1]   Diagnosis: Diverticulitis [257905]   Admitting Physician: ANDREW COWAN [[id number]]   Attending Physician: ANDREW COWAN [[id number]]   Certification: I Certify That Inpatient Hospital Services Are Medically Necessary For Greater Than 2 Midnights                      No follow-up provider specified.         Medication List       No changes were made to your prescriptions during this visit.               Rica Crump, APRN  04/10/24 1931                    Andrew Cowan MD   Physician  Surgery     H&P     Signed     Date of Service: 04/10/24 1932  Creation Time: 04/10/24 1932     Signed       Expand All Collapse All    Patient: Saulo Shepard     YOB: 1975     Date: 04/10/2024     Primary Care Provider: Vivek Stewart DO         Chief Complaint   Patient presents with    Abdominal Pain            Subjective  .      History of present illness:  Mr. Shepard is a 48 y.o. who is here for evaluation of left mid abdominal pain.  Mr. Shepard is a previous patient of Dr. Dina Boyd, who completed his laparoscopic hernia repair less than a year ago, and with this noted he has had 1 episode of diverticulitis 2 to 3 years prior and now presents with a 8 to 12-hour history of left lower quadrant abdominal pain.  On CAT scan it appears a short segment of descending and sigmoid colon  have an appearance consistent with diverticulitis.  His white count is elevated at 22,000 ESR and C-reactive protein are ordered for the morning.  With the above problem we will plan to admit, hydrate, IV antibiotics, and Dr. Boyd will see in the morning.  He is aware that he has recurrent diverticulitis surgical intervention may need to occur to prevent further infections but no plan for surgical intervention is anticipated during this hospitalization.  He is aware of the finding the plan the risk and benefits and gives his informed consent.     Past surgical history positive for colonoscopy several times, the last in August 2023, upper endoscopy, facial fracture surgery, incision and drainage of the right leg, bilateral inguinal hernia by Dr. Dina Boyd in 2023, knee surgery and also open splenectomy.  Following trauma.     Allergies to CT contrast,  Non-smoker no alcohol, he is single, and is a .              The following portions of the patient's history were reviewed and updated as appropriate: allergies, current medications, past family history, past medical history, past social history, past surgical history and problem list.     Review of Systems     History:  Medical History        Past Medical History:   Diagnosis Date    Anxiety      Back pain      Fracture of lumbar spine 2002 and 2015     lower lumbar 2002; L1 2015    Head injury      Injury of back      Nausea with vomiting 03/28/2023    Neck pain      Overweight (BMI 25.0-29.9)      Restless leg syndrome                 Surgical History         Past Surgical History:   Procedure Laterality Date    COLONOSCOPY N/A 8/31/2023     Procedure: COLONOSCOPY WITH ANESTHESIA;  Surgeon: Kristopher Brush DO;  Location: [facility];  Service: Gastroenterology;  Laterality: N/A;  preop; screening   postop  PCP Joseph Stewart    COLONOSCOPY N/A 9/1/2023     Procedure: COLONOSCOPY WITH ANESTHESIA;  Surgeon: Kristopher Brush DO;   Location:  [facility];  Service: Gastroenterology;  Laterality: N/A;  Pre: Encounter for screening for malignant neoplasm of colon;  Post: Inadequate prep;  Vivek Stewart DO    FACIAL FRACTURE SURGERY        INCISION AND DRAINAGE LEG Right 12/1/2018     Procedure: INCISION AND DRAINAGE OF RIGHT UPPER INNER THIGH, PLACEMENT OF WOUND VAC;  Surgeon: René Dallas MD;  Location:  [facility];  Service: General    INGUINAL HERNIA REPAIR Bilateral 8/22/2023     Procedure: INGUINAL HERNIA BILATERAL REPAIR LAPAROSCOPIC WITH DAVINCI ROBOT WITH MESH;  Surgeon: Dina Boyd MD;  Location:  [facility] OR;  Service: Robotics - DaVinci;  Laterality: Bilateral;    KNEE SURGERY Right 09/2021    SPLENECTOMY                      Family History   Problem Relation Age of Onset    Fibromyalgia Mother      Lung cancer Father      Colon cancer Neg Hx      Colon polyps Neg Hx      Esophageal cancer Neg Hx           Social History   Social History           Tobacco Use    Smoking status: Never    Smokeless tobacco: Never   Vaping Use    Vaping status: Never Used   Substance Use Topics    Alcohol use: Not Currently    Drug use: No            Allergies:  Allergies        Allergies   Allergen Reactions    Ct Contrast Itching            Medications:     Current Medications      Current Facility-Administered Medications:     levoFLOXacin (LEVAQUIN) 750 mg/150 mL D5W (premix) (LEVAQUIN) 750 mg, 750 mg, Intravenous, Once, Rica Crump, KYLER    metroNIDAZOLE (FLAGYL) IVPB 500 mg, 500 mg, Intravenous, Once, Rica Crump APRN    [COMPLETED] Insert Peripheral IV, , , Once **AND** sodium chloride 0.9 % flush 10 mL, 10 mL, Intravenous, PRN, Rica Crump APRN     Current Outpatient Medications:     acetaminophen (Tylenol) 325 MG tablet, Take 3 tablets by mouth Every 8 (Eight) Hours. Take every 8 hours for 3 days then take prn as needed., Disp: 100 tablet, Rfl: 2    HYDROcodone-acetaminophen (NORCO) 5-325 MG per  tablet, Take 1 tablet by mouth Every 4 (Four) Hours As Needed for Severe Pain., Disp: 20 tablet, Rfl: 0    ibuprofen (ADVIL,MOTRIN) 800 MG tablet, Take 1 tablet by mouth Every 8 (Eight) Hours As Needed for Mild Pain for up to 90 doses. Take every 8 hours for three days then take as needed., Disp: 90 tablet, Rfl: 0    rOPINIRole (REQUIP) 0.25 MG tablet, Take 1 tablet by mouth 3 (Three) Times a Day. Take 1 hour before bedtime., Disp: 90 tablet, Rfl: 2    trimethoprim-polymyxin b (Polytrim) 87449-9.1 UNIT/ML-% ophthalmic solution, Administer 1 drop to the right eye Every 4 (Four) Hours., Disp: 10 mL, Rfl: 0              Objective[]Expand by Default  Vital Signs:   Vitals          Vitals:     04/10/24 1744 04/10/24 1848 04/10/24 1901 04/10/24 1916   BP: 129/83 139/81 125/85 126/87   Pulse: 96 96 94 98   Resp:           Temp:           SpO2: 97% 95% 96% 96%   Weight:           Height:                    Physical Exam:                General Appearance:    Alert, cooperative, in no acute distress mild distress in the left mid abdomen   Head:    Normocephalic, without obvious abnormality, atraumatic   Eyes:            Lids and lashes normal, conjunctivae and sclerae normal, no   icterus, no pallor, corneas clear,   Ears:    Ears appear intact with no abnormalities noted   Throat:   No oral lesions, no thrush, oral mucosa moist         Lungs:     Clear to auscultation,respirations regular, even and                  Unlabored    Heart:    Regular rhythm and normal rate, no murmur, no gallop.   Chest Wall:    No abnormalities observed   Abdomen:   Occasional bowel sounds, tenderness to mild palpation in the left mid abdomen no peritoneal signs no pain to the right lateral abdomen.  Rectal exams not completed.  Well-healed upper midline incision, also previous robotic incisions noted.   Extremities:   Moves all extremities well, no edema, no cyanosis, no             redness   Pulses:   Pulses palpable and equal bilaterally    Skin:   No bleeding, bruising or rash   Lymph nodes:   No palpable adenopathy   Neurologic:   Cranial nerves 2 - 12 grossly intact.         Study Result     Narrative & Impression   EXAM/TECHNIQUE: CT abdomen and pelvis without contrast     INDICATION: Left lower quadrant abdominal pain     COMPARISON: 1/29/2024     DLP: 440.9 mGy.cm. Automated exposure control was also utilized to  decrease patient radiation dose.     FINDINGS:     Lung bases are clear.     Unenhanced liver, gallbladder, biliary tree, pancreas, and adrenal  glands are unremarkable. Postoperative change of splenectomy.     4 mm left lower pole renal calculus. No ureteral stone or  hydronephrosis. No focal urinary bladder abnormality.     4 with centimeter long segment of colonic wall thickening with  surrounding fat stranding involving the distal descending colon in a  background of mild diverticulosis. No extraluminal fluid/gas to suggest  macro perforation. Normal appendix. No small bowel obstruction or  evidence of active small bowel inflammation.     No ascites or free pelvic fluid. No pelvic mass or pelvic collection.  Prostate and seminal vesicles are unremarkable. Nonaneurysmal abdominal  aorta. No enlarged retroperitoneal, mesenteric, pelvic, or inguinal  lymph node.     No acute abdominal wall soft tissue abnormality. Small left inguinal  fat-containing hernia. No suspicious osseous lesion. Chronic mild L1  compression deformity.     IMPRESSION:     Acute uncomplicated diverticulitis of the distal descending colon.        This report was signed and finalized on 4/10/2024 6:53 PM by Dr. Kieran Dubois MD.      Results Review:              I reviewed the patient's new clinical results.     Review of Systems was reviewed and confirmed as accurate as documented by the MA.           Assessment / Plan:     Diagnoses and all orders for this visit:     1. Diverticulitis (Primary)     Other orders  -     CBC & Differential; Standing  -      Lipase; Standing  -     Urinalysis With Microscopic If Indicated (No Culture) - Urine, Clean Catch; Standing  -     Comprehensive Metabolic Panel; Standing  -     Insert Peripheral IV; Standing  -     sodium chloride 0.9 % flush 10 mL  -     HYDROmorphone (DILAUDID) injection 0.5 mg  -     ondansetron (ZOFRAN) injection 4 mg  -     CT Abdomen Pelvis Without Contrast; Standing  -     CBC & Differential  -     Lipase  -     Urinalysis With Microscopic If Indicated (No Culture) - Urine, Clean Catch  -     Comprehensive Metabolic Panel  -     Insert Peripheral IV  -     CT Abdomen Pelvis Without Contrast  -     [place] Draw; Standing  -     [place] Draw  -     Blood Culture - Blood,; Standing  -     Blood Culture - Blood,; Standing  -     levoFLOXacin (LEVAQUIN) 750 mg/150 mL D5W (premix) (LEVAQUIN) 750 mg  -     metroNIDAZOLE (FLAGYL) IVPB 500 mg  -     Lactic Acid, Plasma; Standing  -     Blood Culture - Blood,  -     Blood Culture - Blood,  -     Lactic Acid, Plasma  -     Inpatient Admission; Standing  -     Inpatient Admission           48-year-old gentleman with recurrent diverticulitis, previous treated by Dr. Boyd 9 months ago for laparoscopic bilateral inguinal hernia plan to admit, hydrate, IV antibiotics, Dr. Boyd to see in the morning, confirm which antibiotic she desires, do not expect surgical intervention will be needed during this hospitalization but a future colon resection will be needed due to these recurrent episodes.  Will defer to the expertise of Dr. Boyd for this timing.     [] X-Ray  [] Reviewed Records  [] Called Physician/Consulted     Electronically signed by René Dallas MD  04/10/24  19:32 CDT                         mponent  Ref Range & Units 03:55  (4/11/24) 1 d ago  (4/10/24) 2 mo ago  (1/29/24) 5 mo ago  (10/21/23) 6 mo ago  (10/1/23) 7 mo ago  (9/4/23) 7 mo ago  (8/26/23)   Glucose  65 - 99 mg/dL 109 High  101 High  98 100 High  85 102 High  103 High    BUN  6 - 20  mg/dL 8 9 10 13 10 9 13   Creatinine  0.76 - 1.27 mg/dL 1.00 1.12 1.09 0.98 1.00 0.99 1.03   Sodium  136 - 145 mmol/L 136 139 140 141 139 142 139   Potassium  3.5 - 5.2 mmol/L 3.9 4.0 4.1 CM 4.0 CM 3.7 CM 4.0 4.2 CM   Chloride  98 - 107 mmol/L 103 103 108 High  108 High  104 109 High  105   CO2  22.0 - 29.0 mmol/L 25.0 28.0 25.0 24.0 25.0 24.0 24.0   Calcium  8.6 - 10.5 mg/dL 9.5 10.4 10.2 10.6 High  10.1 10.4 10.8 High    Total Protein  6.0 - 8.5 g/dL 5.7 Low  6.9 5.8 Low   7.1 6.8 7.2   Albumin  3.5 - 5.2 g/dL 3.3 Low              s 03:55  (4/11/24) 1 d ago  (4/10/24) 2 mo ago  (1/29/24) 5 mo ago  (10/21/23) 5 mo ago  (10/21/23) 6 mo ago  (10/1/23) 7 mo ago  (9/4/23) 7 mo ago  (9/4/23)   WBC  3.40 - 10.80 10*3/mm3 17.34 High  22.13 High  15.30 High   13.38 High  16.87 High   12.97 High    RBC  4.14 - 5.80 10*6/mm3 5.15 5.69 5.22  5.06 5.21  4.94   Hemoglobin  13.0 - 17.7 g/dL 15.0 16.8 15.4  14.7 14.9  14.3   Hematocrit  37.5 - 51.0 % 45.9 50.2 46.2  44.7 46.4  43.1   MCV  79.0 - 97.0 fL 89.1 88.2 88.5  88.3 89.1  87.2   MCH  26.6 - 33.0 pg 29.1 29.5 29.5  29.1 28.6  28.9   MCHC  31.5 - 35.7 g/dL 32.7 33.5 33.3  32.9 32.1  33.2   RDW  12.3 - 15.4 % 14.7 14.8 15.1  14.6 14.1  14.5   RDW-SD  37.0 - 54.0 fl 48.1 48.0 49.2  46.9 46.4  46.4   MPV  6.0 - 12.0 fL 10.9 10.5 10.6  10.6 11.1  10.4   Platelets  140 - 450 10*3/mm3 358 394 323  374 348  380   Neutrophil %  42.7 - 76.0 % 58.9 70.6 49.5 54.5  59.5 57.4    Lymphocyte %  19.6 - 45.3 % 23.6 17.3 Low  36.5 21.2  25.5 15.8 Low     Monocyte %  5.0 - 12.0 % 14.6 High  10.0 10.9 7.1  12.7 High  14.9 High     Eosinophil %  0.3 - 6.2 % 1.7 1.0 2.0 3.0  1.2 5.9    Basophil %  0.0 - 1.5 % 0.6 0.6 0.8   0.7     Immature Grans %  0.0 - 0.5 % 0.6 High  0.5 0.3   0.4     Neutrophils, Absolute  1.70 - 7.00 10*3/mm3 10.21 High  15.64 High  7.57 High  7.29 High   10.03 High  7.44 High     Lymphocytes, Absolute  0.70 - 3.10 10*3/mm3 4.10 High  3.82 High  5.58 High  4.72 High   4.30  High  2.81    Monocytes, Absolute  0.10 - 0.90 10*3/mm3 2.53 High                     Current Facility-Administered Medications   Medication Dose Route Frequency Provider Last Rate Last Admin    sennosides-docusate (PERICOLACE) 8.6-50 MG per tablet 2 tablet  2 tablet Oral BID René Dallas MD   2 tablet at 04/11/24 0815    And    polyethylene glycol (MIRALAX) packet 17 g  17 g Oral Daily PRN René Dallas MD        And    bisacodyl (DULCOLAX) EC tablet 5 mg  5 mg Oral Daily PRN René Dallas MD        And    bisacodyl (DULCOLAX) suppository 10 mg  10 mg Rectal Daily PRN René Dallas MD        cefOXItin (MEFOXIN) 2,000 mg in sodium chloride 0.9 % 100 mL MBP  2,000 mg Intravenous Q6H René Dallas  mL/hr at 04/11/24 0835 2,000 mg at 04/11/24 0835    dextrose 5 % and sodium chloride 0.45 % with KCl 20 mEq/L infusion  125 mL/hr Intravenous Continuous René Dallas  mL/hr at 04/10/24 2119 125 mL/hr at 04/10/24 2119    Enoxaparin Sodium (LOVENOX) syringe 30 mg  30 mg Subcutaneous Q12H René Dallas MD   30 mg at 04/11/24 0817    famotidine (PEPCID) injection 20 mg  20 mg Intravenous Q12H René Dallas MD   20 mg at 04/10/24 2120    Or    famotidine (PEPCID) tablet 20 mg  20 mg Oral Q12H René Dallas MD   20 mg at 04/11/24 0813    HYDROmorphone (DILAUDID) injection 0.5 mg  0.5 mg Intravenous Q2H PRN René Dallas MD   0.5 mg at 04/11/24 0832    LORazepam (ATIVAN) injection 1 mg  1 mg Intravenous Q4H PRN René Dallas MD        metroNIDAZOLE (FLAGYL) IVPB 500 mg  500 mg Intravenous Q6H René Dallas  mL/hr at 04/11/24 0835 500 mg at 04/11/24 0835    ondansetron (ZOFRAN) 8 mg/50 mL NS IVPB  8 mg Intravenous Q6H PRN René Dallas MD        ondansetron ODT (ZOFRAN-ODT) disintegrating tablet 8 mg  8 mg Oral Q6H PRN René Dallas MD        simethicone (MYLICON) chewable tablet 80 mg  80 mg Oral 4x Daily PRN René Dallas MD        sodium chloride 0.9  % flush 10 mL  10 mL Intravenous PRN Rica Crump, APRN        sodium chloride 0.9 % flush 10 mL  10 mL Intravenous Q12H René Dallas MD   10 mL at 04/11/24 0838    sodium chloride 0.9 % flush 10 mL  10 mL Intravenous PRN René Dallas MD        sodium chloride 0.9 % infusion 40 mL  40 mL Intravenous PRN René Dallas MD        sucralfate (CARAFATE) tablet 1 g  1 g Oral 4x Daily AC & at Bedtime René Dallas MD   1 g at 04/11/24 0814

## 2024-04-11 NOTE — ED PROVIDER NOTES
Subjective   History of Present Illness  Patient is a 48-year-old male who presents to the ER with chief complaints of abdominal pain.  He states he began experiencing left lower quadrant abdominal pain this morning.  He states the pain woke him up from his sleep.  He describes the pain as sharp in nature and then when the sharpness subsides he has a constant dull aching sensation.  He reports nausea without any vomiting or diarrhea.  He reports history of diverticulitis and states he has been eating appropriately however is concerned this may be another episode of diverticulitis given the symptoms.  Patient denies any recorded fevers.  Past medical history significant for anxiety, back pain, neck pain, restless leg syndrome, diverticulitis        Review of Systems   Constitutional: Negative.  Negative for fever.   HENT: Negative.  Negative for congestion.    Eyes: Negative.    Respiratory: Negative.  Negative for cough and shortness of breath.    Cardiovascular: Negative.  Negative for chest pain.   Gastrointestinal:  Positive for abdominal pain and nausea. Negative for constipation, diarrhea and vomiting.   Genitourinary: Negative.  Negative for dysuria.   Musculoskeletal: Negative.  Negative for back pain.   Skin: Negative.    All other systems reviewed and are negative.      Past Medical History:   Diagnosis Date    Anxiety     Back pain     Fracture of lumbar spine 2002 and 2015    lower lumbar 2002; L1 2015    Head injury     Injury of back     Nausea with vomiting 03/28/2023    Neck pain     Overweight (BMI 25.0-29.9)     Restless leg syndrome        Allergies   Allergen Reactions    Ct Contrast Itching       Past Surgical History:   Procedure Laterality Date    COLONOSCOPY N/A 8/31/2023    Procedure: COLONOSCOPY WITH ANESTHESIA;  Surgeon: Kristopher Brush DO;  Location: [facility] ENDOSCOPY;  Service: Gastroenterology;  Laterality: N/A;  preop; screening   postop  PCP Joseph Stewart    COLONOSCOPY N/A 9/1/2023     Procedure: COLONOSCOPY WITH ANESTHESIA;  Surgeon: Kristopher Brush DO;  Location:  [facility] ENDOSCOPY;  Service: Gastroenterology;  Laterality: N/A;  Pre: Encounter for screening for malignant neoplasm of colon;  Post: Inadequate prep;  Vivek Stewart DO    FACIAL FRACTURE SURGERY      INCISION AND DRAINAGE LEG Right 12/1/2018    Procedure: INCISION AND DRAINAGE OF RIGHT UPPER INNER THIGH, PLACEMENT OF WOUND VAC;  Surgeon: René Dallas MD;  Location:  [facility] OR;  Service: General    INGUINAL HERNIA REPAIR Bilateral 8/22/2023    Procedure: INGUINAL HERNIA BILATERAL REPAIR LAPAROSCOPIC WITH DAVINCI ROBOT WITH MESH;  Surgeon: Dina Boyd MD;  Location:  [facility] OR;  Service: Robotics - DaVinci;  Laterality: Bilateral;    KNEE SURGERY Right 09/2021    SPLENECTOMY         Family History   Problem Relation Age of Onset    Fibromyalgia Mother     Lung cancer Father     Colon cancer Neg Hx     Colon polyps Neg Hx     Esophageal cancer Neg Hx        Social History     Socioeconomic History    Marital status: Single   Tobacco Use    Smoking status: Never    Smokeless tobacco: Never   Vaping Use    Vaping status: Never Used   Substance and Sexual Activity    Alcohol use: Not Currently    Drug use: No    Sexual activity: Yes     Partners: Female           Objective   Physical Exam  Vitals and nursing note reviewed.   Constitutional:       General: He is in acute distress.      Appearance: He is well-developed. He is not ill-appearing or diaphoretic.   HENT:      Head: Normocephalic and atraumatic.      Nose: Nose normal.      Mouth/Throat:      Mouth: Mucous membranes are moist.   Eyes:      General: No scleral icterus.     Conjunctiva/sclera: Conjunctivae normal.      Pupils: Pupils are equal, round, and reactive to light.   Neck:      Thyroid: No thyromegaly.      Vascular: No JVD.   Cardiovascular:      Rate and Rhythm: Normal rate and regular rhythm.      Heart sounds: Normal heart sounds. No murmur  heard.  Pulmonary:      Effort: Pulmonary effort is normal. No respiratory distress.      Breath sounds: Normal breath sounds. No wheezing or rales.   Chest:      Chest wall: No tenderness.   Abdominal:      General: Bowel sounds are normal. There is no distension.      Palpations: Abdomen is soft. There is no mass.      Tenderness: There is abdominal tenderness in the left lower quadrant. There is no guarding or rebound.   Musculoskeletal:         General: Normal range of motion.      Cervical back: Normal range of motion and neck supple.   Lymphadenopathy:      Cervical: No cervical adenopathy.   Skin:     General: Skin is warm and dry.      Coloration: Skin is not pale.      Findings: No erythema or rash.   Neurological:      Mental Status: He is alert and oriented to person, place, and time.      Cranial Nerves: No cranial nerve deficit.      Coordination: Coordination normal.      Deep Tendon Reflexes: Reflexes are normal and symmetric.   Psychiatric:         Behavior: Behavior normal.         Thought Content: Thought content normal.         Judgment: Judgment normal.         Procedures           ED Course  ED Course as of 04/10/24 1931   Wed Apr 10, 2024   1913 Dr. Dallas reviewed ct scan and wanted patient admitted under his services.  [TW]      ED Course User Index  [TW] Rica Crump APRN                                             Medical Decision Making  Patient is a 48-year-old male who presents to the ER with chief complaints of abdominal pain.  He states he began experiencing left lower quadrant abdominal pain this morning.  He states the pain woke him up from his sleep.  He describes the pain as sharp in nature and then when the sharpness subsides he has a constant dull aching sensation.  He reports nausea without any vomiting or diarrhea.  He reports history of diverticulitis and states he has been eating appropriately however is concerned this may be another episode of diverticulitis given  the symptoms.  Patient denies any recorded fevers.  Past medical history significant for anxiety, back pain, neck pain, restless leg syndrome, diverticulitis  Differential diagnosis: Diverticulitis, constipation, colitis, viral illness, and other    Labs Reviewed  URINALYSIS W/ MICROSCOPIC IF INDICATED (NO CULTURE) - Abnormal; Notable for the following components:     Ketones, UA                   Trace (*)            All other components within normal limits         Narrative: Urine microscopic not indicated.  COMPREHENSIVE METABOLIC PANEL - Abnormal; Notable for the following components:     Glucose                       101 (*)             All other components within normal limits         Narrative: GFR Normal >60                  Chronic Kidney Disease <60                  Kidney Failure <15                    CBC WITH AUTO DIFFERENTIAL - Abnormal; Notable for the following components:     WBC                           22.13 (*)               Lymphocyte %                  17.3 (*)               Neutrophils, Absolute         15.64 (*)               Lymphocytes, Absolute         3.82 (*)               Monocytes, Absolute           2.21 (*)               Immature Grans, Absolute      0.10 (*)            All other components within normal limits  LIPASE - Normal  BLOOD CULTURE  BLOOD CULTURE  RAINBOW DRAW         Narrative: The following orders were created for panel order [place] Draw.                  Procedure                               Abnormality         Status                                     ---------                               -----------         ------                                     Red Top[[id number]]                                          In process                                 Leahy Top[[id number]]                                         In process                                 Light Blue Top[[id number]]                                   In process                                                    Please view results for these tests on the individual orders.  LACTIC ACID, PLASMA  CBC AND DIFFERENTIAL         Narrative: The following orders were created for panel order CBC & Differential.                  Procedure                               Abnormality         Status                                     ---------                               -----------         ------                                     CBC Auto Differential[[id number]]        Abnormal            Final result                                                 Please view results for these tests on the individual orders.  RED TOP  GRAY TOP  LIGHT BLUE TOP     CT Abdomen Pelvis Without Contrast   Final Result         Acute uncomplicated diverticulitis of the distal descending colon.              This report was signed and finalized on 4/10/2024 6:53 PM by Dr. Kieran Dubois MD.       Patient has an elevated white blood count of 22.13.  CT scan is consistent with acute diverticulitis.  Dr. Dallas with general surgery was agreeable for admission.  Patient has received IV fluids, IV antibiotics, and pain medication in the ER.  Please see his note for details.    Problems Addressed:  Diverticulitis: acute illness or injury    Amount and/or Complexity of Data Reviewed  Labs: ordered. Decision-making details documented in ED Course.  Radiology: ordered. Decision-making details documented in ED Course.  Discussion of management or test interpretation with external provider(s): Dr. Dallas on call for general surgery    Risk  Prescription drug management.  Decision regarding hospitalization.        Final diagnoses:   Diverticulitis       ED Disposition  ED Disposition       ED Disposition   Decision to Admit    Condition   --    Comment   Level of Care: Med/Surg [1]   Diagnosis: Diverticulitis [554540]   Admitting Physician: ANDREW DALLAS [[id number]]   Attending Physician: ANDREW DALLAS [[id number]]   Certification: I Certify That Inpatient  Hospital Services Are Medically Necessary For Greater Than 2 Midnights                 No follow-up provider specified.       Medication List      No changes were made to your prescriptions during this visit.            Rica Crump, APRN  04/10/24 1931

## 2024-04-11 NOTE — H&P
Patient: Saulo Shepard    YOB: 1975    Date: 04/10/2024    Primary Care Provider: Vivek Stewart DO    Chief Complaint   Patient presents with    Abdominal Pain       Subjective .     History of present illness:  Mr. Shepard is a 48 y.o. who is here for evaluation of left mid abdominal pain.  Mr. Shepard is a previous patient of Dr. Dina Boyd, who completed his laparoscopic hernia repair less than a year ago, and with this noted he has had 1 episode of diverticulitis 2 to 3 years prior and now presents with a 8 to 12-hour history of left lower quadrant abdominal pain.  On CAT scan it appears a short segment of descending and sigmoid colon have an appearance consistent with diverticulitis.  His white count is elevated at 22,000 ESR and C-reactive protein are ordered for the morning.  With the above problem we will plan to admit, hydrate, IV antibiotics, and Dr. Boyd will see in the morning.  He is aware that he has recurrent diverticulitis surgical intervention may need to occur to prevent further infections but no plan for surgical intervention is anticipated during this hospitalization.  He is aware of the finding the plan the risk and benefits and gives his informed consent.    Past surgical history positive for colonoscopy several times, the last in August 2023, upper endoscopy, facial fracture surgery, incision and drainage of the right leg, bilateral inguinal hernia by Dr. Dina Boyd in 2023, knee surgery and also open splenectomy.  Following trauma.    Allergies to CT contrast,  Non-smoker no alcohol, he is single, and is a .          The following portions of the patient's history were reviewed and updated as appropriate: allergies, current medications, past family history, past medical history, past social history, past surgical history and problem list.    Review of Systems    History:  Past Medical History:   Diagnosis Date    Anxiety     Back pain      Fracture of lumbar spine 2002 and 2015    lower lumbar 2002; L1 2015    Head injury     Injury of back     Nausea with vomiting 03/28/2023    Neck pain     Overweight (BMI 25.0-29.9)     Restless leg syndrome           Past Surgical History:   Procedure Laterality Date    COLONOSCOPY N/A 8/31/2023    Procedure: COLONOSCOPY WITH ANESTHESIA;  Surgeon: Kristopher Brush DO;  Location:  [facility] ENDOSCOPY;  Service: Gastroenterology;  Laterality: N/A;  preop; screening   postop  PCP Joseph Stewart    COLONOSCOPY N/A 9/1/2023    Procedure: COLONOSCOPY WITH ANESTHESIA;  Surgeon: Kristopher Brush DO;  Location:  [facility] ENDOSCOPY;  Service: Gastroenterology;  Laterality: N/A;  Pre: Encounter for screening for malignant neoplasm of colon;  Post: Inadequate prep;  Vivek Stewart DO    FACIAL FRACTURE SURGERY      INCISION AND DRAINAGE LEG Right 12/1/2018    Procedure: INCISION AND DRAINAGE OF RIGHT UPPER INNER THIGH, PLACEMENT OF WOUND VAC;  Surgeon: René Dallas MD;  Location:  [facility] OR;  Service: General    INGUINAL HERNIA REPAIR Bilateral 8/22/2023    Procedure: INGUINAL HERNIA BILATERAL REPAIR LAPAROSCOPIC WITH DAVINCI ROBOT WITH MESH;  Surgeon: Dina Boyd MD;  Location:  [facility] OR;  Service: Robotics - DaVinci;  Laterality: Bilateral;    KNEE SURGERY Right 09/2021    SPLENECTOMY         Family History   Problem Relation Age of Onset    Fibromyalgia Mother     Lung cancer Father     Colon cancer Neg Hx     Colon polyps Neg Hx     Esophageal cancer Neg Hx        Social History     Tobacco Use    Smoking status: Never    Smokeless tobacco: Never   Vaping Use    Vaping status: Never Used   Substance Use Topics    Alcohol use: Not Currently    Drug use: No       Allergies:  Allergies   Allergen Reactions    Ct Contrast Itching       Medications:     Current Facility-Administered Medications:     levoFLOXacin (LEVAQUIN) 750 mg/150 mL D5W (premix) (LEVAQUIN) 750 mg, 750 mg, Intravenous, Once, Rica Crump  KYLER Espinoza    metroNIDAZOLE (FLAGYL) IVPB 500 mg, 500 mg, Intravenous, Once, Rica Crump APRN    [COMPLETED] Insert Peripheral IV, , , Once **AND** sodium chloride 0.9 % flush 10 mL, 10 mL, Intravenous, PRN, Rica Crump APRN    Current Outpatient Medications:     acetaminophen (Tylenol) 325 MG tablet, Take 3 tablets by mouth Every 8 (Eight) Hours. Take every 8 hours for 3 days then take prn as needed., Disp: 100 tablet, Rfl: 2    HYDROcodone-acetaminophen (NORCO) 5-325 MG per tablet, Take 1 tablet by mouth Every 4 (Four) Hours As Needed for Severe Pain., Disp: 20 tablet, Rfl: 0    ibuprofen (ADVIL,MOTRIN) 800 MG tablet, Take 1 tablet by mouth Every 8 (Eight) Hours As Needed for Mild Pain for up to 90 doses. Take every 8 hours for three days then take as needed., Disp: 90 tablet, Rfl: 0    rOPINIRole (REQUIP) 0.25 MG tablet, Take 1 tablet by mouth 3 (Three) Times a Day. Take 1 hour before bedtime., Disp: 90 tablet, Rfl: 2    trimethoprim-polymyxin b (Polytrim) 83302-8.1 UNIT/ML-% ophthalmic solution, Administer 1 drop to the right eye Every 4 (Four) Hours., Disp: 10 mL, Rfl: 0    Objective     Vital Signs:   Vitals:    04/10/24 1744 04/10/24 1848 04/10/24 1901 04/10/24 1916   BP: 129/83 139/81 125/85 126/87   Pulse: 96 96 94 98   Resp:       Temp:       SpO2: 97% 95% 96% 96%   Weight:       Height:           Physical Exam:     General Appearance:    Alert, cooperative, in no acute distress mild distress in the left mid abdomen   Head:    Normocephalic, without obvious abnormality, atraumatic   Eyes:            Lids and lashes normal, conjunctivae and sclerae normal, no   icterus, no pallor, corneas clear,   Ears:    Ears appear intact with no abnormalities noted   Throat:   No oral lesions, no thrush, oral mucosa moist       Lungs:     Clear to auscultation,respirations regular, even and                  Unlabored    Heart:    Regular rhythm and normal rate, no murmur, no gallop.   Chest Wall:    No  abnormalities observed   Abdomen:   Occasional bowel sounds, tenderness to mild palpation in the left mid abdomen no peritoneal signs no pain to the right lateral abdomen.  Rectal exams not completed.  Well-healed upper midline incision, also previous robotic incisions noted.   Extremities:   Moves all extremities well, no edema, no cyanosis, no             redness   Pulses:   Pulses palpable and equal bilaterally   Skin:   No bleeding, bruising or rash   Lymph nodes:   No palpable adenopathy   Neurologic:   Cranial nerves 2 - 12 grossly intact.       Study Result    Narrative & Impression   EXAM/TECHNIQUE: CT abdomen and pelvis without contrast     INDICATION: Left lower quadrant abdominal pain     COMPARISON: 1/29/2024     DLP: 440.9 mGy.cm. Automated exposure control was also utilized to  decrease patient radiation dose.     FINDINGS:     Lung bases are clear.     Unenhanced liver, gallbladder, biliary tree, pancreas, and adrenal  glands are unremarkable. Postoperative change of splenectomy.     4 mm left lower pole renal calculus. No ureteral stone or  hydronephrosis. No focal urinary bladder abnormality.     4 with centimeter long segment of colonic wall thickening with  surrounding fat stranding involving the distal descending colon in a  background of mild diverticulosis. No extraluminal fluid/gas to suggest  macro perforation. Normal appendix. No small bowel obstruction or  evidence of active small bowel inflammation.     No ascites or free pelvic fluid. No pelvic mass or pelvic collection.  Prostate and seminal vesicles are unremarkable. Nonaneurysmal abdominal  aorta. No enlarged retroperitoneal, mesenteric, pelvic, or inguinal  lymph node.     No acute abdominal wall soft tissue abnormality. Small left inguinal  fat-containing hernia. No suspicious osseous lesion. Chronic mild L1  compression deformity.     IMPRESSION:     Acute uncomplicated diverticulitis of the distal descending colon.        This  report was signed and finalized on 4/10/2024 6:53 PM by Dr. Kieran Dubois MD.     Results Review:   I reviewed the patient's new clinical results.    Review of Systems was reviewed and confirmed as accurate as documented by the MA.         Assessment / Plan:    Diagnoses and all orders for this visit:    1. Diverticulitis (Primary)    Other orders  -     CBC & Differential; Standing  -     Lipase; Standing  -     Urinalysis With Microscopic If Indicated (No Culture) - Urine, Clean Catch; Standing  -     Comprehensive Metabolic Panel; Standing  -     Insert Peripheral IV; Standing  -     sodium chloride 0.9 % flush 10 mL  -     HYDROmorphone (DILAUDID) injection 0.5 mg  -     ondansetron (ZOFRAN) injection 4 mg  -     CT Abdomen Pelvis Without Contrast; Standing  -     CBC & Differential  -     Lipase  -     Urinalysis With Microscopic If Indicated (No Culture) - Urine, Clean Catch  -     Comprehensive Metabolic Panel  -     Insert Peripheral IV  -     CT Abdomen Pelvis Without Contrast  -     [place] Draw; Standing  -     [place] Draw  -     Blood Culture - Blood,; Standing  -     Blood Culture - Blood,; Standing  -     levoFLOXacin (LEVAQUIN) 750 mg/150 mL D5W (premix) (LEVAQUIN) 750 mg  -     metroNIDAZOLE (FLAGYL) IVPB 500 mg  -     Lactic Acid, Plasma; Standing  -     Blood Culture - Blood,  -     Blood Culture - Blood,  -     Lactic Acid, Plasma  -     Inpatient Admission; Standing  -     Inpatient Admission        48-year-old gentleman with recurrent diverticulitis, previous treated by Dr. Boyd 9 months ago for laparoscopic bilateral inguinal hernia plan to admit, hydrate, IV antibiotics, Dr. Boyd to see in the morning, confirm which antibiotic she desires, do not expect surgical intervention will be needed during this hospitalization but a future colon resection will be needed due to these recurrent episodes.  Will defer to the expertise of Dr. Boyd for this timing.    [] X-Ray  [] Reviewed  Records  [] Called Physician/Consulted    Electronically signed by René Dallas MD  04/10/24  19:32 CDT

## 2024-04-11 NOTE — PROGRESS NOTES
Carmen Yuen PA-C General Surgery Progress Note     LOS: 1 day   Patient Care Team:  Vivek Stewart DO as PCP - General (Family Medicine)  Lynnette Torres APRN as Nurse Practitioner (Neurology)  Dina Boyd MD as Consulting Physician (General Surgery)      Subjective     Interval History:      No acute events overnight. Patient still complaining of LLQ tenderness. Mild nausea. No vomiting. No BM since yesterday. Tolerating CL diet.      Objective     Vital Signs  Temp:  [97.9 °F (36.6 °C)-98.8 °F (37.1 °C)] 98.1 °F (36.7 °C)  Heart Rate:  [] 76  Resp:  [16-18] 16  BP: (107-139)/(60-87) 115/60    Physical Exam:  General appearance - alert, well appearing, and in no distress and oriented to person, place, and time  Mental status - alert, oriented to person, place, and time, normal mood, behavior, speech, dress, motor activity, and thought processes  Eyes - pupils equal and reactive, extraocular eye movements intact, sclera anicteric  Neck - supple, no significant adenopathy  Chest - no tachypnea, retractions or cyanosis  Heart - normal rate and regular rhythm  Abdomen - tenderness noted in LLQ with light palpation  Neurological - alert, oriented, normal speech, no focal findings or movement disorder noted  Skin - normal coloration and turgor, no rashes, no suspicious skin lesions noted      Results Review:    Lab Results (last 24 hours)       Procedure Component Value Units Date/Time    Sedimentation Rate [[id number]]  (Normal) Collected: 04/11/24 0355    Specimen: Blood Updated: 04/11/24 0614     Sed Rate 10 mm/hr     Amylase [[id number]]  (Normal) Collected: 04/11/24 0355    Specimen: Blood Updated: 04/11/24 0518     Amylase 58 U/L     Comprehensive Metabolic Panel [[id number]]  (Abnormal) Collected: 04/11/24 0355    Specimen: Blood Updated: 04/11/24 0518     Glucose 109 mg/dL      BUN 8 mg/dL      Creatinine 1.00 mg/dL      Sodium 136 mmol/L      Potassium 3.9 mmol/L      Chloride 103 mmol/L       CO2 25.0 mmol/L      Calcium 9.5 mg/dL      Total Protein 5.7 g/dL      Albumin 3.3 g/dL      ALT (SGPT) 17 U/L      AST (SGOT) 15 U/L      Alkaline Phosphatase 72 U/L      Total Bilirubin 1.2 mg/dL      Globulin 2.4 gm/dL      A/G Ratio 1.4 g/dL      BUN/Creatinine Ratio 8.0     Anion Gap 8.0 mmol/L      eGFR 93.4 mL/min/1.73     Narrative:      GFR Normal >60  Chronic Kidney Disease <60  Kidney Failure <15      Lipase [[id number]]  (Normal) Collected: 04/11/24 0355    Specimen: Blood Updated: 04/11/24 0518     Lipase 17 U/L     CBC Auto Differential [[id number]]  (Abnormal) Collected: 04/11/24 0355    Specimen: Blood Updated: 04/11/24 0504     WBC 17.34 10*3/mm3      RBC 5.15 10*6/mm3      Hemoglobin 15.0 g/dL      Hematocrit 45.9 %      MCV 89.1 fL      MCH 29.1 pg      MCHC 32.7 g/dL      RDW 14.7 %      RDW-SD 48.1 fl      MPV 10.9 fL      Platelets 358 10*3/mm3      Neutrophil % 58.9 %      Lymphocyte % 23.6 %      Monocyte % 14.6 %      Eosinophil % 1.7 %      Basophil % 0.6 %      Immature Grans % 0.6 %      Neutrophils, Absolute 10.21 10*3/mm3      Lymphocytes, Absolute 4.10 10*3/mm3      Monocytes, Absolute 2.53 10*3/mm3      Eosinophils, Absolute 0.29 10*3/mm3      Basophils, Absolute 0.11 10*3/mm3      Immature Grans, Absolute 0.10 10*3/mm3      nRBC 0.0 /100 WBC     [place] Draw [[id number]] Collected: 04/10/24 1829    Specimen: Blood Updated: 04/10/24 2230    Narrative:      The following orders were created for panel order [place] Draw.  Procedure                               Abnormality         Status                     ---------                               -----------         ------                     Red Top[[id number]]                                          Final result               Gray Top[[id number]]                                         Final result               Light Blue Top[[id number]]                                   Final result                 Please view results for these  tests on the individual orders.    Gray Top [[id number]] Collected: 04/10/24 1829    Specimen: Blood Updated: 04/10/24 2230     Extra Tube Hold for add-ons.     Comment: Auto resulted.       C-reactive Protein [[id number]]  (Abnormal) Collected: 04/10/24 1819    Specimen: Blood Updated: 04/10/24 1957     C-Reactive Protein 3.80 mg/dL     Blood Culture - Blood, Arm, Right [[id number]] Collected: 04/10/24 1930    Specimen: Blood from Arm, Right Updated: 04/10/24 1950    Blood Culture - Blood, Arm, Right [[id number]] Collected: 04/10/24 1915    Specimen: Blood from Arm, Right Updated: 04/10/24 1950    Lactic Acid, Plasma [[id number]]  (Normal) Collected: 04/10/24 1829    Specimen: Blood Updated: 04/10/24 1933     Lactate 0.8 mmol/L     Red Top [[id number]] Collected: 04/10/24 1829    Specimen: Blood Updated: 04/10/24 1930     Extra Tube Hold for add-ons.     Comment: Auto resulted.       Light Blue Top [[id number]] Collected: 04/10/24 1829    Specimen: Blood Updated: 04/10/24 1930     Extra Tube Hold for add-ons.     Comment: Auto resulted       Comprehensive Metabolic Panel [[id number]]  (Abnormal) Collected: 04/10/24 1819    Specimen: Blood Updated: 04/10/24 1857     Glucose 101 mg/dL      BUN 9 mg/dL      Creatinine 1.12 mg/dL      Sodium 139 mmol/L      Potassium 4.0 mmol/L      Chloride 103 mmol/L      CO2 28.0 mmol/L      Calcium 10.4 mg/dL      Total Protein 6.9 g/dL      Albumin 4.1 g/dL      ALT (SGPT) 24 U/L      AST (SGOT) 21 U/L      Alkaline Phosphatase 90 U/L      Total Bilirubin 0.7 mg/dL      Globulin 2.8 gm/dL      A/G Ratio 1.5 g/dL      BUN/Creatinine Ratio 8.0     Anion Gap 8.0 mmol/L      eGFR 81.5 mL/min/1.73     Narrative:      GFR Normal >60  Chronic Kidney Disease <60  Kidney Failure <15      CBC & Differential [[id number]]  (Abnormal) Collected: 04/10/24 1819    Specimen: Blood Updated: 04/10/24 2874    Narrative:      The following orders were created for panel order CBC & Differential.  Procedure                                Abnormality         Status                     ---------                               -----------         ------                     CBC Auto Differential[[id number]]        Abnormal            Final result                 Please view results for these tests on the individual orders.    CBC Auto Differential [[id number]]  (Abnormal) Collected: 04/10/24 1819    Specimen: Blood Updated: 04/10/24 1852     WBC 22.13 10*3/mm3      RBC 5.69 10*6/mm3      Hemoglobin 16.8 g/dL      Hematocrit 50.2 %      MCV 88.2 fL      MCH 29.5 pg      MCHC 33.5 g/dL      RDW 14.8 %      RDW-SD 48.0 fl      MPV 10.5 fL      Platelets 394 10*3/mm3      Neutrophil % 70.6 %      Lymphocyte % 17.3 %      Monocyte % 10.0 %      Eosinophil % 1.0 %      Basophil % 0.6 %      Immature Grans % 0.5 %      Neutrophils, Absolute 15.64 10*3/mm3      Lymphocytes, Absolute 3.82 10*3/mm3      Monocytes, Absolute 2.21 10*3/mm3      Eosinophils, Absolute 0.23 10*3/mm3      Basophils, Absolute 0.13 10*3/mm3      Immature Grans, Absolute 0.10 10*3/mm3      nRBC 0.0 /100 WBC     Lipase [[id number]]  (Normal) Collected: 04/10/24 1819    Specimen: Blood Updated: 04/10/24 1851     Lipase 24 U/L     Urinalysis With Microscopic If Indicated (No Culture) - Urine, Clean Catch [[id number]]  (Abnormal) Collected: 04/10/24 1812    Specimen: Urine, Clean Catch Updated: 04/10/24 1840     Color, UA Yellow     Appearance, UA Clear     pH, UA 8.0     Specific Gravity, UA 1.016     Glucose, UA Negative     Ketones, UA Trace     Bilirubin, UA Negative     Blood, UA Negative     Protein, UA Negative     Leuk Esterase, UA Negative     Nitrite, UA Negative     Urobilinogen, UA 0.2 E.U./dL    Narrative:      Urine microscopic not indicated.          Imaging Results (Last 24 Hours)       Procedure Component Value Units Date/Time    CT Abdomen Pelvis Without Contrast [[id number]] Collected: 04/10/24 1850     Updated: 04/10/24 1857    Narrative:       EXAM/TECHNIQUE: CT abdomen and pelvis without contrast     INDICATION: Left lower quadrant abdominal pain     COMPARISON: 1/29/2024     DLP: 440.9 mGy.cm. Automated exposure control was also utilized to  decrease patient radiation dose.     FINDINGS:     Lung bases are clear.     Unenhanced liver, gallbladder, biliary tree, pancreas, and adrenal  glands are unremarkable. Postoperative change of splenectomy.     4 mm left lower pole renal calculus. No ureteral stone or  hydronephrosis. No focal urinary bladder abnormality.     4 with centimeter long segment of colonic wall thickening with  surrounding fat stranding involving the distal descending colon in a  background of mild diverticulosis. No extraluminal fluid/gas to suggest  macro perforation. Normal appendix. No small bowel obstruction or  evidence of active small bowel inflammation.     No ascites or free pelvic fluid. No pelvic mass or pelvic collection.  Prostate and seminal vesicles are unremarkable. Nonaneurysmal abdominal  aorta. No enlarged retroperitoneal, mesenteric, pelvic, or inguinal  lymph node.     No acute abdominal wall soft tissue abnormality. Small left inguinal  fat-containing hernia. No suspicious osseous lesion. Chronic mild L1  compression deformity.       Impression:         Acute uncomplicated diverticulitis of the distal descending colon.        This report was signed and finalized on 4/10/2024 6:53 PM by Dr. Kieran Dubois MD.                 Assessment & Plan       Continue with IV antibiotics--Rocephin & Flagyl. Repeat labs in AM. Adequate pain control. Continue with CL diet.       Carmen Yuen PA-C  04/11/24  15:54 CDT

## 2024-04-12 ENCOUNTER — READMISSION MANAGEMENT (OUTPATIENT)
Dept: CALL CENTER | Facility: HOSPITAL | Age: 49
End: 2024-04-12
Payer: COMMERCIAL

## 2024-04-12 VITALS
HEART RATE: 81 BPM | WEIGHT: 218.4 LBS | BODY MASS INDEX: 29.58 KG/M2 | HEIGHT: 72 IN | SYSTOLIC BLOOD PRESSURE: 110 MMHG | RESPIRATION RATE: 18 BRPM | DIASTOLIC BLOOD PRESSURE: 63 MMHG | TEMPERATURE: 98.2 F | OXYGEN SATURATION: 97 %

## 2024-04-12 LAB
ALBUMIN SERPL-MCNC: 3.3 G/DL (ref 3.5–5.2)
ALBUMIN/GLOB SERPL: 1.1 G/DL
ALP SERPL-CCNC: 72 U/L (ref 39–117)
ALT SERPL W P-5'-P-CCNC: 16 U/L (ref 1–41)
ANION GAP SERPL CALCULATED.3IONS-SCNC: 7 MMOL/L (ref 5–15)
AST SERPL-CCNC: 14 U/L (ref 1–40)
BASOPHILS # BLD AUTO: 0.14 10*3/MM3 (ref 0–0.2)
BASOPHILS NFR BLD AUTO: 1.1 % (ref 0–1.5)
BILIRUB SERPL-MCNC: 0.6 MG/DL (ref 0–1.2)
BUN SERPL-MCNC: 6 MG/DL (ref 6–20)
BUN/CREAT SERPL: 6.2 (ref 7–25)
CALCIUM SPEC-SCNC: 10.1 MG/DL (ref 8.6–10.5)
CHLORIDE SERPL-SCNC: 107 MMOL/L (ref 98–107)
CO2 SERPL-SCNC: 22 MMOL/L (ref 22–29)
CREAT SERPL-MCNC: 0.97 MG/DL (ref 0.76–1.27)
DEPRECATED RDW RBC AUTO: 49 FL (ref 37–54)
EGFRCR SERPLBLD CKD-EPI 2021: 96.9 ML/MIN/1.73
EOSINOPHIL # BLD AUTO: 0.33 10*3/MM3 (ref 0–0.4)
EOSINOPHIL NFR BLD AUTO: 2.5 % (ref 0.3–6.2)
ERYTHROCYTE [DISTWIDTH] IN BLOOD BY AUTOMATED COUNT: 14.7 % (ref 12.3–15.4)
GLOBULIN UR ELPH-MCNC: 2.9 GM/DL
GLUCOSE SERPL-MCNC: 107 MG/DL (ref 65–99)
HCT VFR BLD AUTO: 45.8 % (ref 37.5–51)
HGB BLD-MCNC: 14.8 G/DL (ref 13–17.7)
IMM GRANULOCYTES # BLD AUTO: 0.06 10*3/MM3 (ref 0–0.05)
IMM GRANULOCYTES NFR BLD AUTO: 0.5 % (ref 0–0.5)
LYMPHOCYTES # BLD AUTO: 3.73 10*3/MM3 (ref 0.7–3.1)
LYMPHOCYTES NFR BLD AUTO: 28.7 % (ref 19.6–45.3)
MCH RBC QN AUTO: 29.1 PG (ref 26.6–33)
MCHC RBC AUTO-ENTMCNC: 32.3 G/DL (ref 31.5–35.7)
MCV RBC AUTO: 90 FL (ref 79–97)
MONOCYTES # BLD AUTO: 1.76 10*3/MM3 (ref 0.1–0.9)
MONOCYTES NFR BLD AUTO: 13.5 % (ref 5–12)
NEUTROPHILS NFR BLD AUTO: 53.7 % (ref 42.7–76)
NEUTROPHILS NFR BLD AUTO: 6.98 10*3/MM3 (ref 1.7–7)
NRBC BLD AUTO-RTO: 0 /100 WBC (ref 0–0.2)
PLATELET # BLD AUTO: 388 10*3/MM3 (ref 140–450)
PMV BLD AUTO: 10.5 FL (ref 6–12)
POTASSIUM SERPL-SCNC: 4 MMOL/L (ref 3.5–5.2)
PROT SERPL-MCNC: 6.2 G/DL (ref 6–8.5)
RBC # BLD AUTO: 5.09 10*6/MM3 (ref 4.14–5.8)
SODIUM SERPL-SCNC: 136 MMOL/L (ref 136–145)
WBC NRBC COR # BLD AUTO: 13 10*3/MM3 (ref 3.4–10.8)

## 2024-04-12 PROCEDURE — 80053 COMPREHEN METABOLIC PANEL: CPT

## 2024-04-12 PROCEDURE — 25010000002 METRONIDAZOLE 500 MG/100ML SOLUTION: Performed by: SPECIALIST

## 2024-04-12 PROCEDURE — 85025 COMPLETE CBC W/AUTO DIFF WBC: CPT

## 2024-04-12 PROCEDURE — 99238 HOSP IP/OBS DSCHRG MGMT 30/<: CPT

## 2024-04-12 PROCEDURE — 96372 THER/PROPH/DIAG INJ SC/IM: CPT

## 2024-04-12 PROCEDURE — 25010000002 ENOXAPARIN PER 10 MG: Performed by: SPECIALIST

## 2024-04-12 RX ORDER — ONDANSETRON 4 MG/1
4 TABLET, FILM COATED ORAL EVERY 8 HOURS PRN
Qty: 15 TABLET | Refills: 0 | Status: SHIPPED | OUTPATIENT
Start: 2024-04-12 | End: 2025-04-12

## 2024-04-12 RX ORDER — AMOXICILLIN AND CLAVULANATE POTASSIUM 875; 125 MG/1; MG/1
1 TABLET, FILM COATED ORAL 2 TIMES DAILY
Qty: 14 TABLET | Refills: 0 | Status: SHIPPED | OUTPATIENT
Start: 2024-04-12

## 2024-04-12 RX ADMIN — METRONIDAZOLE 500 MG: 5 INJECTION, SOLUTION INTRAVENOUS at 02:31

## 2024-04-12 RX ADMIN — SUCRALFATE 1 G: 1 TABLET ORAL at 07:43

## 2024-04-12 RX ADMIN — SUCRALFATE 1 G: 1 TABLET ORAL at 11:23

## 2024-04-12 RX ADMIN — Medication 10 ML: at 08:11

## 2024-04-12 RX ADMIN — ENOXAPARIN SODIUM 30 MG: 100 INJECTION SUBCUTANEOUS at 07:44

## 2024-04-12 RX ADMIN — METRONIDAZOLE 500 MG: 5 INJECTION, SOLUTION INTRAVENOUS at 07:44

## 2024-04-12 RX ADMIN — FAMOTIDINE 20 MG: 20 TABLET, FILM COATED ORAL at 07:44

## 2024-04-12 NOTE — OUTREACH NOTE
Prep Survey      Flowsheet Row Responses   [facility] patient discharged from? [place]   Is LACE score < 7 ? No   Eligibility [facility]   Date of Admission 04/10/24   Date of Discharge 04/12/24   Discharge Disposition Home or Self Care   Discharge diagnosis Acute descending diverticulitis   Does the patient have one of the following disease processes/diagnoses(primary or secondary)? Other   Does the patient have Home health ordered? No   Is there a DME ordered? No   Prep survey completed? Yes            Toya MELISSA - Registered Nurse

## 2024-04-12 NOTE — DISCHARGE SUMMARY
Consults       No orders found from 3/12/2024 to 4/11/2024.         Carmen Yuen PA-C - Discharge Summary    Date of Discharge:  4/14/2024    Discharge Diagnosis: diverticulitis    Presenting Problem/History of Present Illness  Diverticulitis [K57.92]     Hospital Course  Patient is a 48 y.o. male presented to the ER with abdominal pain of the LLQ. CT scan done in the ER showed a short segment of descending and sigmoid colon diverticulitis. His white count was 22k. He was admitted and received 2 days of IV antibiotics. His WBC came down to 13k, his pain decreased, and he tolerated a liquid diet without nausea or vomiting. The patient was discharged with oral antibiotics for 1 week and OTC pain relief and instructions to follow a low residue diet and follow up in clinic in 1 week.     Procedures Performed         Consults:   Consults       No orders found from 3/12/2024 to 4/11/2024.            Condition on Discharge:  improved     Vital Signs       Physical Exam:   See History and Physical found in chart.    Discharge Disposition  Home or Self Care    Discharge Medications     Discharge Medications        New Medications        Instructions Start Date   amoxicillin-clavulanate 875-125 MG per tablet  Commonly known as: AUGMENTIN   Take 1 tablet by mouth 2 (Two) Times a Day for 7 days.      ondansetron 4 MG tablet  Commonly known as: Zofran   4 mg, Oral, Every 8 Hours PRN             Continue These Medications        Instructions Start Date   acetaminophen 325 MG tablet  Commonly known as: Tylenol   975 mg, Oral, Every 8 Hours, Take every 8 hours for 3 days then take prn as needed.      ibuprofen 800 MG tablet  Commonly known as: ADVIL,MOTRIN   800 mg, Oral, Every 8 Hours PRN, Take every 8 hours for three days then take as needed.      meloxicam 15 MG tablet  Commonly known as: MOBIC   15 mg, Oral, Daily      rOPINIRole 0.25 MG tablet  Commonly known as: REQUIP   0.25 mg, Oral, 3 Times Daily, Take 1 hour before  bedtime.               Discharge Diet: low residue diet; no red meat or raw vegetables     Activity at Discharge: up ad rosemary    Follow-up Appointments  Future Appointments   Date Time Provider Department Center   4/18/2024  9:15 AM Vivek Stewart DO [facility]   4/18/2024  2:30 PM Carmen Yuen PA-C [facility]   8/5/2024  9:00 AM Vivek Stewart DO [facility]         Test Results Pending at Discharge  Pending Labs       Order Current Status    Blood Culture - Blood, Arm, Right Preliminary result    Blood Culture - Blood, Arm, Right Preliminary result             Carmen Yuen PA-C  04/14/24  22:06 CDT

## 2024-04-13 NOTE — PAYOR COMM NOTE
"DC HOME 4-12-24    Abiola Vallejo (48 y.o. Male)       Date of Birth   1975    Social Security Number       Address   [address] [place] [address]    Home Phone   [phone]    MRN   [id number]       Buddhist   Restorationist    Marital Status   Single                            Admission Date   4/10/24    Admission Type   Emergency    Admitting Provider   René Dallas MD    Attending Provider       Department, Room/Bed   [facility] 4B, 401/1       Discharge Date   4/12/2024    Discharge Disposition   Home or Self Care    Discharge Destination                                 Attending Provider: (none)   Allergies: Ct Contrast    Isolation: None   Infection: MRSA (12/03/18)   Code Status: Prior    Ht: 182.9 cm (72\")   Wt: 99.1 kg (218 lb 6.4 oz)    Admission Cmt: None   Principal Problem: Acute descending diverticulitis [K57.92]                   Active Insurance as of 4/10/2024       Primary Coverage       Payor Plan Insurance Group Employer/Plan Group    [employer] [place] [employer] [place]ICA[place] [id number]       Payor Plan Address Payor Plan Phone Number Payor Plan Fax Number Effective Dates    [address] [address] [phone]  6/14/2019 - None Entered    [facility] [id number]         Subscriber Name Subscriber Birth Date Member ID       ABIOLA VALLEJO 1975 [id number]                     Emergency Contacts        (Rel.) Home Phone Work Phone Mobile Phone    Harleen Cerda (Mother) -- -- [phone]    costa monae (Significant Other) -- -- [phone]              Discharge Summary    No notes of this type exist for this encounter.       "

## 2024-04-15 ENCOUNTER — TRANSITIONAL CARE MANAGEMENT TELEPHONE ENCOUNTER (OUTPATIENT)
Dept: CALL CENTER | Facility: HOSPITAL | Age: 49
End: 2024-04-15
Payer: COMMERCIAL

## 2024-04-15 LAB
BACTERIA SPEC AEROBE CULT: NORMAL
BACTERIA SPEC AEROBE CULT: NORMAL

## 2024-04-15 NOTE — OUTREACH NOTE
Call Center TCM Note      Flowsheet Row Responses   [facility] patient discharged from? [place]   Does the patient have one of the following disease processes/diagnoses(primary or secondary)? Other   TCM attempt successful? No  [Teja, Sideny, daughters]   Unsuccessful attempts Attempt 1   Call Status Left message   Does the patient have an appointment with their PCP within 7-14 days of discharge? Yes  [4/18/2024 at 9:15 AM]            Danisha Wray RN    4/15/2024, 10:03 CDT

## 2024-04-15 NOTE — OUTREACH NOTE
Call Center TCM Note      Flowsheet Row Responses   [facility] patient discharged from? [place]   Does the patient have one of the following disease processes/diagnoses(primary or secondary)? Other   TCM attempt successful? No   Unsuccessful attempts Attempt 2   Call Status Voice mail issues            Danisha Wray RN    4/15/2024, 15:09 CDT

## 2024-04-16 ENCOUNTER — TRANSITIONAL CARE MANAGEMENT TELEPHONE ENCOUNTER (OUTPATIENT)
Dept: CALL CENTER | Facility: HOSPITAL | Age: 49
End: 2024-04-16
Payer: COMMERCIAL

## 2024-04-16 NOTE — OUTREACH NOTE
Call Center TCM Note      Flowsheet Row Responses   [facility] patient discharged from? [place]   Does the patient have one of the following disease processes/diagnoses(primary or secondary)? Other   TCM attempt successful? Yes   Call start time 1330   Call end time 1335   Discharge diagnosis Acute descending diverticulitis   Person spoke with today (if not patient) and relationship pt   Meds reviewed with patient/caregiver? Yes   Is the patient having any side effects they believe may be caused by any medication additions or changes? No   Does the patient have all medications ordered at discharge? Yes   Is the patient taking all medications as directed (includes completed medication regime)? Yes   Comments Surgery 4/18/24 at 2:30 PM   Does the patient have an appointment with their PCP within 7-14 days of discharge? Yes  [4/18/2024 at 9:15 AM]   Psychosocial issues? No   Did the patient receive a copy of their discharge instructions? Yes   Nursing interventions Reviewed instructions with patient   What is the patient's perception of their health status since discharge? Improving   Is the patient/caregiver able to teach back signs and symptoms related to disease process for when to call PCP? Yes   Is the patient/caregiver able to teach back signs and symptoms related to disease process for when to call 911? Yes   Is the patient/caregiver able to teach back the hierarchy of who to call/visit for symptoms/problems? PCP, Specialist, Home health nurse, Urgent Care, ED, 911 Yes   TCM call completed? Yes   Wrap up additional comments Pt states he is doing better today, and denies any abdominal pain. Pt does report diarrhea, and was educated on the BRAT diet to help with diarrhea. Pt advised to report diarrhea to PCP. Reviewed AVS/meds with pt. Pt verified PCP/Surgeon fu appts.   Call end time 1335   Would this patient benefit from a Referral to [facility] Social Work? No   Is the patient interested in additional calls from  an ambulatory ? No            Danisha Wray RN    4/16/2024, 13:37 EDT

## 2024-04-18 ENCOUNTER — OFFICE VISIT (OUTPATIENT)
Dept: SURGERY | Facility: CLINIC | Age: 49
End: 2024-04-18
Payer: COMMERCIAL

## 2024-04-18 ENCOUNTER — OFFICE VISIT (OUTPATIENT)
Dept: FAMILY MEDICINE CLINIC | Facility: CLINIC | Age: 49
End: 2024-04-18
Payer: COMMERCIAL

## 2024-04-18 VITALS
WEIGHT: 215 LBS | DIASTOLIC BLOOD PRESSURE: 93 MMHG | BODY MASS INDEX: 29.12 KG/M2 | TEMPERATURE: 97.7 F | HEIGHT: 72 IN | HEART RATE: 84 BPM | SYSTOLIC BLOOD PRESSURE: 145 MMHG | RESPIRATION RATE: 20 BRPM | OXYGEN SATURATION: 98 %

## 2024-04-18 VITALS
DIASTOLIC BLOOD PRESSURE: 85 MMHG | SYSTOLIC BLOOD PRESSURE: 128 MMHG | WEIGHT: 215.6 LBS | HEIGHT: 72 IN | BODY MASS INDEX: 29.2 KG/M2

## 2024-04-18 DIAGNOSIS — K57.92 DIVERTICULITIS: Primary | ICD-10-CM

## 2024-04-18 DIAGNOSIS — G25.81 RLS (RESTLESS LEGS SYNDROME): ICD-10-CM

## 2024-04-18 PROCEDURE — 1160F RVW MEDS BY RX/DR IN RCRD: CPT

## 2024-04-18 PROCEDURE — 1159F MED LIST DOCD IN RCRD: CPT

## 2024-04-18 PROCEDURE — 99213 OFFICE O/P EST LOW 20 MIN: CPT

## 2024-04-18 RX ORDER — ROPINIROLE 0.25 MG/1
0.25 TABLET, FILM COATED ORAL 3 TIMES DAILY
Qty: 90 TABLET | Refills: 3 | Status: SHIPPED | OUTPATIENT
Start: 2024-04-18

## 2024-04-18 NOTE — PROGRESS NOTES
"Chief Complaint  Follow-up and Med Refill    Subjective        Saulo Shepard presents to [facility]  History of Present Illness  Hospital f/u after admission on 4/10/24 discharged on 4/14/24  TCM note on 4/16 by Danisha Wray  Admitted for acute descending diverticulitis, treated successfully with abx  Feels better today, diarrhea resolved  ROS otherwise neg  Needs refill of med for RLS    Current Outpatient Medications   Medication Instructions    acetaminophen (TYLENOL) 975 mg, Oral, Every 8 Hours, Take every 8 hours for 3 days then take prn as needed.    amoxicillin-clavulanate (AUGMENTIN) 875-125 MG per tablet Take 1 tablet by mouth 2 (Two) Times a Day for 7 days.    ibuprofen (ADVIL,MOTRIN) 800 mg, Oral, Every 8 Hours PRN, Take every 8 hours for three days then take as needed.    meloxicam (MOBIC) 15 mg, Daily    ondansetron (ZOFRAN) 4 mg, Oral, Every 8 Hours PRN    rOPINIRole (REQUIP) 0.25 mg, Oral, 3 Times Daily, Take 1 hour before bedtime.     Current outpatient and discharge medications have been reconciled for the patient.  Reviewed by: Vivek Stewart DO    Objective   Vital Signs:  /93   Pulse 84   Temp 97.7 °F (36.5 °C)   Resp 20   Ht 182.9 cm (72\")   Wt 97.5 kg (215 lb)   SpO2 98%   BMI 29.16 kg/m²   Estimated body mass index is 29.16 kg/m² as calculated from the following:    Height as of this encounter: 182.9 cm (72\").    Weight as of this encounter: 97.5 kg (215 lb).               Physical Exam  Vitals and nursing note reviewed.   Constitutional:       General: He is not in acute distress.     Appearance: He is not diaphoretic.   HENT:      Head: Normocephalic and atraumatic.      Nose: Nose normal.   Eyes:      General: No scleral icterus.        Right eye: No discharge.         Left eye: No discharge.      Conjunctiva/sclera: Conjunctivae normal.   Neck:      Trachea: No tracheal deviation.   Pulmonary:      Effort: Pulmonary effort is " normal.   Skin:     General: Skin is warm and dry.      Coloration: Skin is not pale.   Neurological:      Mental Status: He is alert and oriented to person, place, and time.   Psychiatric:         Behavior: Behavior normal.         Thought Content: Thought content normal.         Judgment: Judgment normal.        Result Review :                     Assessment and Plan     Diagnoses and all orders for this visit:    1. Acute descending diverticulitis (Primary)    2. RLS (restless legs syndrome)  -     rOPINIRole (REQUIP) 0.25 MG tablet; Take 1 tablet by mouth 3 (Three) Times a Day. Take 1 hour before bedtime.  Dispense: 90 tablet; Refill: 3    Continue surgical f/u later today  Discussed higher fiber diet  Refill requip         Follow Up     Return in about 6 months (around 10/18/2024).  Patient was given instructions and counseling regarding his condition or for health maintenance advice. Please see specific information pulled into the AVS if appropriate.

## 2024-04-18 NOTE — PROGRESS NOTES
Office New Patient History and Physical:     Referring Provider: No ref. provider found    Chief Complaint   Patient presents with    Follow-up     1 week hospital follow up for diverticulitis        Subjective .     History of present illness:  Saulo Shepard is a 48 y.o. male for a 1 week follow up appointment after being admitted to the hospital with diverticulitis.  He has been taking the Augmentin as prescribed.  He states his pain continues to improve in the left lower quadrant.  He denies fevers, nausea, vomiting.    BMI is 29.23. He is a nonsmoker. He does not take any blood thinners.     Review of Systems    Review of Systems - General ROS: negative  ENT ROS: negative  Respiratory ROS: no cough, shortness of breath, or wheezing  Cardiovascular ROS: no chest pain or dyspnea on exertion  Gastrointestinal ROS: positive for - abdominal pain  Genito-Urinary ROS: no dysuria, trouble voiding, or hematuria  Dermatological ROS: negative   Breast ROS: negative for breast lumps  Hematological and Lymphatic ROS: negative  Musculoskeletal ROS: negative   Neurological ROS: no TIA or stroke symptoms    Psychological ROS: negative  Endocrine ROS: negative    History  Past Medical History:   Diagnosis Date    Anxiety     Back pain     Fracture of lumbar spine 2002 and 2015    lower lumbar 2002; L1 2015    Head injury     Injury of back     Nausea with vomiting 03/28/2023    Neck pain     Overweight (BMI 25.0-29.9)     Restless leg syndrome    ,   Past Surgical History:   Procedure Laterality Date    COLONOSCOPY N/A 8/31/2023    Procedure: COLONOSCOPY WITH ANESTHESIA;  Surgeon: Kristopher Brush DO;  Location: [facility] ENDOSCOPY;  Service: Gastroenterology;  Laterality: N/A;  preop; screening   postop  PCP Joseph Stewart    COLONOSCOPY N/A 9/1/2023    Procedure: COLONOSCOPY WITH ANESTHESIA;  Surgeon: Kristopher Brush DO;  Location: [facility] ENDOSCOPY;  Service: Gastroenterology;  Laterality: N/A;  Pre: Encounter for  screening for malignant neoplasm of colon;  Post: Inadequate prep;  Vivek Stewart,     FACIAL FRACTURE SURGERY      INCISION AND DRAINAGE LEG Right 12/1/2018    Procedure: INCISION AND DRAINAGE OF RIGHT UPPER INNER THIGH, PLACEMENT OF WOUND VAC;  Surgeon: René Dallas MD;  Location:  [facility] OR;  Service: General    INGUINAL HERNIA REPAIR Bilateral 8/22/2023    Procedure: INGUINAL HERNIA BILATERAL REPAIR LAPAROSCOPIC WITH DAVINCI ROBOT WITH MESH;  Surgeon: Dina Boyd MD;  Location:  [facility] OR;  Service: Robotics - DaVinci;  Laterality: Bilateral;    KNEE SURGERY Right 09/2021    SPLENECTOMY     ,   Family History   Problem Relation Age of Onset    Fibromyalgia Mother     Lung cancer Father     Colon cancer Neg Hx     Colon polyps Neg Hx     Esophageal cancer Neg Hx    ,   Social History     Tobacco Use    Smoking status: Never    Smokeless tobacco: Never   Vaping Use    Vaping status: Never Used   Substance Use Topics    Alcohol use: Not Currently    Drug use: No   , (Not in a hospital admission)   and Allergies:  Ct contrast    Current Outpatient Medications:     acetaminophen (Tylenol) 325 MG tablet, Take 3 tablets by mouth Every 8 (Eight) Hours. Take every 8 hours for 3 days then take prn as needed., Disp: 100 tablet, Rfl: 2    amoxicillin-clavulanate (AUGMENTIN) 875-125 MG per tablet, Take 1 tablet by mouth 2 (Two) Times a Day for 7 days., Disp: 14 tablet, Rfl: 0    ibuprofen (ADVIL,MOTRIN) 800 MG tablet, Take 1 tablet by mouth Every 8 (Eight) Hours As Needed for Mild Pain for up to 90 doses. Take every 8 hours for three days then take as needed., Disp: 90 tablet, Rfl: 0    meloxicam (MOBIC) 15 MG tablet, Take 1 tablet by mouth Daily., Disp: , Rfl:     ondansetron (Zofran) 4 MG tablet, Take 1 tablet by mouth Every 8 (Eight) Hours As Needed for Nausea or Vomiting., Disp: 15 tablet, Rfl: 0    rOPINIRole (REQUIP) 0.25 MG tablet, Take 1 tablet by mouth 3 (Three) Times a Day. Take 1 hour before  "bedtime., Disp: 90 tablet, Rfl: 3    Objective     Vital Signs   /85 (BP Location: Left arm, Patient Position: Sitting, Cuff Size: Large Adult)   Ht 182.9 cm (72.01\")   Wt 97.8 kg (215 lb 9.6 oz)   BMI 29.23 kg/m²      Physical Exam:  General appearance - alert, well appearing, and in no distress and oriented to person, place, and time  Mental status - alert, oriented to person, place, and time, normal mood, behavior, speech, dress, motor activity, and thought processes  Eyes - sclera anicteric  Neck - supple, no significant adenopathy  Chest - no tachypnea, retractions or cyanosis  Heart - normal rate and regular rhythm  Abdomen - soft, nontender, nondistended, no masses or organomegaly  Neurological - alert, oriented, normal speech, no focal findings or movement disorder noted  Skin - normal coloration and turgor, no rashes, no suspicious skin lesions noted    Results Review:  Result Review :            ENDOSCOPY - COLON (09/01/2023)   Poor prep with repeat colonoscopy within 3 months   Telephone Encounter by Isabella Talley MA (09/08/2023 10:42)   Need 3 month colon recall with a 2 day prep per Dr. Brush  Assessment & Plan       Diagnoses and all orders for this visit:    1. Acute descending diverticulitis (Primary)    Mr. Shepard is a 48-year-old male who presents to the clinic for 1 week follow-up after hospitalization due to diverticulitis.  He is overall improved in his symptoms. I have instructed him to finish his course of antibiotics. His last colonoscopy was in September 2023. It was a poor prep and the patient was in need of a 3 month follow up colonoscopy. He did not show for this in December 2023. I have sent a message to Dr. Brush's APRN to let her know that he was seen and his recent diverticulitis diagnosis in hopes to get a repeat colonoscopy in the next few months. I have also made the patient aware of this. He will call for an appointment if he has any new problems or concerns. He " voiced understanding and is agreeable to the plan.      Follow up:       Return if symptoms worsen or fail to improve.        Carmen Yuen PA-C  04/18/24  16:46 CDT

## 2024-04-18 NOTE — Clinical Note
This patient was recently hospitalized for diverticulitis. I saw in his chart where he missed his repeat colonoscopy in December 2023 with Dr. Brush. I wanted to make you all aware so we can hopefully get him scheduled for another colonoscopy in the next few months. I have had my MA call him and let him know to give your office a call to set up an appointment.

## 2024-04-19 ENCOUNTER — TELEPHONE (OUTPATIENT)
Dept: GASTROENTEROLOGY | Facility: CLINIC | Age: 49
End: 2024-04-19
Payer: COMMERCIAL

## 2024-04-19 ENCOUNTER — TELEPHONE (OUTPATIENT)
Dept: SURGERY | Facility: CLINIC | Age: 49
End: 2024-04-19
Payer: COMMERCIAL

## 2024-04-19 NOTE — TELEPHONE ENCOUNTER
called spoke to patient he will call Dr Murray's office to get repeat colonoscopy scheduled and then he will call our office to let us know when the date will be for the procedure

## 2024-05-21 ENCOUNTER — OFFICE VISIT (OUTPATIENT)
Dept: GASTROENTEROLOGY | Facility: CLINIC | Age: 49
End: 2024-05-21
Payer: COMMERCIAL

## 2024-05-21 VITALS
OXYGEN SATURATION: 96 % | BODY MASS INDEX: 28.44 KG/M2 | HEIGHT: 72 IN | WEIGHT: 210 LBS | TEMPERATURE: 98 F | DIASTOLIC BLOOD PRESSURE: 80 MMHG | SYSTOLIC BLOOD PRESSURE: 134 MMHG | HEART RATE: 73 BPM

## 2024-05-21 DIAGNOSIS — R10.32 LEFT LOWER QUADRANT ABDOMINAL PAIN: Primary | ICD-10-CM

## 2024-05-21 DIAGNOSIS — K57.92 DIVERTICULITIS: ICD-10-CM

## 2024-05-21 PROCEDURE — 1159F MED LIST DOCD IN RCRD: CPT | Performed by: NURSE PRACTITIONER

## 2024-05-21 PROCEDURE — 99213 OFFICE O/P EST LOW 20 MIN: CPT | Performed by: NURSE PRACTITIONER

## 2024-05-21 PROCEDURE — 1160F RVW MEDS BY RX/DR IN RCRD: CPT | Performed by: NURSE PRACTITIONER

## 2024-05-21 NOTE — H&P (VIEW-ONLY)
Chief Complaint   Patient presents with    Colonoscopy     9-1-23 colon        PCP: Vivek Stewart DO  REFER: No ref. provider found    Subjective     HPI    Treated at [facility] 4/10/24-4/12/24 for diverticulitis ( CT Abdomen Pelvis Without Contrast (04/10/2024 18:43) ).   Saulo Shepard treated with antibiotic and has imporvment in LLQ abdominal pain.  Prior to onset of pain bowel habit described as daily.   He has not observed bright red blood per rectum.  No weight loss.  Currently denies difficulty with bowels.  Bowel habit is daily, he is unsure if bowels are completely evacuated.  No weight loss.      Saulo Shepard was evaluated in the office in Aug 2023 for colon screening.  Colonoscopy 9/2023 incomplete due to poor prep.  There is not a family history of colon cancer in first degree relative.  There is not a family history of colon polyps in first degree relative.     COLONOSCOPY (09/01/2023 08:09) -solid stool  COLONOSCOPY (08/31/2023 07:31) -solid stool      Past Medical History:   Diagnosis Date    Anxiety     Back pain     Fracture of lumbar spine 2002 and 2015    lower lumbar 2002; L1 2015    Head injury     Injury of back     Nausea with vomiting 03/28/2023    Neck pain     Overweight (BMI 25.0-29.9)     Restless leg syndrome        Past Surgical History:   Procedure Laterality Date    COLONOSCOPY N/A 8/31/2023    Procedure: COLONOSCOPY WITH ANESTHESIA;  Surgeon: Kristopher Brush DO;  Location: [facility] ENDOSCOPY;  Service: Gastroenterology;  Laterality: N/A;  preop; screening   postop  PCP Joseph Stewart    COLONOSCOPY N/A 9/1/2023    Procedure: COLONOSCOPY WITH ANESTHESIA;  Surgeon: Kristopher Brush DO;  Location: [facility] ENDOSCOPY;  Service: Gastroenterology;  Laterality: N/A;  Pre: Encounter for screening for malignant neoplasm of colon;  Post: Inadequate prep;  Vivek Stewart DO    FACIAL FRACTURE SURGERY      INCISION AND DRAINAGE LEG Right 12/1/2018    Procedure:  INCISION AND DRAINAGE OF RIGHT UPPER INNER THIGH, PLACEMENT OF WOUND VAC;  Surgeon: René Dallas MD;  Location:  [facility] OR;  Service: General    INGUINAL HERNIA REPAIR Bilateral 8/22/2023    Procedure: INGUINAL HERNIA BILATERAL REPAIR LAPAROSCOPIC WITH DAVINCI ROBOT WITH MESH;  Surgeon: Dina Boyd MD;  Location:  [facility] OR;  Service: Robotics - DaVinci;  Laterality: Bilateral;    KNEE SURGERY Right 09/2021    SPLENECTOMY         Outpatient Medications Marked as Taking for the 5/21/24 encounter (Office Visit) with Som Cano APRN   Medication Sig Dispense Refill    acetaminophen (Tylenol) 325 MG tablet Take 3 tablets by mouth Every 8 (Eight) Hours. Take every 8 hours for 3 days then take prn as needed. 100 tablet 2    ibuprofen (ADVIL,MOTRIN) 800 MG tablet Take 1 tablet by mouth Every 8 (Eight) Hours As Needed for Mild Pain for up to 90 doses. Take every 8 hours for three days then take as needed. 90 tablet 0    meloxicam (MOBIC) 15 MG tablet Take 1 tablet by mouth Daily.      rOPINIRole (REQUIP) 0.25 MG tablet Take 1 tablet by mouth 3 (Three) Times a Day. Take 1 hour before bedtime. 90 tablet 3       Allergies   Allergen Reactions    Ct Contrast Itching       Social History     Socioeconomic History    Marital status: Single   Tobacco Use    Smoking status: Never    Smokeless tobacco: Never   Vaping Use    Vaping status: Never Used   Substance and Sexual Activity    Alcohol use: Not Currently    Drug use: No    Sexual activity: Yes     Partners: Female       Review of Systems   Constitutional:  Negative for fever and unexpected weight change.   HENT:  Negative for trouble swallowing.    Respiratory:  Negative for shortness of breath.    Cardiovascular:  Negative for chest pain.   Gastrointestinal:  Negative for abdominal pain and anal bleeding.       Objective     Vitals:    05/21/24 0957   BP: 134/80   Pulse: 73   Temp: 98 °F (36.7 °C)   SpO2: 96%   Weight: 95.3 kg (210 lb)   Height: 182.9  "cm (72\")     Body mass index is 28.48 kg/m².    Physical Exam  Constitutional:       Appearance: Normal appearance. He is well-developed.   Eyes:      General: No scleral icterus.  Cardiovascular:      Heart sounds: Normal heart sounds. No murmur heard.  Pulmonary:      Effort: Pulmonary effort is normal.   Abdominal:      General: Bowel sounds are normal. There is no distension.      Palpations: Abdomen is soft.      Tenderness: There is no abdominal tenderness. There is no guarding.   Skin:     General: Skin is warm and dry.      Coloration: Skin is not jaundiced.   Neurological:      Mental Status: He is alert.   Psychiatric:         Behavior: Behavior is cooperative.         Imaging Results (Most Recent)       None            Body mass index is 28.48 kg/m².    Assessment & Plan     Diagnoses and all orders for this visit:    1. Left lower quadrant abdominal pain (Primary)  -     Case Request; Standing  -     Case Request    2. Diverticulitis    Other orders  -     Implement Anesthesia Orders Day of Procedure; Standing  -     Obtain Informed Consent; Standing         COLONOSCOPY WITH ANESTHESIA (N/A)    Miralax prep  Multi day prep    Discussed importance of adequate bowel movement.  I recommend utilizing miralax or MOM on regular basis to help facilitate bowel movement.  Discussed risk of reoccurrence of diverticulitis with constipation/lack of adequate bowel movement.  Ok to consume foods with seed, it is recommended to avoid constipation to prevent further occurences of diverticulitis. . I encouraged dietary fiber.      Plan for repeat colonoscopy (inadequate prep 9/2023)    Recommend daily use of Miralax, adjust as needed  Encouraged addition of dietary fiber, increasing daily water consumption, as well daily physical activity     CT scan reviewed.  Explained colonoscopy is recommend after treatment for diverticulitis to determine if there is a coexistence of colon cancer.   Will need to delay procedure 6 " weeks from date of CT scan per protocol due to diverticulitis to decrease risk of perforation.  Saulo Shepard verbalized understanding       Advised pt to stop use of NSAIDs, Fish Oil, and MV 5 days prior to procedure, per Dr Brush protocol.  Tylenol based products are ok to take.  Pt verbalized understanding.      All risks, benefits, alternatives, and indications of colonoscopy procedure have been discussed with the patient. Risks to include perforation of the colon requiring possible surgery or colostomy, risk of bleeding from biopsies or removal of colon tissue, possibility of missing a colon polyp or cancer, or adverse drug reaction.  Benefits to include the diagnosis and management of disease of the colon and rectum. Alternatives to include barium enema, radiographic evaluation, lab testing or no intervention. Pt verbalizes understanding and agrees to proceed with procedure.        Som Cano, APRN  05/21/24          Patient Instructions   The Goal of a constipation regimen is to produce at least 3 complete bowel movements per week     *Recommend starting a soluble fiber regimen that includes psyllium such as Metamucil or FiberCon tablets.    This helps to keep stool soft and formed and easy transit throughout the colon to produce regular and complete bowel movements.  * Consume at least 20 to 30 g of dietary fiber per day  * Research has proven that consuming 5 prunes or 2 kiwi fruit per day can also aid in reducing constipation.  * When starting fiber regimen recommend starting with a low-dose and gradually increasing by 1 tablespoon every 7 days as tolerated.    This will help your body acclimate to the higher fiber diet and reduce risk of unwanted side effects that include bloating, gas, abdominal fullness, and cramping  For example: Begin taking 1 tablespoon daily for at least 7 days, if this is not successful in producing regular bowel movements can begin taking 1 tablespoons twice a day,  and finally if this is not successful after taking 2 tablespoons for 7 days, can further increase to maximum recommended dosing of 1 tablespoon 3 times per day.  * It is important to consume increased amounts of fluid throughout the day to help improve the effectiveness of the fiber supplementation  * Avoid gummy formulations of fiber as this can further increase your risk of the above adverse effects due to artificial sweeteners in the Gummies.       Fiber Content in Foods  Fiber is a substance that is found in plant foods, such as fruits, vegetables, whole grains, nuts, seeds, and beans. As part of your treatment and recovery plan, your health care provider may recommend that you eat foods that have specific amounts of dietary fiber. Some conditions may require a high-fiber diet while others may require a low-fiber diet.  This sheet gives you information about the dietary fiber content of some common foods. Your health care provider will tell you how much fiber you need in your diet. If you have problems or questions, contact your health care provider or dietitian.  What foods are high in fiber?    Fruits  Blackberries or raspberries (fresh) -- ½ cup (75 g) has 4 g of fiber.  Pear (fresh) -- 1 medium (180 g) has 5.5 g of fiber.  Prunes (dried) -- 6 to 8 pieces (57-76 g) has 5 g of fiber.  Apple with skin -- 1 medium (182 g) has 4.8 g of fiber.  Guava -- 1 cup (128 g) has 8.9 g of fiber.  Vegetables  Peas (frozen) -- ½ cup (80 g) has 4.4 g of fiber.  Potato with skin (baked) -- 1 medium (173 g) has 4.4 g of fiber.  Pumpkin (canned) -- ½ cup (122 g) has 5 g of fiber.  [place] sprouts (cooked) -- ½ cup (78 g) has 4 g of fiber.  Sweet potato -- ½ cup mashed (124 g) has 4 g of fiber.  Winter squash -- 1 cup cooked (205 g) has 5.7 g of fiber.  Grains  Bran cereal -- ½ cup (31 g) has 8.6 g of fiber.  Bulgur (cooked) -- ½ cup (70 g) has 4 g of fiber.  Quinoa (cooked) -- 1 cup (185 g) has 5.2 g of fiber.  Popcorn -- 3  cups (375 g) popped has 5.8 g of fiber.  Spaghetti, whole wheat -- 1 cup (140 g) has 6 g of fiber.  Meats and other proteins  Batres beans (cooked) -- ½ cup (90 g) has 7.7 g of fiber.  Lentils (cooked) -- ½ cup (90 g) has 7.8 g of fiber.  Kidney beans (canned) -- ½ cup (92.5 g) has 5.7 g of fiber.  Soybeans (canned, frozen, or fresh) -- ½ cup (92.5 g) has 5.2 g of fiber.  Baked beans, plain or vegetarian (canned) -- ½ cup (130 g) has 5.2 g of fiber.  Garbanzo beans or chickpeas (canned) -- ½ cup (90 g) has 6.6 g of fiber.  Black beans (cooked) -- ½ cup (86 g) has 7.5 g of fiber.  White beans or navy beans (cooked) -- ½ cup (91 g) has 9.3 g of fiber.  The items listed above may not be a complete list of foods with high fiber. Actual amounts of fiber may be different depending on processing. Contact a dietitian for more information.  What foods are moderate in fiber?    Fruits  Banana -- 1 medium (126 g) has 3.2 g of fiber.  Melon -- 1 cup (155 g) has 1.4 g of fiber.  Orange -- 1 small (154 g) has 3.7 g of fiber.  Raisins -- ¼ cup (40 g) has 1.8 g of fiber.  Applesauce, sweetened -- ½ cup (125 g) has 1.5 g of fiber.  Blueberries (fresh) -- ½ cup (75 g) has 1.8 g of fiber.  Strawberries (fresh, sliced) -- 1 cup (150 g) has 3 g of fiber.  Cherries -- 1 cup (140 g) has 2.9 g of fiber.  Vegetables  Broccoli (cooked) -- ½ cup (77.5 g) has 2.1 g of fiber.  Carrots (cooked) -- ½ cup (77.5 g) has 2.2 g of fiber.  Corn (canned or frozen) -- ½ cup (82.5 g) has 2.1 g of fiber.  Potatoes, mashed -- ½ cup (105 g) has 1.6 g of fiber.  Tomato -- 1 medium (62 g) has 1.5 g of fiber.  Green beans (canned) -- ½ cup (83 g) has 2 g of fiber.  Squash, winter -- ½ cup (58 g) has 1 g of fiber.  Sweet potato, baked -- 1 medium (150 g) has 3 g of fiber.  Cauliflower (cooked) -- 1/2 cup (90 g) has 2.3 g of fiber.  Grains  Long-grain brown rice (cooked) -- 1 cup (196 g) has 3.5 g of fiber.  Bagel, plain -- one 4-inch (10 cm) bagel has 2 g of  fiber.  Instant oatmeal -- ½ cup (120 g) has about 2 g of fiber.  Macaroni noodles, enriched (cooked) -- 1 cup (140 g) has 2.5 g of fiber.  Multigrain cereal -- ½ cup (15 g) has about 2-4 g of fiber.  Whole-wheat bread -- 1 slice (26 g) has 2 g of fiber.  Whole-wheat spaghetti noodles -- ½ cup (70 g) has 3.2 g of fiber.  Corn tortilla -- one 6-inch (15 cm) tortilla has 1.5 g of fiber.  Meats and other proteins  Almonds -- ¼ cup or 1 oz (28 g) has 3.5 g of fiber.  Sunflower seeds in shell -- ¼ cup or ½ oz (11.5 g) has 1.1 g of fiber.  Vegetable or soy miguel a -- 1 miguel a (70 g) has 3.4 g of fiber.  Walnuts -- ¼ cup or 1 oz (30 g) has 2 g of fiber.  Flax seed -- 1 Tbsp (7 g) has 2.8 g of fiber.  The items listed above may not be a complete list of foods that have moderate amounts of fiber. Actual amounts of fiber may be different depending on processing. Contact a dietitian for more information.  What foods are low in fiber?    Low-fiber foods contain less than 1 g of fiber per serving. They include:  Fruits  Fruit juice -- ½ cup or 4 fl oz (118 mL) has 0.5 g of fiber.  Vegetables  Lettuce -- 1 cup (35 g) has 0.5 g of fiber.  Cucumber (slices) -- ½ cup (60 g) has 0.3 g of fiber.  Celery -- 1 stalk (40 g) has 0.1 g of fiber.  Grains  Flour tortilla -- one 6-inch (15 cm) tortilla has 0.5 g of fiber.  White rice (cooked) -- ½ cup (81.5 g) has 0.3 g of fiber.  Meats and other proteins  Egg -- 1 large (50 g) has 0 g of fiber.  Meat, poultry, or fish -- 3 oz (85 g) has 0 g of fiber.  Dairy  Milk -- 1 cup or 8 fl oz (237 mL) has 0 g of fiber.  Yogurt -- 1 cup (245 g) has 0 g of fiber.  The items listed above may not be a complete list of foods that are low in fiber. Actual amounts of fiber may be different depending on processing. Contact a dietitian for more information.  Summary  Fiber is a substance that is found in plant foods, such as fruits, vegetables, whole grains, nuts, seeds, and beans.  As part of your treatment and  recovery plan, your health care provider may recommend that you eat foods that have specific amounts of dietary fiber.  This information is not intended to replace advice given to you by your health care provider. Make sure you discuss any questions you have with your health care provider.

## 2024-05-21 NOTE — PATIENT INSTRUCTIONS
The Goal of a constipation regimen is to produce at least 3 complete bowel movements per week     *Recommend starting a soluble fiber regimen that includes psyllium such as Metamucil or FiberCon tablets.    This helps to keep stool soft and formed and easy transit throughout the colon to produce regular and complete bowel movements.  * Consume at least 20 to 30 g of dietary fiber per day  * Research has proven that consuming 5 prunes or 2 kiwi fruit per day can also aid in reducing constipation.  * When starting fiber regimen recommend starting with a low-dose and gradually increasing by 1 tablespoon every 7 days as tolerated.    This will help your body acclimate to the higher fiber diet and reduce risk of unwanted side effects that include bloating, gas, abdominal fullness, and cramping  For example: Begin taking 1 tablespoon daily for at least 7 days, if this is not successful in producing regular bowel movements can begin taking 1 tablespoons twice a day, and finally if this is not successful after taking 2 tablespoons for 7 days, can further increase to maximum recommended dosing of 1 tablespoon 3 times per day.  * It is important to consume increased amounts of fluid throughout the day to help improve the effectiveness of the fiber supplementation  * Avoid gummy formulations of fiber as this can further increase your risk of the above adverse effects due to artificial sweeteners in the Gummies.       Fiber Content in Foods  Fiber is a substance that is found in plant foods, such as fruits, vegetables, whole grains, nuts, seeds, and beans. As part of your treatment and recovery plan, your health care provider may recommend that you eat foods that have specific amounts of dietary fiber. Some conditions may require a high-fiber diet while others may require a low-fiber diet.  This sheet gives you information about the dietary fiber content of some common foods. Your health care provider will tell you how much  fiber you need in your diet. If you have problems or questions, contact your health care provider or dietitian.  What foods are high in fiber?    Fruits  Blackberries or raspberries (fresh) -- ½ cup (75 g) has 4 g of fiber.  Pear (fresh) -- 1 medium (180 g) has 5.5 g of fiber.  Prunes (dried) -- 6 to 8 pieces (57-76 g) has 5 g of fiber.  Apple with skin -- 1 medium (182 g) has 4.8 g of fiber.  Guava -- 1 cup (128 g) has 8.9 g of fiber.  Vegetables  Peas (frozen) -- ½ cup (80 g) has 4.4 g of fiber.  Potato with skin (baked) -- 1 medium (173 g) has 4.4 g of fiber.  Pumpkin (canned) -- ½ cup (122 g) has 5 g of fiber.  Tuttle sprouts (cooked) -- ½ cup (78 g) has 4 g of fiber.  Sweet potato -- ½ cup mashed (124 g) has 4 g of fiber.  Winter squash -- 1 cup cooked (205 g) has 5.7 g of fiber.  Grains  Bran cereal -- ½ cup (31 g) has 8.6 g of fiber.  Bulgur (cooked) -- ½ cup (70 g) has 4 g of fiber.  Quinoa (cooked) -- 1 cup (185 g) has 5.2 g of fiber.  Popcorn -- 3 cups (375 g) popped has 5.8 g of fiber.  Spaghetti, whole wheat -- 1 cup (140 g) has 6 g of fiber.  Meats and other proteins  Batres beans (cooked) -- ½ cup (90 g) has 7.7 g of fiber.  Lentils (cooked) -- ½ cup (90 g) has 7.8 g of fiber.  Kidney beans (canned) -- ½ cup (92.5 g) has 5.7 g of fiber.  Soybeans (canned, frozen, or fresh) -- ½ cup (92.5 g) has 5.2 g of fiber.  Baked beans, plain or vegetarian (canned) -- ½ cup (130 g) has 5.2 g of fiber.  Garbanzo beans or chickpeas (canned) -- ½ cup (90 g) has 6.6 g of fiber.  Black beans (cooked) -- ½ cup (86 g) has 7.5 g of fiber.  White beans or navy beans (cooked) -- ½ cup (91 g) has 9.3 g of fiber.  The items listed above may not be a complete list of foods with high fiber. Actual amounts of fiber may be different depending on processing. Contact a dietitian for more information.  What foods are moderate in fiber?    Fruits  Banana -- 1 medium (126 g) has 3.2 g of fiber.  Melon -- 1 cup (155 g) has 1.4 g of  fiber.  Orange -- 1 small (154 g) has 3.7 g of fiber.  Raisins -- ¼ cup (40 g) has 1.8 g of fiber.  Applesauce, sweetened -- ½ cup (125 g) has 1.5 g of fiber.  Blueberries (fresh) -- ½ cup (75 g) has 1.8 g of fiber.  Strawberries (fresh, sliced) -- 1 cup (150 g) has 3 g of fiber.  Cherries -- 1 cup (140 g) has 2.9 g of fiber.  Vegetables  Broccoli (cooked) -- ½ cup (77.5 g) has 2.1 g of fiber.  Carrots (cooked) -- ½ cup (77.5 g) has 2.2 g of fiber.  Corn (canned or frozen) -- ½ cup (82.5 g) has 2.1 g of fiber.  Potatoes, mashed -- ½ cup (105 g) has 1.6 g of fiber.  Tomato -- 1 medium (62 g) has 1.5 g of fiber.  Green beans (canned) -- ½ cup (83 g) has 2 g of fiber.  Squash, winter -- ½ cup (58 g) has 1 g of fiber.  Sweet potato, baked -- 1 medium (150 g) has 3 g of fiber.  Cauliflower (cooked) -- 1/2 cup (90 g) has 2.3 g of fiber.  Grains  Long-grain brown rice (cooked) -- 1 cup (196 g) has 3.5 g of fiber.  Bagel, plain -- one 4-inch (10 cm) bagel has 2 g of fiber.  Instant oatmeal -- ½ cup (120 g) has about 2 g of fiber.  Macaroni noodles, enriched (cooked) -- 1 cup (140 g) has 2.5 g of fiber.  Multigrain cereal -- ½ cup (15 g) has about 2-4 g of fiber.  Whole-wheat bread -- 1 slice (26 g) has 2 g of fiber.  Whole-wheat spaghetti noodles -- ½ cup (70 g) has 3.2 g of fiber.  Corn tortilla -- one 6-inch (15 cm) tortilla has 1.5 g of fiber.  Meats and other proteins  Almonds -- ¼ cup or 1 oz (28 g) has 3.5 g of fiber.  Sunflower seeds in shell -- ¼ cup or ½ oz (11.5 g) has 1.1 g of fiber.  Vegetable or soy miguel a -- 1 miguel a (70 g) has 3.4 g of fiber.  Walnuts -- ¼ cup or 1 oz (30 g) has 2 g of fiber.  Flax seed -- 1 Tbsp (7 g) has 2.8 g of fiber.  The items listed above may not be a complete list of foods that have moderate amounts of fiber. Actual amounts of fiber may be different depending on processing. Contact a dietitian for more information.  What foods are low in fiber?    Low-fiber foods contain less than 1 g  of fiber per serving. They include:  Fruits  Fruit juice -- ½ cup or 4 fl oz (118 mL) has 0.5 g of fiber.  Vegetables  Lettuce -- 1 cup (35 g) has 0.5 g of fiber.  Cucumber (slices) -- ½ cup (60 g) has 0.3 g of fiber.  Celery -- 1 stalk (40 g) has 0.1 g of fiber.  Grains  Flour tortilla -- one 6-inch (15 cm) tortilla has 0.5 g of fiber.  White rice (cooked) -- ½ cup (81.5 g) has 0.3 g of fiber.  Meats and other proteins  Egg -- 1 large (50 g) has 0 g of fiber.  Meat, poultry, or fish -- 3 oz (85 g) has 0 g of fiber.  Dairy  Milk -- 1 cup or 8 fl oz (237 mL) has 0 g of fiber.  Yogurt -- 1 cup (245 g) has 0 g of fiber.  The items listed above may not be a complete list of foods that are low in fiber. Actual amounts of fiber may be different depending on processing. Contact a dietitian for more information.  Summary  Fiber is a substance that is found in plant foods, such as fruits, vegetables, whole grains, nuts, seeds, and beans.  As part of your treatment and recovery plan, your health care provider may recommend that you eat foods that have specific amounts of dietary fiber.  This information is not intended to replace advice given to you by your health care provider. Make sure you discuss any questions you have with your health care provider.

## 2024-05-31 ENCOUNTER — ANESTHESIA EVENT (OUTPATIENT)
Dept: GASTROENTEROLOGY | Facility: HOSPITAL | Age: 49
End: 2024-05-31
Payer: COMMERCIAL

## 2024-05-31 ENCOUNTER — ANESTHESIA (OUTPATIENT)
Dept: GASTROENTEROLOGY | Facility: HOSPITAL | Age: 49
End: 2024-05-31
Payer: COMMERCIAL

## 2024-05-31 ENCOUNTER — HOSPITAL ENCOUNTER (OUTPATIENT)
Facility: HOSPITAL | Age: 49
Setting detail: HOSPITAL OUTPATIENT SURGERY
Discharge: HOME | End: 2024-05-31
Attending: INTERNAL MEDICINE | Admitting: INTERNAL MEDICINE
Payer: COMMERCIAL

## 2024-05-31 VITALS
SYSTOLIC BLOOD PRESSURE: 108 MMHG | OXYGEN SATURATION: 97 % | TEMPERATURE: 96.4 F | BODY MASS INDEX: 27.17 KG/M2 | RESPIRATION RATE: 15 BRPM | HEART RATE: 71 BPM | DIASTOLIC BLOOD PRESSURE: 80 MMHG | WEIGHT: 205 LBS | HEIGHT: 73 IN

## 2024-05-31 PROCEDURE — 45378 DIAGNOSTIC COLONOSCOPY: CPT | Performed by: INTERNAL MEDICINE

## 2024-05-31 PROCEDURE — 25010000002 PROPOFOL 10 MG/ML EMULSION: Performed by: NURSE ANESTHETIST, CERTIFIED REGISTERED

## 2024-05-31 PROCEDURE — 25810000003 SODIUM CHLORIDE 0.9 % SOLUTION: Performed by: ANESTHESIOLOGY

## 2024-05-31 RX ORDER — LIDOCAINE HYDROCHLORIDE 20 MG/ML
INJECTION, SOLUTION EPIDURAL; INFILTRATION; INTRACAUDAL; PERINEURAL AS NEEDED
Status: DISCONTINUED | OUTPATIENT
Start: 2024-05-31 | End: 2024-05-31 | Stop reason: SURG

## 2024-05-31 RX ORDER — SODIUM CHLORIDE 9 MG/ML
500 INJECTION, SOLUTION INTRAVENOUS CONTINUOUS PRN
Status: DISCONTINUED | OUTPATIENT
Start: 2024-05-31 | End: 2024-05-31 | Stop reason: HOSPADM

## 2024-05-31 RX ORDER — PROPOFOL 10 MG/ML
VIAL (ML) INTRAVENOUS AS NEEDED
Status: DISCONTINUED | OUTPATIENT
Start: 2024-05-31 | End: 2024-05-31 | Stop reason: SURG

## 2024-05-31 RX ORDER — SODIUM CHLORIDE 0.9 % (FLUSH) 0.9 %
10 SYRINGE (ML) INJECTION AS NEEDED
Status: DISCONTINUED | OUTPATIENT
Start: 2024-05-31 | End: 2024-05-31 | Stop reason: HOSPADM

## 2024-05-31 RX ADMIN — PROPOFOL 150 MG: 10 INJECTION, EMULSION INTRAVENOUS at 08:44

## 2024-05-31 RX ADMIN — SODIUM CHLORIDE 500 ML: 9 INJECTION, SOLUTION INTRAVENOUS at 07:55

## 2024-05-31 RX ADMIN — LIDOCAINE HYDROCHLORIDE 80 MG: 20 INJECTION, SOLUTION EPIDURAL; INFILTRATION; INTRACAUDAL; PERINEURAL at 08:44

## 2024-05-31 NOTE — ANESTHESIA POSTPROCEDURE EVALUATION
"Patient: Saulo Shepard    Procedure Summary       Date: 05/31/24 Room / Location: [facility] ENDOSCOPY 5 / [facility]    Anesthesia Start: 0840 Anesthesia Stop: 0903    Procedure: COLONOSCOPY WITH ANESTHESIA Diagnosis:       Left lower quadrant abdominal pain      (Left lower quadrant abdominal pain [R10.32])    Surgeons: Kristopher Brush DO Provider: Wayne Samuels CRNA    Anesthesia Type: MAC ASA Status: 2            Anesthesia Type: MAC    Vitals  Vitals Value Taken Time   /80 05/31/24 0901   Temp     Pulse 84 05/31/24 0903   Resp 15 05/31/24 0855   SpO2 94 % 05/31/24 0903   Vitals shown include unfiled device data.        Post Anesthesia Care and Evaluation    Patient location during evaluation: PACU  Patient participation: complete - patient participated  Level of consciousness: awake and alert  Pain management: adequate    Airway patency: patent  Anesthetic complications: No anesthetic complications    Cardiovascular status: acceptable  Respiratory status: acceptable  Hydration status: acceptable    Comments: Blood pressure 102/72, pulse 80, temperature 96.4 °F (35.8 °C), temperature source Temporal, resp. rate 15, height 185.4 cm (73\"), weight 93 kg (205 lb), SpO2 95%.    Pt discharged from PACU based on murphy score >8    "

## 2024-05-31 NOTE — ANESTHESIA PREPROCEDURE EVALUATION
Anesthesia Evaluation     no history of anesthetic complications:   NPO Solid Status: > 8 hours  NPO Liquid Status: > 2 hours           Airway   Mallampati: I  TM distance: >3 FB  Neck ROM: full  No difficulty expected  Dental      Pulmonary    (-) asthma, sleep apnea, not a smoker  Cardiovascular   Exercise tolerance: good (4-7 METS)    (-) hypertension, past MI, CAD      Neuro/Psych  (+) headaches, psychiatric history Anxiety  (-) seizures, TIA, CVA  GI/Hepatic/Renal/Endo    (-) liver disease, no renal disease, diabetes    ROS Comment: Diverticulitis    Musculoskeletal     (+) back pain  Abdominal    Substance History      OB/GYN          Other   arthritis,                 Anesthesia Plan    ASA 2     MAC     intravenous induction     Anesthetic plan, risks, benefits, and alternatives have been provided, discussed and informed consent has been obtained with: patient.    CODE STATUS:

## 2024-06-03 ENCOUNTER — TELEPHONE (OUTPATIENT)
Dept: GASTROENTEROLOGY | Facility: CLINIC | Age: 49
End: 2024-06-03
Payer: COMMERCIAL

## 2024-06-22 ENCOUNTER — HOSPITAL ENCOUNTER (EMERGENCY)
Facility: HOSPITAL | Age: 49
Discharge: HOME | End: 2024-06-22
Payer: COMMERCIAL

## 2024-06-22 ENCOUNTER — APPOINTMENT (OUTPATIENT)
Dept: GENERAL RADIOLOGY | Facility: HOSPITAL | Age: 49
End: 2024-06-22
Payer: COMMERCIAL

## 2024-06-22 VITALS
BODY MASS INDEX: 27.83 KG/M2 | OXYGEN SATURATION: 98 % | HEIGHT: 73 IN | WEIGHT: 210 LBS | SYSTOLIC BLOOD PRESSURE: 120 MMHG | TEMPERATURE: 98.2 F | RESPIRATION RATE: 16 BRPM | DIASTOLIC BLOOD PRESSURE: 85 MMHG | HEART RATE: 71 BPM

## 2024-06-22 DIAGNOSIS — M25.551 RIGHT HIP PAIN: Primary | ICD-10-CM

## 2024-06-22 DIAGNOSIS — M54.31 SCIATICA OF RIGHT SIDE: ICD-10-CM

## 2024-06-22 PROCEDURE — 73502 X-RAY EXAM HIP UNI 2-3 VIEWS: CPT

## 2024-06-22 PROCEDURE — 25010000002 KETOROLAC TROMETHAMINE PER 15 MG: Performed by: NURSE PRACTITIONER

## 2024-06-22 PROCEDURE — 25010000002 ORPHENADRINE CITRATE PER 60 MG: Performed by: NURSE PRACTITIONER

## 2024-06-22 PROCEDURE — 99283 EMERGENCY DEPT VISIT LOW MDM: CPT

## 2024-06-22 PROCEDURE — 72110 X-RAY EXAM L-2 SPINE 4/>VWS: CPT

## 2024-06-22 PROCEDURE — 96372 THER/PROPH/DIAG INJ SC/IM: CPT

## 2024-06-22 RX ORDER — CYCLOBENZAPRINE HCL 10 MG
10 TABLET ORAL 3 TIMES DAILY PRN
Qty: 20 TABLET | Refills: 0 | Status: SHIPPED | OUTPATIENT
Start: 2024-06-22

## 2024-06-22 RX ORDER — ORPHENADRINE CITRATE 30 MG/ML
60 INJECTION INTRAMUSCULAR; INTRAVENOUS ONCE
Status: COMPLETED | OUTPATIENT
Start: 2024-06-22 | End: 2024-06-22

## 2024-06-22 RX ORDER — KETOROLAC TROMETHAMINE 30 MG/ML
60 INJECTION, SOLUTION INTRAMUSCULAR; INTRAVENOUS ONCE
Status: COMPLETED | OUTPATIENT
Start: 2024-06-22 | End: 2024-06-22

## 2024-06-22 RX ORDER — METHYLPREDNISOLONE 4 MG/1
TABLET ORAL
Qty: 21 EACH | Refills: 0 | Status: SHIPPED | OUTPATIENT
Start: 2024-06-22

## 2024-06-22 RX ADMIN — ORPHENADRINE CITRATE 60 MG: 60 INJECTION INTRAMUSCULAR; INTRAVENOUS at 11:59

## 2024-06-22 RX ADMIN — KETOROLAC TROMETHAMINE 60 MG: 60 INJECTION, SOLUTION INTRAMUSCULAR at 11:59

## 2024-06-22 NOTE — DISCHARGE INSTRUCTIONS
Return to ER if symptoms worsen   Ice to area for 24 hours, then moist heat compresses three times a day  Follow up with primary care provider on Monday

## 2024-06-22 NOTE — ED PROVIDER NOTES
Subjective   History of Present Illness  Patient is a 49-year-old male presents the emergency department with right hip pain and right buttocks pain that he woke up with this morning.  He denies any known injury however the patient works as a  and does a lot of lifting.  He denies any specific injury.  No incontinence of urine or stool.  He does have a history of degenerative changes in his back and hips.  He denies any recent fall.  No numbness or focal weakness.    History provided by:  Patient   used: No        Review of Systems   Constitutional: Negative.    HENT: Negative.     Eyes: Negative.    Respiratory: Negative.     Cardiovascular: Negative.    Gastrointestinal: Negative.    Endocrine: Negative.    Genitourinary: Negative.    Musculoskeletal:         Patient is a 49-year-old male presents the emergency department with right hip pain and right buttocks pain that he woke up with this morning.  He denies any known injury however the patient works as a  and does a lot of lifting.  He denies any specific injury.  No incontinence of urine or stool.  He does have a history of degenerative changes in his back and hips.  He denies any recent fall.  No numbness or focal weakness.     Skin: Negative.    Allergic/Immunologic: Negative.    Neurological: Negative.    Hematological: Negative.    Psychiatric/Behavioral: Negative.     All other systems reviewed and are negative.      Past Medical History:   Diagnosis Date    Anxiety     Back pain     Fracture of lumbar spine 2002 and 2015    lower lumbar 2002; L1 2015    Head injury     Injury of back     Nausea with vomiting 03/28/2023    Neck pain     Overweight (BMI 25.0-29.9)     Restless leg syndrome        Allergies   Allergen Reactions    Ct Contrast Itching       Past Surgical History:   Procedure Laterality Date    COLONOSCOPY N/A 8/31/2023    Procedure: COLONOSCOPY WITH ANESTHESIA;  Surgeon: Kristopher Brush DO;  Location:   [facility];  Service: Gastroenterology;  Laterality: N/A;  preop; screening   postop  PCP Joseph Stewart    COLONOSCOPY N/A 9/1/2023    Procedure: COLONOSCOPY WITH ANESTHESIA;  Surgeon: Kristopher Brush DO;  Location:  [facility];  Service: Gastroenterology;  Laterality: N/A;  Pre: Encounter for screening for malignant neoplasm of colon;  Post: Inadequate prep;  Vivek Stewart DO    COLONOSCOPY N/A 5/31/2024    Procedure: COLONOSCOPY WITH ANESTHESIA;  Surgeon: Kristopher Brush DO;  Location: [facility] ENDOSCOPY;  Service: Gastroenterology;  Laterality: N/A;  Pre: Encounter for screening for malignant neoplasm of colon, Left lower quadrant abdominal pain;  Post: Fair prep;  Vivek Stewart DO    FACIAL FRACTURE SURGERY      INCISION AND DRAINAGE LEG Right 12/1/2018    Procedure: INCISION AND DRAINAGE OF RIGHT UPPER INNER THIGH, PLACEMENT OF WOUND VAC;  Surgeon: René Dallas MD;  Location: [facility] OR;  Service: General    INGUINAL HERNIA REPAIR Bilateral 8/22/2023    Procedure: INGUINAL HERNIA BILATERAL REPAIR LAPAROSCOPIC WITH DAVINCI ROBOT WITH MESH;  Surgeon: Dina Boyd MD;  Location: [facility] OR;  Service: Robotics - DaVinci;  Laterality: Bilateral;    KNEE SURGERY Right 09/2021    SPLENECTOMY         Family History   Problem Relation Age of Onset    Fibromyalgia Mother     Lung cancer Father     Colon cancer Neg Hx     Colon polyps Neg Hx     Esophageal cancer Neg Hx        Social History     Socioeconomic History    Marital status: Single   Tobacco Use    Smoking status: Never    Smokeless tobacco: Never   Vaping Use    Vaping status: Never Used   Substance and Sexual Activity    Alcohol use: Not Currently    Drug use: No    Sexual activity: Yes     Partners: Female       Prior to Admission medications    Medication Sig Start Date End Date Taking? Authorizing Provider   acetaminophen (Tylenol) 325 MG tablet Take 3 tablets by mouth Every 8 (Eight) Hours. Take every 8 hours for 3  "days then take prn as needed. 8/22/23 8/21/24  Dina Boyd MD   amoxicillin-clavulanate (AUGMENTIN) 875-125 MG per tablet Take 1 tablet by mouth 2 (Two) Times a Day for 7 days.  Patient not taking: Reported on 5/21/2024 4/12/24   Carmen Yuen PA-C   ibuprofen (ADVIL,MOTRIN) 800 MG tablet Take 1 tablet by mouth Every 8 (Eight) Hours As Needed for Mild Pain for up to 90 doses. Take every 8 hours for three days then take as needed. 2/5/24   Angelica Almonte APRN   meloxicam (MOBIC) 15 MG tablet Take 1 tablet by mouth Daily.    Provider, MD Tiffanie   ondansetron (Zofran) 4 MG tablet Take 1 tablet by mouth Every 8 (Eight) Hours As Needed for Nausea or Vomiting.  Patient not taking: Reported on 5/21/2024 4/12/24 4/12/25  Carmen Yuen PA-C   rOPINIRole (REQUIP) 0.25 MG tablet Take 1 tablet by mouth 3 (Three) Times a Day. Take 1 hour before bedtime. 4/18/24   Vivek Stewart, DO       /85 (BP Location: Left arm, Patient Position: Sitting)   Pulse 71   Temp 98.2 °F (36.8 °C) (Oral)   Resp 16   Ht 185.4 cm (73\")   Wt 95.3 kg (210 lb)   SpO2 98%   BMI 27.71 kg/m²     Objective   Physical Exam  Vitals and nursing note reviewed.   Constitutional:       Appearance: He is well-developed.      Comments: Nontoxic-appearing.  No acute distress.   HENT:      Head: Normocephalic and atraumatic.   Eyes:      Conjunctiva/sclera: Conjunctivae normal.      Pupils: Pupils are equal, round, and reactive to light.   Cardiovascular:      Rate and Rhythm: Normal rate and regular rhythm.      Heart sounds: Normal heart sounds.   Pulmonary:      Effort: Pulmonary effort is normal.      Breath sounds: Normal breath sounds.   Abdominal:      General: Bowel sounds are normal.      Palpations: Abdomen is soft.   Musculoskeletal:      Cervical back: Normal range of motion and neck supple.      Comments: Tenderness on palpation of the lateral right hip and mid buttocks.  Mild tenderness on palpation of paraspinal " muscles of the right lower lumbar spine.  No step-off or laxity noted.  Foot push pull strong and equal.  DTRs are intact.   Skin:     General: Skin is warm and dry.   Neurological:      Mental Status: He is alert and oriented to person, place, and time.      Deep Tendon Reflexes: Reflexes are normal and symmetric.   Psychiatric:         Behavior: Behavior normal.         Thought Content: Thought content normal.         Judgment: Judgment normal.         Procedures         Lab Results (last 24 hours)       ** No results found for the last 24 hours. **            XR Spine Lumbar Complete 4+VW   Final Result   1. Stable exam compared to 6/23/2020. Chronic L1 compression deformity   with mild degenerative disc change and facet arthropathy. No acute   radiographic abnormality.           This report was signed and finalized on 6/22/2024 11:33 AM by Dr. Bobbi Mendiola MD.          XR Hip With or Without Pelvis 2 - 3 View Right   Final Result   1. Mild bilateral hip joint space narrowing with no acute osseous injury   identified of the bony pelvis or right hip.       This report was signed and finalized on 6/22/2024 11:31 AM by Dr. Bobbi Mendiola MD.              ED Course  ED Course as of 06/22/24 1619   Sat Jun 22, 2024   1201 X-ray of the L-spine shows chronic L1 deformity degenerative disc changes no acute findings.  X-ray of the right heel shows bilateral joint space narrowing no acute injury.  Patient was given Toradol and Norflex while in the emergency department.  Advised patient this could be sciatica or bursitis of the hip.  Will place on steroids and muscle relaxants.  Advised the patient to apply ice to the affected area today and then moist heat starting tomorrow.  Follow-up with primary care doctor on Monday.  Advised to return the emergency department before if symptoms worsen.  Patient is in agreement with the care plan and voices understanding of instructions. [CW]      ED Course User Index  [CW]  Madalyn Cobb APRN        Medical Decision Making  Patient is a 49-year-old male presents the emergency department with right hip pain and right buttocks pain that he woke up with this morning.  He denies any known injury however the patient works as a  and does a lot of lifting.  He denies any specific injury.  No incontinence of urine or stool.  He does have a history of degenerative changes in his back and hips.  He denies any recent fall.  No numbness or focal weakness.  Course of treatment in ED: Nontoxic-appearing.  No acute distress.  Patient has tenderness on palpation the lateral aspect of the right hip.  There is no erythema or signs of septic joint.  He has some tenderness to the lower lumbar spine paraspinal muscles of the lower lumbar spine.  No step-off or laxity noted.  Foot push pull strong and equal.  DTRs are intact.  X-ray of the L-spine and right hip are done. Pt was given toradol and norflex as well.   Differential diagnosis to include but not limited to: bursitis; right hip fracture; sciatica; lumbar strain   XR Spine Lumbar Complete 4+VW   Final Result    1. Stable exam compared to 6/23/2020. Chronic L1 compression deformity    with mild degenerative disc change and facet arthropathy. No acute    radiographic abnormality.              This report was signed and finalized on 6/22/2024 11:33 AM by Dr. Bobbi Mendiola MD.          XR Hip With or Without Pelvis 2 - 3 View Right   Final Result    1. Mild bilateral hip joint space narrowing with no acute osseous injury    identified of the bony pelvis or right hip.         This report was signed and finalized on 6/22/2024 11:31 AM by Dr. Bobbi Mendiola MD.     X-ray of the L-spine shows chronic L1 deformity degenerative disc changes no acute findings.  X-ray of the right heel shows bilateral joint space narrowing no acute injury.  Patient was given Toradol and Norflex while in the emergency department.  Advised patient this  could be sciatica or bursitis of the hip.  Will place on steroids and muscle relaxants.  Advised the patient to apply ice to the affected area today and then moist heat starting tomorrow.  Follow-up with primary care doctor on Monday.  Advised to return the emergency department before if symptoms worsen.  Patient is in agreement with the care plan and voices understanding of instructions.         Problems Addressed:  Right hip pain: complicated acute illness or injury  Sciatica of right side: complicated acute illness or injury    Amount and/or Complexity of Data Reviewed  Radiology: ordered. Decision-making details documented in ED Course.    Risk  Prescription drug management.         Final diagnoses:   Right hip pain   Sciatica of right side          Madalyn Cobb, APRN  06/22/24 3028

## 2024-06-22 NOTE — Clinical Note
[facility] EMERGENCY DEPARTMENT  [address]  [facility] [address]  Phone: [phone]    Saulo Shepard was seen and treated in our emergency department on 6/22/2024.  He may return to work on 06/25/2024.         Thank you for choosing [facility].    Madalyn Cobb APRN

## 2024-06-24 ENCOUNTER — OFFICE VISIT (OUTPATIENT)
Dept: FAMILY MEDICINE CLINIC | Facility: CLINIC | Age: 49
End: 2024-06-24
Payer: COMMERCIAL

## 2024-06-24 VITALS
RESPIRATION RATE: 18 BRPM | DIASTOLIC BLOOD PRESSURE: 80 MMHG | OXYGEN SATURATION: 96 % | TEMPERATURE: 98 F | BODY MASS INDEX: 27.96 KG/M2 | HEART RATE: 83 BPM | SYSTOLIC BLOOD PRESSURE: 116 MMHG | HEIGHT: 73 IN | WEIGHT: 211 LBS

## 2024-06-24 DIAGNOSIS — M54.31 SCIATICA, RIGHT SIDE: Primary | ICD-10-CM

## 2024-06-24 PROCEDURE — 99213 OFFICE O/P EST LOW 20 MIN: CPT | Performed by: FAMILY MEDICINE

## 2024-06-24 PROCEDURE — 1160F RVW MEDS BY RX/DR IN RCRD: CPT | Performed by: FAMILY MEDICINE

## 2024-06-24 PROCEDURE — 1159F MED LIST DOCD IN RCRD: CPT | Performed by: FAMILY MEDICINE

## 2024-06-24 RX ORDER — GABAPENTIN 300 MG/1
300 CAPSULE ORAL 3 TIMES DAILY
Qty: 90 CAPSULE | Refills: 2 | Status: SHIPPED | OUTPATIENT
Start: 2024-06-24

## 2024-06-24 NOTE — PROGRESS NOTES
"Chief Complaint  Hospital Follow Up Visit (Pt went to ED for right hip pain ), Hip Pain (Right hip pain), and Hand Pain    Subjective        Saulo Shepard presents to [facility]  Hip Pain     Hand Pain       Recently seen in the ED on 6/22/2024 for new onset of right buttock pain with occasional radiation down right leg.  Started after sleeping without any other inciting injuries, x-ray revealed unchanged findings of chronic compression of L1 vertebra with mild degenerative change and facet arthropathy throughout.    Objective   Vital Signs:  /80   Pulse 83   Temp 98 °F (36.7 °C)   Resp 18   Ht 185.4 cm (73\")   Wt 95.7 kg (211 lb)   SpO2 96%   BMI 27.84 kg/m²   Estimated body mass index is 27.84 kg/m² as calculated from the following:    Height as of this encounter: 185.4 cm (73\").    Weight as of this encounter: 95.7 kg (211 lb).               Physical Exam  Vitals and nursing note reviewed.   Constitutional:       General: He is not in acute distress.     Appearance: He is not diaphoretic.   HENT:      Head: Normocephalic and atraumatic.      Nose: Nose normal.   Eyes:      General: No scleral icterus.        Right eye: No discharge.         Left eye: No discharge.      Conjunctiva/sclera: Conjunctivae normal.   Neck:      Trachea: No tracheal deviation.   Pulmonary:      Effort: Pulmonary effort is normal.   Musculoskeletal:      Comments: Equivocal seated leg raise on the right   Skin:     General: Skin is warm and dry.      Coloration: Skin is not pale.   Neurological:      Mental Status: He is alert and oriented to person, place, and time.   Psychiatric:         Behavior: Behavior normal.         Thought Content: Thought content normal.         Judgment: Judgment normal.        Result Review :                     Assessment and Plan     Diagnoses and all orders for this visit:    1. Sciatica, right side (Primary)  -     Ambulatory Referral to Physical " Therapy  -     gabapentin (NEURONTIN) 300 MG capsule; Take 1 capsule by mouth 3 (Three) Times a Day.  Dispense: 90 capsule; Refill: 2    Radiculopathy versus piriformis syndrome  Consider MRI if poor response to PT       Follow Up     Return if symptoms worsen or fail to improve.  Patient was given instructions and counseling regarding his condition or for health maintenance advice. Please see specific information pulled into the AVS if appropriate.

## 2024-07-01 ENCOUNTER — HOSPITAL ENCOUNTER (EMERGENCY)
Facility: HOSPITAL | Age: 49
Discharge: HOME | End: 2024-07-01
Payer: COMMERCIAL

## 2024-07-01 VITALS
RESPIRATION RATE: 16 BRPM | HEIGHT: 73 IN | BODY MASS INDEX: 28.03 KG/M2 | WEIGHT: 211.5 LBS | HEART RATE: 85 BPM | DIASTOLIC BLOOD PRESSURE: 89 MMHG | TEMPERATURE: 98.4 F | SYSTOLIC BLOOD PRESSURE: 144 MMHG | OXYGEN SATURATION: 99 %

## 2024-07-01 DIAGNOSIS — T78.40XA ALLERGIC REACTION, INITIAL ENCOUNTER: ICD-10-CM

## 2024-07-01 DIAGNOSIS — T63.441A BEE STING, ACCIDENTAL OR UNINTENTIONAL, INITIAL ENCOUNTER: Primary | ICD-10-CM

## 2024-07-01 PROCEDURE — 25010000002 METHYLPREDNISOLONE PER 125 MG: Performed by: NURSE PRACTITIONER

## 2024-07-01 PROCEDURE — 96375 TX/PRO/DX INJ NEW DRUG ADDON: CPT

## 2024-07-01 PROCEDURE — 25010000002 DIPHENHYDRAMINE PER 50 MG: Performed by: NURSE PRACTITIONER

## 2024-07-01 PROCEDURE — 99283 EMERGENCY DEPT VISIT LOW MDM: CPT

## 2024-07-01 PROCEDURE — 96374 THER/PROPH/DIAG INJ IV PUSH: CPT

## 2024-07-01 RX ORDER — DIPHENHYDRAMINE HCL 25 MG
50 TABLET ORAL EVERY 6 HOURS PRN
Qty: 20 TABLET | Refills: 0 | Status: SHIPPED | OUTPATIENT
Start: 2024-07-01

## 2024-07-01 RX ORDER — METHYLPREDNISOLONE 4 MG/1
TABLET ORAL
Qty: 21 EACH | Refills: 0 | Status: SHIPPED | OUTPATIENT
Start: 2024-07-01

## 2024-07-01 RX ORDER — DIPHENHYDRAMINE HYDROCHLORIDE 50 MG/ML
50 INJECTION INTRAMUSCULAR; INTRAVENOUS ONCE
Status: COMPLETED | OUTPATIENT
Start: 2024-07-01 | End: 2024-07-01

## 2024-07-01 RX ORDER — FAMOTIDINE 10 MG/ML
20 INJECTION, SOLUTION INTRAVENOUS ONCE
Status: COMPLETED | OUTPATIENT
Start: 2024-07-01 | End: 2024-07-01

## 2024-07-01 RX ORDER — METHYLPREDNISOLONE SODIUM SUCCINATE 125 MG/2ML
125 INJECTION, POWDER, LYOPHILIZED, FOR SOLUTION INTRAMUSCULAR; INTRAVENOUS ONCE
Status: COMPLETED | OUTPATIENT
Start: 2024-07-01 | End: 2024-07-01

## 2024-07-01 RX ADMIN — METHYLPREDNISOLONE SODIUM SUCCINATE 125 MG: 125 INJECTION, POWDER, FOR SOLUTION INTRAMUSCULAR; INTRAVENOUS at 17:55

## 2024-07-01 RX ADMIN — FAMOTIDINE 20 MG: 10 INJECTION INTRAVENOUS at 18:03

## 2024-07-01 RX ADMIN — DIPHENHYDRAMINE HYDROCHLORIDE 50 MG: 50 INJECTION, SOLUTION INTRAMUSCULAR; INTRAVENOUS at 17:58

## 2024-07-01 NOTE — Clinical Note
[facility] EMERGENCY DEPARTMENT  [address]  [facility] [address]  Phone: [phone]    Saulo Shepard was seen and treated in our emergency department on 7/1/2024.  He may return to work on 07/03/2024.         Thank you for choosing [facility].    Madalyn Cobb APRN

## 2024-07-01 NOTE — ED PROVIDER NOTES
Subjective   History of Present Illness  Patient is a 49-year-old male presents emergency department after being stung by bee around 2:00 to his right hand.  He has not had a history of allergic reactions in the past.  He states he noticed increased swelling to his hand a few hours after.  He has not applied ice.  Has not taken any Benadryl.  He denies any shortness of breath or difficulty swallowing.  No other complaints.    History provided by:  Patient   used: No        Review of Systems   Constitutional: Negative.    HENT: Negative.     Eyes: Negative.    Respiratory: Negative.     Cardiovascular: Negative.    Gastrointestinal: Negative.    Endocrine: Negative.    Genitourinary: Negative.    Musculoskeletal:         Patient is a 49-year-old male presents emergency department after being stung by bee around 2:00 to his right hand.  He has not had a history of allergic reactions in the past.  He states he noticed increased swelling to his hand a few hours after.  He has not applied ice.  Has not taken any Benadryl.  He denies any shortness of breath or difficulty swallowing.  No other complaints.     Skin: Negative.    Allergic/Immunologic: Negative.    Neurological: Negative.    Hematological: Negative.    Psychiatric/Behavioral: Negative.     All other systems reviewed and are negative.      Past Medical History:   Diagnosis Date    Anxiety     Back pain     Fracture of lumbar spine 2002 and 2015    lower lumbar 2002; L1 2015    Head injury     Injury of back     Nausea with vomiting 03/28/2023    Neck pain     Overweight (BMI 25.0-29.9)     Restless leg syndrome        Allergies   Allergen Reactions    Ct Contrast Itching       Past Surgical History:   Procedure Laterality Date    COLONOSCOPY N/A 8/31/2023    Procedure: COLONOSCOPY WITH ANESTHESIA;  Surgeon: Kristopher Brush DO;  Location: [facility] ENDOSCOPY;  Service: Gastroenterology;  Laterality: N/A;  preop; screening   postop  PCP  Joseph Stewart    COLONOSCOPY N/A 9/1/2023    Procedure: COLONOSCOPY WITH ANESTHESIA;  Surgeon: Kristopher Brush DO;  Location:  [facility] ENDOSCOPY;  Service: Gastroenterology;  Laterality: N/A;  Pre: Encounter for screening for malignant neoplasm of colon;  Post: Inadequate prep;  Vivek Stewart DO    COLONOSCOPY N/A 5/31/2024    Procedure: COLONOSCOPY WITH ANESTHESIA;  Surgeon: Kristopher Brush DO;  Location:  [facility] ENDOSCOPY;  Service: Gastroenterology;  Laterality: N/A;  Pre: Encounter for screening for malignant neoplasm of colon, Left lower quadrant abdominal pain;  Post: Fair prep;  Vivek Stewart DO    FACIAL FRACTURE SURGERY      INCISION AND DRAINAGE LEG Right 12/1/2018    Procedure: INCISION AND DRAINAGE OF RIGHT UPPER INNER THIGH, PLACEMENT OF WOUND VAC;  Surgeon: René Dallas MD;  Location: [facility] OR;  Service: General    INGUINAL HERNIA REPAIR Bilateral 8/22/2023    Procedure: INGUINAL HERNIA BILATERAL REPAIR LAPAROSCOPIC WITH DAVINCI ROBOT WITH MESH;  Surgeon: Dina Boyd MD;  Location:  [facility] OR;  Service: Robotics - DaVinci;  Laterality: Bilateral;    KNEE SURGERY Right 09/2021    SPLENECTOMY         Family History   Problem Relation Age of Onset    Fibromyalgia Mother     Lung cancer Father     Colon cancer Neg Hx     Colon polyps Neg Hx     Esophageal cancer Neg Hx        Social History     Socioeconomic History    Marital status: Single   Tobacco Use    Smoking status: Never    Smokeless tobacco: Never   Vaping Use    Vaping status: Never Used   Substance and Sexual Activity    Alcohol use: Not Currently    Drug use: No    Sexual activity: Yes     Partners: Female       Prior to Admission medications    Medication Sig Start Date End Date Taking? Authorizing Provider   acetaminophen (Tylenol) 325 MG tablet Take 3 tablets by mouth Every 8 (Eight) Hours. Take every 8 hours for 3 days then take prn as needed. 8/22/23 8/21/24  Dina Boyd MD   cyclobenzaprine (FLEXERIL)  "10 MG tablet Take 1 tablet by mouth 3 (Three) Times a Day As Needed for Muscle Spasms. 6/22/24   Madalyn Cobb APRN   gabapentin (NEURONTIN) 300 MG capsule Take 1 capsule by mouth 3 (Three) Times a Day. 6/24/24   Vivek Stewart DO   ibuprofen (ADVIL,MOTRIN) 800 MG tablet Take 1 tablet by mouth Every 8 (Eight) Hours As Needed for Mild Pain for up to 90 doses. Take every 8 hours for three days then take as needed. 2/5/24   Angelica Almonte APRN   meloxicam (MOBIC) 15 MG tablet Take 1 tablet by mouth Daily.    Provider, MD Tiffanie   methylPREDNISolone (MEDROL) 4 MG dose pack Take as directed on package instructions. 6/22/24   Madalyn Cobb APRN   rOPINIRole (REQUIP) 0.25 MG tablet Take 1 tablet by mouth 3 (Three) Times a Day. Take 1 hour before bedtime. 4/18/24   Vivek Stewart DO       /92 (BP Location: Right arm, Patient Position: Sitting)   Pulse 91   Temp 98.4 °F (36.9 °C) (Oral)   Resp 18   Ht 185.4 cm (73\")   Wt 95.9 kg (211 lb 8 oz)   SpO2 96%   BMI 27.90 kg/m²     Objective   Physical Exam  Vitals and nursing note reviewed.   Constitutional:       Appearance: He is well-developed.      Comments: Non toxic appearing. No acute distress. Airway intact   HENT:      Head: Normocephalic and atraumatic.   Eyes:      Conjunctiva/sclera: Conjunctivae normal.      Pupils: Pupils are equal, round, and reactive to light.   Cardiovascular:      Rate and Rhythm: Normal rate and regular rhythm.      Heart sounds: Normal heart sounds.   Pulmonary:      Effort: Pulmonary effort is normal.      Breath sounds: Normal breath sounds.   Abdominal:      General: Bowel sounds are normal.      Palpations: Abdomen is soft.   Musculoskeletal:         General: Normal range of motion.      Cervical back: Normal range of motion and neck supple.      Comments: Right hand: sting noted to right middle digit. Soft tissue swelling to all fingers and dorsal aspect of right hand. Periph pulses palp. " Flexion extension intact to all digits    Skin:     General: Skin is warm and dry.   Neurological:      Mental Status: He is alert and oriented to person, place, and time.      Deep Tendon Reflexes: Reflexes are normal and symmetric.   Psychiatric:         Behavior: Behavior normal.         Thought Content: Thought content normal.         Judgment: Judgment normal.         Procedures         Lab Results (last 24 hours)       ** No results found for the last 24 hours. **            No orders to display       ED Course  ED Course as of 07/01/24 1825 Mon Jul 01, 2024 1820 Reevaluated the patient.  Patient received Pepcid, Benadryl, Solu-Medrol and has had the extremity elevated.  The swelling is starting to resolve.  Patient states he is feeling much better.  Will send the patient home with Benadryl and steroids.  Advised to elevate and apply ice.  Advised the patient reasons to return emergency department.  He is in agreement with the care plan and voices understanding of instructions.  Patient be discharged shortly in stable condition. [CW]      ED Course User Index  [CW] Madalyn Cobb APRN        Medical Decision Making  Patient is a 49-year-old male presents emergency department after being stung by bee around 2:00 to his right hand.  He has not had a history of allergic reactions in the past.  He states he noticed increased swelling to his hand a few hours after.  He has not applied ice.  Has not taken any Benadryl.  He denies any shortness of breath or difficulty swallowing.  No other complaints.  Course of treatment in the er: non toxic appearing. No acute distress. Airway intact. Lungs cta. Right hand: insect sting noted to middle digit. There is soft tissue swelling to all digits and dorsal aspect of right hand. Flexion extension intact to all digits. Periph pulses palp.  Have ordered Solu-Medrol, Pepcid, Benadryl, had ordered to elevate the hand and apply ice to the area.  Will reevaluate after  the medications.  Differential diagnosis to include but not limited to: allergic reaction; cellulitis  Reevaluated the patient.  Patient received Pepcid, Benadryl, Solu-Medrol and has had the extremity elevated.  The swelling is starting to resolve.  Patient states he is feeling much better.  Will send the patient home with Benadryl and steroids.  Advised to elevate and apply ice.  Advised the patient reasons to return emergency department.  He is in agreement with the care plan and voices understanding of instructions.  Patient be discharged shortly in stable condition.      Problems Addressed:  Allergic reaction, initial encounter: complicated acute illness or injury  Bee sting, accidental or unintentional, initial encounter: complicated acute illness or injury    Risk  OTC drugs.  Prescription drug management.         Final diagnoses:   Bee sting, accidental or unintentional, initial encounter   Allergic reaction, initial encounter          Madalyn Cobb, APRN  07/01/24 3146

## 2024-07-25 ENCOUNTER — HOSPITAL ENCOUNTER (EMERGENCY)
Facility: HOSPITAL | Age: 49
Discharge: HOME | End: 2024-07-25
Payer: COMMERCIAL

## 2024-07-25 VITALS
HEIGHT: 73 IN | DIASTOLIC BLOOD PRESSURE: 78 MMHG | TEMPERATURE: 97.9 F | BODY MASS INDEX: 27.57 KG/M2 | WEIGHT: 208 LBS | HEART RATE: 73 BPM | RESPIRATION RATE: 16 BRPM | SYSTOLIC BLOOD PRESSURE: 127 MMHG | OXYGEN SATURATION: 95 %

## 2024-07-25 DIAGNOSIS — H66.90 ACUTE OTITIS MEDIA, UNSPECIFIED OTITIS MEDIA TYPE: Primary | ICD-10-CM

## 2024-07-25 DIAGNOSIS — H61.21 IMPACTED CERUMEN OF RIGHT EAR: ICD-10-CM

## 2024-07-25 PROCEDURE — 99282 EMERGENCY DEPT VISIT SF MDM: CPT

## 2024-07-25 RX ORDER — AMOXICILLIN AND CLAVULANATE POTASSIUM 875; 125 MG/1; MG/1
1 TABLET, FILM COATED ORAL 2 TIMES DAILY
Qty: 20 TABLET | Refills: 0 | Status: SHIPPED | OUTPATIENT
Start: 2024-07-25 | End: 2024-08-02

## 2024-07-25 NOTE — ED PROVIDER NOTES
Subjective   History of Present Illness  Patient is a 49-year-old male that presents to the emergency department for right-sided earache and facial swelling.  Patient reports he began feeling a pressure in maxillary area and right ear yesterday but no other symptoms.  He states that he woke up this morning and felt like his face was swelling on the right side. Patient states he does have discomfort when touching face around maxillary area and right ear.  Patient reports prior to feeling slight pressure yesterday he has felt fine with no other complaints.  Denies any fevers, body aches, chills, cough, congestion or runny nose.  Denies any blurred vision, lightheadedness, dizziness or near syncope.  Denies any chest pain, shortness of breath, abdominal pain, nausea, vomiting, constipation or diarrhea. Patient reports due to the pressure on the right side of his face he does have a slight headache but reports it is not severe or debilitating.          Review of Systems   HENT:  Positive for ear discharge, ear pain and facial swelling.    Neurological:  Positive for headaches.   All other systems reviewed and are negative.      Past Medical History:   Diagnosis Date    Anxiety     Back pain     Fracture of lumbar spine 2002 and 2015    lower lumbar 2002; L1 2015    Head injury     Injury of back     Nausea with vomiting 03/28/2023    Neck pain     Overweight (BMI 25.0-29.9)     Restless leg syndrome        Allergies   Allergen Reactions    Ct Contrast Itching       Past Surgical History:   Procedure Laterality Date    COLONOSCOPY N/A 8/31/2023    Procedure: COLONOSCOPY WITH ANESTHESIA;  Surgeon: Kristopher Brush DO;  Location: [facility] ENDOSCOPY;  Service: Gastroenterology;  Laterality: N/A;  preop; screening   postop  PCP Joseph Stewart    COLONOSCOPY N/A 9/1/2023    Procedure: COLONOSCOPY WITH ANESTHESIA;  Surgeon: Kristopher Brush DO;  Location: [facility] ENDOSCOPY;  Service: Gastroenterology;  Laterality: N/A;  Pre:  Encounter for screening for malignant neoplasm of colon;  Post: Inadequate prep;  Vivek Stewart DO    COLONOSCOPY N/A 5/31/2024    Procedure: COLONOSCOPY WITH ANESTHESIA;  Surgeon: Kristopher Brush DO;  Location:  [facility] ENDOSCOPY;  Service: Gastroenterology;  Laterality: N/A;  Pre: Encounter for screening for malignant neoplasm of colon, Left lower quadrant abdominal pain;  Post: Fair prep;  Vivek Stewart DO    FACIAL FRACTURE SURGERY      INCISION AND DRAINAGE LEG Right 12/1/2018    Procedure: INCISION AND DRAINAGE OF RIGHT UPPER INNER THIGH, PLACEMENT OF WOUND VAC;  Surgeon: René Dallas MD;  Location: [facility] OR;  Service: General    INGUINAL HERNIA REPAIR Bilateral 8/22/2023    Procedure: INGUINAL HERNIA BILATERAL REPAIR LAPAROSCOPIC WITH DAVINCI ROBOT WITH MESH;  Surgeon: Dina Boyd MD;  Location:  [facility] OR;  Service: Robotics - DaVinci;  Laterality: Bilateral;    KNEE SURGERY Right 09/2021    SPLENECTOMY         Family History   Problem Relation Age of Onset    Fibromyalgia Mother     Lung cancer Father     Colon cancer Neg Hx     Colon polyps Neg Hx     Esophageal cancer Neg Hx        Social History     Socioeconomic History    Marital status: Single   Tobacco Use    Smoking status: Never    Smokeless tobacco: Never   Vaping Use    Vaping status: Never Used   Substance and Sexual Activity    Alcohol use: Not Currently    Drug use: No    Sexual activity: Yes     Partners: Female           Objective   Physical Exam  Vitals and nursing note reviewed.   Constitutional:       Appearance: Normal appearance.      Comments: Nontoxic appearing. In no acute distress.    HENT:      Head: Normocephalic and atraumatic.      Right Ear: External ear normal. Drainage and tenderness present. There is impacted cerumen. Tympanic membrane is erythematous.      Left Ear: External ear normal.      Nose: Nose normal.      Mouth/Throat:      Lips: Pink.      Mouth: Mucous membranes are moist.       Dentition: Dental caries present. No dental tenderness, gingival swelling, dental abscesses or gum lesions.      Tongue: No lesions. Tongue does not deviate from midline.      Palate: No mass and lesions.      Pharynx: Oropharynx is clear. Uvula midline. No pharyngeal swelling, oropharyngeal exudate, posterior oropharyngeal erythema or uvula swelling.      Tonsils: No tonsillar exudate or tonsillar abscesses. 0 on the right. 0 on the left.      Comments: No obvious signs of facial swelling noted. Patient does have multiple dental caries noted throughout the mouth but no obvious signs of dental abscess or gingival irritation. No oropharynx swelling.   Eyes:      Extraocular Movements: Extraocular movements intact.      Conjunctiva/sclera: Conjunctivae normal.      Pupils: Pupils are equal, round, and reactive to light.   Cardiovascular:      Rate and Rhythm: Normal rate and regular rhythm.      Pulses: Normal pulses.      Heart sounds: Normal heart sounds.   Pulmonary:      Effort: Pulmonary effort is normal. No respiratory distress.      Breath sounds: Normal breath sounds. No wheezing.   Chest:      Chest wall: No tenderness.   Abdominal:      General: Bowel sounds are normal. There is no distension.      Palpations: Abdomen is soft.      Tenderness: There is no abdominal tenderness. There is no right CVA tenderness, left CVA tenderness, guarding or rebound.   Musculoskeletal:         General: Normal range of motion.      Cervical back: Normal range of motion and neck supple.      Right lower leg: No edema.      Left lower leg: No edema.   Skin:     General: Skin is warm and dry.      Capillary Refill: Capillary refill takes less than 2 seconds.   Neurological:      General: No focal deficit present.      Mental Status: He is alert and oriented to person, place, and time. Mental status is at baseline.      Sensory: Sensation is intact.      Motor: Motor function is intact.      Coordination: Coordination is intact.       Gait: Gait is intact.      Deep Tendon Reflexes: Reflexes are normal and symmetric.   Psychiatric:         Attention and Perception: Attention and perception normal.         Mood and Affect: Mood and affect normal.         Speech: Speech normal.         Behavior: Behavior normal. Behavior is cooperative.         Thought Content: Thought content normal.         Cognition and Memory: Cognition and memory normal.         Judgment: Judgment normal.         Procedures           ED Course                                             Medical Decision Making  Saulo Shepard is a 49 y.o. male who presents to the ED for right-sided earache and facial swelling.  Patient reports he began feeling a pressure in maxillary area and right ear yesterday but no other symptoms.  He states that he woke up this morning and felt like his face was swelling on the right side. Patient states he does have discomfort when touching face around maxillary area and right ear.  Patient reports prior to feeling slight pressure yesterday he has felt fine with no other complaints.  Denies any fevers, body aches, chills, cough, congestion or runny nose.  Denies any blurred vision, lightheadedness, dizziness or near syncope.  Denies any chest pain, shortness of breath, abdominal pain, nausea, vomiting, constipation or diarrhea. Patient reports due to the pressure on the right side of his face he does have a slight headache but reports it is not severe or debilitating.    Patient was non-toxic appearing on arrival. No acute distress was noted.  Vital signs stable.     Past medical history, surgical history, and medication regimen reviewed.     Previous notes, labs, imaging and more reviewed.    Patient's presentation raises suspicion for differentials including, but not limited to, otitis media, otitis externa, dental abscess, facial cellulitis.    During physical exam we discussed options for ED course.  Discussed patient can get lab work and CT  imaging of face and head to rule out abscess or other intracranial abnormality.  Discussed that patient can also be discharged on antibiotics for treatment of otitis media as he does have irritation, tenderness and drainage noted from the right ear.  Patient has elected to be discharged on oral antibiotics and will return if symptoms do not improve or worsen.  Discussed that patient be discharged with Augmentin that he will take twice daily for the next 10 days.  He was also educated on concerning signs and symptoms that would warrant a quick return to the ED and verbalized understanding of this. I answered all the questions regarding the emergency department evaluation, diagnosis, and treatment plan in plain and simple language that was understandable. We discussed that due to always having some diagnostic uncertainty while in the ER, there is always a chance that symptoms may change or new symptoms may reveal themselves after being discharged. Because of this, I stressed the importance of Saulo following up with their PCP. Patient informed that appointment will need to be done by calling their office to set up an appointment within the next few days or as soon as reasonably possible so that the symptoms can be re-evaluated for improvement or for any other questions. I also gave Saulo common sense return precautions and prompted patient to return to the emergency department within 24 - 48hrs if there are any new, worsening, or concerning symptoms. The patient verbalized understanding of the discharge instructions and agreed with them. Saulo was discharged in stable condition.     Dragon disclaimer:  Parts of this note may be an electronic transcription/translation of spoken language to printed text using the Dragon dictation system.       Problems Addressed:  Acute otitis media, unspecified otitis media type: complicated acute illness or injury  Impacted cerumen of right ear: complicated acute illness or  injury    Risk  Prescription drug management.        Final diagnoses:   Acute otitis media, unspecified otitis media type   Impacted cerumen of right ear       ED Disposition  ED Disposition       ED Disposition   Discharge    Condition   Stable    Comment   --               Vivek Stewart, DO  [address]  [address]  [facility] [id number] [phone]    Schedule an appointment as soon as possible for a visit       [facility] EMERGENCY DEPARTMENT  [address] [address] [phone]    If symptoms worsen         Medication List        New Prescriptions      amoxicillin-clavulanate 875-125 MG per tablet  Commonly known as: AUGMENTIN  Take 1 tablet by mouth 2 (Two) Times a Day for 10 days.               Where to Get Your Medications        These medications were sent to [id number] - [place], [place] - [address] - [phone]  - [phone] FX  [address] [facility] [address]      Phone: [phone]   amoxicillin-clavulanate 875-125 MG per tablet            Piter Parra, APRN  07/25/24 1001

## 2024-07-25 NOTE — Clinical Note
[facility] EMERGENCY DEPARTMENT  [address]  [facility] [address]  Phone: [phone]    Saulo Shepard was seen and treated in our emergency department on 7/25/2024.  He may return to work on 07/26/2024.         Thank you for choosing [facility].    Piter Parra, APRN

## 2024-07-25 NOTE — DISCHARGE INSTRUCTIONS
It was very nice to meet you, Saulo. Thank you for allowing us to take care of you today at [facility].    Today you were seen in the emergency department for your symptoms. Please understand that an ER evaluation is just the start of your evaluation. We do the best we can, but we are often unable to fully find what is causing your symptoms from one evaluation.  Because of this, the goal is to determine whether you need to be evaluated in the hospital or if it is safe for you to go home and see other doctors provided such as primary care physicians or specialist on an outpatient basis.     Like we discussed, I strongly urge that you follow up with your primary care doctor. Please call their office to set up an appointment as soon as possible so that you can be re-evaluated for improvement in your symptoms or for any other questions.  I have provided the information needed, including phone number, to call to set up an appointment below in these discharge papers.     Educational material has also been provided in the following pages regarding what we have discussed today.     MEDICATIONS PRESCRIBED: Augmentin twice daily for the next 10 days    Please return to the emergency room within 12-48 hours if you experience symptoms such as the following:   Fever, chills, chest pain or shortness of breath, pain with inspiration/expiration, pain that travels to your arms, neck or back, nausea, vomiting, severe headache, tearing pain in your chest, dizziness, feel as though you are about to pass out, OR if you have any worsening symptoms, or any other concerns.

## 2024-08-02 ENCOUNTER — APPOINTMENT (OUTPATIENT)
Dept: CT IMAGING | Facility: HOSPITAL | Age: 49
End: 2024-08-02
Payer: COMMERCIAL

## 2024-08-02 ENCOUNTER — HOSPITAL ENCOUNTER (EMERGENCY)
Facility: HOSPITAL | Age: 49
Discharge: HOME | End: 2024-08-02
Payer: COMMERCIAL

## 2024-08-02 VITALS
BODY MASS INDEX: 27.83 KG/M2 | OXYGEN SATURATION: 96 % | HEIGHT: 73 IN | HEART RATE: 96 BPM | RESPIRATION RATE: 16 BRPM | WEIGHT: 210 LBS | DIASTOLIC BLOOD PRESSURE: 85 MMHG | TEMPERATURE: 98.8 F | SYSTOLIC BLOOD PRESSURE: 112 MMHG

## 2024-08-02 DIAGNOSIS — K40.90 LEFT INGUINAL HERNIA: ICD-10-CM

## 2024-08-02 DIAGNOSIS — K57.92 DIVERTICULITIS: Primary | ICD-10-CM

## 2024-08-02 DIAGNOSIS — N20.0 LEFT RENAL STONE: ICD-10-CM

## 2024-08-02 LAB
ALBUMIN SERPL-MCNC: 3.5 G/DL (ref 3.5–5.2)
ALBUMIN/GLOB SERPL: 1.3 G/DL
ALP SERPL-CCNC: 84 U/L (ref 39–117)
ALT SERPL W P-5'-P-CCNC: 20 U/L (ref 1–41)
ANION GAP SERPL CALCULATED.3IONS-SCNC: 7 MMOL/L (ref 5–15)
AST SERPL-CCNC: 21 U/L (ref 1–40)
BASOPHILS # BLD AUTO: 0.12 10*3/MM3 (ref 0–0.2)
BASOPHILS NFR BLD AUTO: 0.7 % (ref 0–1.5)
BILIRUB SERPL-MCNC: 0.7 MG/DL (ref 0–1.2)
BILIRUB UR QL STRIP: NEGATIVE
BUN SERPL-MCNC: 8 MG/DL (ref 6–20)
BUN/CREAT SERPL: 6.6 (ref 7–25)
CALCIUM SPEC-SCNC: 10 MG/DL (ref 8.6–10.5)
CHLORIDE SERPL-SCNC: 106 MMOL/L (ref 98–107)
CLARITY UR: CLEAR
CO2 SERPL-SCNC: 27 MMOL/L (ref 22–29)
COLOR UR: YELLOW
CREAT SERPL-MCNC: 1.21 MG/DL (ref 0.76–1.27)
D-LACTATE SERPL-SCNC: 1 MMOL/L (ref 0.5–2)
DEPRECATED RDW RBC AUTO: 48 FL (ref 37–54)
EGFRCR SERPLBLD CKD-EPI 2021: 73.9 ML/MIN/1.73
EOSINOPHIL # BLD AUTO: 0.15 10*3/MM3 (ref 0–0.4)
EOSINOPHIL NFR BLD AUTO: 0.9 % (ref 0.3–6.2)
ERYTHROCYTE [DISTWIDTH] IN BLOOD BY AUTOMATED COUNT: 14.9 % (ref 12.3–15.4)
GLOBULIN UR ELPH-MCNC: 2.7 GM/DL
GLUCOSE SERPL-MCNC: 85 MG/DL (ref 65–99)
GLUCOSE UR STRIP-MCNC: NEGATIVE MG/DL
HCT VFR BLD AUTO: 44.4 % (ref 37.5–51)
HGB BLD-MCNC: 14.8 G/DL (ref 13–17.7)
HGB UR QL STRIP.AUTO: NEGATIVE
IMM GRANULOCYTES # BLD AUTO: 0.09 10*3/MM3 (ref 0–0.05)
IMM GRANULOCYTES NFR BLD AUTO: 0.5 % (ref 0–0.5)
INR PPP: 0.93 (ref 0.91–1.09)
KETONES UR QL STRIP: NEGATIVE
LEUKOCYTE ESTERASE UR QL STRIP.AUTO: NEGATIVE
LIPASE SERPL-CCNC: 19 U/L (ref 13–60)
LYMPHOCYTES # BLD AUTO: 3.62 10*3/MM3 (ref 0.7–3.1)
LYMPHOCYTES NFR BLD AUTO: 20.9 % (ref 19.6–45.3)
MAGNESIUM SERPL-MCNC: 2.1 MG/DL (ref 1.6–2.6)
MCH RBC QN AUTO: 29.4 PG (ref 26.6–33)
MCHC RBC AUTO-ENTMCNC: 33.3 G/DL (ref 31.5–35.7)
MCV RBC AUTO: 88.1 FL (ref 79–97)
MONOCYTES # BLD AUTO: 1.75 10*3/MM3 (ref 0.1–0.9)
MONOCYTES NFR BLD AUTO: 10.1 % (ref 5–12)
NEUTROPHILS NFR BLD AUTO: 11.61 10*3/MM3 (ref 1.7–7)
NEUTROPHILS NFR BLD AUTO: 66.9 % (ref 42.7–76)
NITRITE UR QL STRIP: NEGATIVE
NRBC BLD AUTO-RTO: 0 /100 WBC (ref 0–0.2)
PH UR STRIP.AUTO: 6.5 [PH] (ref 5–8)
PLATELET # BLD AUTO: 392 10*3/MM3 (ref 140–450)
PMV BLD AUTO: 10.4 FL (ref 6–12)
POTASSIUM SERPL-SCNC: 3.9 MMOL/L (ref 3.5–5.2)
PROT SERPL-MCNC: 6.2 G/DL (ref 6–8.5)
PROT UR QL STRIP: NEGATIVE
PROTHROMBIN TIME: 12.8 SECONDS (ref 11.8–14.8)
RBC # BLD AUTO: 5.04 10*6/MM3 (ref 4.14–5.8)
SODIUM SERPL-SCNC: 140 MMOL/L (ref 136–145)
SP GR UR STRIP: 1.02 (ref 1–1.03)
UROBILINOGEN UR QL STRIP: NORMAL
WBC NRBC COR # BLD AUTO: 17.34 10*3/MM3 (ref 3.4–10.8)

## 2024-08-02 PROCEDURE — 25010000002 DIPHENHYDRAMINE PER 50 MG

## 2024-08-02 PROCEDURE — 83690 ASSAY OF LIPASE: CPT

## 2024-08-02 PROCEDURE — 96376 TX/PRO/DX INJ SAME DRUG ADON: CPT

## 2024-08-02 PROCEDURE — 99285 EMERGENCY DEPT VISIT HI MDM: CPT

## 2024-08-02 PROCEDURE — 85025 COMPLETE CBC W/AUTO DIFF WBC: CPT

## 2024-08-02 PROCEDURE — 74177 CT ABD & PELVIS W/CONTRAST: CPT

## 2024-08-02 PROCEDURE — 85610 PROTHROMBIN TIME: CPT

## 2024-08-02 PROCEDURE — 83605 ASSAY OF LACTIC ACID: CPT

## 2024-08-02 PROCEDURE — 96375 TX/PRO/DX INJ NEW DRUG ADDON: CPT

## 2024-08-02 PROCEDURE — 25010000002 METHYLPREDNISOLONE PER 125 MG

## 2024-08-02 PROCEDURE — 96374 THER/PROPH/DIAG INJ IV PUSH: CPT

## 2024-08-02 PROCEDURE — 25010000002 METHYLPREDNISOLONE PER 40 MG

## 2024-08-02 PROCEDURE — 83735 ASSAY OF MAGNESIUM: CPT

## 2024-08-02 PROCEDURE — 81003 URINALYSIS AUTO W/O SCOPE: CPT

## 2024-08-02 PROCEDURE — 25510000001 IOPAMIDOL 61 % SOLUTION

## 2024-08-02 PROCEDURE — 80053 COMPREHEN METABOLIC PANEL: CPT

## 2024-08-02 RX ORDER — ONDANSETRON 4 MG/1
4 TABLET, ORALLY DISINTEGRATING ORAL EVERY 6 HOURS PRN
Qty: 20 TABLET | Refills: 0 | Status: SHIPPED | OUTPATIENT
Start: 2024-08-02

## 2024-08-02 RX ORDER — METRONIDAZOLE 500 MG/1
500 TABLET ORAL 3 TIMES DAILY
Qty: 30 TABLET | Refills: 0 | Status: SHIPPED | OUTPATIENT
Start: 2024-08-02 | End: 2024-08-12

## 2024-08-02 RX ORDER — METHYLPREDNISOLONE SODIUM SUCCINATE 40 MG/ML
40 INJECTION, POWDER, LYOPHILIZED, FOR SOLUTION INTRAMUSCULAR; INTRAVENOUS EVERY 4 HOURS
Status: DISCONTINUED | OUTPATIENT
Start: 2024-08-02 | End: 2024-08-02 | Stop reason: HOSPADM

## 2024-08-02 RX ORDER — DIPHENHYDRAMINE HYDROCHLORIDE 50 MG/ML
25 INJECTION INTRAMUSCULAR; INTRAVENOUS ONCE
Status: COMPLETED | OUTPATIENT
Start: 2024-08-02 | End: 2024-08-02

## 2024-08-02 RX ORDER — CIPROFLOXACIN 500 MG/1
500 TABLET, FILM COATED ORAL 2 TIMES DAILY
Qty: 20 TABLET | Refills: 0 | Status: SHIPPED | OUTPATIENT
Start: 2024-08-02 | End: 2024-08-12

## 2024-08-02 RX ORDER — SODIUM CHLORIDE 0.9 % (FLUSH) 0.9 %
10 SYRINGE (ML) INJECTION AS NEEDED
Status: DISCONTINUED | OUTPATIENT
Start: 2024-08-02 | End: 2024-08-02 | Stop reason: HOSPADM

## 2024-08-02 RX ORDER — FAMOTIDINE 10 MG/ML
20 INJECTION, SOLUTION INTRAVENOUS ONCE
Status: COMPLETED | OUTPATIENT
Start: 2024-08-02 | End: 2024-08-02

## 2024-08-02 RX ORDER — METHYLPREDNISOLONE SODIUM SUCCINATE 125 MG/2ML
80 INJECTION, POWDER, LYOPHILIZED, FOR SOLUTION INTRAMUSCULAR; INTRAVENOUS ONCE
Status: COMPLETED | OUTPATIENT
Start: 2024-08-02 | End: 2024-08-02

## 2024-08-02 RX ADMIN — DIPHENHYDRAMINE HYDROCHLORIDE 25 MG: 50 INJECTION, SOLUTION INTRAMUSCULAR; INTRAVENOUS at 17:55

## 2024-08-02 RX ADMIN — DIPHENHYDRAMINE HYDROCHLORIDE 25 MG: 50 INJECTION, SOLUTION INTRAMUSCULAR; INTRAVENOUS at 19:07

## 2024-08-02 RX ADMIN — METHYLPREDNISOLONE SODIUM SUCCINATE 80 MG: 125 INJECTION, POWDER, FOR SOLUTION INTRAMUSCULAR; INTRAVENOUS at 19:08

## 2024-08-02 RX ADMIN — METHYLPREDNISOLONE SODIUM SUCCINATE 40 MG: 40 INJECTION, POWDER, FOR SOLUTION INTRAMUSCULAR; INTRAVENOUS at 17:55

## 2024-08-02 RX ADMIN — FAMOTIDINE 20 MG: 10 INJECTION INTRAVENOUS at 19:09

## 2024-08-02 RX ADMIN — IOPAMIDOL 100 ML: 612 INJECTION, SOLUTION INTRAVENOUS at 18:37

## 2024-08-02 NOTE — ED PROVIDER NOTES
Subjective   History of Present Illness  Patient is a 49-year-old male that presents to the emergency department for complaints of lower abdominal pain.  Patient reports onset of symptoms began last night.  Patient reports abdominal pain is located to the left lower quadrant but denies any radiation of pain.  He states that when pain is most severe he is hurting so bad he gets lightheaded.  Denies any chest pain, shortness of breath, palpitations, dizziness, blurred vision, headache or syncope.  He denies any nausea, vomiting, constipation or diarrhea.  Denies any blood in stool.  Denies any fevers, body aches, chills, cough, or congestion.  Patient denies any urinary urgency, frequency, retention, or dysuria.  Patient reports he does have a history of diverticulitis, bilateral inguinal hernia repair in the past.      Review of Systems   Gastrointestinal:  Positive for abdominal pain.   All other systems reviewed and are negative.      Past Medical History:   Diagnosis Date    Anxiety     Back pain     Fracture of lumbar spine 2002 and 2015    lower lumbar 2002; L1 2015    Head injury     Injury of back     Nausea with vomiting 03/28/2023    Neck pain     Overweight (BMI 25.0-29.9)     Restless leg syndrome        Allergies   Allergen Reactions    Ct Contrast Itching       Past Surgical History:   Procedure Laterality Date    COLONOSCOPY N/A 8/31/2023    Procedure: COLONOSCOPY WITH ANESTHESIA;  Surgeon: Kristopher Brush DO;  Location: [facility] ENDOSCOPY;  Service: Gastroenterology;  Laterality: N/A;  preop; screening   postop  PCP Joseph Stewart    COLONOSCOPY N/A 9/1/2023    Procedure: COLONOSCOPY WITH ANESTHESIA;  Surgeon: Kristopher Brush DO;  Location: [facility] ENDOSCOPY;  Service: Gastroenterology;  Laterality: N/A;  Pre: Encounter for screening for malignant neoplasm of colon;  Post: Inadequate prep;  Vivek Stewart DO    COLONOSCOPY N/A 5/31/2024    Procedure: COLONOSCOPY WITH ANESTHESIA;  Surgeon:  Kristopher Brush DO;  Location:  [facility] ENDOSCOPY;  Service: Gastroenterology;  Laterality: N/A;  Pre: Encounter for screening for malignant neoplasm of colon, Left lower quadrant abdominal pain;  Post: Fair prep;  Vivek Stewart DO    FACIAL FRACTURE SURGERY      INCISION AND DRAINAGE LEG Right 12/1/2018    Procedure: INCISION AND DRAINAGE OF RIGHT UPPER INNER THIGH, PLACEMENT OF WOUND VAC;  Surgeon: René Dallas MD;  Location:  [facility] OR;  Service: General    INGUINAL HERNIA REPAIR Bilateral 8/22/2023    Procedure: INGUINAL HERNIA BILATERAL REPAIR LAPAROSCOPIC WITH DAVINCI ROBOT WITH MESH;  Surgeon: Dina Boyd MD;  Location:  [facility] OR;  Service: Robotics - DaVinci;  Laterality: Bilateral;    KNEE SURGERY Right 09/2021    SPLENECTOMY         Family History   Problem Relation Age of Onset    Fibromyalgia Mother     Lung cancer Father     Colon cancer Neg Hx     Colon polyps Neg Hx     Esophageal cancer Neg Hx        Social History     Socioeconomic History    Marital status: Single   Tobacco Use    Smoking status: Never    Smokeless tobacco: Never   Vaping Use    Vaping status: Never Used   Substance and Sexual Activity    Alcohol use: Not Currently    Drug use: No    Sexual activity: Yes     Partners: Female           Objective   Physical Exam  Vitals and nursing note reviewed.   Constitutional:       Appearance: Normal appearance.      Comments: Nontoxic appearing. In no acute distress.    HENT:      Head: Normocephalic and atraumatic.      Right Ear: External ear normal.      Left Ear: External ear normal.      Nose: Nose normal.      Mouth/Throat:      Mouth: Mucous membranes are moist.      Pharynx: Oropharynx is clear.   Eyes:      Extraocular Movements: Extraocular movements intact.      Conjunctiva/sclera: Conjunctivae normal.      Pupils: Pupils are equal, round, and reactive to light.   Cardiovascular:      Rate and Rhythm: Normal rate and regular rhythm.      Pulses: Normal pulses.       Heart sounds: Normal heart sounds.   Pulmonary:      Effort: Pulmonary effort is normal. No respiratory distress.      Breath sounds: Normal breath sounds. No wheezing.   Chest:      Chest wall: No tenderness.   Abdominal:      General: Abdomen is protuberant. Bowel sounds are normal. There is no distension.      Palpations: Abdomen is soft.      Tenderness: There is abdominal tenderness in the left lower quadrant. There is no right CVA tenderness, left CVA tenderness, guarding or rebound.   Musculoskeletal:         General: Normal range of motion.      Cervical back: Normal range of motion and neck supple.      Right lower leg: No edema.      Left lower leg: No edema.   Skin:     General: Skin is warm and dry.      Capillary Refill: Capillary refill takes less than 2 seconds.   Neurological:      General: No focal deficit present.      Mental Status: He is alert and oriented to person, place, and time. Mental status is at baseline.   Psychiatric:         Mood and Affect: Mood normal.         Behavior: Behavior normal.         Thought Content: Thought content normal.         Judgment: Judgment normal.       Labs Reviewed   COMPREHENSIVE METABOLIC PANEL - Abnormal; Notable for the following components:       Result Value    BUN/Creatinine Ratio 6.6 (*)     All other components within normal limits    Narrative:     GFR Normal >60  Chronic Kidney Disease <60  Kidney Failure <15     CBC WITH AUTO DIFFERENTIAL - Abnormal; Notable for the following components:    WBC 17.34 (*)     Neutrophils, Absolute 11.61 (*)     Lymphocytes, Absolute 3.62 (*)     Monocytes, Absolute 1.75 (*)     Immature Grans, Absolute 0.09 (*)     All other components within normal limits   PROTIME-INR - Normal   URINALYSIS W/ CULTURE IF INDICATED - Normal    Narrative:     In absence of clinical symptoms, the presence of pyuria, bacteria, and/or nitrites on the urinalysis result does not correlate with infection.  Urine microscopic not indicated.    LACTIC ACID, PLASMA - Normal   MAGNESIUM - Normal   LIPASE - Normal   CBC AND DIFFERENTIAL    Narrative:     The following orders were created for panel order CBC & Differential.  Procedure                               Abnormality         Status                     ---------                               -----------         ------                     CBC Auto Differential[[id number]]        Abnormal            Final result                 Please view results for these tests on the individual orders.      CT Abdomen Pelvis With Contrast   Final Result   1. Thickening of the distal descending colon and proximal sigmoid colon   with stranding of the adjacent fat. There is mild diverticulosis of the   colon with no definite focal diverticulum in the area of wall thickening   although it could be obscured if it is small. The findings are most   likely due to diverticulitis. A focal area of colitis and neoplasm are   also in the differential. Follow-up recommended. There is no perforation   or abscess.   2. The spleen is surgically absent.   3. A 2-3 mm nonobstructive renal stone on the left.   4. Bladder wall thickening is likely due to severe under distention.   Prostate is mildly prominent measuring 4.6 cm.   5. Small fat-containing left inguinal hernia. Other nonacute findings,   as discussed.           The full report of this exam was immediately signed and available to the   emergency room. The patient is currently in the emergency room.       This report was signed and finalized on 8/2/2024 6:47 PM by Dr. Igor Pena MD.               Procedures           ED Course  ED Course as of 08/02/24 2018   Fri Aug 02, 2024   1911 Dr. Pedro, general surgery made aware of patient and will evaluate.  [KF]   1928 Dr. Pedro states patient can be discharged with Cipro, Flagyl, and close follow-up with Dr. Boyd as this is multiple recurrence of diverticulitis. [KF]      ED Course User Index  [KF] Piter Parra, APRN                                              Medical Decision Making  Saulo Shepard is a 49 y.o. male who presents to the ED for complaints of lower abdominal pain.  Patient reports onset of symptoms began last night.  Patient reports abdominal pain is located to the left lower quadrant but denies any radiation of pain.  He states that when pain is most severe he is hurting so bad he gets lightheaded.  Denies any chest pain, shortness of breath, palpitations, dizziness, blurred vision, headache or syncope.  He denies any nausea, vomiting, constipation or diarrhea.  Denies any blood in stool.  Denies any fevers, body aches, chills, cough, or congestion.  Patient denies any urinary urgency, frequency, retention, or dysuria.  Patient reports he does have a history of diverticulitis, bilateral inguinal hernia repair in the past.    Patient was non-toxic appearing on arrival. No acute distress was noted.  Vital signs stable.     Patient's presentation raises suspicion for differentials including, but not limited to, gastroenteritis, diverticulitis, obstruction, perforation.    Past medical history, surgical history, and medication regimen reviewed.     Previous notes, labs, imaging and more reviewed.    Medications administered,   sodium chloride 0.9 % flush 10 mL (has no administration in time range)  methylPREDNISolone sodium succinate (SOLU-Medrol) injection 40 mg (40 mg Intravenous Given 8/2/24 1755)  diphenhydrAMINE (BENADRYL) injection 25 mg (25 mg Intravenous Given 8/2/24 1755)  iopamidol (ISOVUE-300) 61 % injection 100 mL (100 mL Intravenous Given 8/2/24 1837)  diphenhydrAMINE (BENADRYL) injection 25 mg (25 mg Intravenous Given 8/2/24 1907)  famotidine (PEPCID) injection 20 mg (20 mg Intravenous Given 8/2/24 1909)  methylPREDNISolone sodium succinate (SOLU-Medrol) injection 80 mg (80 mg Intravenous Given 8/2/24 1908)     Please refer to above section of note for lab and imaging results that were reviewed and  interpreted by radiology as well as attending physician.     Given findings described above, patient's presentation is likely consistent with reticulitis, left inguinal hernia and nonobstructing left renal stone. I have a low suspicion for obstruction or perforation at this point in their ED course.      Dr. Pedro reviewed patient's chart and states that he can be discharged home with antibiotics and follow-up closely with Dr. Boyd office.     I had an in-depth discussion with the patient regarding all lab and imaging results completed during today's ED encounter.  Discussed that patient will be discharged with prescription for Cipro, Flagyl, and Zofran.  Discussed that he will need to increase fluid intake to help with hydration and follow-up closely with Dr. Boyd as scheduled.  Patient was educated on concerning signs and symptoms that warrant a quick return to the ED and verbalized understanding of this. I answered all the questions regarding the emergency department evaluation, diagnosis, and treatment plan in plain and simple language that was understandable. We discussed that due to always having some diagnostic uncertainty while in the ER, there is always a chance that symptoms may change or new symptoms may reveal themselves after being discharged. Because of this, I stressed the importance of Saulo following up with their PCP and general surgery. Patient informed that appointment will need to be done by calling their office to set up an appointment within the next few days or as soon as reasonably possible so that the symptoms can be re-evaluated for improvement or for any other questions. I also gave Saulo common sense return precautions and prompted patient to return to the emergency department within 24 - 48hrs if there are any new, worsening, or concerning symptoms. The patient verbalized understanding of the discharge instructions and agreed with them. Saulo was discharged in stable condition.      Dragon disclaimer:  Parts of this note may be an electronic transcription/translation of spoken language to printed text using the Dragon dictation system.       Amount and/or Complexity of Data Reviewed  Labs: ordered.  Radiology: ordered.    Risk  Prescription drug management.        Final diagnoses:   Diverticulitis   Left inguinal hernia   Left renal stone       ED Disposition  ED Disposition       ED Disposition   Discharge    Condition   Stable    Comment   --               Vivek Stewart,   [address]  [address]  [place] [address]  [phone]    Schedule an appointment as soon as possible for a visit       Dina Boyd MD  [address]  [place] [address]  [phone]    Schedule an appointment as soon as possible for a visit       [facility] EMERGENCY DEPARTMENT  [address] [place] [address] [phone]    If symptoms worsen         Medication List        New Prescriptions      ciprofloxacin 500 MG tablet  Commonly known as: CIPRO  Take 1 tablet by mouth 2 (Two) Times a Day for 10 days.     metroNIDAZOLE 500 MG tablet  Commonly known as: FLAGYL  Take 1 tablet by mouth 3 (Three) Times a Day for 10 days.     ondansetron ODT 4 MG disintegrating tablet  Commonly known as: ZOFRAN-ODT  Take 1 tablet by mouth Every 6 (Six) Hours As Needed for Nausea or Vomiting.               Where to Get Your Medications        These medications were sent to [facility]/pharmacy #[id number] - [place], [place] - [address] AT ACROSS FROM [address] - [phone] [facility] [phone]   [address] [facility] [address]      Phone: [phone]   ciprofloxacin 500 MG tablet  metroNIDAZOLE 500 MG tablet  ondansetron ODT 4 MG disintegrating tablet            Piter Parra, APRN  08/02/24 2018

## 2024-08-02 NOTE — Clinical Note
[facility] EMERGENCY DEPARTMENT  [address]  [facility] [address]  Phone: [phone]    Saulo Shepard was seen and treated in our emergency department on 8/2/2024.  He may return to work on 08/05/2024.         Thank you for choosing [facility].    Piter Parra, APRN

## 2024-08-03 NOTE — DISCHARGE INSTRUCTIONS
It was very nice to meet you, Saulo. Thank you for allowing us to take care of you today at [facility].    Today you were seen in the emergency department for your symptoms. Please understand that an ER evaluation is just the start of your evaluation. We do the best we can, but we are often unable to fully find what is causing your symptoms from one evaluation.  Because of this, the goal is to determine whether you need to be evaluated in the hospital or if it is safe for you to go home and see other doctors provided such as primary care physicians or specialist on an outpatient basis.     Like we discussed, I strongly urge that you follow up with your primary care doctor and Dr. Boyd. Please call their office to set up an appointment as soon as possible so that you can be re-evaluated for improvement in your symptoms or for any other questions.  I have provided the information needed, including phone number, to call to set up an appointment below in these discharge papers.     Educational material has also been provided in the following pages regarding what we have discussed today.     MEDICATIONS PRESCRIBED: Cipro and Flagyl as prescribed for 10 days.  Zofran as needed.    Please return to the emergency room within 12-48 hours if you experience symptoms such as the following:   Fever, chills, chest pain or shortness of breath, pain with inspiration/expiration, pain that travels to your arms, neck or back, nausea, vomiting, severe headache, tearing pain in your chest, dizziness, feel as though you are about to pass out, OR if you have any worsening symptoms, or any other concerns.

## 2024-08-05 ENCOUNTER — TELEPHONE (OUTPATIENT)
Dept: SURGERY | Facility: CLINIC | Age: 49
End: 2024-08-05

## 2024-08-05 ENCOUNTER — OFFICE VISIT (OUTPATIENT)
Dept: FAMILY MEDICINE CLINIC | Facility: CLINIC | Age: 49
End: 2024-08-05
Payer: COMMERCIAL

## 2024-08-05 VITALS
SYSTOLIC BLOOD PRESSURE: 131 MMHG | HEIGHT: 73 IN | WEIGHT: 211.6 LBS | BODY MASS INDEX: 28.04 KG/M2 | RESPIRATION RATE: 18 BRPM | OXYGEN SATURATION: 96 % | HEART RATE: 84 BPM | DIASTOLIC BLOOD PRESSURE: 81 MMHG | TEMPERATURE: 96.8 F

## 2024-08-05 DIAGNOSIS — Z00.00 ANNUAL PHYSICAL EXAM: Primary | ICD-10-CM

## 2024-08-05 DIAGNOSIS — G25.81 RLS (RESTLESS LEGS SYNDROME): ICD-10-CM

## 2024-08-05 DIAGNOSIS — K57.90 DIVERTICULAR DISEASE: ICD-10-CM

## 2024-08-05 DIAGNOSIS — Z91.030 BEE STING ALLERGY: ICD-10-CM

## 2024-08-05 PROCEDURE — 99396 PREV VISIT EST AGE 40-64: CPT | Performed by: FAMILY MEDICINE

## 2024-08-05 PROCEDURE — 1159F MED LIST DOCD IN RCRD: CPT | Performed by: FAMILY MEDICINE

## 2024-08-05 PROCEDURE — 1160F RVW MEDS BY RX/DR IN RCRD: CPT | Performed by: FAMILY MEDICINE

## 2024-08-05 PROCEDURE — 1125F AMNT PAIN NOTED PAIN PRSNT: CPT | Performed by: FAMILY MEDICINE

## 2024-08-05 RX ORDER — ROPINIROLE 0.25 MG/1
0.25 TABLET, FILM COATED ORAL NIGHTLY
Qty: 90 TABLET | Refills: 3 | Status: SHIPPED | OUTPATIENT
Start: 2024-08-05

## 2024-08-05 RX ORDER — EPINEPHRINE 0.3 MG/.3ML
0.3 INJECTION SUBCUTANEOUS ONCE
Qty: 1 EACH | Refills: 1 | Status: SHIPPED | OUTPATIENT
Start: 2024-08-05 | End: 2024-08-05

## 2024-08-05 NOTE — TELEPHONE ENCOUNTER
[facility] staff attempted to follow warm transfer process and was unsuccessful     Caller: Saulo Shepard     Relationship to patient: SELF     Best call back number: [phone]     Patient is needing:  PATIENT IS WANTING TO SCHEDULE A FOLLOW UP APPOINTMENT FROM ER  WITH GONZÁLEZ GREGG OFFERED 08.14.24 PATIENT WOULD LIKE TO BE SEEN TODAY 08.05.24

## 2024-08-05 NOTE — PROGRESS NOTES
Chief Complaint  Annual Exam    Subjective        Saulo Shepard presents to [facility]  History of Present Illness  Here for annual exam, ROS is stable on ropinirole, just recently started antibiotics for diverticulitis flare, very mild abdominal pain today but feels better overall.  Would like an EpiPen for swelling related to bee sting allergy    Past Medical History:   Diagnosis Date    Anxiety     Back pain     Fracture of lumbar spine 2002 and 2015    lower lumbar 2002; L1 2015    Head injury     Injury of back     Nausea with vomiting 03/28/2023    Neck pain     Overweight (BMI 25.0-29.9)     Restless leg syndrome      Past Surgical History:   Procedure Laterality Date    COLONOSCOPY N/A 8/31/2023    Procedure: COLONOSCOPY WITH ANESTHESIA;  Surgeon: Kristopher Brush DO;  Location: [facility] ENDOSCOPY;  Service: Gastroenterology;  Laterality: N/A;  preop; screening   postop  PCP Joseph Stewart    COLONOSCOPY N/A 9/1/2023    Procedure: COLONOSCOPY WITH ANESTHESIA;  Surgeon: Kristopher Brush DO;  Location: [facility] ENDOSCOPY;  Service: Gastroenterology;  Laterality: N/A;  Pre: Encounter for screening for malignant neoplasm of colon;  Post: Inadequate prep;  Vivek Stewart DO    COLONOSCOPY N/A 5/31/2024    Procedure: COLONOSCOPY WITH ANESTHESIA;  Surgeon: Kristopher Brush DO;  Location: [facility] ENDOSCOPY;  Service: Gastroenterology;  Laterality: N/A;  Pre: Encounter for screening for malignant neoplasm of colon, Left lower quadrant abdominal pain;  Post: Fair prep;  Vivek Stewart DO    FACIAL FRACTURE SURGERY      INCISION AND DRAINAGE LEG Right 12/1/2018    Procedure: INCISION AND DRAINAGE OF RIGHT UPPER INNER THIGH, PLACEMENT OF WOUND VAC;  Surgeon: René Dallas MD;  Location: [facility] OR;  Service: General    INGUINAL HERNIA REPAIR Bilateral 8/22/2023    Procedure: INGUINAL HERNIA BILATERAL REPAIR LAPAROSCOPIC WITH DAVINCI ROBOT WITH MESH;  Surgeon: Oliver  "Dina CEE MD;  Location:  [facility] OR;  Service: Robotics - DaVinci;  Laterality: Bilateral;    KNEE SURGERY Right 09/2021    SPLENECTOMY       Current Outpatient Medications   Medication Instructions    acetaminophen (TYLENOL) 975 mg, Oral, Every 8 Hours, Take every 8 hours for 3 days then take prn as needed.    ciprofloxacin (CIPRO) 500 mg, Oral, 2 Times Daily    cyclobenzaprine (FLEXERIL) 10 mg, Oral, 3 Times Daily PRN    diphenhydrAMINE (BENADRYL) 50 mg, Oral, Every 6 Hours PRN    EPINEPHrine (EPIPEN 2-NAVJOT) 0.3 mg, Intramuscular, Once    gabapentin (NEURONTIN) 300 mg, Oral, 3 Times Daily    ibuprofen (ADVIL,MOTRIN) 800 mg, Oral, Every 8 Hours PRN, Take every 8 hours for three days then take as needed.    meloxicam (MOBIC) 15 mg, Oral, Daily    metroNIDAZOLE (FLAGYL) 500 mg, Oral, 3 Times Daily    ondansetron ODT (ZOFRAN-ODT) 4 mg, Oral, Every 6 Hours PRN    rOPINIRole (REQUIP) 0.25 mg, Oral, Nightly, Take 1 hour before bedtime.     Allergies   Allergen Reactions    Ct Contrast Itching     Family History   Problem Relation Age of Onset    Fibromyalgia Mother     Lung cancer Father     Colon cancer Neg Hx     Colon polyps Neg Hx     Esophageal cancer Neg Hx      Social History     Tobacco Use    Smoking status: Never    Smokeless tobacco: Never   Vaping Use    Vaping status: Never Used   Substance Use Topics    Alcohol use: Not Currently    Drug use: No       Objective   Vital Signs:  /81   Pulse 84   Temp 96.8 °F (36 °C)   Resp 18   Ht 185.4 cm (73\")   Wt 96 kg (211 lb 9.6 oz)   SpO2 96%   BMI 27.92 kg/m²   Estimated body mass index is 27.92 kg/m² as calculated from the following:    Height as of this encounter: 185.4 cm (73\").    Weight as of this encounter: 96 kg (211 lb 9.6 oz).               Physical Exam  Constitutional:       General: He is not in acute distress.     Appearance: Normal appearance. He is not ill-appearing or diaphoretic.   HENT:      Head: Normocephalic and atraumatic.      " Comments: Plaque noted on lower teeth     Right Ear: Tympanic membrane and external ear normal.      Left Ear: Tympanic membrane and external ear normal.      Nose: Nose normal. No congestion or rhinorrhea.      Mouth/Throat:      Mouth: Mucous membranes are moist.      Pharynx: Oropharynx is clear. No oropharyngeal exudate or posterior oropharyngeal erythema.   Eyes:      General: No scleral icterus.        Right eye: No discharge.         Left eye: No discharge.      Extraocular Movements: Extraocular movements intact.      Conjunctiva/sclera: Conjunctivae normal.      Pupils: Pupils are equal, round, and reactive to light.   Neck:      Thyroid: No thyromegaly.      Vascular: No JVD.   Cardiovascular:      Rate and Rhythm: Normal rate and regular rhythm.      Pulses: Normal pulses.      Heart sounds: Normal heart sounds. No murmur heard.     No friction rub. No gallop.   Pulmonary:      Effort: Pulmonary effort is normal.      Breath sounds: Normal breath sounds.   Abdominal:      General: Bowel sounds are normal. There is no distension.      Palpations: Abdomen is soft. There is no mass.      Tenderness: There is no abdominal tenderness (very mild LLQ). There is no guarding or rebound.   Musculoskeletal:         General: No deformity or signs of injury.      Cervical back: Neck supple.      Right lower leg: No edema.      Left lower leg: No edema.   Lymphadenopathy:      Cervical: No cervical adenopathy.   Skin:     General: Skin is warm and dry.      Capillary Refill: Capillary refill takes less than 2 seconds.      Coloration: Skin is not jaundiced or pale.   Neurological:      Mental Status: He is alert and oriented to person, place, and time. Mental status is at baseline.   Psychiatric:         Mood and Affect: Mood normal.         Behavior: Behavior normal.         Thought Content: Thought content normal.         Judgment: Judgment normal.        Result Review :                     Assessment and Plan      Diagnoses and all orders for this visit:    1. Annual physical exam (Primary)    2. RLS (restless legs syndrome)  -     rOPINIRole (REQUIP) 0.25 MG tablet; Take 1 tablet by mouth Every Night. Take 1 hour before bedtime.  Dispense: 90 tablet; Refill: 3    3. Bee sting allergy  -     EPINEPHrine (EpiPen 2-Matthew) 0.3 MG/0.3ML solution auto-injector injection; Inject 0.3 mL into the appropriate muscle as directed by prescriber 1 (One) Time for 1 dose.  Dispense: 1 each; Refill: 1    4. Diverticular disease    Preventative: increase fiber intake  F/u yearly

## 2024-08-07 ENCOUNTER — OFFICE VISIT (OUTPATIENT)
Dept: SURGERY | Facility: CLINIC | Age: 49
End: 2024-08-07
Payer: COMMERCIAL

## 2024-08-07 VITALS
SYSTOLIC BLOOD PRESSURE: 140 MMHG | DIASTOLIC BLOOD PRESSURE: 82 MMHG | BODY MASS INDEX: 27.96 KG/M2 | WEIGHT: 211 LBS | HEIGHT: 73 IN

## 2024-08-07 DIAGNOSIS — K57.92 DIVERTICULITIS: Primary | ICD-10-CM

## 2024-08-07 DIAGNOSIS — E66.3 OVERWEIGHT WITH BODY MASS INDEX (BMI) OF 27 TO 27.9 IN ADULT: ICD-10-CM

## 2024-08-07 PROCEDURE — 1159F MED LIST DOCD IN RCRD: CPT | Performed by: STUDENT IN AN ORGANIZED HEALTH CARE EDUCATION/TRAINING PROGRAM

## 2024-08-07 PROCEDURE — 1160F RVW MEDS BY RX/DR IN RCRD: CPT | Performed by: STUDENT IN AN ORGANIZED HEALTH CARE EDUCATION/TRAINING PROGRAM

## 2024-08-07 PROCEDURE — 99213 OFFICE O/P EST LOW 20 MIN: CPT | Performed by: STUDENT IN AN ORGANIZED HEALTH CARE EDUCATION/TRAINING PROGRAM

## 2024-08-07 RX ORDER — DIPHENHYDRAMINE HYDROCHLORIDE 50 MG/ML
50 INJECTION INTRAMUSCULAR; INTRAVENOUS
OUTPATIENT
Start: 2024-08-07 | End: 2024-08-08

## 2024-08-07 RX ORDER — DIPHENHYDRAMINE HCL 25 MG
50 TABLET ORAL
OUTPATIENT
Start: 2024-08-07 | End: 2024-08-07

## 2024-08-07 RX ORDER — PREDNISONE 50 MG/1
50 TABLET ORAL TAKE AS DIRECTED
Qty: 3 TABLET | Refills: 0 | Status: SHIPPED | OUTPATIENT
Start: 2024-08-07

## 2024-08-07 NOTE — PROGRESS NOTES
Office Established Patient Note:     Referring Provider: No ref. provider found    Chief Complaint   Patient presents with    Diverticulitis       Subjective .     History of present illness:  Saulo Shepard is a 49 y.o. male with a known history of diverticulitis who was recently seen in the ER with recurrent non-perforated diverticulitis. No nausea, vomiting nor constipation. He still has persistent pain. Last colonoscopy was this year with no cancer.     History  Past Medical History:   Diagnosis Date    Anxiety     Back pain     Fracture of lumbar spine 2002 and 2015    lower lumbar 2002; L1 2015    Head injury     Injury of back     Nausea with vomiting 03/28/2023    Neck pain     Overweight (BMI 25.0-29.9)     Restless leg syndrome    ,   Past Surgical History:   Procedure Laterality Date    COLONOSCOPY N/A 8/31/2023    Procedure: COLONOSCOPY WITH ANESTHESIA;  Surgeon: Kristopher Brush DO;  Location: [facility] ENDOSCOPY;  Service: Gastroenterology;  Laterality: N/A;  preop; screening   postop  PCP Joseph Stewart    COLONOSCOPY N/A 9/1/2023    Procedure: COLONOSCOPY WITH ANESTHESIA;  Surgeon: Kristopher Brush DO;  Location: [facility] ENDOSCOPY;  Service: Gastroenterology;  Laterality: N/A;  Pre: Encounter for screening for malignant neoplasm of colon;  Post: Inadequate prep;  Vivek Stewart DO    COLONOSCOPY N/A 5/31/2024    Procedure: COLONOSCOPY WITH ANESTHESIA;  Surgeon: Kristopher Brush DO;  Location: [facility] ENDOSCOPY;  Service: Gastroenterology;  Laterality: N/A;  Pre: Encounter for screening for malignant neoplasm of colon, Left lower quadrant abdominal pain;  Post: Fair prep;  Vivek Stewart DO    FACIAL FRACTURE SURGERY      INCISION AND DRAINAGE LEG Right 12/1/2018    Procedure: INCISION AND DRAINAGE OF RIGHT UPPER INNER THIGH, PLACEMENT OF WOUND VAC;  Surgeon: René Dallas MD;  Location: [facility] OR;  Service: General    INGUINAL HERNIA REPAIR Bilateral 8/22/2023    Procedure: INGUINAL  HERNIA BILATERAL REPAIR LAPAROSCOPIC WITH DAVINCI ROBOT WITH MESH;  Surgeon: Dina Boyd MD;  Location: [facility] OR;  Service: Robotics - DaVinci;  Laterality: Bilateral;    KNEE SURGERY Right 09/2021    SPLENECTOMY     ,   Family History   Problem Relation Age of Onset    Fibromyalgia Mother     Lung cancer Father     Colon cancer Neg Hx     Colon polyps Neg Hx     Esophageal cancer Neg Hx    ,   Social History     Tobacco Use    Smoking status: Never    Smokeless tobacco: Never   Vaping Use    Vaping status: Never Used   Substance Use Topics    Alcohol use: Not Currently    Drug use: No   , (Not in a hospital admission)   and Allergies:  Ct contrast    Current Outpatient Medications:     acetaminophen (Tylenol) 325 MG tablet, Take 3 tablets by mouth Every 8 (Eight) Hours. Take every 8 hours for 3 days then take prn as needed., Disp: 100 tablet, Rfl: 2    ciprofloxacin (CIPRO) 500 MG tablet, Take 1 tablet by mouth 2 (Two) Times a Day for 10 days., Disp: 20 tablet, Rfl: 0    cyclobenzaprine (FLEXERIL) 10 MG tablet, Take 1 tablet by mouth 3 (Three) Times a Day As Needed for Muscle Spasms., Disp: 20 tablet, Rfl: 0    diphenhydrAMINE (BENADRYL) 25 MG tablet, Take 2 tablets by mouth Every 6 (Six) Hours As Needed for Itching., Disp: 20 tablet, Rfl: 0    gabapentin (NEURONTIN) 300 MG capsule, Take 1 capsule by mouth 3 (Three) Times a Day., Disp: 90 capsule, Rfl: 2    ibuprofen (ADVIL,MOTRIN) 800 MG tablet, Take 1 tablet by mouth Every 8 (Eight) Hours As Needed for Mild Pain for up to 90 doses. Take every 8 hours for three days then take as needed., Disp: 90 tablet, Rfl: 0    meloxicam (MOBIC) 15 MG tablet, Take 1 tablet by mouth Daily., Disp: , Rfl:     metroNIDAZOLE (FLAGYL) 500 MG tablet, Take 1 tablet by mouth 3 (Three) Times a Day for 10 days., Disp: 30 tablet, Rfl: 0    ondansetron ODT (ZOFRAN-ODT) 4 MG disintegrating tablet, Take 1 tablet by mouth Every 6 (Six) Hours As Needed for Nausea or Vomiting., Disp:  "20 tablet, Rfl: 0    rOPINIRole (REQUIP) 0.25 MG tablet, Take 1 tablet by mouth Every Night. Take 1 hour before bedtime., Disp: 90 tablet, Rfl: 3    predniSONE (DELTASONE) 50 MG tablet, Take 1 tablet by mouth Take As Directed for 3 doses. Take 1 tablet (50mg) 13 Hours 7 Hours & 1 Hour Prior to Exam, Disp: 3 tablet, Rfl: 0      Objective     Vital Signs   /82   Ht 185.4 cm (73\")   Wt 95.7 kg (211 lb)   BMI 27.84 kg/m²      Physical Exam:  General appearance - alert, well appearing, and in no distress  Mental status - alert, oriented to person, place, and time  Eyes - pupils equal and reactive, extraocular eye movements intact  Neck - supple, no significant adenopathy  Chest - no tachypnea, retractions or cyanosis  Heart - normal rate and regular rhythm  Abdomen - soft, non-distended, + TTP in LLQ   Neurological - alert, oriented, normal speech, no focal findings or movement disorder noted    Results Review:     The following data was reviewed by: Dina Boyd MD on 08/07/2024:  CT Abdomen Pelvis With Contrast (08/02/2024 18:47)   1. Thickening of the distal descending colon and proximal sigmoid colon  with stranding of the adjacent fat. There is mild diverticulosis of the  colon with no definite focal diverticulum in the area of wall thickening  although it could be obscured if it is small. The findings are most  likely due to diverticulitis. A focal area of colitis and neoplasm are  also in the differential. Follow-up recommended. There is no perforation  or abscess.  2. The spleen is surgically absent.  3. A 2-3 mm nonobstructive renal stone on the left.  4. Bladder wall thickening is likely due to severe under distention.  Prostate is mildly prominent measuring 4.6 cm.  5. Small fat-containing left inguinal hernia. Other nonacute findings,  as discussed.  ED Provider Notes by Piter Parra APRN (08/02/2024 17:44)     Assessment & Plan       Diagnoses and all orders for this visit:    1. Acute " descending diverticulitis (Primary)  -     diphenhydrAMINE (BENADRYL) tablet 50 mg  -     diphenhydrAMINE (BENADRYL) injection 50 mg  -     diphenhydrAMINE (BENADRYL) injection 50 mg  -     CT Abdomen Pelvis With & Without Contrast; Future    2. Overweight with body mass index (BMI) of 27 to 27.9 in adult    Other orders  -     predniSONE (DELTASONE) 50 MG tablet; Take 1 tablet by mouth Take As Directed for 3 doses. Take 1 tablet (50mg) 13 Hours 7 Hours & 1 Hour Prior to Exam  Dispense: 3 tablet; Refill: 0  -     Verify time of home doses of pre-medication for contrast allergy; Standing       Mr. Shepard is a 49 year old male with recurrent diverticulitis.   Repeat CT scan in 2 weeks and then follow up with me.       Dina Boyd MD  08/08/24  22:31 CDT

## 2024-08-07 NOTE — PATIENT INSTRUCTIONS

## 2024-08-21 ENCOUNTER — HOSPITAL ENCOUNTER (EMERGENCY)
Facility: HOSPITAL | Age: 49
Discharge: HOME | End: 2024-08-21
Attending: FAMILY MEDICINE
Payer: COMMERCIAL

## 2024-08-21 ENCOUNTER — HOSPITAL ENCOUNTER (OUTPATIENT)
Dept: CT IMAGING | Facility: HOSPITAL | Age: 49
Discharge: HOME | End: 2024-08-21
Admitting: STUDENT IN AN ORGANIZED HEALTH CARE EDUCATION/TRAINING PROGRAM
Payer: COMMERCIAL

## 2024-08-21 VITALS
BODY MASS INDEX: 27.96 KG/M2 | WEIGHT: 210.98 LBS | HEART RATE: 98 BPM | SYSTOLIC BLOOD PRESSURE: 121 MMHG | TEMPERATURE: 97.9 F | OXYGEN SATURATION: 93 % | RESPIRATION RATE: 18 BRPM | HEIGHT: 73 IN | DIASTOLIC BLOOD PRESSURE: 66 MMHG

## 2024-08-21 DIAGNOSIS — T78.40XA ALLERGIC REACTION, INITIAL ENCOUNTER: Primary | ICD-10-CM

## 2024-08-21 DIAGNOSIS — K57.92 DIVERTICULITIS: ICD-10-CM

## 2024-08-21 PROCEDURE — 25510000001 IOPAMIDOL 61 % SOLUTION: Performed by: STUDENT IN AN ORGANIZED HEALTH CARE EDUCATION/TRAINING PROGRAM

## 2024-08-21 PROCEDURE — 25810000003 SODIUM CHLORIDE 0.9 % SOLUTION: Performed by: FAMILY MEDICINE

## 2024-08-21 PROCEDURE — 25010000002 METHYLPREDNISOLONE PER 125 MG: Performed by: FAMILY MEDICINE

## 2024-08-21 PROCEDURE — 94799 UNLISTED PULMONARY SVC/PX: CPT

## 2024-08-21 PROCEDURE — 25010000002 DIPHENHYDRAMINE PER 50 MG: Performed by: FAMILY MEDICINE

## 2024-08-21 PROCEDURE — 74178 CT ABD&PLV WO CNTR FLWD CNTR: CPT

## 2024-08-21 PROCEDURE — 63710000001 DIPHENHYDRAMINE PER 50 MG: Performed by: RADIOLOGY

## 2024-08-21 PROCEDURE — 25010000002 EPINEPHRINE 1 MG/10ML SOLUTION PREFILLED SYRINGE: Performed by: FAMILY MEDICINE

## 2024-08-21 PROCEDURE — 99285 EMERGENCY DEPT VISIT HI MDM: CPT

## 2024-08-21 RX ORDER — DIPHENHYDRAMINE HCL 50 MG
50 CAPSULE ORAL ONCE
Status: COMPLETED | OUTPATIENT
Start: 2024-08-21 | End: 2024-08-21

## 2024-08-21 RX ORDER — DIPHENHYDRAMINE HYDROCHLORIDE 50 MG/ML
INJECTION INTRAMUSCULAR; INTRAVENOUS
Status: COMPLETED | OUTPATIENT
Start: 2024-08-21 | End: 2024-08-21

## 2024-08-21 RX ORDER — METHYLPREDNISOLONE SODIUM SUCCINATE 125 MG/2ML
INJECTION, POWDER, LYOPHILIZED, FOR SOLUTION INTRAMUSCULAR; INTRAVENOUS
Status: COMPLETED | OUTPATIENT
Start: 2024-08-21 | End: 2024-08-21

## 2024-08-21 RX ADMIN — METHYLPREDNISOLONE SODIUM SUCCINATE 60 MG: 125 INJECTION, POWDER, LYOPHILIZED, FOR SOLUTION INTRAMUSCULAR; INTRAVENOUS at 16:41

## 2024-08-21 RX ADMIN — DIPHENHYDRAMINE HYDROCHLORIDE 50 MG: 50 CAPSULE ORAL at 15:20

## 2024-08-21 RX ADMIN — EPINEPHRINE 0.5 MG: 0.1 INJECTION INTRAVENOUS at 16:42

## 2024-08-21 RX ADMIN — DIPHENHYDRAMINE HYDROCHLORIDE 50 MG: 50 INJECTION, SOLUTION INTRAMUSCULAR; INTRAVENOUS at 16:41

## 2024-08-21 RX ADMIN — SODIUM CHLORIDE 500 ML: 0.9 INJECTION, SOLUTION INTRAVENOUS at 16:40

## 2024-08-21 RX ADMIN — IOPAMIDOL 100 ML: 612 INJECTION, SOLUTION INTRAVENOUS at 16:57

## 2024-08-21 NOTE — ED PROVIDER NOTES
Subjective   History of Present Illness  41-year-old male was brought in after he had rapid response and CT scan machine.  He is getting a CT scan with IV contrast which she does have an allergic reaction to IV contrast.  He had premedicated with IV contrast and still had an allergic reaction.  He was given Solu-Medrol, Benadryl, epinephrine.  Patient currently has no shortness of breath.  No sensation of his throat closing.  Denies any other symptoms at this time.      Review of Systems   Respiratory:  Positive for shortness of breath.    Skin:  Negative for rash.   All other systems reviewed and are negative.      Past Medical History:   Diagnosis Date    Anxiety     Back pain     Fracture of lumbar spine 2002 and 2015    lower lumbar 2002; L1 2015    Head injury     Injury of back     Nausea with vomiting 03/28/2023    Neck pain     Overweight (BMI 25.0-29.9)     Restless leg syndrome        Allergies   Allergen Reactions    Contrast Dye (Echo Or Unknown Ct/Mr) Hives, Rash, Angioedema and Seizure     Allergy to CT dye, was given premedication by rad protocol (13,7,1 hr prednisone 50mg, 50mg benedryl 1 hr prior), still had reaction  Itching, tongue swelling, lips swelling, hives, sob    Ct Contrast Itching       Past Surgical History:   Procedure Laterality Date    COLONOSCOPY N/A 8/31/2023    Procedure: COLONOSCOPY WITH ANESTHESIA;  Surgeon: Kristopher Brush DO;  Location: [facility] ENDOSCOPY;  Service: Gastroenterology;  Laterality: N/A;  preop; screening   postop  PCP Joseph Stewart    COLONOSCOPY N/A 9/1/2023    Procedure: COLONOSCOPY WITH ANESTHESIA;  Surgeon: Kristopher Brush DO;  Location: [facility] ENDOSCOPY;  Service: Gastroenterology;  Laterality: N/A;  Pre: Encounter for screening for malignant neoplasm of colon;  Post: Inadequate prep;  Vivek Stewart DO    COLONOSCOPY N/A 5/31/2024    Procedure: COLONOSCOPY WITH ANESTHESIA;  Surgeon: Kristopher Brush DO;  Location: [facility] ENDOSCOPY;  Service:  Gastroenterology;  Laterality: N/A;  Pre: Encounter for screening for malignant neoplasm of colon, Left lower quadrant abdominal pain;  Post: Fair prep;  Vivek Stewart DO    FACIAL FRACTURE SURGERY      INCISION AND DRAINAGE LEG Right 12/1/2018    Procedure: INCISION AND DRAINAGE OF RIGHT UPPER INNER THIGH, PLACEMENT OF WOUND VAC;  Surgeon: René Dallas MD;  Location:  [facility] OR;  Service: General    INGUINAL HERNIA REPAIR Bilateral 8/22/2023    Procedure: INGUINAL HERNIA BILATERAL REPAIR LAPAROSCOPIC WITH DAVINCI ROBOT WITH MESH;  Surgeon: Dina Boyd MD;  Location:  [facility] OR;  Service: Robotics - DaVinci;  Laterality: Bilateral;    KNEE SURGERY Right 09/2021    SPLENECTOMY         Family History   Problem Relation Age of Onset    Fibromyalgia Mother     Lung cancer Father     Colon cancer Neg Hx     Colon polyps Neg Hx     Esophageal cancer Neg Hx        Social History     Socioeconomic History    Marital status: Single   Tobacco Use    Smoking status: Never    Smokeless tobacco: Never   Vaping Use    Vaping status: Never Used   Substance and Sexual Activity    Alcohol use: Not Currently    Drug use: No    Sexual activity: Yes     Partners: Female           Objective   Physical Exam  Vitals and nursing note reviewed.   Constitutional:       Appearance: Normal appearance.   HENT:      Head: Normocephalic and atraumatic.      Mouth/Throat:      Mouth: Mucous membranes are moist.      Pharynx: Oropharynx is clear.   Eyes:      Extraocular Movements: Extraocular movements intact.      Pupils: Pupils are equal, round, and reactive to light.   Cardiovascular:      Rate and Rhythm: Normal rate and regular rhythm.      Heart sounds: Normal heart sounds.   Pulmonary:      Effort: Pulmonary effort is normal.      Breath sounds: Normal breath sounds.   Abdominal:      General: Bowel sounds are normal.      Palpations: Abdomen is soft.      Tenderness: There is no abdominal tenderness.   Skin:     General:  Skin is warm and dry.   Neurological:      General: No focal deficit present.      Mental Status: He is alert and oriented to person, place, and time.   Psychiatric:         Mood and Affect: Mood normal.         Behavior: Behavior normal.         Procedures           ED Course                                               Medical Decision Making  49-year-old male had an allergic reaction.  He was given IV contrast with a CT scan.  Patient was given epinephrine, Benadryl, Solu-Medrol prior to arrival to the emergency room.  Patient had rapid response called on him at the CT scan machine.  Hospitalist responded to the call.  Patient will be discharged home in stable condition.  All questions were answered patient prior to discharge.  Patient was vies follow-up primary care fighter.  Patient was vies return the emergency room with new or worsening symptoms.  Patient verbalized understanding was agreeable plan as discussed.    Problems Addressed:  Allergic reaction, initial encounter: acute illness or injury        Final diagnoses:   Allergic reaction, initial encounter       ED Disposition  ED Disposition       ED Disposition   Discharge    Condition   Stable    Comment   --               Vivek Stewart,   [address]  [address]  [facility] [id number] [phone]    Schedule an appointment as soon as possible for a visit       [facility] EMERGENCY DEPARTMENT  [address] [place] [id number] [phone]    As needed, If symptoms worsen         Medication List      No changes were made to your prescriptions during this visit.            Clinton Castillo MD  08/21/24 0080

## 2024-08-22 ENCOUNTER — OFFICE VISIT (OUTPATIENT)
Dept: FAMILY MEDICINE CLINIC | Facility: CLINIC | Age: 49
End: 2024-08-22
Payer: COMMERCIAL

## 2024-08-22 VITALS
WEIGHT: 212.6 LBS | HEART RATE: 103 BPM | DIASTOLIC BLOOD PRESSURE: 60 MMHG | TEMPERATURE: 98.6 F | HEIGHT: 73 IN | SYSTOLIC BLOOD PRESSURE: 120 MMHG | OXYGEN SATURATION: 96 % | RESPIRATION RATE: 16 BRPM | BODY MASS INDEX: 28.18 KG/M2

## 2024-08-22 DIAGNOSIS — Z91.030 ALLERGY TO HONEY BEE VENOM: ICD-10-CM

## 2024-08-22 DIAGNOSIS — T78.2XXD ANAPHYLAXIS, SUBSEQUENT ENCOUNTER: Primary | ICD-10-CM

## 2024-08-22 DIAGNOSIS — Z91.041 CONTRAST MEDIA ALLERGY: ICD-10-CM

## 2024-08-22 RX ORDER — EPINEPHRINE 0.3 MG/.3ML
0.3 INJECTION SUBCUTANEOUS ONCE
COMMUNITY
Start: 2024-08-05

## 2024-08-22 NOTE — PROGRESS NOTES
KYLER Thorpe  [facility]   Family Medicine  [address]  [place] [address]  Phone: [phone]  Fax: [phone]         Chief Complaint:  Chief Complaint   Patient presents with    Hospital Follow Up Visit     Patient states that he is here for a hospital follow up, he states that he had a CT done yesterday and had a reaction to the contrast. Patient states that his head is hurting him.         History:  Saulo Shepard is a 49 y.o. male that is an established patient. He  is here for evaluation of the above complaint.    HPI   History of Present Illness  The patient presents for evaluation of an allergic reaction to CT contrast. He is accompanied by his wife.    He underwent a CT scan of the abdomen and pelvis, ordered by Dr. Breanne Boyd, due to recurrent diverticulitis. The purpose was to evaluate the need for surgery. A previous CT scan was performed without contrast. However, during the recent scan with contrast, he experienced immediate facial heat, swelling of the face and tongue, and generalized redness. His heart rate dropped significantly, leading to a near fainting episode. He also reported severe itching and hives.    Despite premedication with 50 mg of Prednisone three times and 50 mg of Benadryl 1 hour before the test, his symptoms persisted. He was diagnosed with hives, rash, angioedema, and a seizure, although he does not recall the seizure. He received a single dose of epinephrine, which caused a severe headache and occasional shaking. His symptoms have progressively worsened with each contrast exposure. He was stabilized in the ED.    He also has a history of severe reactions to bee stings, with significant swelling. He has up-to-date EpiPens at home, prescribed by Dr. Stewart. He spent approximately 2 hours in the ER following the recent reaction. Upon returning home, he did not experience any breathing difficulties or throat irritation, but had a dry  "mouth and mild diarrhea. His hives and redness resolved almost immediately, and he did not experience any itching overnight. His tongue is currently normal, and he reports no persistent swelling.    History:  8/21/2024, anaphylaxis with IV contrast despite premedication  8/2/2024, pruritus with CT contrast with premedication  4/10/2024, first time pruritus with CT contrast    ALLERGIES  The patient is allergic to CT CONTRAST.      Results         ROS:  Review of Systems   Constitutional: Negative.    HENT: Negative.     Respiratory: Negative.     Cardiovascular: Negative.    Skin: Negative.          reports that he has never smoked. He has never used smokeless tobacco. He reports that he does not currently use alcohol. He reports that he does not use drugs.    Current Outpatient Medications   Medication Instructions    cyclobenzaprine (FLEXERIL) 10 mg, Oral, 3 Times Daily PRN    diphenhydrAMINE (BENADRYL) 50 mg, Oral, Every 6 Hours PRN    EPINEPHrine (EPIPEN) 0.3 mg, Intramuscular, Once    gabapentin (NEURONTIN) 300 mg, Oral, 3 Times Daily    ibuprofen (ADVIL,MOTRIN) 800 mg, Oral, Every 8 Hours PRN, Take every 8 hours for three days then take as needed.    meloxicam (MOBIC) 15 mg, Oral, Daily    ondansetron ODT (ZOFRAN-ODT) 4 mg, Oral, Every 6 Hours PRN    predniSONE (DELTASONE) 50 mg, Oral, Take As Directed, Take 1 tablet (50mg) 13 Hours 7 Hours & 1 Hour Prior to Exam    rOPINIRole (REQUIP) 0.25 mg, Oral, Nightly, Take 1 hour before bedtime.       OBJECTIVE:  /60 (BP Location: Right arm, Patient Position: Sitting, Cuff Size: Adult)   Pulse 103   Temp 98.6 °F (37 °C) (Temporal)   Resp 16   Ht 185.4 cm (73\")   Wt 96.4 kg (212 lb 9.6 oz)   SpO2 96%   BMI 28.05 kg/m²    Physical Exam  Vitals and nursing note reviewed.   Constitutional:       Appearance: Normal appearance.   HENT:      Head: Normocephalic and atraumatic.      Nose: Nose normal.      Mouth/Throat:      Comments: No o/p swelling  Eyes:      " Conjunctiva/sclera: Conjunctivae normal.   Cardiovascular:      Rate and Rhythm: Normal rate and regular rhythm.   Pulmonary:      Effort: Pulmonary effort is normal. No respiratory distress.      Breath sounds: Normal breath sounds. No wheezing or rales.   Neurological:      General: No focal deficit present.      Mental Status: He is alert and oriented to person, place, and time.   Psychiatric:         Mood and Affect: Mood normal.         Behavior: Behavior normal.       Physical Exam      Procedures    Assessment/Plan:     Diagnoses and all orders for this visit:    1. Anaphylaxis, subsequent encounter (Primary)    2. Allergy to honey bee venom    3. Contrast media allergy           Assessment & Plan  1. Anaphylaxis.  He experienced a severe allergic reaction to CT contrast, including symptoms of tongue and facial swelling, hives, rash, angioedema, and a seizure. He was treated with epinephrine and steroids in the ER. He was premedicated with 50 mg of an unspecified medication three times and Benadryl prior to the CT scan, but still had a severe reaction. He was advised to avoid contrast in the future. The type of contrast used will be documented in his chart for future reference. He was instructed to keep his EpiPens together and not to separate them. A prescription for an additional 2-pack of EpiPens was provided.    2. Diverticulitis.  He has a history of recurrent diverticulitis and is scheduled to see Dr. Boyd on 08/28/2024 to discuss potential surgery. Further evaluation and alternative diagnostic tests will be considered due to his contrast allergy.        An After Visit Summary was printed and given to the patient at discharge.  Return if symptoms worsen or fail to improve.       Patient Instructions   Avoid CT contrast, even with premedication      Discussion:     No CT contrast in the future    I spent 25 minutes caring for Saulo on this date of service. This time includes time spent by me in the  following activities: preparing for the visit, reviewing tests, performing a medically appropriate examination and/or evaluation, counseling and educating the patient/family/caregiver, documenting information in the medical record, obtaining a separately obtained history, and reviewing a separately obtained history   Patient or patient representative verbalized consent for the use of Ambient Listening during the visit with  KYLER Thorpe for chart documentation. 8/22/2024  12:07 DEEPAKT    Angelica CHÁVEZ 8/22/2024   Electronically signed.

## 2024-08-24 ENCOUNTER — HOSPITAL ENCOUNTER (EMERGENCY)
Facility: HOSPITAL | Age: 49
Discharge: HOME | End: 2024-08-24
Attending: EMERGENCY MEDICINE
Payer: COMMERCIAL

## 2024-08-24 ENCOUNTER — APPOINTMENT (OUTPATIENT)
Dept: CT IMAGING | Facility: HOSPITAL | Age: 49
End: 2024-08-24
Payer: COMMERCIAL

## 2024-08-24 ENCOUNTER — APPOINTMENT (OUTPATIENT)
Dept: GENERAL RADIOLOGY | Facility: HOSPITAL | Age: 49
End: 2024-08-24
Payer: COMMERCIAL

## 2024-08-24 VITALS
HEART RATE: 76 BPM | TEMPERATURE: 98 F | OXYGEN SATURATION: 98 % | DIASTOLIC BLOOD PRESSURE: 86 MMHG | BODY MASS INDEX: 27.7 KG/M2 | HEIGHT: 73 IN | RESPIRATION RATE: 18 BRPM | WEIGHT: 209 LBS | SYSTOLIC BLOOD PRESSURE: 125 MMHG

## 2024-08-24 DIAGNOSIS — R06.00 DYSPNEA, UNSPECIFIED TYPE: ICD-10-CM

## 2024-08-24 DIAGNOSIS — R51.9 NONINTRACTABLE HEADACHE, UNSPECIFIED CHRONICITY PATTERN, UNSPECIFIED HEADACHE TYPE: Primary | ICD-10-CM

## 2024-08-24 DIAGNOSIS — R07.89 CHEST DISCOMFORT: ICD-10-CM

## 2024-08-24 LAB
ALBUMIN SERPL-MCNC: 3.3 G/DL (ref 3.5–5.2)
ALBUMIN/GLOB SERPL: 1.7 G/DL
ALP SERPL-CCNC: 71 U/L (ref 39–117)
ALT SERPL W P-5'-P-CCNC: 16 U/L (ref 1–41)
AMPHET+METHAMPHET UR QL: NEGATIVE
AMPHETAMINES UR QL: NEGATIVE
ANION GAP SERPL CALCULATED.3IONS-SCNC: 9 MMOL/L (ref 5–15)
AST SERPL-CCNC: 14 U/L (ref 1–40)
BARBITURATES UR QL SCN: NEGATIVE
BASOPHILS # BLD AUTO: 0.14 10*3/MM3 (ref 0–0.2)
BASOPHILS NFR BLD AUTO: 0.8 % (ref 0–1.5)
BENZODIAZ UR QL SCN: NEGATIVE
BILIRUB SERPL-MCNC: 0.5 MG/DL (ref 0–1.2)
BUN SERPL-MCNC: 10 MG/DL (ref 6–20)
BUN/CREAT SERPL: 9.3 (ref 7–25)
BUPRENORPHINE SERPL-MCNC: NEGATIVE NG/ML
CALCIUM SPEC-SCNC: 10.1 MG/DL (ref 8.6–10.5)
CANNABINOIDS SERPL QL: NEGATIVE
CHLORIDE SERPL-SCNC: 105 MMOL/L (ref 98–107)
CO2 SERPL-SCNC: 25 MMOL/L (ref 22–29)
COCAINE UR QL: NEGATIVE
CREAT SERPL-MCNC: 1.07 MG/DL (ref 0.76–1.27)
CRP SERPL-MCNC: <0.3 MG/DL (ref 0–0.5)
D DIMER PPP FEU-MCNC: <0.27 MCGFEU/ML (ref 0–0.5)
DEPRECATED RDW RBC AUTO: 48.3 FL (ref 37–54)
EGFRCR SERPLBLD CKD-EPI 2021: 85.6 ML/MIN/1.73
EOSINOPHIL # BLD AUTO: 0.16 10*3/MM3 (ref 0–0.4)
EOSINOPHIL NFR BLD AUTO: 0.9 % (ref 0.3–6.2)
ERYTHROCYTE [DISTWIDTH] IN BLOOD BY AUTOMATED COUNT: 15.1 % (ref 12.3–15.4)
FENTANYL UR-MCNC: NEGATIVE NG/ML
GEN 5 2HR TROPONIN T REFLEX: 7 NG/L
GLOBULIN UR ELPH-MCNC: 2 GM/DL
GLUCOSE SERPL-MCNC: 102 MG/DL (ref 65–99)
HCT VFR BLD AUTO: 50.9 % (ref 37.5–51)
HGB BLD-MCNC: 16.8 G/DL (ref 13–17.7)
HOLD SPECIMEN: NORMAL
HOLD SPECIMEN: NORMAL
IMM GRANULOCYTES # BLD AUTO: 0.13 10*3/MM3 (ref 0–0.05)
IMM GRANULOCYTES NFR BLD AUTO: 0.7 % (ref 0–0.5)
LYMPHOCYTES # BLD AUTO: 4.63 10*3/MM3 (ref 0.7–3.1)
LYMPHOCYTES NFR BLD AUTO: 25.5 % (ref 19.6–45.3)
MCH RBC QN AUTO: 28.8 PG (ref 26.6–33)
MCHC RBC AUTO-ENTMCNC: 33 G/DL (ref 31.5–35.7)
MCV RBC AUTO: 87.3 FL (ref 79–97)
METHADONE UR QL SCN: NEGATIVE
MONOCYTES # BLD AUTO: 2.04 10*3/MM3 (ref 0.1–0.9)
MONOCYTES NFR BLD AUTO: 11.2 % (ref 5–12)
NEUTROPHILS NFR BLD AUTO: 11.09 10*3/MM3 (ref 1.7–7)
NEUTROPHILS NFR BLD AUTO: 60.9 % (ref 42.7–76)
NRBC BLD AUTO-RTO: 0 /100 WBC (ref 0–0.2)
NT-PROBNP SERPL-MCNC: <36 PG/ML (ref 0–450)
OPIATES UR QL: NEGATIVE
OXYCODONE UR QL SCN: NEGATIVE
PCP UR QL SCN: NEGATIVE
PLATELET # BLD AUTO: 381 10*3/MM3 (ref 140–450)
PMV BLD AUTO: 10 FL (ref 6–12)
POTASSIUM SERPL-SCNC: 4 MMOL/L (ref 3.5–5.2)
PROT SERPL-MCNC: 5.3 G/DL (ref 6–8.5)
QT INTERVAL: 356 MS
QT INTERVAL: 372 MS
QTC INTERVAL: 389 MS
QTC INTERVAL: 428 MS
RBC # BLD AUTO: 5.83 10*6/MM3 (ref 4.14–5.8)
SODIUM SERPL-SCNC: 139 MMOL/L (ref 136–145)
TRICYCLICS UR QL SCN: NEGATIVE
TROPONIN T DELTA: 0 NG/L
TROPONIN T SERPL HS-MCNC: 7 NG/L
WBC NRBC COR # BLD AUTO: 18.19 10*3/MM3 (ref 3.4–10.8)
WHOLE BLOOD HOLD COAG: NORMAL
WHOLE BLOOD HOLD SPECIMEN: NORMAL

## 2024-08-24 PROCEDURE — 80053 COMPREHEN METABOLIC PANEL: CPT | Performed by: EMERGENCY MEDICINE

## 2024-08-24 PROCEDURE — 84484 ASSAY OF TROPONIN QUANT: CPT | Performed by: EMERGENCY MEDICINE

## 2024-08-24 PROCEDURE — 80307 DRUG TEST PRSMV CHEM ANLYZR: CPT | Performed by: EMERGENCY MEDICINE

## 2024-08-24 PROCEDURE — 36415 COLL VENOUS BLD VENIPUNCTURE: CPT

## 2024-08-24 PROCEDURE — 86140 C-REACTIVE PROTEIN: CPT | Performed by: EMERGENCY MEDICINE

## 2024-08-24 PROCEDURE — 70450 CT HEAD/BRAIN W/O DYE: CPT

## 2024-08-24 PROCEDURE — 99284 EMERGENCY DEPT VISIT MOD MDM: CPT

## 2024-08-24 PROCEDURE — 71045 X-RAY EXAM CHEST 1 VIEW: CPT

## 2024-08-24 PROCEDURE — 85379 FIBRIN DEGRADATION QUANT: CPT | Performed by: EMERGENCY MEDICINE

## 2024-08-24 PROCEDURE — 85025 COMPLETE CBC W/AUTO DIFF WBC: CPT | Performed by: EMERGENCY MEDICINE

## 2024-08-24 PROCEDURE — 83880 ASSAY OF NATRIURETIC PEPTIDE: CPT | Performed by: EMERGENCY MEDICINE

## 2024-08-24 PROCEDURE — 93005 ELECTROCARDIOGRAM TRACING: CPT | Performed by: EMERGENCY MEDICINE

## 2024-08-24 RX ORDER — ASPIRIN 81 MG/1
324 TABLET, CHEWABLE ORAL ONCE
Status: COMPLETED | OUTPATIENT
Start: 2024-08-24 | End: 2024-08-24

## 2024-08-24 RX ORDER — SODIUM CHLORIDE 0.9 % (FLUSH) 0.9 %
10 SYRINGE (ML) INJECTION AS NEEDED
Status: DISCONTINUED | OUTPATIENT
Start: 2024-08-24 | End: 2024-08-24 | Stop reason: HOSPADM

## 2024-08-24 RX ADMIN — ASPIRIN 324 MG: 81 TABLET, CHEWABLE ORAL at 15:16

## 2024-08-24 NOTE — ED PROVIDER NOTES
Subjective   History of Present Illness  Patient 49-year-old complaining of some shortness of breath and chest tightness and a headache he feels like his eyeballs are coming out of his head because the headache he has similar headaches before.  He had a CT for which he got contrast allergic reaction therefore he was given epinephrine.    Shortness of Breath  Severity:  Moderate  Onset quality:  Gradual  Duration:  2 days  Timing:  Constant  Progression:  Worsening  Chronicity:  New  Context: not activity, not animal exposure, not emotional upset, not known allergens, not occupational exposure, not pollens and not strong odors    Relieved by:  Nothing  Worsened by:  Nothing  Ineffective treatments:  None tried  Associated symptoms: no abdominal pain, no chest pain, no cough, no fever, no headaches, no hemoptysis, no neck pain, no sputum production, no syncope, no swollen glands and no vomiting    Risk factors: no recent alcohol use, no family hx of DVT, no obesity and no recent surgery        Review of Systems   Constitutional: Negative.  Negative for chills, fatigue and fever.   HENT: Negative.  Negative for congestion.    Respiratory:  Positive for shortness of breath. Negative for cough, hemoptysis, sputum production, chest tightness and stridor.    Cardiovascular: Negative.  Negative for chest pain and syncope.   Gastrointestinal: Negative.  Negative for abdominal distention, abdominal pain, nausea and vomiting.   Endocrine: Negative.    Genitourinary: Negative.  Negative for difficulty urinating and flank pain.   Musculoskeletal: Negative.  Negative for neck pain.   Skin: Negative.  Negative for color change.   Neurological: Negative.  Negative for dizziness and headaches.   All other systems reviewed and are negative.      Past Medical History:   Diagnosis Date    Anxiety     Back pain     Fracture of lumbar spine 2002 and 2015    lower lumbar 2002; L1 2015    Head injury     Injury of back     Nausea with  vomiting 03/28/2023    Neck pain     Overweight (BMI 25.0-29.9)     Restless leg syndrome        Allergies   Allergen Reactions    Contrast Dye (Echo Or Unknown Ct/Mr) Hives, Rash, Angioedema and Seizure     Allergy to CT dye, was given premedication by rad protocol (13,7,1 hr prednisone 50mg, 50mg benedryl 1 hr prior), still had reaction  Itching, tongue swelling, lips swelling, hives, sob    Ct Contrast Itching       Past Surgical History:   Procedure Laterality Date    COLONOSCOPY N/A 8/31/2023    Procedure: COLONOSCOPY WITH ANESTHESIA;  Surgeon: Kristopher Brush DO;  Location: [facility] ENDOSCOPY;  Service: Gastroenterology;  Laterality: N/A;  preop; screening   postop  PCP Joseph Stewart    COLONOSCOPY N/A 9/1/2023    Procedure: COLONOSCOPY WITH ANESTHESIA;  Surgeon: Kristopher Brush DO;  Location: [facility] ENDOSCOPY;  Service: Gastroenterology;  Laterality: N/A;  Pre: Encounter for screening for malignant neoplasm of colon;  Post: Inadequate prep;  Vivek Stewart DO    COLONOSCOPY N/A 5/31/2024    Procedure: COLONOSCOPY WITH ANESTHESIA;  Surgeon: Kristopher Brush DO;  Location: [facility] ENDOSCOPY;  Service: Gastroenterology;  Laterality: N/A;  Pre: Encounter for screening for malignant neoplasm of colon, Left lower quadrant abdominal pain;  Post: Fair prep;  Vivek Stewart DO    FACIAL FRACTURE SURGERY      INCISION AND DRAINAGE LEG Right 12/1/2018    Procedure: INCISION AND DRAINAGE OF RIGHT UPPER INNER THIGH, PLACEMENT OF WOUND VAC;  Surgeon: René Dallas MD;  Location:  [facility] OR;  Service: General    INGUINAL HERNIA REPAIR Bilateral 8/22/2023    Procedure: INGUINAL HERNIA BILATERAL REPAIR LAPAROSCOPIC WITH DAVINCI ROBOT WITH MESH;  Surgeon: Dina Boyd MD;  Location:  [facility] OR;  Service: Robotics - DaVinci;  Laterality: Bilateral;    KNEE SURGERY Right 09/2021    SPLENECTOMY         Family History   Problem Relation Age of Onset    Fibromyalgia Mother     Lung cancer Father     Colon  cancer Neg Hx     Colon polyps Neg Hx     Esophageal cancer Neg Hx        Social History     Socioeconomic History    Marital status: Single   Tobacco Use    Smoking status: Never    Smokeless tobacco: Never   Vaping Use    Vaping status: Never Used   Substance and Sexual Activity    Alcohol use: Not Currently    Drug use: No    Sexual activity: Yes     Partners: Female           Objective   Physical Exam  Vitals and nursing note reviewed. Exam conducted with a chaperone present.   Constitutional:       General: He is not in acute distress.     Appearance: Normal appearance.   HENT:      Head: Normocephalic.      Nose: Nose normal.      Mouth/Throat:      Mouth: Mucous membranes are moist.   Eyes:      Pupils: Pupils are equal, round, and reactive to light.   Cardiovascular:      Rate and Rhythm: Normal rate. No extrasystoles are present.     Chest Wall: PMI is not displaced.      Pulses: Normal pulses.      Heart sounds: Normal heart sounds. No murmur heard.     No gallop.   Pulmonary:      Effort: Pulmonary effort is normal. No tachypnea, respiratory distress or retractions.      Breath sounds: No stridor or decreased air movement. No decreased breath sounds or rales.   Chest:      Chest wall: No deformity or tenderness.   Abdominal:      General: Abdomen is flat. Bowel sounds are normal. There is no distension or abdominal bruit.      Palpations: Abdomen is soft. There is no mass or pulsatile mass.      Tenderness: There is no abdominal tenderness.   Musculoskeletal:      Cervical back: Normal range of motion.      Right lower leg: No tenderness. No edema.      Left lower leg: No tenderness. No edema.   Skin:     General: Skin is warm.      Capillary Refill: Capillary refill takes less than 2 seconds.      Coloration: Skin is not cyanotic, mottled or pale.      Findings: No ecchymosis.   Neurological:      General: No focal deficit present.      Mental Status: He is alert and oriented to person, place, and time.  Mental status is at baseline.      Cranial Nerves: No cranial nerve deficit.      Sensory: No sensory deficit.      Motor: No weakness.      Coordination: Coordination normal.   Psychiatric:         Mood and Affect: Mood normal.         Thought Content: Thought content normal.         Procedures           ED Course  ED Course as of 08/24/24 1729   Sat Aug 24, 2024   1340 nsr [TS]   1723 Patient was seen recently in the ED after he had allergic reaction to contrast were given epinephrine.  Today he comes into the ED with chest tightness.  And some shortness of breath.  No other complaint associate with this.  His cardiac markers are negative.  Urine resting is negative.  Lab work was unremarkable.  I have discussed this case at length with the patient leukocytosis and his steroid use.  Offered stress testing patient says he is going to follow-up with his primary MD and get assessed to schedule to the primary MD.  There is no clinical evidence of PE Wells score is low risk.  CT of the head was performed because the patient having headache which is negative.  And chest x-ray is negative. [TS]      ED Course User Index  [TS] Delfin Maguire MD                                             Medical Decision Making  Differential Diagnosis:  I considered pulmonary etiology, asthma, chronic obstructive pulmonary disease, pneumonia, pulmonary embolism, adult respiratory distress syndrome, pneumothorax, pleural effusion, pulmonary fibrosis, cardiac etiology, congestive heart failure, myocardial infarction, metabolic etiology, diabetic ketoacidosis, uremia, acidosis, sepsis, anemia, drug related etiology, hyperventilation and CNS disease as a possible cause of dyspnea in this patient. This is a partial list of diagnoses considered.       Patient given multiple different complaint including chest tightness shortness of breath and headache.  Workup was performed CT of the head is negative.  Chest x-ray negative.  Lab workup was  unremarkable.  The patient is resting comfortably in bed in no distress will be discharged home with a follow-up with the primary MD and outpatient stress testing.    Problems Addressed:  Chest discomfort: acute illness or injury  Dyspnea, unspecified type: acute illness or injury  Nonintractable headache, unspecified chronicity pattern, unspecified headache type: acute illness or injury    Amount and/or Complexity of Data Reviewed  Labs: ordered.     Details: As reviewed  Radiology: ordered.     Details: X-rays reviewed  ECG/medicine tests: ordered.     Details: Nonischemic    Risk  OTC drugs.  Prescription drug management.  Risk Details: Wells score is 0  PERC is low risk  Heart score 1  Years Algorithm for Pulmonary Embolus  To be used in hemodynamically stable patient >18 years old        No signs of DVT are present, there is no hemoptysis, PE is not the most likely diagnosis and the D-dimer is not greater or equal to 1000 ng/mL. Therefore PE is excluded . The Years algorithm rules out PE (0.43 % with symptomatic VTE during 3-month follow-up)    This patient presents with shortness of breath patient arrived hemodynamically stable was placed on the monitor and IV access obtained EKG was obtained and did not reveal any malignant/unstable dysrhythmias any acute ST elevation, no evidence of Brugada, or significantly prolonged QT .  Presentation not consistent with other acute, emergent cause of shortness of breath at this time.  Low suspicion for acute PE is low risk per Wells and Years criteria and no evidence of DVT such as calf swelling, tenderness, palpable tortuous lower extremity vein, or Homans' sign.  Low suspicion for pneumothorax as the patient is saturating well and has no radiographic evidence of a pneumothorax.  Low suspicion for dissection there is no widened mediastinum, hypotension, pulses deficit, and no tearing back/abdominal pain.  Low suspicion for tamponade as there is no JVD, muffled heart  sounds, electrical alternans on EKG, and no hypotension.  Low suspicion for pericarditis as there is no diffuse ST elevation or MA depression and the patient is afebrile.  No evidence of GI bleed per patient's history.  Low suspicion for CHF exacerbation as there is no evidence of volume overload and no evidence of severely elevated BNP.  Low suspicion for pneumonia since the patient has no fever no productive cough no chest x-ray findings of pneumonia and no leukocytosis.    This patient presented with gradual onset of headache patient arrived hemodynamically stable and neurological exam was without any focal deficits. Patient was placed on a monitor and IV access established. Presentation not consistent with other acute emergent cause of headache at this time. No red flags for subarachnoid hemorrhage and headache is not the worst headache of the patient's life. No neck stiffness or overlying skin changes. Low suspicion for acute angle-closure glaucoma at this time given lack of pupillary findings. Low suspicion for temporal arteritis as there is no bulging temporal artery, pain is not localized to temporal area, and the patient does not have any visual loss or history of vasculitis. Low suspicion for CRAO/CRVO as the patient did not have any painless visual loss. Low suspicion for ACS as the patient does not have any associated chest pain or shortness of breath and has a nonsuspicious history of present illness. No gait disturbance no diplopia no dysarthria or dysphagia on exam. There is low suspicion for meningitis encephalitis as there is no fever no nuchal rigidity and no confusion and negative inflammatory markers and normal lab work-up.  Based on the patient's history and physical there is very low clinical suspicion for significant intracranial pathology. The headache was NOT sudden onset, NOT maximal at onset, there are NO neurologic findings, the patient does NOT have a fever, the patient does NOT have any  jaw claudication, the patient does NOT endorse a clotting disorder, patient DENIES any trauma or eye pain and the headache is NOT associated with dizziness or ataxia.         Final diagnoses:   Nonintractable headache, unspecified chronicity pattern, unspecified headache type   Dyspnea, unspecified type   Chest discomfort       ED Disposition  ED Disposition       ED Disposition   Discharge    Condition   Stable    Comment   --               Vivek Stewart DO  [address] [address]  [address] [place] [address]  [phone]    In 2 days           Medication List      No changes were made to your prescriptions during this visit.            Delfin Maguire MD  08/24/24 1437       Delfin Maguire MD  08/24/24 1720

## 2024-08-24 NOTE — DISCHARGE INSTRUCTIONS
It was very nice to meet you, Saulo. Thank you for allowing us to take care of you today at [facility].     Today you were seen in the emergency department for your symptoms. Please understand that an ER evaluation is just the start of your evaluation. We do the best we can, but we are often unable to fully find what is causing your symptoms from one evaluation.  Because of this, the goal is to determine whether you need to be evaluated in the hospital or if it is safe for you to go home and see other doctors provided such as primary care physicians or specialist on an outpatient basis.      Like we discussed, I strongly urge that you follow up with your primary care doctor. Please call their office to set up an appointment as soon as possible so that you can be re-evaluated for improvement in your symptoms or for any other questions.  I have provided the information needed, including phone number, to call to set up an appointment below in these discharge papers.      Educational material has also been provided in the following pages regarding what we have discussed today.      Please return to the emergency room within 12-48 hours if you experience symptoms such as the following:   Fever, chills, chest pain or shortness of breath, pain with inspiration/expiration, pain that travels to your arms, neck or back, nausea, vomiting, severe headache, tearing pain in your chest, dizziness, feel as though you are about to pass out, OR if you have any worsening symptoms, or any other concerns.

## 2024-08-26 ENCOUNTER — OFFICE VISIT (OUTPATIENT)
Dept: FAMILY MEDICINE CLINIC | Facility: CLINIC | Age: 49
End: 2024-08-26
Payer: COMMERCIAL

## 2024-08-26 VITALS
HEART RATE: 114 BPM | WEIGHT: 210 LBS | TEMPERATURE: 97.1 F | RESPIRATION RATE: 19 BRPM | BODY MASS INDEX: 27.83 KG/M2 | HEIGHT: 73 IN | SYSTOLIC BLOOD PRESSURE: 125 MMHG | DIASTOLIC BLOOD PRESSURE: 85 MMHG | OXYGEN SATURATION: 98 %

## 2024-08-26 DIAGNOSIS — R07.89 CHEST TIGHTNESS: Primary | ICD-10-CM

## 2024-08-26 DIAGNOSIS — R51.9 ACUTE INTRACTABLE HEADACHE, UNSPECIFIED HEADACHE TYPE: ICD-10-CM

## 2024-08-26 PROCEDURE — 99214 OFFICE O/P EST MOD 30 MIN: CPT | Performed by: NURSE PRACTITIONER

## 2024-08-26 PROCEDURE — 1125F AMNT PAIN NOTED PAIN PRSNT: CPT | Performed by: NURSE PRACTITIONER

## 2024-08-26 RX ORDER — RIMEGEPANT SULFATE 75 MG/75MG
75 TABLET, ORALLY DISINTEGRATING ORAL DAILY PRN
Qty: 16 TABLET | Refills: 1 | Status: SHIPPED | OUTPATIENT
Start: 2024-08-26

## 2024-08-26 NOTE — PROGRESS NOTES
KYLER Thorpe  [facility]   Family Medicine  [address]  [place] [address]  Phone: [phone]  Fax: [phone]         Chief Complaint:  Chief Complaint   Patient presents with    Follow-up     On allergic reaction to contrast dye. Patient was itchy, and experienced severe reaction heart rate dropped, swelling. Headache.        History:  Saulo Shepard is a 49 y.o. male that is an established patient. He  is here for evaluation of the above complaint.    Follow-upCurrent symptoms: shortness of breath and headaches. Current symptoms include no chest pain, no palpitations, no wheezing and no cough.      History of Present Illness  The patient presents for evaluation of multiple medical concerns.    He sought emergency care on 08/24/2024 due to a persistent headache that had worsened, accompanied by impaired peripheral vision. This was after the anaphylaxis he had from CT dye on 8/21. He has had the headache since IV epinephrine.  Despite the passage of 5 days, the headache persists, albeit at a tolerable level. He describes the pain as if his eyeballs are being pushed out. A CT scan of his head was performed in the ER, which showed normal results. His blood pressure was not elevated during the ER visit or today. He was given four children's aspirin in the ER, but has not taken any since then. He reports no history of headaches, except for occasional ones that resolve on their own. He also experiences severe eye pain and can sometimes feel his heartbeat around his eyes. His peripheral vision issues have resolved for now, but were present earlier today and tend to worsen with the headache.    He reports experiencing chest pressure, likening it to the sensation of someone sitting on his chest when he lies down. He also mentions shortness of breath during exercise, such as when he was walking up here. However, his oxygen saturation level is currently at 98 percent. He was  "advised to have a stress test while in the ED.     SOCIAL HISTORY  He does not smoke.    FAMILY HISTORY  His great grandmother had heart problems. His mother had high blood pressure and was on medication for it. He denies any family history of heart attack or open heart surgery.      Results  Imaging  CT of the head showed no abnormalities. Chest x-ray showed no abnormalities.       ROS:  Review of Systems   Constitutional: Negative.    HENT: Negative.     Respiratory:  Positive for chest tightness and shortness of breath. Negative for cough and wheezing.    Cardiovascular: Negative.  Negative for chest pain, palpitations and leg swelling.   Neurological:  Positive for headaches.         reports that he has never smoked. He has never used smokeless tobacco. He reports that he does not currently use alcohol. He reports that he does not use drugs.    Current Outpatient Medications   Medication Instructions    cyclobenzaprine (FLEXERIL) 10 mg, Oral, 3 Times Daily PRN    diphenhydrAMINE (BENADRYL) 50 mg, Oral, Every 6 Hours PRN    EPINEPHrine (EPIPEN) 0.3 mg, Once    gabapentin (NEURONTIN) 300 mg, Oral, 3 Times Daily    ibuprofen (ADVIL,MOTRIN) 800 mg, Oral, Every 8 Hours PRN, Take every 8 hours for three days then take as needed.    meloxicam (MOBIC) 15 mg, Oral, Daily    Nurtec 75 mg, Oral, Daily PRN    ondansetron ODT (ZOFRAN-ODT) 4 mg, Oral, Every 6 Hours PRN    predniSONE (DELTASONE) 50 mg, Oral, Take As Directed, Take 1 tablet (50mg) 13 Hours 7 Hours & 1 Hour Prior to Exam    rOPINIRole (REQUIP) 0.25 mg, Oral, Nightly, Take 1 hour before bedtime.       OBJECTIVE:  /85 (BP Location: Left arm, Patient Position: Sitting, Cuff Size: Adult)   Pulse 114   Temp 97.1 °F (36.2 °C) (Infrared)   Resp 19   Ht 185.4 cm (72.99\")   Wt 95.3 kg (210 lb)   SpO2 98%   BMI 27.71 kg/m²    Physical Exam  Vitals and nursing note reviewed.   Constitutional:       Appearance: Normal appearance.   HENT:      Head: " Normocephalic and atraumatic.      Nose: Nose normal.   Eyes:      Conjunctiva/sclera: Conjunctivae normal.   Cardiovascular:      Rate and Rhythm: Normal rate and regular rhythm.   Pulmonary:      Effort: Pulmonary effort is normal. No respiratory distress.      Breath sounds: Normal breath sounds. No wheezing or rales.   Neurological:      General: No focal deficit present.      Mental Status: He is alert and oriented to person, place, and time.   Psychiatric:         Mood and Affect: Mood normal.         Behavior: Behavior normal.       Physical Exam  Clear lung sounds.    Procedures    Assessment/Plan:     Diagnoses and all orders for this visit:    1. Chest tightness (Primary)  -     Adult Stress Echo W/ Cont or Stress Agent if Necessary Per Protocol; Future    2. Acute intractable headache, unspecified headache type  -     Rimegepant Sulfate (Nurtec) 75 MG tablet dispersible tablet; Take 1 tablet by mouth Daily As Needed (as needed for headache).  Dispense: 16 tablet; Refill: 1           Assessment & Plan  1. Headache.  The headache has persisted for 5 days and is described as feeling like pressure on the eyeballs, with associated visual disturbances. A CT scan of the head was performed and returned normal. Blood pressure is not elevated. He has been taking ibuprofen and Tylenol without relief. A sample of a new medication will be provided to manage the headache. If the new medication is effective, he will continue its use.    2. Chest pressure.  He reports chest pressure that feels like someone is sitting on his chest, especially when lying down. A stress test will be ordered to rule out any cardiac issues. He has no personal history of heart problems, but his mother had high blood pressure. If the stress test indicates any abnormalities, further cardiac evaluation will be considered.        An After Visit Summary was printed and given to the patient at discharge.  No follow-ups on file.       There are no  Patient Instructions on file for this visit.      Discussion:     I spent 32 minutes caring for Saulo on this date of service. This time includes time spent by me in the following activities: preparing for the visit, reviewing tests, performing a medically appropriate examination and/or evaluation, counseling and educating the patient/family/caregiver, documenting information in the medical record, independently interpreting results and communicating that information with the patient/family/caregiver, care coordination, ordering medications, ordering test(s), obtaining a separately obtained history, and reviewing a separately obtained history   Patient or patient representative verbalized consent for the use of Ambient Listening during the visit with  KYLER Thorpe for chart documentation. 8/26/2024  14:13 CDT    Angelica CHÁVEZ 8/26/2024   Electronically signed.

## 2024-08-28 ENCOUNTER — OFFICE VISIT (OUTPATIENT)
Dept: SURGERY | Facility: CLINIC | Age: 49
End: 2024-08-28
Payer: COMMERCIAL

## 2024-08-28 VITALS
OXYGEN SATURATION: 98 % | DIASTOLIC BLOOD PRESSURE: 84 MMHG | HEIGHT: 73 IN | BODY MASS INDEX: 27.83 KG/M2 | HEART RATE: 112 BPM | WEIGHT: 210 LBS | SYSTOLIC BLOOD PRESSURE: 128 MMHG

## 2024-08-28 DIAGNOSIS — K57.92 DIVERTICULITIS: Primary | ICD-10-CM

## 2024-08-28 DIAGNOSIS — E66.3 OVERWEIGHT WITH BODY MASS INDEX (BMI) OF 27 TO 27.9 IN ADULT: ICD-10-CM

## 2024-08-28 NOTE — PROGRESS NOTES
Office Established Patient Note:     Referring Provider: No ref. provider found    Chief Complaint   Patient presents with    Follow-up       Subjective .     History of present illness:   History of Present Illness  The patient presents for evaluation of diverticulitis.    He reports intermittent abdominal discomfort, particularly when wearing tight clothing or belts, which exacerbates his diverticulitis pain. He does not experience nausea, vomiting, or fever.    He experienced a significant drop in blood pressure, recorded as 90/18, and an EpiPen was administered due to a severe allergic reaction to the contrast dye used during his CT scan.    His diet has been rich in potatoes, mixed vegetables, and fruits, but he has not been supplementing with additional fiber. He has been consuming Gatorade and water for hydration. He was advised to cease soda consumption to prevent the onset of diabetes, which he has complied with.    ALLERGIES  He is allergic to CONTRAST DYE.   He had a severe allergic reaction to the contrast dye.     History  Past Medical History:   Diagnosis Date    Anxiety     Back pain     Fracture of lumbar spine 2002 and 2015    lower lumbar 2002; L1 2015    Head injury     Injury of back     Nausea with vomiting 03/28/2023    Neck pain     Overweight (BMI 25.0-29.9)     Restless leg syndrome    ,   Past Surgical History:   Procedure Laterality Date    COLONOSCOPY N/A 8/31/2023    Procedure: COLONOSCOPY WITH ANESTHESIA;  Surgeon: Kristopher Brush DO;  Location: [facility] ENDOSCOPY;  Service: Gastroenterology;  Laterality: N/A;  preop; screening   postop  PCP Joseph Stewart    COLONOSCOPY N/A 9/1/2023    Procedure: COLONOSCOPY WITH ANESTHESIA;  Surgeon: Kristopher Brush DO;  Location: [facility] ENDOSCOPY;  Service: Gastroenterology;  Laterality: N/A;  Pre: Encounter for screening for malignant neoplasm of colon;  Post: Inadequate prep;  Vivek Stewart DO    COLONOSCOPY N/A 5/31/2024    Procedure:  COLONOSCOPY WITH ANESTHESIA;  Surgeon: Kristopher Brush DO;  Location:  [facility] ENDOSCOPY;  Service: Gastroenterology;  Laterality: N/A;  Pre: Encounter for screening for malignant neoplasm of colon, Left lower quadrant abdominal pain;  Post: Fair prep;  Vivek Stewart DO    FACIAL FRACTURE SURGERY      INCISION AND DRAINAGE LEG Right 12/1/2018    Procedure: INCISION AND DRAINAGE OF RIGHT UPPER INNER THIGH, PLACEMENT OF WOUND VAC;  Surgeon: René Dallas MD;  Location:  [facility] OR;  Service: General    INGUINAL HERNIA REPAIR Bilateral 8/22/2023    Procedure: INGUINAL HERNIA BILATERAL REPAIR LAPAROSCOPIC WITH DAVINCI ROBOT WITH MESH;  Surgeon: Dina Boyd MD;  Location:  [facility] OR;  Service: Robotics - DaVinci;  Laterality: Bilateral;    KNEE SURGERY Right 09/2021    SPLENECTOMY     ,   Family History   Problem Relation Age of Onset    Fibromyalgia Mother     Lung cancer Father     Colon cancer Neg Hx     Colon polyps Neg Hx     Esophageal cancer Neg Hx    ,   Social History     Tobacco Use    Smoking status: Never    Smokeless tobacco: Never   Vaping Use    Vaping status: Never Used   Substance Use Topics    Alcohol use: Not Currently    Drug use: No   , (Not in a hospital admission)   and Allergies:  Contrast dye (echo or unknown ct/mr) and Ct contrast    Current Outpatient Medications:     cyclobenzaprine (FLEXERIL) 10 MG tablet, Take 1 tablet by mouth 3 (Three) Times a Day As Needed for Muscle Spasms., Disp: 20 tablet, Rfl: 0    diphenhydrAMINE (BENADRYL) 25 MG tablet, Take 2 tablets by mouth Every 6 (Six) Hours As Needed for Itching., Disp: 20 tablet, Rfl: 0    EPINEPHrine (EPIPEN) 0.3 MG/0.3ML solution auto-injector injection, Inject 0.3 mL into the appropriate muscle as directed by prescriber 1 (One) Time., Disp: , Rfl:     gabapentin (NEURONTIN) 300 MG capsule, Take 1 capsule by mouth 3 (Three) Times a Day., Disp: 90 capsule, Rfl: 2    ibuprofen (ADVIL,MOTRIN) 800 MG tablet, Take 1 tablet by  "mouth Every 8 (Eight) Hours As Needed for Mild Pain for up to 90 doses. Take every 8 hours for three days then take as needed., Disp: 90 tablet, Rfl: 0    meloxicam (MOBIC) 15 MG tablet, Take 1 tablet by mouth Daily., Disp: , Rfl:     ondansetron ODT (ZOFRAN-ODT) 4 MG disintegrating tablet, Take 1 tablet by mouth Every 6 (Six) Hours As Needed for Nausea or Vomiting., Disp: 20 tablet, Rfl: 0    predniSONE (DELTASONE) 50 MG tablet, Take 1 tablet by mouth Take As Directed for 3 doses. Take 1 tablet (50mg) 13 Hours 7 Hours & 1 Hour Prior to Exam, Disp: 3 tablet, Rfl: 0    Rimegepant Sulfate (Nurtec) 75 MG tablet dispersible tablet, Take 1 tablet by mouth Daily As Needed (as needed for headache)., Disp: 16 tablet, Rfl: 1    rOPINIRole (REQUIP) 0.25 MG tablet, Take 1 tablet by mouth Every Night. Take 1 hour before bedtime., Disp: 90 tablet, Rfl: 3    psyllium (METAMUCIL) 58.6 % powder, Take 1 packet by mouth Daily., Disp: 360 packet, Rfl: 0      Objective     Vital Signs   /84   Pulse 112   Ht 185.4 cm (72.99\")   Wt 95.3 kg (210 lb)   SpO2 98%   BMI 27.71 kg/m²      Physical Exam:  Mental status - alert, oriented to person, place, and time  Eyes - pupils equal and reactive, extraocular eye movements intact  Neck - supple, no significant adenopathy  Abdomen - soft, nontender, nondistended, no masses or organomegaly  Neurological - alert, oriented, normal speech, no focal findings or movement disorder noted  Physical Exam      Results Review:     The following data was reviewed by: Dina Boyd MD on 08/28/2024:    CT Abdomen Pelvis With & Without Contrast (08/21/2024 16:56)   1. Interval resolution of LEFT lower quadrant diverticulitis changes   Results  Imaging  CT scan showed that the diverticulitis completely went away and patient still has the diverticuli. No inflammation, no fluid in pelvis, no signs of infection.       Assessment & Plan       Diagnoses and all orders for this visit:    1. Acute " descending diverticulitis (Primary)  -     psyllium (METAMUCIL) 58.6 % powder; Take 1 packet by mouth Daily.  Dispense: 360 packet; Refill: 0    2. Overweight with body mass index (BMI) of 27 to 27.9 in adult       Assessment & Plan  1. Diverticulitis.  The CT scan results indicate a resolution of the diverticulitis, with no signs of inflammation or infection. However, diverticuli are still present. A prescription for fiber supplements will be provided. He is advised to maintain a high water intake. Future CT scans should be performed without contrast due to his allergy. Should symptoms recur, he is instructed to contact the office immediately.    2. Severe allergic reaction to contrast dye.  He experienced a severe allergic reaction to contrast dye during a recent procedure, resulting in significantly low blood pressure and other symptoms. He is advised to avoid contrast dye in future imaging studies.       Dina Boyd MD  08/28/24  14:22 CDT    Patient or patient representative verbalized consent for the use of Ambient Listening during the visit with  Dina Boyd MD for chart documentation. 8/29/2024  11:08 CDT

## 2024-08-28 NOTE — PATIENT INSTRUCTIONS

## 2024-08-29 LAB
QT INTERVAL: 356 MS
QT INTERVAL: 372 MS
QTC INTERVAL: 389 MS
QTC INTERVAL: 428 MS

## 2024-09-04 ENCOUNTER — HOSPITAL ENCOUNTER (EMERGENCY)
Facility: HOSPITAL | Age: 49
Discharge: HOME | End: 2024-09-04
Payer: COMMERCIAL

## 2024-09-04 VITALS
DIASTOLIC BLOOD PRESSURE: 79 MMHG | RESPIRATION RATE: 14 BRPM | HEART RATE: 71 BPM | WEIGHT: 211 LBS | TEMPERATURE: 97.9 F | OXYGEN SATURATION: 98 % | BODY MASS INDEX: 27.96 KG/M2 | SYSTOLIC BLOOD PRESSURE: 132 MMHG | HEIGHT: 73 IN

## 2024-09-04 DIAGNOSIS — T78.40XA ALLERGIC REACTION, INITIAL ENCOUNTER: Primary | ICD-10-CM

## 2024-09-04 PROCEDURE — 96375 TX/PRO/DX INJ NEW DRUG ADDON: CPT

## 2024-09-04 PROCEDURE — 25010000002 DIPHENHYDRAMINE PER 50 MG: Performed by: NURSE PRACTITIONER

## 2024-09-04 PROCEDURE — 96374 THER/PROPH/DIAG INJ IV PUSH: CPT

## 2024-09-04 PROCEDURE — 99283 EMERGENCY DEPT VISIT LOW MDM: CPT

## 2024-09-04 PROCEDURE — 25010000002 METHYLPREDNISOLONE PER 125 MG: Performed by: NURSE PRACTITIONER

## 2024-09-04 RX ORDER — DIPHENHYDRAMINE HCL 25 MG
50 TABLET ORAL EVERY 6 HOURS PRN
Qty: 30 TABLET | Refills: 0 | Status: SHIPPED | OUTPATIENT
Start: 2024-09-04

## 2024-09-04 RX ORDER — DIPHENHYDRAMINE HYDROCHLORIDE 50 MG/ML
50 INJECTION INTRAMUSCULAR; INTRAVENOUS ONCE
Status: COMPLETED | OUTPATIENT
Start: 2024-09-04 | End: 2024-09-04

## 2024-09-04 RX ORDER — FAMOTIDINE 20 MG/1
20 TABLET, FILM COATED ORAL 2 TIMES DAILY
Qty: 10 TABLET | Refills: 0 | Status: SHIPPED | OUTPATIENT
Start: 2024-09-04 | End: 2024-09-09

## 2024-09-04 RX ORDER — FAMOTIDINE 10 MG/ML
20 INJECTION, SOLUTION INTRAVENOUS ONCE
Status: COMPLETED | OUTPATIENT
Start: 2024-09-04 | End: 2024-09-04

## 2024-09-04 RX ORDER — METHYLPREDNISOLONE 4 MG
TABLET, DOSE PACK ORAL
Qty: 21 EACH | Refills: 0 | Status: SHIPPED | OUTPATIENT
Start: 2024-09-04

## 2024-09-04 RX ORDER — METHYLPREDNISOLONE SODIUM SUCCINATE 125 MG/2ML
125 INJECTION, POWDER, LYOPHILIZED, FOR SOLUTION INTRAMUSCULAR; INTRAVENOUS ONCE
Status: COMPLETED | OUTPATIENT
Start: 2024-09-04 | End: 2024-09-04

## 2024-09-04 RX ADMIN — FAMOTIDINE 20 MG: 10 INJECTION INTRAVENOUS at 08:57

## 2024-09-04 RX ADMIN — DIPHENHYDRAMINE HYDROCHLORIDE 50 MG: 50 INJECTION, SOLUTION INTRAMUSCULAR; INTRAVENOUS at 08:54

## 2024-09-04 RX ADMIN — METHYLPREDNISOLONE SODIUM SUCCINATE 125 MG: 125 INJECTION, POWDER, FOR SOLUTION INTRAMUSCULAR; INTRAVENOUS at 08:54

## 2024-09-04 NOTE — ED PROVIDER NOTES
Subjective   History of Present Illness  Patient is a 49-year-old male presents the emergency department after being stung by wasp about 30 minutes prior to arrival.  Patient has a history of having an allergic reaction to wasp.  He states he had an EpiPen but he just received and never used it and states last time he had epinephrine it made him have a terrible headache.  He has not taken any Benadryl.  He was stung to the left side of the neck.  He states he is having a metallic taste in his mouth and a headache at present.  He denies any difficulty breathing.  He states his throat feels dry.  He is not not having difficulty swallowing.    History provided by:  Patient   used: No        Review of Systems   Constitutional: Negative.    HENT: Negative.     Eyes: Negative.    Respiratory: Negative.     Cardiovascular: Negative.    Gastrointestinal: Negative.    Endocrine: Negative.    Genitourinary: Negative.    Musculoskeletal: Negative.    Skin: Negative.    Allergic/Immunologic:        Patient is a 49-year-old male presents the emergency department after being stung by wasp about 30 minutes prior to arrival.  Patient has a history of having an allergic reaction to wasp.  He states he had an EpiPen but he just received and never used it and states last time he had epinephrine it made him have a terrible headache.  He has not taken any Benadryl.  He was stung to the left side of the neck.  He states he is having a metallic taste in his mouth and a headache at present.  He denies any difficulty breathing.  He states his throat feels dry.  He is not not having difficulty swallowing.     Neurological: Negative.    Hematological: Negative.    Psychiatric/Behavioral: Negative.     All other systems reviewed and are negative.      Past Medical History:   Diagnosis Date    Anxiety     Back pain     Fracture of lumbar spine 2002 and 2015    lower lumbar 2002; L1 2015    Head injury     Injury of back      Nausea with vomiting 03/28/2023    Neck pain     Overweight (BMI 25.0-29.9)     Restless leg syndrome        Allergies   Allergen Reactions    Contrast Dye (Echo Or Unknown Ct/Mr) Hives, Rash, Angioedema and Seizure     Allergy to CT dye, was given premedication by rad protocol (13,7,1 hr prednisone 50mg, 50mg benedryl 1 hr prior), still had reaction  Itching, tongue swelling, lips swelling, hives, sob    Ct Contrast Itching       Past Surgical History:   Procedure Laterality Date    COLONOSCOPY N/A 8/31/2023    Procedure: COLONOSCOPY WITH ANESTHESIA;  Surgeon: Kristopher Brush DO;  Location: [facility] ENDOSCOPY;  Service: Gastroenterology;  Laterality: N/A;  preop; screening   postop  PCP Joseph Stewart    COLONOSCOPY N/A 9/1/2023    Procedure: COLONOSCOPY WITH ANESTHESIA;  Surgeon: Kristopher Brush DO;  Location: [facility] ENDOSCOPY;  Service: Gastroenterology;  Laterality: N/A;  Pre: Encounter for screening for malignant neoplasm of colon;  Post: Inadequate prep;  Vivek Stewart DO    COLONOSCOPY N/A 5/31/2024    Procedure: COLONOSCOPY WITH ANESTHESIA;  Surgeon: Kristopher Brush DO;  Location: [facility] ENDOSCOPY;  Service: Gastroenterology;  Laterality: N/A;  Pre: Encounter for screening for malignant neoplasm of colon, Left lower quadrant abdominal pain;  Post: Fair prep;  Vivek Stewart DO    FACIAL FRACTURE SURGERY      INCISION AND DRAINAGE LEG Right 12/1/2018    Procedure: INCISION AND DRAINAGE OF RIGHT UPPER INNER THIGH, PLACEMENT OF WOUND VAC;  Surgeon: René Dallas MD;  Location:  [facility] OR;  Service: General    INGUINAL HERNIA REPAIR Bilateral 8/22/2023    Procedure: INGUINAL HERNIA BILATERAL REPAIR LAPAROSCOPIC WITH DAVINCI ROBOT WITH MESH;  Surgeon: Dina Boyd MD;  Location:  [facility] OR;  Service: Robotics - DaVinci;  Laterality: Bilateral;    KNEE SURGERY Right 09/2021    SPLENECTOMY         Family History   Problem Relation Age of Onset    Fibromyalgia Mother     Lung cancer Father      Colon cancer Neg Hx     Colon polyps Neg Hx     Esophageal cancer Neg Hx        Social History     Socioeconomic History    Marital status: Single   Tobacco Use    Smoking status: Never    Smokeless tobacco: Never   Vaping Use    Vaping status: Never Used   Substance and Sexual Activity    Alcohol use: Not Currently    Drug use: No    Sexual activity: Yes     Partners: Female       Prior to Admission medications    Medication Sig Start Date End Date Taking? Authorizing Provider   cyclobenzaprine (FLEXERIL) 10 MG tablet Take 1 tablet by mouth 3 (Three) Times a Day As Needed for Muscle Spasms. 6/22/24   Madalyn Cobb APRN   diphenhydrAMINE (BENADRYL) 25 MG tablet Take 2 tablets by mouth Every 6 (Six) Hours As Needed for Itching. 7/1/24   Madalyn Cobb APRN   EPINEPHrine (EPIPEN) 0.3 MG/0.3ML solution auto-injector injection Inject 0.3 mL into the appropriate muscle as directed by prescriber 1 (One) Time. 8/5/24   ProviderTiffanie MD   gabapentin (NEURONTIN) 300 MG capsule Take 1 capsule by mouth 3 (Three) Times a Day. 6/24/24   Vivek Stewart DO   ibuprofen (ADVIL,MOTRIN) 800 MG tablet Take 1 tablet by mouth Every 8 (Eight) Hours As Needed for Mild Pain for up to 90 doses. Take every 8 hours for three days then take as needed. 2/5/24   Angelica Almonte APRN   meloxicam (MOBIC) 15 MG tablet Take 1 tablet by mouth Daily.    Provider, MD Tiffanie   ondansetron ODT (ZOFRAN-ODT) 4 MG disintegrating tablet Take 1 tablet by mouth Every 6 (Six) Hours As Needed for Nausea or Vomiting. 8/2/24   Piter Parra APRN   predniSONE (DELTASONE) 50 MG tablet Take 1 tablet by mouth Take As Directed for 3 doses. Take 1 tablet (50mg) 13 Hours 7 Hours & 1 Hour Prior to Exam 8/7/24   Dina Boyd MD   psyllium (METAMUCIL) 58.6 % powder Take 1 packet by mouth Daily. 8/28/24 8/28/25  Dina Boyd MD   Rimegepant Sulfate (Nurtec) 75 MG tablet dispersible tablet Take 1 tablet by mouth Daily  "As Needed (as needed for headache). 8/26/24   Angelica Almonte APRN   rOPINIRole (REQUIP) 0.25 MG tablet Take 1 tablet by mouth Every Night. Take 1 hour before bedtime. 8/5/24   Vivek Stewart,        /80 (BP Location: Right arm, Patient Position: Sitting)   Pulse 70   Temp 97.6 °F (36.4 °C) (Oral)   Resp 18   Ht 185.4 cm (73\")   Wt 95.7 kg (211 lb)   SpO2 96%   BMI 27.84 kg/m²     Objective   Physical Exam  Vitals and nursing note reviewed.   Constitutional:       Appearance: He is well-developed.   HENT:      Head: Normocephalic and atraumatic.      Comments: Airway intact. Swallows without difficulty.   Eyes:      Conjunctiva/sclera: Conjunctivae normal.      Pupils: Pupils are equal, round, and reactive to light.   Cardiovascular:      Rate and Rhythm: Normal rate and regular rhythm.      Heart sounds: Normal heart sounds.   Pulmonary:      Effort: Pulmonary effort is normal. No respiratory distress.      Breath sounds: Normal breath sounds. No stridor. No wheezing, rhonchi or rales.   Chest:      Chest wall: No tenderness.   Abdominal:      General: Bowel sounds are normal.      Palpations: Abdomen is soft.   Musculoskeletal:         General: Normal range of motion.      Cervical back: Normal range of motion and neck supple.   Skin:     General: Skin is warm and dry.      Comments: There is erythematous swollen area to left side of neck consistent with insect sting. No drainage from the area. No warmth on palpation    Neurological:      Mental Status: He is alert and oriented to person, place, and time.      Deep Tendon Reflexes: Reflexes are normal and symmetric.   Psychiatric:         Behavior: Behavior normal.         Thought Content: Thought content normal.         Judgment: Judgment normal.         Procedures         Lab Results (last 24 hours)       ** No results found for the last 24 hours. **            No orders to display       ED Course  ED Course as of 09/04/24 0924   Wed Sep 04, " 2024 0920 Reeval pt- redness and swelling have resolved. Pt states that he is feeling better at present. Advised pt to keep benadryl on his person as well epi pen. Pt is in agreement with care plan and voices understanding of instructions. Pt will be discharged shortly in stable condition  [CW]      ED Course User Index  [CW] Madalyn Cobb APRN        Medical Decision Making  Patient is a 49-year-old male presents the emergency department after being stung by wasp about 30 minutes prior to arrival.  Patient has a history of having an allergic reaction to wasp.  He states he had an EpiPen but he just received and never used it and states last time he had epinephrine it made him have a terrible headache.  He has not taken any Benadryl.  He was stung to the left side of the neck.  He states he is having a metallic taste in his mouth and a headache at present.  He denies any difficulty breathing.  He states his throat feels dry.  He is not not having difficulty swallowing.  Course of treatment in the er: Nontoxic-appearing.  No acute distress.  Erythematous area noted to the left side of the neck consistent with an insect sting.  There is no warmth on palpation or drainage from the area.  Airway is intact.  Lungs clear to auscultation.  CV normal sinus rhythm.  Patient is given Benadryl, Solu-Medrol, Pepcid.  Reeval pt- redness and swelling have resolved. Pt states that he is feeling better at present. Advised pt to keep benadryl on his person as well epi pen. Pt is in agreement with care plan and voices understanding of instructions. Pt will be discharged shortly in stable condition       Risk  Prescription drug management.         Final diagnoses:   Allergic reaction, initial encounter          Madalyn Cobb APRN  09/04/24 0910

## 2024-09-04 NOTE — Clinical Note
[facility] EMERGENCY DEPARTMENT  [address]  [facility] [address]  Phone: [phone]    Saulo Shepard was seen and treated in our emergency department on 9/4/2024.  He may return to work on 09/05/2024.         Thank you for choosing [facility].    Madalyn Cobb APRN

## 2024-09-04 NOTE — DISCHARGE INSTRUCTIONS
Return to ER if symptoms worsen   Keep benadryl with you as well as epi pen  Follow up with primary care provider this week

## 2024-09-05 DIAGNOSIS — R07.89 CHEST TIGHTNESS: Primary | ICD-10-CM

## 2024-09-05 DIAGNOSIS — R06.09 DYSPNEA ON EXERTION: ICD-10-CM

## 2024-09-10 ENCOUNTER — TELEPHONE (OUTPATIENT)
Dept: FAMILY MEDICINE CLINIC | Facility: CLINIC | Age: 49
End: 2024-09-10
Payer: COMMERCIAL

## 2024-09-10 NOTE — TELEPHONE ENCOUNTER
Caller: RENAE     Relationship: RN WITH [facility]     Best call back number: [phone]     What is the best time to reach you: SHE STATES UNTIL 7 PM     Who are you requesting to speak with (clinical staff, provider,  specific staff member): CLINICAL     Do you know the name of the person who called: CLINICAL     What was the call regarding: SHE STATES HE HAS BEEN GOING TO THE EMERGENCY DEPARTMENT  5 X IN 30 DAYS AND SHE STATES THEY HAVE AN ED DIVERSION PROGRAM AND WANTS TO INFORM HIS PROVIDER     Is it okay if the provider responds through [facility]: CALL BACK IF NEEDED

## 2024-09-10 NOTE — TELEPHONE ENCOUNTER
Spoke with patient. We seen him on 9/4/2024 for his last ER visit. He declines another appointment as he's already followed up on his last ER visit.

## 2024-09-12 ENCOUNTER — HOSPITAL ENCOUNTER (OUTPATIENT)
Dept: CARDIOLOGY | Facility: HOSPITAL | Age: 49
Discharge: HOME | End: 2024-09-12
Admitting: NURSE PRACTITIONER
Payer: COMMERCIAL

## 2024-09-12 VITALS
SYSTOLIC BLOOD PRESSURE: 140 MMHG | HEIGHT: 73 IN | WEIGHT: 211 LBS | HEART RATE: 78 BPM | DIASTOLIC BLOOD PRESSURE: 86 MMHG | BODY MASS INDEX: 27.96 KG/M2

## 2024-09-12 DIAGNOSIS — R06.09 DYSPNEA ON EXERTION: ICD-10-CM

## 2024-09-12 DIAGNOSIS — R07.89 CHEST TIGHTNESS: ICD-10-CM

## 2024-09-12 LAB
BH CV STRESS BP STAGE 1: NORMAL
BH CV STRESS BP STAGE 2: NORMAL
BH CV STRESS BP STAGE 3: NORMAL
BH CV STRESS DURATION MIN STAGE 1: 3
BH CV STRESS DURATION MIN STAGE 2: 3
BH CV STRESS DURATION MIN STAGE 3: 3
BH CV STRESS DURATION SEC STAGE 1: 0
BH CV STRESS DURATION SEC STAGE 2: 0
BH CV STRESS DURATION SEC STAGE 3: 0
BH CV STRESS GRADE STAGE 1: 10
BH CV STRESS GRADE STAGE 2: 12
BH CV STRESS GRADE STAGE 3: 14
BH CV STRESS HR STAGE 1: 108
BH CV STRESS HR STAGE 2: 136
BH CV STRESS HR STAGE 3: 157
BH CV STRESS METS STAGE 1: 5
BH CV STRESS METS STAGE 2: 7.5
BH CV STRESS METS STAGE 3: 10
BH CV STRESS PROTOCOL 1: NORMAL
BH CV STRESS RECOVERY BP: NORMAL MMHG
BH CV STRESS RECOVERY HR: 98 BPM
BH CV STRESS SPEED STAGE 1: 1.7
BH CV STRESS SPEED STAGE 2: 2.5
BH CV STRESS SPEED STAGE 3: 3.4
BH CV STRESS STAGE 1: 1
BH CV STRESS STAGE 2: 2
BH CV STRESS STAGE 3: 3
MAXIMAL PREDICTED HEART RATE: 172 BPM
PERCENT MAX PREDICTED HR: 91.28 %
STRESS BASELINE BP: NORMAL MMHG
STRESS BASELINE HR: 78 BPM
STRESS PERCENT HR: 107 %
STRESS POST ESTIMATED WORKLOAD: 10 METS
STRESS POST EXERCISE DUR MIN: 9 MIN
STRESS POST EXERCISE DUR SEC: 0 SEC
STRESS POST PEAK BP: NORMAL MMHG
STRESS POST PEAK HR: 157 BPM
STRESS TARGET HR: 146 BPM

## 2024-09-12 PROCEDURE — 93017 CV STRESS TEST TRACING ONLY: CPT

## 2024-10-09 ENCOUNTER — TELEPHONE (OUTPATIENT)
Dept: FAMILY MEDICINE CLINIC | Facility: CLINIC | Age: 49
End: 2024-10-09
Payer: COMMERCIAL

## 2024-10-09 NOTE — TELEPHONE ENCOUNTER
RENAE RN WITH [facility] CALLED IN TO LET PROVIDER KNOW PATIENT CAN HAVE 18 Rimegepant Sulfate (Nurtec) 75 MG tablet dispersible tablet  WITH A PRIOR AUTHORIZATION     PATIENT TOLD REPRESENTATIVE HE IS HAVING HEADACHES EVERYDAY AND IS OUT OF MEDICATION       GOOD CALL BACK   [phone]

## 2024-10-17 ENCOUNTER — APPOINTMENT (OUTPATIENT)
Dept: GENERAL RADIOLOGY | Facility: HOSPITAL | Age: 49
End: 2024-10-17
Payer: COMMERCIAL

## 2024-10-17 ENCOUNTER — HOSPITAL ENCOUNTER (EMERGENCY)
Facility: HOSPITAL | Age: 49
Discharge: HOME | End: 2024-10-17
Payer: COMMERCIAL

## 2024-10-17 VITALS
TEMPERATURE: 98.2 F | RESPIRATION RATE: 18 BRPM | DIASTOLIC BLOOD PRESSURE: 93 MMHG | BODY MASS INDEX: 28.83 KG/M2 | WEIGHT: 217.5 LBS | HEART RATE: 80 BPM | SYSTOLIC BLOOD PRESSURE: 136 MMHG | HEIGHT: 73 IN | OXYGEN SATURATION: 95 %

## 2024-10-17 DIAGNOSIS — S81.812A LACERATION OF LEFT LOWER EXTREMITY, INITIAL ENCOUNTER: Primary | ICD-10-CM

## 2024-10-17 DIAGNOSIS — T07.XXXA ABRASIONS OF MULTIPLE SITES: ICD-10-CM

## 2024-10-17 PROCEDURE — 25010000002 LIDOCAINE-EPINEPHRINE 2 %-1:100000 SOLUTION: Performed by: NURSE PRACTITIONER

## 2024-10-17 PROCEDURE — 99283 EMERGENCY DEPT VISIT LOW MDM: CPT

## 2024-10-17 PROCEDURE — 90471 IMMUNIZATION ADMIN: CPT | Performed by: NURSE PRACTITIONER

## 2024-10-17 PROCEDURE — 90472 IMMUNIZATION ADMIN EACH ADD: CPT | Performed by: NURSE PRACTITIONER

## 2024-10-17 PROCEDURE — 73562 X-RAY EXAM OF KNEE 3: CPT

## 2024-10-17 PROCEDURE — 90715 TDAP VACCINE 7 YRS/> IM: CPT | Performed by: NURSE PRACTITIONER

## 2024-10-17 PROCEDURE — 25010000002 TETANUS-DIPHTH-ACELL PERTUSSIS 5-2.5-18.5 LF-MCG/0.5 SUSPENSION PREFILLED SYRINGE: Performed by: NURSE PRACTITIONER

## 2024-10-17 RX ORDER — CEPHALEXIN 500 MG/1
500 CAPSULE ORAL 3 TIMES DAILY
Qty: 21 CAPSULE | Refills: 0 | Status: SHIPPED | OUTPATIENT
Start: 2024-10-17 | End: 2024-10-17

## 2024-10-17 RX ORDER — HYDROCODONE BITARTRATE AND ACETAMINOPHEN 5; 325 MG/1; MG/1
1 TABLET ORAL EVERY 6 HOURS PRN
Qty: 15 TABLET | Refills: 0 | Status: SHIPPED | OUTPATIENT
Start: 2024-10-17 | End: 2024-10-17

## 2024-10-17 RX ORDER — CEPHALEXIN 500 MG/1
500 CAPSULE ORAL 3 TIMES DAILY
Qty: 21 CAPSULE | Refills: 0 | Status: SHIPPED | OUTPATIENT
Start: 2024-10-17 | End: 2024-10-24

## 2024-10-17 RX ORDER — HYDROCODONE BITARTRATE AND ACETAMINOPHEN 5; 325 MG/1; MG/1
1 TABLET ORAL EVERY 6 HOURS PRN
Qty: 15 TABLET | Refills: 0 | Status: SHIPPED | OUTPATIENT
Start: 2024-10-17

## 2024-10-17 RX ORDER — LIDOCAINE HYDROCHLORIDE AND EPINEPHRINE BITARTRATE 20; .01 MG/ML; MG/ML
10 INJECTION, SOLUTION SUBCUTANEOUS ONCE
Status: COMPLETED | OUTPATIENT
Start: 2024-10-17 | End: 2024-10-17

## 2024-10-17 RX ADMIN — TETANUS TOXOID, REDUCED DIPHTHERIA TOXOID AND ACELLULAR PERTUSSIS VACCINE, ADSORBED 0.5 ML: 5; 2.5; 8; 8; 2.5 SUSPENSION INTRAMUSCULAR at 15:27

## 2024-10-17 RX ADMIN — LIDOCAINE HYDROCHLORIDE,EPINEPHRINE BITARTRATE 10 ML: 20; .01 INJECTION, SOLUTION INFILTRATION; PERINEURAL at 15:00

## 2024-10-17 NOTE — DISCHARGE INSTRUCTIONS
Return to ER if symptoms worsen   Clean wound twice daily with soap and water and apply bacitiraicin/ non stick dressing/ ace  Elevate/ ice   Return to er in 10 days for suture removal. Return before if increased swelling, redness, drainage from the wound

## 2024-10-17 NOTE — Clinical Note
[facility] EMERGENCY DEPARTMENT  [address]  [facility] [address]  Phone: [phone]    Saulo Shepard was seen and treated in our emergency department on 10/17/2024.  He may return to work on 10/21/2024.         Thank you for choosing [facility].    Madalyn Cobb APRN

## 2024-10-17 NOTE — ED PROVIDER NOTES
Subjective   History of Present Illness  Patient is a 50yo male presents to emergency room with injury to left leg. He states that he was using a drill bit and it slipped and hit him in the left leg. This occurred just pta. He sustained a laceration to left leg with multiple abrasions as well. Unsure of last tetanus immunization. He denies any other injury     History provided by:  Patient   used: No        Review of Systems   Constitutional: Negative.    HENT: Negative.     Eyes: Negative.    Respiratory: Negative.     Cardiovascular: Negative.    Gastrointestinal: Negative.    Endocrine: Negative.    Genitourinary: Negative.    Musculoskeletal:         Patient is a 50yo male presents to emergency room with injury to left leg. He states that he was using a drill bit and it slipped and hit him in the left leg. This occurred just pta. He sustained a laceration to left leg with multiple abrasions as well. Unsure of last tetanus immunization. He denies any other injury      Skin: Negative.    Allergic/Immunologic: Negative.    Neurological: Negative.    Hematological: Negative.    Psychiatric/Behavioral: Negative.     All other systems reviewed and are negative.      Past Medical History:   Diagnosis Date    Anxiety     Back pain     Fracture of lumbar spine 2002 and 2015    lower lumbar 2002; L1 2015    Head injury     Injury of back     Nausea with vomiting 03/28/2023    Neck pain     Overweight (BMI 25.0-29.9)     Restless leg syndrome        Allergies   Allergen Reactions    Contrast Dye (Echo Or Unknown Ct/Mr) Hives, Rash, Angioedema and Seizure     Allergy to CT dye, was given premedication by rad protocol (13,7,1 hr prednisone 50mg, 50mg benedryl 1 hr prior), still had reaction  Itching, tongue swelling, lips swelling, hives, sob    Ct Contrast Itching       Past Surgical History:   Procedure Laterality Date    COLONOSCOPY N/A 8/31/2023    Procedure: COLONOSCOPY WITH ANESTHESIA;  Surgeon:  Kristopher Brush DO;  Location:  [facility];  Service: Gastroenterology;  Laterality: N/A;  preop; screening   postop  PCP Joseph Stewart    COLONOSCOPY N/A 9/1/2023    Procedure: COLONOSCOPY WITH ANESTHESIA;  Surgeon: Kristopher Brush DO;  Location:  [facility];  Service: Gastroenterology;  Laterality: N/A;  Pre: Encounter for screening for malignant neoplasm of colon;  Post: Inadequate prep;  Vivek Stewart DO    COLONOSCOPY N/A 5/31/2024    Procedure: COLONOSCOPY WITH ANESTHESIA;  Surgeon: Kristopher Brush DO;  Location:  [facility];  Service: Gastroenterology;  Laterality: N/A;  Pre: Encounter for screening for malignant neoplasm of colon, Left lower quadrant abdominal pain;  Post: Fair prep;  Vivek Stewart DO    FACIAL FRACTURE SURGERY      INCISION AND DRAINAGE LEG Right 12/1/2018    Procedure: INCISION AND DRAINAGE OF RIGHT UPPER INNER THIGH, PLACEMENT OF WOUND VAC;  Surgeon: René Dallas MD;  Location:  [facility];  Service: General    INGUINAL HERNIA REPAIR Bilateral 8/22/2023    Procedure: INGUINAL HERNIA BILATERAL REPAIR LAPAROSCOPIC WITH DAVINCI ROBOT WITH MESH;  Surgeon: Dina Boyd MD;  Location:  [facility];  Service: Robotics - DaVinci;  Laterality: Bilateral;    KNEE SURGERY Right 09/2021    SPLENECTOMY         Family History   Problem Relation Age of Onset    Fibromyalgia Mother     Lung cancer Father     Colon cancer Neg Hx     Colon polyps Neg Hx     Esophageal cancer Neg Hx        Social History     Socioeconomic History    Marital status: Single   Tobacco Use    Smoking status: Never    Smokeless tobacco: Never   Vaping Use    Vaping status: Never Used   Substance and Sexual Activity    Alcohol use: Not Currently    Drug use: No    Sexual activity: Yes     Partners: Female       Prior to Admission medications    Medication Sig Start Date End Date Taking? Authorizing Provider   cyclobenzaprine (FLEXERIL) 10 MG tablet Take 1 tablet by mouth 3 (Three) Times  a Day As Needed for Muscle Spasms. 6/22/24   Madalyn Cobb APRN   diphenhydrAMINE (BENADRYL) 25 MG tablet Take 2 tablets by mouth Every 6 (Six) Hours As Needed for Itching. 7/1/24   Madalyn Cobb APRN   diphenhydrAMINE (BENADRYL) 25 MG tablet Take 2 tablets by mouth Every 6 (Six) Hours As Needed for Itching or Allergies. 9/4/24   Madalyn Cobb APRN   EPINEPHrine (EPIPEN) 0.3 MG/0.3ML solution auto-injector injection Inject 0.3 mL into the appropriate muscle as directed by prescriber 1 (One) Time. 8/5/24   ProviderTiffanie MD   gabapentin (NEURONTIN) 300 MG capsule Take 1 capsule by mouth 3 (Three) Times a Day. 6/24/24   Vivek Stewart DO   ibuprofen (ADVIL,MOTRIN) 800 MG tablet Take 1 tablet by mouth Every 8 (Eight) Hours As Needed for Mild Pain for up to 90 doses. Take every 8 hours for three days then take as needed. 2/5/24   Angelica Almonte APRN   meloxicam (MOBIC) 15 MG tablet Take 1 tablet by mouth Daily.    Provider, MD Tiffanie   methylPREDNISolone (MEDROL) 4 MG dose pack Take as directed on package instructions. 9/4/24   Madalyn Cobb APRN   ondansetron ODT (ZOFRAN-ODT) 4 MG disintegrating tablet Take 1 tablet by mouth Every 6 (Six) Hours As Needed for Nausea or Vomiting. 8/2/24   Piter Parra APRN   psyllium (METAMUCIL) 58.6 % powder Take 1 packet by mouth Daily. 8/28/24 8/28/25  Dina Boyd MD   Rimegepant Sulfate (Nurtec) 75 MG tablet dispersible tablet Take 1 tablet by mouth Daily As Needed (as needed for headache). 8/26/24   Angelica Almonte APRN   rOPINIRole (REQUIP) 0.25 MG tablet Take 1 tablet by mouth Every Night. Take 1 hour before bedtime. 8/5/24   Vivek Stewart DO   predniSONE (DELTASONE) 50 MG tablet Take 1 tablet by mouth Take As Directed for 3 doses. Take 1 tablet (50mg) 13 Hours 7 Hours & 1 Hour Prior to Exam 8/7/24 10/17/24  Dina Boyd MD       /92 (BP Location: Right arm, Patient Position: Sitting)   Pulse  "89   Temp 98.2 °F (36.8 °C) (Oral)   Resp 18   Ht 185.4 cm (73\")   Wt 98.7 kg (217 lb 8 oz)   SpO2 96%   BMI 28.70 kg/m²     Objective   Physical Exam  Vitals and nursing note reviewed.   Constitutional:       Appearance: He is well-developed.   HENT:      Head: Normocephalic and atraumatic.   Eyes:      Conjunctiva/sclera: Conjunctivae normal.      Pupils: Pupils are equal, round, and reactive to light.   Cardiovascular:      Rate and Rhythm: Normal rate and regular rhythm.      Heart sounds: Normal heart sounds.   Pulmonary:      Effort: Pulmonary effort is normal.      Breath sounds: Normal breath sounds.   Musculoskeletal:         General: Normal range of motion.      Cervical back: Normal range of motion and neck supple.      Comments: LLE: there is approximately a 1.5cm irregular shaped laceration noted to left anterior knee. There are several abrasions noted as well. There is no joint involvement. Moves knee without difficult. No joint effusions noted   Skin:     General: Skin is warm and dry.   Neurological:      Mental Status: He is alert and oriented to person, place, and time.      Deep Tendon Reflexes: Reflexes are normal and symmetric.   Psychiatric:         Behavior: Behavior normal.         Thought Content: Thought content normal.         Judgment: Judgment normal.         Laceration Repair    Date/Time: 10/17/2024 3:00 PM    Performed by: Madalyn Cobb APRN  Authorized by: Madalyn Cobb APRN    Consent:     Consent obtained:  Verbal and written    Consent given by:  Patient    Risks, benefits, and alternatives were discussed: yes      Risks discussed:  Infection, need for additional repair, nerve damage, pain, poor cosmetic result, poor wound healing, retained foreign body, tendon damage and vascular damage    Alternatives discussed:  No treatment, delayed treatment and observation  Universal protocol:     Procedure explained and questions answered to patient or proxy's " satisfaction: yes      Relevant documents present and verified: yes      Test results available: yes      Imaging studies available: yes      Patient identity confirmed:  Verbally with patient, arm band and provided demographic data  Anesthesia:     Anesthesia method:  Local infiltration    Local anesthetic:  Lidocaine 2% WITH epi  Laceration details:     Location:  Leg    Leg location:  L knee    Length (cm):  2.5  Exploration:     Imaging outcome: foreign body not noted      Wound extent: no areolar tissue violation noted, no fascia violation noted, no foreign bodies/material noted, no muscle damage noted, no nerve damage noted, no tendon damage noted, no underlying fracture noted and no vascular damage noted      Contaminated: no    Treatment:     Area cleansed with:  Shur-Clens and saline    Irrigation method:  Pressure wash and syringe    Visualized foreign bodies/material removed: no    Skin repair:     Repair method:  Sutures    Suture size:  4-0    Suture technique:  Simple interrupted    Number of sutures:  6  Approximation:     Approximation:  Loose  Repair type:     Repair type:  Simple  Post-procedure details:     Dressing: bacitraicin/ adaptic/ethan.    Procedure completion:  Tolerated well, no immediate complications           Lab Results (last 24 hours)       ** No results found for the last 24 hours. **            XR Knee 3 View Left   Final Result   1. Unremarkable exam.       This report was signed and finalized on 10/17/2024 2:48 PM by Cristian Boyd.              ED Course  ED Course as of 10/17/24 1535   Thu Oct 17, 2024   1525 X-ray of the left knee is negative for any acute fracture or foreign body.  Laceration was repaired.  Patient tolerated well.  Will place the patient on prophylactic antibiotics and pain medicine as well.  Reviewed side effects and potential for abuse for the pain medicine.  Butch report completed no signs of suspicious activity noted.  Advised the patient sutures  need to be removed in 10 days.  Advised to return the emergency department before if there was signs of infection including increased swelling, redness, drainage from the wounds.  Patient is in agreement with the care plan and voices understanding of instructions. [CW]      ED Course User Index  [CW] Madalyn Cobb APRN        Medical Decision Making  Patient is a 48yo male presents to emergency room with injury to left leg. He states that he was using a drill bit and it slipped and hit him in the left leg. This occurred just pta. He sustained a laceration to left leg with multiple abrasions as well. Unsure of last tetanus immunization. He denies any other injury   Course of treatment in the er: Nontoxic-appearing.  No acute distress.  Lungs clear to auscultation.  CV normal sinus rhythm.  Left lower extremity there is a irregular shaped laceration noted just above the knee.  Measures approximately 2.5cm.  Bleeding is controlled.  There are several abrasions surrounding the laceration as well.  No joint effusion.  Moves the knee without difficulty.  DTRs are intact.  Have ordered x-ray of the left knee, irrigation of the wound, and tetanus immunization.  XR Knee 3 View Left   Final Result    1. Unremarkable exam.         This report was signed and finalized on 10/17/2024 2:48 PM by Cristian Boyd.          X-ray of the left knee is negative for any acute fracture or foreign body.  Laceration was repaired.  Patient tolerated well.  Will place the patient on prophylactic antibiotics and pain medicine as well.  Reviewed side effects and potential for abuse for the pain medicine.  Bucth report completed no signs of suspicious activity noted.  Advised the patient sutures need to be removed in 10 days.  Advised to return the emergency department before if there was signs of infection including increased swelling, redness, drainage from the wounds.  Patient is in agreement with the care plan and voices  understanding of instructions.      Problems Addressed:  Abrasions of multiple sites: complicated acute illness or injury  Laceration of left lower extremity, initial encounter: complicated acute illness or injury    Amount and/or Complexity of Data Reviewed  Radiology: ordered. Decision-making details documented in ED Course.    Risk  Prescription drug management.         Final diagnoses:   Laceration of left lower extremity, initial encounter   Abrasions of multiple sites          Madalyn Cobb, APRN  10/17/24 [id number]

## 2024-10-28 ENCOUNTER — OFFICE VISIT (OUTPATIENT)
Dept: FAMILY MEDICINE CLINIC | Facility: CLINIC | Age: 49
End: 2024-10-28
Payer: COMMERCIAL

## 2024-10-28 VITALS
HEART RATE: 106 BPM | SYSTOLIC BLOOD PRESSURE: 121 MMHG | OXYGEN SATURATION: 96 % | TEMPERATURE: 97.3 F | WEIGHT: 217.4 LBS | DIASTOLIC BLOOD PRESSURE: 83 MMHG | HEIGHT: 73 IN | BODY MASS INDEX: 28.81 KG/M2

## 2024-10-28 DIAGNOSIS — S39.012A STRAIN OF LUMBAR REGION, INITIAL ENCOUNTER: ICD-10-CM

## 2024-10-28 DIAGNOSIS — Z48.02 VISIT FOR SUTURE REMOVAL: Primary | ICD-10-CM

## 2024-10-28 PROCEDURE — 1125F AMNT PAIN NOTED PAIN PRSNT: CPT

## 2024-10-28 PROCEDURE — 1160F RVW MEDS BY RX/DR IN RCRD: CPT

## 2024-10-28 PROCEDURE — 1159F MED LIST DOCD IN RCRD: CPT

## 2024-10-28 PROCEDURE — 96372 THER/PROPH/DIAG INJ SC/IM: CPT

## 2024-10-28 PROCEDURE — 99214 OFFICE O/P EST MOD 30 MIN: CPT

## 2024-10-28 PROCEDURE — 15853 REMOVAL SUTR/STAPL XREQ ANES: CPT

## 2024-10-28 RX ORDER — KETOROLAC TROMETHAMINE 30 MG/ML
30 INJECTION, SOLUTION INTRAMUSCULAR; INTRAVENOUS EVERY 6 HOURS PRN
Status: SHIPPED | OUTPATIENT
Start: 2024-10-28 | End: 2024-11-02

## 2024-10-28 RX ORDER — IBUPROFEN 800 MG/1
800 TABLET, FILM COATED ORAL EVERY 6 HOURS PRN
Qty: 60 TABLET | Refills: 0 | Status: SHIPPED | OUTPATIENT
Start: 2024-10-28

## 2024-10-28 RX ADMIN — KETOROLAC TROMETHAMINE 30 MG: 30 INJECTION, SOLUTION INTRAMUSCULAR; INTRAVENOUS at 14:32

## 2024-10-28 NOTE — PROGRESS NOTES
Kary Terrazas DO  [facility]   Family Medicine  [address]  [place] [address]  Phone: [phone]  Fax: [phone]         Chief Complaint:  Chief Complaint   Patient presents with    Suture / Staple Removal        History:  Saulo Shepard is a 49 y.o. male who presents for suture removal. Injury occurred 10 days ago while using power tools. Cut his left leg and required 6 sutures. Placed 10 days ago. Was given pain medications and antibiotics.  Feels like it is doing well.  Has had some dryness, but no bleeding.  No discharge.  Minimal redness.    Does also complain about increasing back pain over the last 1 to 2 days.  Has taken ibuprofen as needed, with minimal relief.  He has chronic back pain, and is unsure of a specific recent injury.    HPI           reports that he has never smoked. He has never used smokeless tobacco. He reports that he does not currently use alcohol. He reports that he does not use drugs.    Current Outpatient Medications   Medication Instructions    cyclobenzaprine (FLEXERIL) 10 mg, Oral, 3 Times Daily PRN    diphenhydrAMINE (BENADRYL) 50 mg, Oral, Every 6 Hours PRN    EPINEPHrine (EPIPEN) 0.3 mg, Once    gabapentin (NEURONTIN) 300 mg, Oral, 3 Times Daily    HYDROcodone-acetaminophen (NORCO) 5-325 MG per tablet 1 tablet, Oral, Every 6 Hours PRN    ibuprofen (ADVIL,MOTRIN) 800 mg, Oral, Every 6 Hours PRN    methylPREDNISolone (MEDROL) 4 MG dose pack Take as directed on package instructions.    naloxone (NARCAN) 4 MG/0.1ML nasal spray Call 911. Don't prime. [place] in 1 nostril for overdose. Repeat in 2-3 minutes in other nostril if no or minimal breathing/responsiveness.    Nurtec 75 mg, Oral, Daily PRN    ondansetron ODT (ZOFRAN-ODT) 4 mg, Oral, Every 6 Hours PRN    psyllium (METAMUCIL) 58.6 % powder 1 packet, Oral, Daily    rOPINIRole (REQUIP) 0.25 mg, Oral, Nightly, Take 1 hour before bedtime.       OBJECTIVE:  /83 (BP Location: Right arm,  "Patient Position: Sitting, Cuff Size: Adult)   Pulse 106   Temp 97.3 °F (36.3 °C) (Temporal)   Ht 185.4 cm (73\")   Wt 98.6 kg (217 lb 6.4 oz)   SpO2 96%   BMI 28.68 kg/m²      Gen: No acute distress.   Head and neck: Normocephalic, atraumatic.  HEENT: PERRLA, EOMI.  CV: Well perfused, no pallor.   Resp: Normal respiratory effort. No distress.   Musculoskeletal: No gross deformity. No bony spinal tenderness.  Paraspinal hypertonicity bilaterally from T12-L3.  Neuro: Alert and oriented.   Skin: No visible rash.  Multiple scattered healing wounds to left distal thigh.  Sutures removed.  No drainage.  Psych: Appropriate.       Suture Removal    Date/Time: 10/28/2024 2:40 PM    Performed by: Kary Terrazas DO  Authorized by: Kary Terrazas DO  Body area: lower extremity  Location details: left upper leg  Wound Appearance: clean and pink  Sutures Removed: 6  Patient tolerance: patient tolerated the procedure well with no immediate complications          Assessment/Plan:     Diagnoses and all orders for this visit:    1. Visit for suture removal (Primary)  Clean dry and intact.  Sutures removed.  -     Suture Removal    2. Strain of lumbar region, initial encounter  Exacerbation of chronic back pain.  Encouraged using ibuprofen starting tomorrow home regularly for 2 to 3 days to help with inflammation.  Discussed good mechanics, limit bending/twisting.  -     ketorolac (TORADOL) injection 30 mg  -     ibuprofen (ADVIL,MOTRIN) 800 MG tablet; Take 1 tablet by mouth Every 6 (Six) Hours As Needed for Mild Pain.  Dispense: 60 tablet; Refill: 0        An After Visit Summary was printed and given to the patient at discharge.  Return if symptoms worsen or fail to improve.         Kary Terrazas DO  10/28/2024   Electronically signed.  "

## 2024-10-28 NOTE — PROGRESS NOTES
Orthopaedic Clinic Note - Established Patient    NAME:  Carlos Carney   : 1975  MRN: [id number]    10/29/2024    CHIEF COMPLAINT:  follow up for right knee pain      HISTORY OF PRESENT ILLNESS:   The patient is a 49 y.o. male who returns today for follow up of right knee pain.  He was last seen in May by Hussain Massey PA-C and received a corticosteroid injection.  Since last visit, pain has returned.  He is inquiring about a repeat injection today.       Past Medical History:        Diagnosis Date    Back injuries     x3 fracture       Past Surgical History:        Procedure Laterality Date    KNEE ARTHROSCOPY      SPLENECTOMY         Current Medications:   Prior to Admission medications    Medication Sig Start Date End Date Taking? Authorizing Provider   cyclobenzaprine (FLEXERIL) 10 MG tablet Take 1 tablet by mouth 3 times daily as needed 24  Yes Michael Edwards MD   EPINEPHrine (EPIPEN) 0.3 MG/0.3ML SOAJ injection Inject 0.3 mLs into the muscle once 24  Yes Michael Edwards MD   gabapentin (NEURONTIN) 300 MG capsule Take 1 capsule by mouth 3 times daily. 24  Yes Michael Edwards MD   ibuprofen (ADVIL;MOTRIN) 800 MG tablet Take 1 tablet by mouth every 6 hours as needed 10/28/24  Yes Michael Edwards MD   meloxicam (MOBIC) 15 MG tablet Take 1 tablet by mouth daily 24  Yes Michael Edwards MD   naloxone 4 MG/0.1ML LIQD nasal spray Call 911. Don't prime. [place] in 1 nostril for overdose. Repeat in 2-3 minutes in other nostril if no or minimal breathing/responsiveness. 10/17/24  Yes Michael Edwards MD   ondansetron (ZOFRAN-ODT) 4 MG disintegrating tablet Take 1 tablet by mouth every 6 hours as needed 24  Yes Michael Edwards MD   NURTEC 75 MG TBDP Take 75 mg by mouth daily as needed 24  Yes Michael Edwards MD   rOPINIRole (REQUIP) 0.25 MG tablet Take 1 tablet by mouth nightly 24  Yes Michael Edwards MD       Allergies:  Iodinated

## 2024-10-29 ENCOUNTER — OFFICE VISIT (OUTPATIENT)
Age: 49
End: 2024-10-29
Payer: MEDICAID

## 2024-10-29 VITALS — HEIGHT: 73 IN | WEIGHT: 218 LBS | BODY MASS INDEX: 28.89 KG/M2

## 2024-10-29 DIAGNOSIS — M94.261 CHONDROMALACIA OF RIGHT KNEE: Primary | ICD-10-CM

## 2024-10-29 PROCEDURE — 20610 DRAIN/INJ JOINT/BURSA W/O US: CPT

## 2024-10-29 RX ORDER — EPINEPHRINE 0.3 MG/.3ML
0.3 INJECTION SUBCUTANEOUS ONCE
COMMUNITY
Start: 2024-08-05

## 2024-10-29 RX ORDER — TRIAMCINOLONE ACETONIDE 40 MG/ML
40 INJECTION, SUSPENSION INTRA-ARTICULAR; INTRAMUSCULAR ONCE
Status: COMPLETED | OUTPATIENT
Start: 2024-10-29 | End: 2024-10-29

## 2024-10-29 RX ORDER — LIDOCAINE HYDROCHLORIDE 10 MG/ML
2 INJECTION, SOLUTION INFILTRATION; PERINEURAL ONCE
Status: COMPLETED | OUTPATIENT
Start: 2024-10-29 | End: 2024-10-29

## 2024-10-29 RX ORDER — CYCLOBENZAPRINE HCL 10 MG
10 TABLET ORAL 3 TIMES DAILY PRN
COMMUNITY
Start: 2024-06-22

## 2024-10-29 RX ORDER — BUPIVACAINE HYDROCHLORIDE 2.5 MG/ML
2 INJECTION, SOLUTION INFILTRATION; PERINEURAL ONCE
Status: COMPLETED | OUTPATIENT
Start: 2024-10-29 | End: 2024-10-29

## 2024-10-29 RX ORDER — GABAPENTIN 300 MG/1
300 CAPSULE ORAL 3 TIMES DAILY
COMMUNITY
Start: 2024-06-24

## 2024-10-29 RX ORDER — IBUPROFEN 800 MG/1
800 TABLET, FILM COATED ORAL EVERY 6 HOURS PRN
COMMUNITY
Start: 2024-10-28

## 2024-10-29 RX ORDER — RIMEGEPANT SULFATE 75 MG/75MG
75 TABLET, ORALLY DISINTEGRATING ORAL DAILY PRN
COMMUNITY
Start: 2024-08-26

## 2024-10-29 RX ORDER — ROPINIROLE 0.25 MG/1
0.25 TABLET, FILM COATED ORAL NIGHTLY
COMMUNITY
Start: 2024-08-05

## 2024-10-29 RX ORDER — ONDANSETRON 4 MG/1
4 TABLET, ORALLY DISINTEGRATING ORAL EVERY 6 HOURS PRN
COMMUNITY
Start: 2024-08-02

## 2024-10-29 RX ORDER — MELOXICAM 15 MG/1
15 TABLET ORAL DAILY
COMMUNITY
Start: 2024-08-22

## 2024-10-29 RX ADMIN — BUPIVACAINE HYDROCHLORIDE 5 MG: 2.5 INJECTION, SOLUTION INFILTRATION; PERINEURAL at 12:12

## 2024-10-29 RX ADMIN — LIDOCAINE HYDROCHLORIDE 2 ML: 10 INJECTION, SOLUTION INFILTRATION; PERINEURAL at 12:13

## 2024-10-29 RX ADMIN — TRIAMCINOLONE ACETONIDE 40 MG: 40 INJECTION, SUSPENSION INTRA-ARTICULAR; INTRAMUSCULAR at 12:13

## 2024-11-07 ENCOUNTER — TELEPHONE (OUTPATIENT)
Dept: INTERNAL MEDICINE | Facility: CLINIC | Age: 49
End: 2024-11-07
Payer: COMMERCIAL

## 2024-11-07 NOTE — TELEPHONE ENCOUNTER
Called patient about denial of his last two visits. We need our name on his insurance as his PCP and a copy of his card

## 2025-01-08 ENCOUNTER — APPOINTMENT (OUTPATIENT)
Dept: CT IMAGING | Facility: HOSPITAL | Age: 50
End: 2025-01-08
Payer: COMMERCIAL

## 2025-01-08 ENCOUNTER — HOSPITAL ENCOUNTER (EMERGENCY)
Facility: HOSPITAL | Age: 50
Discharge: HOME | End: 2025-01-08
Admitting: FAMILY MEDICINE
Payer: COMMERCIAL

## 2025-01-08 ENCOUNTER — APPOINTMENT (OUTPATIENT)
Dept: GENERAL RADIOLOGY | Facility: HOSPITAL | Age: 50
End: 2025-01-08
Payer: COMMERCIAL

## 2025-01-08 VITALS
DIASTOLIC BLOOD PRESSURE: 91 MMHG | OXYGEN SATURATION: 96 % | HEART RATE: 84 BPM | RESPIRATION RATE: 19 BRPM | SYSTOLIC BLOOD PRESSURE: 132 MMHG | BODY MASS INDEX: 29.16 KG/M2 | WEIGHT: 220 LBS | HEIGHT: 73 IN | TEMPERATURE: 97.9 F

## 2025-01-08 DIAGNOSIS — R07.9 CHEST PAIN, UNSPECIFIED TYPE: Primary | ICD-10-CM

## 2025-01-08 DIAGNOSIS — K57.92 DIVERTICULITIS: ICD-10-CM

## 2025-01-08 LAB
ALBUMIN SERPL-MCNC: 3.6 G/DL (ref 3.5–5.2)
ALBUMIN/GLOB SERPL: 1.4 G/DL
ALP SERPL-CCNC: 88 U/L (ref 39–117)
ALT SERPL W P-5'-P-CCNC: 22 U/L (ref 1–41)
ANION GAP SERPL CALCULATED.3IONS-SCNC: 10 MMOL/L (ref 5–15)
AST SERPL-CCNC: 25 U/L (ref 1–40)
BASOPHILS # BLD AUTO: 0.17 10*3/MM3 (ref 0–0.2)
BASOPHILS NFR BLD AUTO: 1.1 % (ref 0–1.5)
BILIRUB SERPL-MCNC: 0.3 MG/DL (ref 0–1.2)
BUN SERPL-MCNC: 11 MG/DL (ref 6–20)
BUN/CREAT SERPL: 9.9 (ref 7–25)
CALCIUM SPEC-SCNC: 10.4 MG/DL (ref 8.6–10.5)
CHLORIDE SERPL-SCNC: 106 MMOL/L (ref 98–107)
CO2 SERPL-SCNC: 24 MMOL/L (ref 22–29)
CREAT SERPL-MCNC: 1.11 MG/DL (ref 0.76–1.27)
D DIMER PPP FEU-MCNC: <0.27 MCGFEU/ML (ref 0–0.5)
DEPRECATED RDW RBC AUTO: 46.6 FL (ref 37–54)
EGFRCR SERPLBLD CKD-EPI 2021: 81.9 ML/MIN/1.73
EOSINOPHIL # BLD AUTO: 0.47 10*3/MM3 (ref 0–0.4)
EOSINOPHIL NFR BLD AUTO: 3.1 % (ref 0.3–6.2)
ERYTHROCYTE [DISTWIDTH] IN BLOOD BY AUTOMATED COUNT: 14.4 % (ref 12.3–15.4)
GEN 5 1HR TROPONIN T REFLEX: <6 NG/L
GLOBULIN UR ELPH-MCNC: 2.5 GM/DL
GLUCOSE SERPL-MCNC: 108 MG/DL (ref 65–99)
HCT VFR BLD AUTO: 45.4 % (ref 37.5–51)
HGB BLD-MCNC: 15.3 G/DL (ref 13–17.7)
IMM GRANULOCYTES # BLD AUTO: 0.07 10*3/MM3 (ref 0–0.05)
IMM GRANULOCYTES NFR BLD AUTO: 0.5 % (ref 0–0.5)
LIPASE SERPL-CCNC: 26 U/L (ref 13–60)
LYMPHOCYTES # BLD AUTO: 4.54 10*3/MM3 (ref 0.7–3.1)
LYMPHOCYTES NFR BLD AUTO: 29.8 % (ref 19.6–45.3)
MCH RBC QN AUTO: 29.6 PG (ref 26.6–33)
MCHC RBC AUTO-ENTMCNC: 33.7 G/DL (ref 31.5–35.7)
MCV RBC AUTO: 87.8 FL (ref 79–97)
MONOCYTES # BLD AUTO: 1.49 10*3/MM3 (ref 0.1–0.9)
MONOCYTES NFR BLD AUTO: 9.8 % (ref 5–12)
NEUTROPHILS NFR BLD AUTO: 55.7 % (ref 42.7–76)
NEUTROPHILS NFR BLD AUTO: 8.47 10*3/MM3 (ref 1.7–7)
NRBC BLD AUTO-RTO: 0 /100 WBC (ref 0–0.2)
PLATELET # BLD AUTO: 398 10*3/MM3 (ref 140–450)
PMV BLD AUTO: 10.1 FL (ref 6–12)
POTASSIUM SERPL-SCNC: 4.4 MMOL/L (ref 3.5–5.2)
PROT SERPL-MCNC: 6.1 G/DL (ref 6–8.5)
RBC # BLD AUTO: 5.17 10*6/MM3 (ref 4.14–5.8)
SODIUM SERPL-SCNC: 140 MMOL/L (ref 136–145)
TROPONIN T NUMERIC DELTA: NORMAL
TROPONIN T SERPL HS-MCNC: <6 NG/L
WBC NRBC COR # BLD AUTO: 15.21 10*3/MM3 (ref 3.4–10.8)

## 2025-01-08 PROCEDURE — 84484 ASSAY OF TROPONIN QUANT: CPT

## 2025-01-08 PROCEDURE — 93010 ELECTROCARDIOGRAM REPORT: CPT | Performed by: INTERNAL MEDICINE

## 2025-01-08 PROCEDURE — 85379 FIBRIN DEGRADATION QUANT: CPT

## 2025-01-08 PROCEDURE — 93005 ELECTROCARDIOGRAM TRACING: CPT

## 2025-01-08 PROCEDURE — 99284 EMERGENCY DEPT VISIT MOD MDM: CPT

## 2025-01-08 PROCEDURE — 85025 COMPLETE CBC W/AUTO DIFF WBC: CPT

## 2025-01-08 PROCEDURE — 36415 COLL VENOUS BLD VENIPUNCTURE: CPT

## 2025-01-08 PROCEDURE — 83690 ASSAY OF LIPASE: CPT

## 2025-01-08 PROCEDURE — 80053 COMPREHEN METABOLIC PANEL: CPT

## 2025-01-08 PROCEDURE — 93005 ELECTROCARDIOGRAM TRACING: CPT | Performed by: FAMILY MEDICINE

## 2025-01-08 PROCEDURE — 74176 CT ABD & PELVIS W/O CONTRAST: CPT

## 2025-01-08 PROCEDURE — 71045 X-RAY EXAM CHEST 1 VIEW: CPT

## 2025-01-08 RX ORDER — ASPIRIN 81 MG/1
324 TABLET, CHEWABLE ORAL ONCE
Status: COMPLETED | OUTPATIENT
Start: 2025-01-08 | End: 2025-01-08

## 2025-01-08 RX ORDER — ONDANSETRON 4 MG/1
4 TABLET, ORALLY DISINTEGRATING ORAL EVERY 6 HOURS PRN
Qty: 12 TABLET | Refills: 0 | Status: SHIPPED | OUTPATIENT
Start: 2025-01-08

## 2025-01-08 RX ORDER — MELOXICAM 15 MG/1
1 TABLET ORAL DAILY
COMMUNITY
Start: 2024-08-22 | End: 2025-01-09

## 2025-01-08 RX ORDER — SODIUM CHLORIDE 0.9 % (FLUSH) 0.9 %
10 SYRINGE (ML) INJECTION AS NEEDED
Status: DISCONTINUED | OUTPATIENT
Start: 2025-01-08 | End: 2025-01-08 | Stop reason: HOSPADM

## 2025-01-08 RX ADMIN — ASPIRIN 324 MG: 81 TABLET, CHEWABLE ORAL at 17:53

## 2025-01-08 NOTE — ED PROVIDER NOTES
Subjective   History of Present Illness  Patient is a 49 year old male that presents to the ED with complaint of chest pain that began Saturday. He describes sharp and achy substernal chest pain, which is intermittent in nature. He endorses shortness of breath starting today. The pain is worse when he lays flat, and better when he is laying down on his side. He denies history of smoking, no history of lung disease or heart disease. Denies upper respiratory symptoms, fever, and cough. Endorses nausea, denies vomiting. He reports there is family history of HTN but he denies personal history.   Additionally reports upper quadrant abdominal discomfort that he noticed a mass in his upper abdominal area Saturday.   He also describes that the pain sometimes radiates from his back to the front of his chest, and today has started hurting into his right armpit, he endorses numbness in right armpit that began 1430 today.   Does not take ASA and is not on a blood thinner. He reports hitting his head Thursday but does not complain of any neurological complaints. History of broken back in 2002, 2013, and 2015.   Has had a negative stress test 9/5/24.   Past medical history includes anxiety, back ain, fracture of lumbar spine, head injury, neck pain, and restless leg syndrome.           Review of Systems   Constitutional: Negative.  Negative for activity change, chills, fatigue and fever.   HENT: Negative.  Negative for congestion, ear pain, rhinorrhea and sore throat.    Eyes: Negative.  Negative for visual disturbance.   Respiratory:  Positive for chest tightness and shortness of breath. Negative for cough and wheezing.    Cardiovascular:  Positive for chest pain. Negative for palpitations and leg swelling.   Gastrointestinal:  Positive for abdominal pain and nausea. Negative for constipation, diarrhea and vomiting. Blood in stool: upper quadrant.  Endocrine: Negative.    Genitourinary: Negative.  Negative for difficulty  urinating.   Musculoskeletal:  Positive for back pain. Negative for arthralgias.   Skin: Negative.  Negative for wound.   Allergic/Immunologic: Negative.    Neurological:  Positive for numbness (right armpit, right arm). Negative for dizziness, syncope, weakness, light-headedness and headaches.   Hematological: Negative.    Psychiatric/Behavioral: Negative.  Negative for dysphoric mood and hallucinations.    All other systems reviewed and are negative.      Past Medical History:   Diagnosis Date    Anxiety     Back pain     Fracture of lumbar spine 2002 and 2015    lower lumbar 2002; L1 2015    Head injury     Injury of back     Nausea with vomiting 03/28/2023    Neck pain     Overweight (BMI 25.0-29.9)     Restless leg syndrome        Allergies   Allergen Reactions    Contrast Dye (Echo Or Unknown Ct/Mr) Hives, Rash, Angioedema and Seizure     Allergy to CT dye, was given premedication by rad protocol (13,7,1 hr prednisone 50mg, 50mg benedryl 1 hr prior), still had reaction  Itching, tongue swelling, lips swelling, hives, sob    Ct Contrast Itching       Past Surgical History:   Procedure Laterality Date    COLONOSCOPY N/A 8/31/2023    Procedure: COLONOSCOPY WITH ANESTHESIA;  Surgeon: Kristopher Brush DO;  Location: [facility] ENDOSCOPY;  Service: Gastroenterology;  Laterality: N/A;  preop; screening   postop  PCP Joseph Stewart    COLONOSCOPY N/A 9/1/2023    Procedure: COLONOSCOPY WITH ANESTHESIA;  Surgeon: Kristopher Brush DO;  Location: [facility] ENDOSCOPY;  Service: Gastroenterology;  Laterality: N/A;  Pre: Encounter for screening for malignant neoplasm of colon;  Post: Inadequate prep;  Vivek Stewart DO    COLONOSCOPY N/A 5/31/2024    Procedure: COLONOSCOPY WITH ANESTHESIA;  Surgeon: Kristopher Brush DO;  Location: [facility] ENDOSCOPY;  Service: Gastroenterology;  Laterality: N/A;  Pre: Encounter for screening for malignant neoplasm of colon, Left lower quadrant abdominal pain;  Post: Fair prep;  Terry  Vivek DOWNING DO    FACIAL FRACTURE SURGERY      INCISION AND DRAINAGE LEG Right 12/1/2018    Procedure: INCISION AND DRAINAGE OF RIGHT UPPER INNER THIGH, PLACEMENT OF WOUND VAC;  Surgeon: René Dallas MD;  Location:  [facility] OR;  Service: General    INGUINAL HERNIA REPAIR Bilateral 8/22/2023    Procedure: INGUINAL HERNIA BILATERAL REPAIR LAPAROSCOPIC WITH DAVINCI ROBOT WITH MESH;  Surgeon: Dina Boyd MD;  Location:  [facility] OR;  Service: Robotics - DaVinci;  Laterality: Bilateral;    KNEE SURGERY Right 09/2021    SPLENECTOMY         Family History   Problem Relation Age of Onset    Fibromyalgia Mother     Lung cancer Father     Colon cancer Neg Hx     Colon polyps Neg Hx     Esophageal cancer Neg Hx        Social History     Socioeconomic History    Marital status: Single   Tobacco Use    Smoking status: Never    Smokeless tobacco: Never   Vaping Use    Vaping status: Never Used   Substance and Sexual Activity    Alcohol use: Not Currently    Drug use: No    Sexual activity: Yes     Partners: Female           Objective   Physical Exam  Constitutional:       General: He is not in acute distress.     Appearance: Normal appearance. He is well-developed and normal weight. He is not ill-appearing, toxic-appearing or diaphoretic.   HENT:      Head: Normocephalic and atraumatic.      Nose: Nose normal.      Mouth/Throat:      Mouth: Mucous membranes are moist.      Pharynx: Oropharynx is clear.   Eyes:      Extraocular Movements: Extraocular movements intact.      Conjunctiva/sclera: Conjunctivae normal.   Cardiovascular:      Rate and Rhythm: Normal rate and regular rhythm.      Pulses: Normal pulses.      Heart sounds: Normal heart sounds. Heart sounds not distant. No murmur heard.  Pulmonary:      Effort: Pulmonary effort is normal. No tachypnea, accessory muscle usage or respiratory distress.      Breath sounds: Normal breath sounds. No decreased breath sounds, wheezing, rhonchi or rales.   Chest:      Chest  wall: Mass (right upper quadrant, chest area) and tenderness present.   Abdominal:      General: Abdomen is flat. Bowel sounds are normal. There is no abdominal bruit.      Palpations: Abdomen is soft. There is mass (right upper quadrant).      Tenderness: There is abdominal tenderness. There is no guarding or rebound.   Musculoskeletal:         General: Normal range of motion.      Cervical back: Normal range of motion and neck supple.      Right lower leg: No edema.      Left lower leg: No edema.   Skin:     General: Skin is warm and dry.      Capillary Refill: Capillary refill takes less than 2 seconds.      Coloration: Skin is not cyanotic.      Findings: No erythema.   Neurological:      General: No focal deficit present.      Mental Status: He is alert and oriented to person, place, and time. Mental status is at baseline.      Motor: No weakness.   Psychiatric:         Mood and Affect: Mood normal. Mood is not anxious.         Behavior: Behavior normal. Behavior is not agitated.         Thought Content: Thought content normal.         Judgment: Judgment normal.         Procedures           ED Course                  HEART Score: 2   Shared Decision Making  I discussed the findings with the patient/patient representative who is in agreement with the treatment plan and the final disposition.  Risks and benefits of discharge and/or observation/admission were discussed: Yes                                      Medical Decision Making  Patient is a 49 year old male that presents to the ED with complaint of chest pain that began Saturday, reports that he noticed a mass in his upper abdominal area. The pain is worse when he lays flat, and better when he is laying on his side. He denies history of hernia. He endorses shortness of breath starting today. He denies history of smoking, no history of lung disease or heart disease. There is family history of HTN but he denies personal history, as well as lung cancer. He  does endorse nausea. He also describes that the pain sometimes radiates from his back to the front of his chest, and today has started hurting into his right armpit, he endorses numbness in right armpit that began 1430 today. He describes the pain as sharp and achy. The pain is intermittent. Does not take ASA and is not on a blood thinner. He reports hitting his head Thursday but does not complain of any neurological complaints. History of broken back in 2002, 2013, and 2015.   Has had a negative stress test 9/5/24.   Past medical history includes anxiety, back ain, fracture of lumbar spine, head injury, neck pain, and restless leg syndrome.     Differential diagnosis includes but is not limited to anxiety, non-specific chest pain, back pain, and hernia.      Amount and/or Complexity of Data Reviewed  Labs: ordered.  Radiology: ordered.  ECG/medicine tests: ordered.    Risk  OTC drugs.  Prescription drug management.      Labs Reviewed   COMPREHENSIVE METABOLIC PANEL - Abnormal; Notable for the following components:       Result Value    Glucose 108 (*)     All other components within normal limits    Narrative:     GFR Categories in Chronic Kidney Disease (CKD)      GFR Category          GFR (mL/min/1.73)    Interpretation  G1                     90 or greater         Normal or high (1)  G2                      60-89                Mild decrease (1)  G3a                   45-59                Mild to moderate decrease  G3b                   30-44                Moderate to severe decrease  G4                    15-29                Severe decrease  G5                    14 or less           Kidney failure          (1)In the absence of evidence of kidney disease, neither GFR category G1 or G2 fulfill the criteria for CKD.    eGFR calculation 2021 CKD-EPI creatinine equation, which does not include race as a factor   CBC WITH AUTO DIFFERENTIAL - Abnormal; Notable for the following components:    WBC 15.21 (*)      "Neutrophils, Absolute 8.47 (*)     Lymphocytes, Absolute 4.54 (*)     Monocytes, Absolute 1.49 (*)     Eosinophils, Absolute 0.47 (*)     Immature Grans, Absolute 0.07 (*)     All other components within normal limits   D-DIMER, QUANTITATIVE - Normal    Narrative:     According to the assay 's published package insert, a normal (<0.50 MCGFEU/mL) D-dimer result in conjunction with a non-high clinical probability assessment, excludes deep vein thrombosis (DVT) and pulmonary embolism (PE) with high sensitivity.    D-dimer values increase with age and this can make VTE exclusion of an older population difficult. To address this, the American College [employer], based on best available evidence and recent guidelines, recommends that clinicians use age-adjusted D-dimer thresholds in patients greater than 50 years of age with: a) a low probability of PE who do not meet all Pulmonary Embolism Rule Out Criteria, or b) in those with intermediate probability of PE.   The formula for an age-adjusted D-dimer cut-off is \"age/100\".  For example, a 60 year old patient would have an age-adjusted cut-off of 0.60 MCGFEU/mL and an 80 year old 0.80 MCGFEU/mL.   TROPONIN - Normal    Narrative:     High Sensitive Troponin T Reference Range:  <14.0 ng/L- Negative Female for AMI  <22.0 ng/L- Negative Male for AMI  >=14 - Abnormal Female indicating possible myocardial injury.  >=22 - Abnormal Male indicating possible myocardial injury.   Clinicians would have to utilize clinical acumen, EKG, Troponin, and serial changes to determine if it is an Acute Myocardial Infarction or myocardial injury due to an underlying chronic condition.        LIPASE - Normal   HIGH SENSITIVITIY TROPONIN T 1HR    Narrative:     High Sensitive Troponin T Reference Range:  <14.0 ng/L- Negative Female for AMI  <22.0 ng/L- Negative Male for AMI  >=14 - Abnormal Female indicating possible myocardial injury.  >=22 - Abnormal Male indicating possible " myocardial injury.   Clinicians would have to utilize clinical acumen, EKG, Troponin, and serial changes to determine if it is an Acute Myocardial Infarction or myocardial injury due to an underlying chronic condition.        CBC AND DIFFERENTIAL    Narrative:     The following orders were created for panel order CBC & Differential.  Procedure                               Abnormality         Status                     ---------                               -----------         ------                     CBC Auto Differential[[id number]]        Abnormal            Final result                 Please view results for these tests on the individual orders.      CT Abdomen Pelvis Without Contrast   Final Result       1.  Mild fat stranding around the proximal sigmoid colon with a few   scattered diverticula noted. Findings are favored to represent   early/mild acute uncomplicated sigmoid diverticulitis.       2.  4 mm nonobstructing left renal calculus.                   This report was signed and finalized on 1/8/2025 7:28 PM by Dr. Kieran Dubois MD.          XR Chest 1 View   Final Result       No acute findings.       This report was signed and finalized on 1/8/2025 6:04 PM by Dr. Kieran Dubois MD.          This is a turnover from KYLER Galvez.  2 sets of cardiac markers are negative.  D-dimer was within normal limits.  EKGs are within normal limits.  CT scan does show mild fat stranding around the proximal sigmoid colon consistent with early/mild acute uncomplicated sigmoid diverticulitis.  Patient has history of diverticulitis.  We will treat accordingly.  White blood count is 15.21.  Lipase is within normal limits.  Patient's BUN is within normal limits at 11, creatinine 1.11, sodium 140, potassium 4.4, liver enzymes are also all within normal limits ALT is 22 and AST is 25.  Patient has had no vomiting or fever.  We will discharge on p.o. antibiotics and recommend close follow-up with PCP for  reevaluation regarding diverticulitis as well as his complaints of chest pain.    Final diagnoses:   Chest pain, unspecified type   Diverticulitis       ED Disposition  ED Disposition       ED Disposition   Discharge    Condition   Good    Comment   --               No follow-up provider specified.       Medication List        New Prescriptions      amoxicillin-clavulanate 875-125 MG per tablet  Commonly known as: AUGMENTIN  Take 1 tablet by mouth 2 (Two) Times a Day for 10 days.            Changed      * ondansetron ODT 4 MG disintegrating tablet  Commonly known as: ZOFRAN-ODT  Take 1 tablet by mouth Every 6 (Six) Hours As Needed for Nausea or Vomiting.  What changed: Another medication with the same name was added. Make sure you understand how and when to take each.     * ondansetron ODT 4 MG disintegrating tablet  Commonly known as: ZOFRAN-ODT  Place 1 tablet on the tongue Every 6 (Six) Hours As Needed for Nausea.  What changed: You were already taking a medication with the same name, and this prescription was added. Make sure you understand how and when to take each.           * This list has 2 medication(s) that are the same as other medications prescribed for you. Read the directions carefully, and ask your doctor or other care provider to review them with you.                   Where to Get Your Medications        These medications were sent to [id number] - [place], [place] - [address] - [phone]  - [phone] FX  [address], [place] [address]      Phone: [phone]   amoxicillin-clavulanate 875-125 MG per tablet  ondansetron ODT 4 MG disintegrating tablet            Rica Crump, APRN  01/08/25 2020

## 2025-01-09 ENCOUNTER — OFFICE VISIT (OUTPATIENT)
Dept: FAMILY MEDICINE CLINIC | Facility: CLINIC | Age: 50
End: 2025-01-09
Payer: COMMERCIAL

## 2025-01-09 VITALS
BODY MASS INDEX: 29.93 KG/M2 | HEART RATE: 77 BPM | OXYGEN SATURATION: 97 % | HEIGHT: 73 IN | DIASTOLIC BLOOD PRESSURE: 80 MMHG | WEIGHT: 225.8 LBS | TEMPERATURE: 98.4 F | SYSTOLIC BLOOD PRESSURE: 120 MMHG

## 2025-01-09 DIAGNOSIS — G25.81 RLS (RESTLESS LEGS SYNDROME): ICD-10-CM

## 2025-01-09 DIAGNOSIS — R07.89 CHEST TIGHTNESS: ICD-10-CM

## 2025-01-09 DIAGNOSIS — K57.92 DIVERTICULITIS: Primary | ICD-10-CM

## 2025-01-09 PROCEDURE — 1125F AMNT PAIN NOTED PAIN PRSNT: CPT | Performed by: FAMILY MEDICINE

## 2025-01-09 PROCEDURE — 1160F RVW MEDS BY RX/DR IN RCRD: CPT | Performed by: FAMILY MEDICINE

## 2025-01-09 PROCEDURE — 1159F MED LIST DOCD IN RCRD: CPT | Performed by: FAMILY MEDICINE

## 2025-01-09 PROCEDURE — 99214 OFFICE O/P EST MOD 30 MIN: CPT | Performed by: FAMILY MEDICINE

## 2025-01-09 RX ORDER — ROPINIROLE 0.25 MG/1
0.25 TABLET, FILM COATED ORAL NIGHTLY
Qty: 90 TABLET | Refills: 3 | Status: SHIPPED | OUTPATIENT
Start: 2025-01-09

## 2025-01-09 RX ORDER — DEXLANSOPRAZOLE 60 MG/1
60 CAPSULE, DELAYED RELEASE ORAL DAILY
Qty: 90 CAPSULE | Refills: 1 | Status: SHIPPED | OUTPATIENT
Start: 2025-01-09

## 2025-01-10 NOTE — PROGRESS NOTES
"Chief Complaint  Follow-up (Patient presents to clinic for ED follow up. Patient was seen at [facility] ED on 01/08/25 for possible heart attack. Patient was diagnosed with diverticulitis. )    Subjective        Saulo Shepard presents to [facility]  History of Present Illness  Seen in ED yesterday for chest pain with nausea and upper abdominal pain  CT found diverticulitis  Still feels nausea at times, troponin negative    Objective   Vital Signs:  /80 (BP Location: Right arm, Patient Position: Sitting, Cuff Size: Adult)   Pulse 77   Temp 98.4 °F (36.9 °C) (Temporal)   Ht 185.4 cm (73\")   Wt 102 kg (225 lb 12.8 oz)   SpO2 97%   BMI 29.79 kg/m²   Estimated body mass index is 29.79 kg/m² as calculated from the following:    Height as of this encounter: 185.4 cm (73\").    Weight as of this encounter: 102 kg (225 lb 12.8 oz).            Physical Exam  Vitals and nursing note reviewed.   Constitutional:       General: He is not in acute distress.     Appearance: He is not diaphoretic.   HENT:      Head: Normocephalic and atraumatic.      Nose: Nose normal.   Eyes:      General: No scleral icterus.        Right eye: No discharge.         Left eye: No discharge.      Conjunctiva/sclera: Conjunctivae normal.   Neck:      Trachea: No tracheal deviation.   Pulmonary:      Effort: Pulmonary effort is normal.   Skin:     General: Skin is warm and dry.      Coloration: Skin is not pale.   Neurological:      Mental Status: He is alert and oriented to person, place, and time.   Psychiatric:         Behavior: Behavior normal.         Thought Content: Thought content normal.         Judgment: Judgment normal.        Result Review :                Assessment and Plan   Diagnoses and all orders for this visit:    1. Diverticulitis (Primary)    2. RLS (restless legs syndrome)  -     rOPINIRole (REQUIP) 0.25 MG tablet; Take 1 tablet by mouth Every Night. Take 1 hour before bedtime.  " Dispense: 90 tablet; Refill: 3    3. Chest tightness  -     dexlansoprazole (DEXILANT) 60 MG capsule; Take 1 capsule by mouth Daily.  Dispense: 90 capsule; Refill: 1    Complete antibiotics  RLS stable, needs refill  PPI trial for suspected gastritis and GERD, DC ibuprofen and meloxicam  F/u PRN

## 2025-01-12 LAB
QT INTERVAL: 356 MS
QT INTERVAL: 368 MS
QTC INTERVAL: 389 MS
QTC INTERVAL: 399 MS

## 2025-01-20 ENCOUNTER — OFFICE VISIT (OUTPATIENT)
Age: 50
End: 2025-01-20
Payer: MEDICAID

## 2025-01-20 VITALS — WEIGHT: 220 LBS | HEIGHT: 73 IN | BODY MASS INDEX: 29.16 KG/M2

## 2025-01-20 DIAGNOSIS — M94.261 CHONDROMALACIA OF RIGHT KNEE: Primary | ICD-10-CM

## 2025-01-20 PROCEDURE — 99213 OFFICE O/P EST LOW 20 MIN: CPT | Performed by: ORTHOPAEDIC SURGERY

## 2025-01-20 PROCEDURE — 1036F TOBACCO NON-USER: CPT | Performed by: ORTHOPAEDIC SURGERY

## 2025-01-20 PROCEDURE — G8419 CALC BMI OUT NRM PARAM NOF/U: HCPCS | Performed by: ORTHOPAEDIC SURGERY

## 2025-01-20 PROCEDURE — G8427 DOCREV CUR MEDS BY ELIG CLIN: HCPCS | Performed by: ORTHOPAEDIC SURGERY

## 2025-01-20 RX ORDER — MELOXICAM 15 MG/1
15 TABLET ORAL DAILY PRN
Qty: 30 TABLET | Refills: 1 | Status: SHIPPED | OUTPATIENT
Start: 2025-01-20

## 2025-01-20 NOTE — PROGRESS NOTES
Orthopaedic Clinic Note - Established Patient    NAME:  Carlos Carney   : 1975  MRN: [id number]    2025    CHIEF COMPLAINT:  follow up for right knee pain      HISTORY OF PRESENT ILLNESS:   The patient is a 49 y.o. male who returns today for follow up of right knee pain.  He was given a repeat injection last visit without much relief.  His most recent x-ray was in 2024 which was normal.  He had a previous right knee patellar chondroplasty in  that did help for several years but now he feels as though his pain is returning.  He is wondering what the next step may be in terms of his treatment.    Past Medical History:        Diagnosis Date    Back injuries     x3 fracture       Past Surgical History:        Procedure Laterality Date    KNEE ARTHROSCOPY      SPLENECTOMY         Current Medications:   Prior to Admission medications    Medication Sig Start Date End Date Taking? Authorizing Provider   meloxicam (MOBIC) 15 MG tablet Take 1 tablet by mouth daily as needed for Pain 25  Yes Nickolas Rush MD   EPINEPHrine (EPIPEN) 0.3 MG/0.3ML SOAJ injection Inject 0.3 mLs into the muscle once 24  Yes ProviderMichael MD   ibuprofen (ADVIL;MOTRIN) 800 MG tablet Take 1 tablet by mouth every 6 hours as needed for Pain 10/28/24  Yes Michael Edwards MD   naloxone 4 MG/0.1ML LIQD nasal spray Call 911. Don't prime. [place] in 1 nostril for overdose. Repeat in 2-3 minutes in other nostril if no or minimal breathing/responsiveness. 10/17/24  Yes Michael Edwards MD   ondansetron (ZOFRAN-ODT) 4 MG disintegrating tablet Take 1 tablet by mouth every 6 hours as needed 24  Yes Michael Edwards MD   rOPINIRole (REQUIP) 0.25 MG tablet Take 1 tablet by mouth nightly 24  Yes Michael Edwards MD       Allergies:  Iodinated contrast media and Other    System Neg/Pos Details   Constitutional Negative Fatigue and Fever.   Respiratory Negative Cough and Dyspnea.   Cardio

## 2025-01-31 ENCOUNTER — TELEPHONE (OUTPATIENT)
Dept: FAMILY MEDICINE CLINIC | Facility: CLINIC | Age: 50
End: 2025-01-31
Payer: COMMERCIAL

## 2025-01-31 NOTE — TELEPHONE ENCOUNTER
Caller: Saulo Shepard    Relationship: Self    Best call back number: [phone]     What medication are you requesting: WHATEVER IS RECOMMENDED    What are your current symptoms: COUGHING UP GREEN STUFF    How long have you been experiencing symptoms: PAST 3 OR 4 DAYS    Have you had these symptoms before:    [] Yes  [x] No    Have you been treated for these symptoms before:   [] Yes  [x] No    If a prescription is needed, what is your preferred pharmacy and phone number: [facility] #[id number] - [place] - [address] [facility] [phone]  - [phone] FX

## 2025-01-31 NOTE — TELEPHONE ENCOUNTER
Spoke with patient who states he has finished the Augmentin, but is still having a productive green cough. Patient would like to know if there is something else that can be sent in or what he should do. Please advise, thanks!

## 2025-01-31 NOTE — TELEPHONE ENCOUNTER
Spoke with patient and informed him of providers advice and has been scheduled for an appt on Monday with Dr. Stewart

## 2025-02-03 ENCOUNTER — OFFICE VISIT (OUTPATIENT)
Dept: FAMILY MEDICINE CLINIC | Facility: CLINIC | Age: 50
End: 2025-02-03
Payer: COMMERCIAL

## 2025-02-03 VITALS
DIASTOLIC BLOOD PRESSURE: 80 MMHG | TEMPERATURE: 98.4 F | HEIGHT: 73 IN | BODY MASS INDEX: 29.03 KG/M2 | HEART RATE: 91 BPM | WEIGHT: 219 LBS | OXYGEN SATURATION: 97 % | SYSTOLIC BLOOD PRESSURE: 120 MMHG

## 2025-02-03 DIAGNOSIS — J40 BRONCHITIS: Primary | ICD-10-CM

## 2025-02-03 PROCEDURE — 1160F RVW MEDS BY RX/DR IN RCRD: CPT | Performed by: FAMILY MEDICINE

## 2025-02-03 PROCEDURE — 1159F MED LIST DOCD IN RCRD: CPT | Performed by: FAMILY MEDICINE

## 2025-02-03 PROCEDURE — 1126F AMNT PAIN NOTED NONE PRSNT: CPT | Performed by: FAMILY MEDICINE

## 2025-02-03 PROCEDURE — 99213 OFFICE O/P EST LOW 20 MIN: CPT | Performed by: FAMILY MEDICINE

## 2025-02-03 RX ORDER — ALBUTEROL SULFATE 90 UG/1
2 INHALANT RESPIRATORY (INHALATION) EVERY 4 HOURS PRN
Qty: 18 G | Refills: 11 | Status: SHIPPED | OUTPATIENT
Start: 2025-02-03

## 2025-02-03 RX ORDER — METHYLPREDNISOLONE 4 MG/1
TABLET ORAL
Qty: 21 TABLET | Refills: 0 | Status: SHIPPED | OUTPATIENT
Start: 2025-02-03 | End: 2025-02-07

## 2025-02-03 NOTE — PROGRESS NOTES
"Chief Complaint  Cough (Patient presents to clinic with c/o productive green cough. /Ongoing for 6 days/Patient has tried OTC Mucinex, Sudafed, Dayquil)    Subjective        Saulo Shepard presents to [facility]  Cough      6 days of central cough chest congestion with green production without any fever or chills.    Objective   Vital Signs:  /80 (BP Location: Left arm, Patient Position: Sitting, Cuff Size: Adult)   Pulse 91   Temp 98.4 °F (36.9 °C) (Temporal)   Ht 185.4 cm (73\")   Wt 99.3 kg (219 lb)   SpO2 97%   BMI 28.89 kg/m²   Estimated body mass index is 28.89 kg/m² as calculated from the following:    Height as of this encounter: 185.4 cm (73\").    Weight as of this encounter: 99.3 kg (219 lb).            Physical Exam  Vitals and nursing note reviewed.   Constitutional:       General: He is not in acute distress.     Appearance: He is not diaphoretic.   HENT:      Head: Normocephalic and atraumatic.      Nose: Nose normal. Congestion present.   Eyes:      General: No scleral icterus.        Right eye: No discharge.         Left eye: No discharge.      Conjunctiva/sclera: Conjunctivae normal.   Neck:      Trachea: No tracheal deviation.   Pulmonary:      Effort: Pulmonary effort is normal.      Breath sounds: Rhonchi present.   Skin:     General: Skin is warm and dry.      Coloration: Skin is not pale.   Neurological:      Mental Status: He is alert and oriented to person, place, and time.   Psychiatric:         Behavior: Behavior normal.         Thought Content: Thought content normal.         Judgment: Judgment normal.        Result Review :                Assessment and Plan   Diagnoses and all orders for this visit:    1. Bronchitis (Primary)  -     albuterol sulfate  (90 Base) MCG/ACT inhaler; Inhale 2 puffs Every 4 (Four) Hours As Needed for Shortness of Air or Wheezing.  Dispense: 18 g; Refill: 11  -     methylPREDNISolone (MEDROL) 4 MG dose pack; " Take as directed on package instructions.  Dispense: 21 tablet; Refill: 0             Follow Up   Return if symptoms worsen or fail to improve.  Patient was given instructions and counseling regarding his condition or for health maintenance advice. Please see specific information pulled into the AVS if appropriate.

## 2025-02-05 ENCOUNTER — HOSPITAL ENCOUNTER (EMERGENCY)
Facility: HOSPITAL | Age: 50
Discharge: HOME | End: 2025-02-05
Attending: EMERGENCY MEDICINE
Payer: COMMERCIAL

## 2025-02-05 VITALS
HEART RATE: 80 BPM | DIASTOLIC BLOOD PRESSURE: 83 MMHG | TEMPERATURE: 97.5 F | SYSTOLIC BLOOD PRESSURE: 131 MMHG | HEIGHT: 73 IN | WEIGHT: 220 LBS | BODY MASS INDEX: 29.16 KG/M2 | OXYGEN SATURATION: 95 % | RESPIRATION RATE: 16 BRPM

## 2025-02-05 DIAGNOSIS — J40 BRONCHITIS: Primary | ICD-10-CM

## 2025-02-05 LAB
FLUAV RNA RESP QL NAA+PROBE: NOT DETECTED
FLUBV RNA RESP QL NAA+PROBE: NOT DETECTED
RSV RNA RESP QL NAA+PROBE: NOT DETECTED
SARS-COV-2 RNA RESP QL NAA+PROBE: NOT DETECTED

## 2025-02-05 PROCEDURE — 87637 SARSCOV2&INF A&B&RSV AMP PRB: CPT | Performed by: EMERGENCY MEDICINE

## 2025-02-05 PROCEDURE — 99283 EMERGENCY DEPT VISIT LOW MDM: CPT

## 2025-02-06 NOTE — ED PROVIDER NOTES
Subjective   History of Present Illness  Patient states that he got a new prescription today for an inhaler for bronchitis.  He first used around 1130 and then beginning about 12:00 he noticed a feel of something seeming to block his throat where he could not swallow other breathable.  He then started having some tingling in his lips and keeps feel like he just cannot clear his throat and breathe well.  He has had cough and congestion for couple days prior to this.  He did not have any testing in his doctor's office.  He does have a bad headache also.  He has been coughing up some greenish sputum.    History provided by:  Patient   used: No    Allergic Reaction  Presenting symptoms: difficulty breathing and difficulty swallowing    Severity:  Mild  Duration:  6 hours  Prior allergic episodes:  No prior episodes  Context: medications    Context: not animal exposure, not chemicals, not cosmetics, not dairy/milk products, not eggs, not food allergies, not grass, not insect bite/sting, not jewelry/metal, not new detergents/soaps, not nuts and not poison ivy    Relieved by:  Nothing  Worsened by:  Nothing  Ineffective treatments:  None tried      Review of Systems   Constitutional: Negative.    HENT:  Positive for trouble swallowing.    Respiratory:  Positive for shortness of breath.    Cardiovascular: Negative.    Gastrointestinal: Negative.    Genitourinary: Negative.    Musculoskeletal: Negative.    Skin: Negative.    Neurological: Negative.    Psychiatric/Behavioral: Negative.     All other systems reviewed and are negative.      Past Medical History:   Diagnosis Date    Anxiety     Back pain     Fracture of lumbar spine 2002 and 2015    lower lumbar 2002; L1 2015    Head injury     Injury of back     Nausea with vomiting 03/28/2023    Neck pain     Overweight (BMI 25.0-29.9)     Restless leg syndrome        Allergies   Allergen Reactions    Contrast Dye (Echo Or Unknown Ct/Mr) Hives, Rash,  Angioedema and Seizure     Allergy to CT dye, was given premedication by rad protocol (13,7,1 hr prednisone 50mg, 50mg benedryl 1 hr prior), still had reaction  Itching, tongue swelling, lips swelling, hives, sob    Ct Contrast Itching       Past Surgical History:   Procedure Laterality Date    COLONOSCOPY N/A 8/31/2023    Procedure: COLONOSCOPY WITH ANESTHESIA;  Surgeon: Kristopher Brush DO;  Location:  [facility];  Service: Gastroenterology;  Laterality: N/A;  preop; screening   postop  PCP Joseph Stewart    COLONOSCOPY N/A 9/1/2023    Procedure: COLONOSCOPY WITH ANESTHESIA;  Surgeon: Kristopher Brush DO;  Location:  [facility];  Service: Gastroenterology;  Laterality: N/A;  Pre: Encounter for screening for malignant neoplasm of colon;  Post: Inadequate prep;  Vivek Stewart DO    COLONOSCOPY N/A 5/31/2024    Procedure: COLONOSCOPY WITH ANESTHESIA;  Surgeon: Kristopher Brush DO;  Location:  [facility];  Service: Gastroenterology;  Laterality: N/A;  Pre: Encounter for screening for malignant neoplasm of colon, Left lower quadrant abdominal pain;  Post: Fair prep;  Vivek Stewart DO    FACIAL FRACTURE SURGERY      INCISION AND DRAINAGE LEG Right 12/1/2018    Procedure: INCISION AND DRAINAGE OF RIGHT UPPER INNER THIGH, PLACEMENT OF WOUND VAC;  Surgeon: René Dallas MD;  Location:  [facility];  Service: General    INGUINAL HERNIA REPAIR Bilateral 8/22/2023    Procedure: INGUINAL HERNIA BILATERAL REPAIR LAPAROSCOPIC WITH DAVINCI ROBOT WITH MESH;  Surgeon: Dina Boyd MD;  Location:  [facility];  Service: Robotics - DaVinci;  Laterality: Bilateral;    KNEE SURGERY Right 09/2021    SPLENECTOMY         Family History   Problem Relation Age of Onset    Fibromyalgia Mother     Lung cancer Father     Colon cancer Neg Hx     Colon polyps Neg Hx     Esophageal cancer Neg Hx        Social History     Socioeconomic History    Marital status: Single   Tobacco Use    Smoking status: Never     Smokeless tobacco: Never   Vaping Use    Vaping status: Never Used   Substance and Sexual Activity    Alcohol use: Not Currently    Drug use: No    Sexual activity: Yes     Partners: Female       Prior to Admission medications    Medication Sig Start Date End Date Taking? Authorizing Provider   albuterol sulfate  (90 Base) MCG/ACT inhaler Inhale 2 puffs Every 4 (Four) Hours As Needed for Shortness of Air or Wheezing. 2/3/25   Vivek Stewart DO   cyclobenzaprine (FLEXERIL) 10 MG tablet Take 1 tablet by mouth 3 (Three) Times a Day As Needed for Muscle Spasms. 6/22/24   Madalyn Cobb APRN   dexlansoprazole (DEXILANT) 60 MG capsule Take 1 capsule by mouth Daily. 1/9/25   Vivek Stewart DO   diphenhydrAMINE (BENADRYL) 25 MG tablet Take 2 tablets by mouth Every 6 (Six) Hours As Needed for Itching. 7/1/24   Madalyn Cobb APRN   EPINEPHrine (EPIPEN) 0.3 MG/0.3ML solution auto-injector injection Inject 0.3 mL into the appropriate muscle as directed by prescriber 1 (One) Time. 8/5/24   Provider, MD Tiffanie   gabapentin (NEURONTIN) 300 MG capsule Take 1 capsule by mouth 3 (Three) Times a Day. 6/24/24   Vivek Stewart DO   HYDROcodone-acetaminophen (NORCO) 5-325 MG per tablet Take 1 tablet by mouth Every 6 (Six) Hours As Needed for Moderate Pain. 10/17/24   Madalyn Cobb APRN   methylPREDNISolone (MEDROL) 4 MG dose pack Take as directed on package instructions. 2/3/25   Vivek Stewart DO   naloxone (NARCAN) 4 MG/0.1ML nasal spray Call 911. Don't prime. [place] in 1 nostril for overdose. Repeat in 2-3 minutes in other nostril if no or minimal breathing/responsiveness. 10/17/24   Madalyn Cobb APRN   ondansetron ODT (ZOFRAN-ODT) 4 MG disintegrating tablet Take 1 tablet by mouth Every 6 (Six) Hours As Needed for Nausea or Vomiting. 8/2/24   Piter Parra APRN   ondansetron ODT (ZOFRAN-ODT) 4 MG disintegrating tablet Place 1 tablet on the tongue Every 6 (Six) Hours As  Needed for Nausea. 1/8/25   Rica Crump APRN   psyllium (METAMUCIL) 58.6 % powder Take 1 packet by mouth Daily. 8/28/24 8/28/25  Dina Boyd MD   Rimegepant Sulfate (Nurtec) 75 MG tablet dispersible tablet Take 1 tablet by mouth Daily As Needed (as needed for headache). 8/26/24   Angelica Almonte APRN   rOPINIRole (REQUIP) 0.25 MG tablet Take 1 tablet by mouth Every Night. Take 1 hour before bedtime. 1/9/25   Vivek Stewart, DO       Medications - No data to display    Vitals:    02/05/25 1941   BP:    Pulse: 74   Resp:    Temp:    SpO2: 96%         Objective   Physical Exam  Vitals and nursing note reviewed.   Constitutional:       Appearance: Normal appearance.   HENT:      Head: Normocephalic and atraumatic.      Mouth/Throat:      Mouth: Mucous membranes are moist.      Pharynx: Oropharynx is clear.   Eyes:      Extraocular Movements: Extraocular movements intact.      Pupils: Pupils are equal, round, and reactive to light.   Cardiovascular:      Rate and Rhythm: Normal rate and regular rhythm.   Pulmonary:      Effort: Pulmonary effort is normal.      Breath sounds: Normal breath sounds.   Musculoskeletal:         General: Normal range of motion.   Skin:     General: Skin is warm and dry.   Neurological:      General: No focal deficit present.      Mental Status: He is alert and oriented to person, place, and time.   Psychiatric:         Mood and Affect: Mood normal.         Behavior: Behavior normal.         Procedures         Lab Results (last 24 hours)       Procedure Component Value Units Date/Time    COVID PRE-OP / PRE-PROCEDURE SCREENING ORDER (NO ISOLATION) - Swab, Nasopharynx [[id number]]  (Normal) Collected: 02/05/25 1846    Specimen: Swab from Nasopharynx Updated: 02/05/25 1940    Narrative:      The following orders were created for panel order COVID PRE-OP / PRE-PROCEDURE SCREENING ORDER (NO ISOLATION) - Swab, Nasopharynx.  Procedure                               Abnormality          Status                     ---------                               -----------         ------                     COVID-19, FLU A/B, RSV P...[[id number]]  Normal              Final result                 Please view results for these tests on the individual orders.    COVID-19, FLU A/B, RSV PCR 1 HR TAT - Swab, Nasopharynx [[id number]]  (Normal) Collected: 02/05/25 1846    Specimen: Swab from Nasopharynx Updated: 02/05/25 1940     COVID19 Not Detected     Influenza A PCR Not Detected     Influenza B PCR Not Detected     RSV, PCR Not Detected    Narrative:      Fact sheet for providers: https://www.[employer].gov/media/497220/download    Fact sheet for patients: https://www.[employer].gov/media/320518/download    Test performed by PCR.            No orders to display       ED Course          MDM  Number of Diagnoses or Management Options  Bronchitis: new and requires workup  Diagnosis management comments: I told the patient that his symptoms are be very atypical for any type of allergic reaction.  He had no wheezes on exam.  I think his feeling of shortness of breath is more related to congestion in his throat and sweats making him feel blocked up but he is obviously getting enough air.  His COVID and flu and RSV are negative.  I told him I think his doctors treated him appropriately and just continue the treatment as it is now.  He should gradually get better.  I did tell him the steroid pack but also with any allergic symptoms anyway.  He is discharged in stable condition.       Amount and/or Complexity of Data Reviewed  Clinical lab tests: ordered and reviewed    Risk of Complications, Morbidity, and/or Mortality  Presenting problems: moderate  Diagnostic procedures: moderate  Management options: moderate    Patient Progress  Patient progress: stable        Final diagnoses:   Bronchitis     '     Ross Summers Jr., MD  02/05/25 1958

## 2025-02-06 NOTE — ED NOTES
Phone call from urgent care stating that patient was now over there to be seen. Urgent care made aware that this nurse was looking for patient in lobby for triage. Patient states to Urgent care the he is going back to ER.

## 2025-02-07 ENCOUNTER — OFFICE VISIT (OUTPATIENT)
Dept: FAMILY MEDICINE CLINIC | Facility: CLINIC | Age: 50
End: 2025-02-07
Payer: COMMERCIAL

## 2025-02-07 VITALS
BODY MASS INDEX: 29.16 KG/M2 | SYSTOLIC BLOOD PRESSURE: 128 MMHG | TEMPERATURE: 97.9 F | HEIGHT: 73 IN | OXYGEN SATURATION: 96 % | DIASTOLIC BLOOD PRESSURE: 84 MMHG | WEIGHT: 220 LBS | HEART RATE: 80 BPM

## 2025-02-07 DIAGNOSIS — J40 BRONCHITIS: ICD-10-CM

## 2025-02-07 DIAGNOSIS — M54.31 SCIATICA, RIGHT SIDE: ICD-10-CM

## 2025-02-07 DIAGNOSIS — R51.9 ACUTE INTRACTABLE HEADACHE, UNSPECIFIED HEADACHE TYPE: ICD-10-CM

## 2025-02-07 PROCEDURE — 99213 OFFICE O/P EST LOW 20 MIN: CPT | Performed by: NURSE PRACTITIONER

## 2025-02-07 PROCEDURE — 1126F AMNT PAIN NOTED NONE PRSNT: CPT | Performed by: NURSE PRACTITIONER

## 2025-02-07 NOTE — PROGRESS NOTES
KYLER Thorpe  [facility]   Family Medicine  [address]  [place] [address]  Phone: [phone]  Fax: [phone]         Chief Complaint:  Chief Complaint   Patient presents with    ER follow up        History:  Saulo Shepard is a 49 y.o. male that is an established patient. He  is here for evaluation of the above complaint.    HPI   History of Present Illness  The patient presents for evaluation of shortness of breath and headache.    He sought medical attention at the emergency room on Wednesday due to a sudden onset of dyspnea. His oxygen saturation was recorded as 90, which subsequently increased to 93 during his stay in the ER. He reported a sensation of an obstruction in his throat, prompting a COVID-19 test, which returned negative results. He was advised to seek follow-up care. He was seen by Dr. Stewart on Monday, who diagnosed him with bronchitis and prescribed a Medrol Dosepak. However, his symptoms escalated a few days later. Since Wednesday, his condition has improved, although he continues to experience headaches. He reports no ear pain but mentions a tingling sensation in his mouth that began on Wednesday. He is currently on a course of Medrol Dosepak, with a few days remaining. He reports no recurrence of the severe dyspnea he experienced on Wednesday.     He continues to experience headaches, although they have lessened in severity. He has exhausted his supply of Nurtec, which he found beneficial. He received an epidural injection approximately 1 to 2 weeks ago but continues to experience headaches, particularly when transitioning from a dark environment to light.    MEDICATIONS  Current: Medrol dosepak, inhaler  Past: Nurtec      Results  Laboratory Studies  COVID-19 test was negative.       ROS:  Review of Systems   Constitutional: Negative.  Negative for fever.   Respiratory:  Positive for cough, shortness of breath and wheezing.    Cardiovascular:  "Negative.    Neurological:  Positive for headaches.         reports that he has never smoked. He has never used smokeless tobacco. He reports that he does not currently use alcohol. He reports that he does not use drugs.    Current Outpatient Medications   Medication Instructions    albuterol sulfate  (90 Base) MCG/ACT inhaler 2 puffs, Inhalation, Every 4 Hours PRN    dexlansoprazole (DEXILANT) 60 mg, Oral, Daily    EPINEPHrine (EPIPEN) 0.3 mg, Once    gabapentin (NEURONTIN) 300 mg, Oral, 3 Times Daily    rimegepant sulfate ODT (NURTEC) 75 mg, Oral, Daily PRN    rOPINIRole (REQUIP) 0.25 mg, Oral, Nightly, Take 1 hour before bedtime.       OBJECTIVE:  /84   Pulse 80   Temp 97.9 °F (36.6 °C)   Ht 185.4 cm (72.99\")   Wt 99.8 kg (220 lb)   SpO2 96%   BMI 29.03 kg/m²    Physical Exam  Vitals and nursing note reviewed.   Constitutional:       Appearance: Normal appearance.   HENT:      Head: Normocephalic and atraumatic.      Nose: Nose normal.   Eyes:      Conjunctiva/sclera: Conjunctivae normal.   Cardiovascular:      Rate and Rhythm: Normal rate and regular rhythm.   Pulmonary:      Effort: Pulmonary effort is normal. No respiratory distress.      Breath sounds: Normal breath sounds. No wheezing or rales.   Neurological:      General: No focal deficit present.      Mental Status: He is alert and oriented to person, place, and time.   Psychiatric:         Mood and Affect: Mood normal.         Behavior: Behavior normal.       Physical Exam  Lungs were auscultated.    Vital Signs  Oxygen saturation is normal.    Procedures    Assessment/Plan:     Diagnoses and all orders for this visit:    1. Bronchitis    2. Sciatica, right side    3. Acute intractable headache, unspecified headache type  -     rimegepant sulfate ODT (Nurtec) 75 MG tablet; Take 1 tablet by mouth Daily As Needed (as needed for headache).  Dispense: 16 tablet; Refill: 2           Assessment & Plan  1. Shortness of breath.  His vital " signs, including oxygen saturation, are within normal limits. He reports improvement since Wednesday but still experiences a headache. He is advised to continue the Medrol Dosepak regimen until completion. The use of the inhaler is recommended at a dosage of 2 puffs every 4 hours as needed for symptoms such as cough, wheeze, shortness of breath, or chest tightness.    2. Headache.  He reports persistent headaches, which may be associated with a viral infection. A refill of Nurtec will be provided to manage his headache symptoms. He is advised to take Nurtec as needed for headache relief.    An After Visit Summary was printed and given to the patient at discharge.  Return in about 6 months (around 8/7/2025).       There are no Patient Instructions on file for this visit.      Discussion:     I spent 25 minutes caring for Saulo on this date of service. This time includes time spent by me in the following activities: preparing for the visit, reviewing tests, performing a medically appropriate examination and/or evaluation, counseling and educating the patient/family/caregiver, documenting information in the medical record, independently interpreting results and communicating that information with the patient/family/caregiver, care coordination, obtaining a separately obtained history, and reviewing a separately obtained history   Patient or patient representative verbalized consent for the use of Ambient Listening during the visit with  KYLER Thorpe for chart documentation. 2/7/2025  12:30 CST    Angelica CHÁVEZ 2/7/2025   Electronically signed.

## 2025-02-17 ENCOUNTER — APPOINTMENT (OUTPATIENT)
Dept: CT IMAGING | Facility: HOSPITAL | Age: 50
End: 2025-02-17
Payer: COMMERCIAL

## 2025-02-17 ENCOUNTER — HOSPITAL ENCOUNTER (EMERGENCY)
Facility: HOSPITAL | Age: 50
Discharge: HOME | End: 2025-02-17
Attending: EMERGENCY MEDICINE | Admitting: EMERGENCY MEDICINE
Payer: COMMERCIAL

## 2025-02-17 VITALS
RESPIRATION RATE: 18 BRPM | BODY MASS INDEX: 28.49 KG/M2 | SYSTOLIC BLOOD PRESSURE: 123 MMHG | DIASTOLIC BLOOD PRESSURE: 91 MMHG | TEMPERATURE: 97.4 F | HEART RATE: 92 BPM | WEIGHT: 215 LBS | HEIGHT: 73 IN | OXYGEN SATURATION: 95 %

## 2025-02-17 DIAGNOSIS — N20.0 KIDNEY STONE: ICD-10-CM

## 2025-02-17 DIAGNOSIS — K57.92 DIVERTICULITIS: Primary | ICD-10-CM

## 2025-02-17 LAB
ALBUMIN SERPL-MCNC: 3.7 G/DL (ref 3.5–5.2)
ALBUMIN/GLOB SERPL: 1.1 G/DL
ALP SERPL-CCNC: 95 U/L (ref 39–117)
ALT SERPL W P-5'-P-CCNC: 15 U/L (ref 1–41)
ANION GAP SERPL CALCULATED.3IONS-SCNC: 10 MMOL/L (ref 5–15)
AST SERPL-CCNC: 13 U/L (ref 1–40)
B PARAPERT DNA SPEC QL NAA+PROBE: NOT DETECTED
B PERT DNA SPEC QL NAA+PROBE: NOT DETECTED
BACTERIA UR QL AUTO: ABNORMAL /HPF
BASOPHILS # BLD AUTO: 0.1 10*3/MM3 (ref 0–0.2)
BASOPHILS NFR BLD AUTO: 0.5 % (ref 0–1.5)
BILIRUB SERPL-MCNC: 0.6 MG/DL (ref 0–1.2)
BILIRUB UR QL STRIP: NEGATIVE
BUN SERPL-MCNC: 8 MG/DL (ref 6–20)
BUN/CREAT SERPL: 7.3 (ref 7–25)
C PNEUM DNA NPH QL NAA+NON-PROBE: NOT DETECTED
CALCIUM SPEC-SCNC: 10.6 MG/DL (ref 8.6–10.5)
CHLORIDE SERPL-SCNC: 105 MMOL/L (ref 98–107)
CLARITY UR: CLEAR
CO2 SERPL-SCNC: 21 MMOL/L (ref 22–29)
COLOR UR: YELLOW
CREAT SERPL-MCNC: 1.09 MG/DL (ref 0.76–1.27)
D-LACTATE SERPL-SCNC: 1.7 MMOL/L (ref 0.5–2)
DEPRECATED RDW RBC AUTO: 46.8 FL (ref 37–54)
EGFRCR SERPLBLD CKD-EPI 2021: 83.7 ML/MIN/1.73
EOSINOPHIL # BLD AUTO: 0.08 10*3/MM3 (ref 0–0.4)
EOSINOPHIL NFR BLD AUTO: 0.4 % (ref 0.3–6.2)
ERYTHROCYTE [DISTWIDTH] IN BLOOD BY AUTOMATED COUNT: 14.6 % (ref 12.3–15.4)
FLUAV SUBTYP SPEC NAA+PROBE: NOT DETECTED
FLUBV RNA ISLT QL NAA+PROBE: NOT DETECTED
GLOBULIN UR ELPH-MCNC: 3.3 GM/DL
GLUCOSE SERPL-MCNC: 124 MG/DL (ref 65–99)
GLUCOSE UR STRIP-MCNC: NEGATIVE MG/DL
HADV DNA SPEC NAA+PROBE: NOT DETECTED
HCOV 229E RNA SPEC QL NAA+PROBE: NOT DETECTED
HCOV HKU1 RNA SPEC QL NAA+PROBE: NOT DETECTED
HCOV NL63 RNA SPEC QL NAA+PROBE: NOT DETECTED
HCOV OC43 RNA SPEC QL NAA+PROBE: NOT DETECTED
HCT VFR BLD AUTO: 47.8 % (ref 37.5–51)
HGB BLD-MCNC: 15.8 G/DL (ref 13–17.7)
HGB UR QL STRIP.AUTO: NEGATIVE
HMPV RNA NPH QL NAA+NON-PROBE: NOT DETECTED
HPIV1 RNA ISLT QL NAA+PROBE: NOT DETECTED
HPIV2 RNA SPEC QL NAA+PROBE: NOT DETECTED
HPIV3 RNA NPH QL NAA+PROBE: NOT DETECTED
HPIV4 P GENE NPH QL NAA+PROBE: NOT DETECTED
HYALINE CASTS UR QL AUTO: ABNORMAL /LPF
IMM GRANULOCYTES # BLD AUTO: 0.11 10*3/MM3 (ref 0–0.05)
IMM GRANULOCYTES NFR BLD AUTO: 0.5 % (ref 0–0.5)
KETONES UR QL STRIP: ABNORMAL
LEUKOCYTE ESTERASE UR QL STRIP.AUTO: ABNORMAL
LIPASE SERPL-CCNC: 23 U/L (ref 13–60)
LYMPHOCYTES # BLD AUTO: 3.47 10*3/MM3 (ref 0.7–3.1)
LYMPHOCYTES NFR BLD AUTO: 16.2 % (ref 19.6–45.3)
M PNEUMO IGG SER IA-ACNC: NOT DETECTED
MCH RBC QN AUTO: 28.8 PG (ref 26.6–33)
MCHC RBC AUTO-ENTMCNC: 33.1 G/DL (ref 31.5–35.7)
MCV RBC AUTO: 87.1 FL (ref 79–97)
MONOCYTES # BLD AUTO: 1.87 10*3/MM3 (ref 0.1–0.9)
MONOCYTES NFR BLD AUTO: 8.7 % (ref 5–12)
NEUTROPHILS NFR BLD AUTO: 15.79 10*3/MM3 (ref 1.7–7)
NEUTROPHILS NFR BLD AUTO: 73.7 % (ref 42.7–76)
NITRITE UR QL STRIP: NEGATIVE
NRBC BLD AUTO-RTO: 0 /100 WBC (ref 0–0.2)
PH UR STRIP.AUTO: 7.5 [PH] (ref 5–8)
PLATELET # BLD AUTO: 465 10*3/MM3 (ref 140–450)
PMV BLD AUTO: 9.9 FL (ref 6–12)
POTASSIUM SERPL-SCNC: 3.8 MMOL/L (ref 3.5–5.2)
PROT SERPL-MCNC: 7 G/DL (ref 6–8.5)
PROT UR QL STRIP: NEGATIVE
RBC # BLD AUTO: 5.49 10*6/MM3 (ref 4.14–5.8)
RBC # UR STRIP: ABNORMAL /HPF
REF LAB TEST METHOD: ABNORMAL
RHINOVIRUS RNA SPEC NAA+PROBE: NOT DETECTED
RSV RNA NPH QL NAA+NON-PROBE: NOT DETECTED
SARS-COV-2 RNA RESP QL NAA+PROBE: NOT DETECTED
SODIUM SERPL-SCNC: 136 MMOL/L (ref 136–145)
SP GR UR STRIP: 1.02 (ref 1–1.03)
SQUAMOUS #/AREA URNS HPF: ABNORMAL /HPF
UROBILINOGEN UR QL STRIP: ABNORMAL
WBC # UR STRIP: ABNORMAL /HPF
WBC NRBC COR # BLD AUTO: 21.42 10*3/MM3 (ref 3.4–10.8)

## 2025-02-17 PROCEDURE — 74176 CT ABD & PELVIS W/O CONTRAST: CPT

## 2025-02-17 PROCEDURE — 36415 COLL VENOUS BLD VENIPUNCTURE: CPT

## 2025-02-17 PROCEDURE — 83605 ASSAY OF LACTIC ACID: CPT | Performed by: NURSE PRACTITIONER

## 2025-02-17 PROCEDURE — 83690 ASSAY OF LIPASE: CPT | Performed by: NURSE PRACTITIONER

## 2025-02-17 PROCEDURE — 81001 URINALYSIS AUTO W/SCOPE: CPT | Performed by: NURSE PRACTITIONER

## 2025-02-17 PROCEDURE — 85025 COMPLETE CBC W/AUTO DIFF WBC: CPT | Performed by: NURSE PRACTITIONER

## 2025-02-17 PROCEDURE — 0202U NFCT DS 22 TRGT SARS-COV-2: CPT | Performed by: NURSE PRACTITIONER

## 2025-02-17 PROCEDURE — 80053 COMPREHEN METABOLIC PANEL: CPT | Performed by: NURSE PRACTITIONER

## 2025-02-17 PROCEDURE — 99284 EMERGENCY DEPT VISIT MOD MDM: CPT

## 2025-02-17 RX ORDER — SODIUM CHLORIDE 0.9 % (FLUSH) 0.9 %
10 SYRINGE (ML) INJECTION AS NEEDED
Status: DISCONTINUED | OUTPATIENT
Start: 2025-02-17 | End: 2025-02-17 | Stop reason: HOSPADM

## 2025-02-17 RX ORDER — METRONIDAZOLE 500 MG/1
500 TABLET ORAL 3 TIMES DAILY
Qty: 30 TABLET | Refills: 0 | Status: SHIPPED | OUTPATIENT
Start: 2025-02-17 | End: 2025-02-27

## 2025-02-17 RX ORDER — CIPROFLOXACIN 500 MG/1
500 TABLET, FILM COATED ORAL 2 TIMES DAILY
Qty: 20 TABLET | Refills: 0 | Status: SHIPPED | OUTPATIENT
Start: 2025-02-17 | End: 2025-02-27

## 2025-02-17 NOTE — ED PROVIDER NOTES
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]     * Posterior tibial tendinitis, left [M76.822]     * Contracture of joint of left foot [M24.575]    Post-Op Diagnosis: Same       Procedure(s):  14225 - BONE MARROW ASPIRATION  37858 - SUBTALAR JOINT ARTHRODESIS-LEFT FOOT  21657 - LAPIDUS ARTHRODESIS, LEFT FOOT  48201 - KIDNER RECONSTRUCTION-LEFT FOOT  65863 - FLEXOR TENOTOMY OF THIRD TOE-LEFT FOOT  04908 - APPLICATION OF BELOW KNEE SPLINT-LEFT FOOT  19537 - USE OF INTRA-OPERATIVE FLUOROSCOPY    Surgeon(s):  Jigar Rhodes DPM    Assistant:  Resident: Iban West, PGY-3  Student:  Pelon Alegria, MS4    Anesthesia: General with popliteal block    Hemostasis: anatomic dissection and electrocautery, pneumatic thigh tourniquet however not inflated    Injectables: Pre-Op 10 cc of 2% Lidocaine with epinephrine    Materials: 3-0/4-0/5-0 Vicryl, 4-0 Nylon    Estimated Blood Loss (mL): 200     Complications: None    Specimens:   * No specimens in log *    Implants:  Implant Name Type Inv. Item Serial No.  Lot No. LRB No. Used Action   ALLOGRAFT BNE 2.5 CC DEMINERALIZED BNE MTRX FIBER ALLOFIBER - [id number]  ALLOGRAFT BNE 2.5 CC DEMINERALIZED BNE MTRX FIBER ALLOFIBER [id number] [employer] [id number] Left 1 Implanted   GRAFT BNE INJ 1.5 CC AUG - [id number]  GRAFT BNE INJ 1.5 CC AUG [id number] [employer] [id number] Left 1 Implanted   KIT SONIC ANCHOR FORCE FIB 2.5X10MM 2 0 - [id number]  KIT SONIC ANCHOR FORCE FIB 2.5X10MM 2 0  [employer] [id number] Left 2 Implanted   SCREW BNE LAXMI 6.5X75 MM 16 MM THRD AXSOS 3 - [id number]  SCREW BNE LAXMI 6.5X75 MM 16  Subjective   History of Present Illness  Patient is a 49-year-old male presents the emergency department with left lower quadrant abdominal pain for the past 5 days.  He denies any vomiting or diarrhea.  He states the pain is gotten worse over the past few days.  He denies any fever or chills.  He states the pain is worse with movement.  He does have a history of diverticulitis.  No history of kidney stones.  No dysuria or hematuria.  He states his last bowel movement was yesterday and was normal for him.  No fever or chills.    History provided by:  Patient   used: No        Review of Systems   Constitutional: Negative.    HENT: Negative.     Eyes: Negative.    Respiratory: Negative.     Cardiovascular: Negative.    Gastrointestinal:  Positive for abdominal pain.        Patient is a 49-year-old male presents the emergency department with left lower quadrant abdominal pain for the past 5 days.  He denies any vomiting or diarrhea.  He states the pain is gotten worse over the past few days.  He denies any fever or chills.  He states the pain is worse with movement.  He does have a history of diverticulitis.  No history of kidney stones.  No dysuria or hematuria.  He states his last bowel movement was yesterday and was normal for him.  No fever or chills.     Endocrine: Negative.    Genitourinary: Negative.    Musculoskeletal: Negative.    Skin: Negative.    Allergic/Immunologic: Negative.    Neurological: Negative.    Hematological: Negative.    Psychiatric/Behavioral: Negative.     All other systems reviewed and are negative.      Past Medical History:   Diagnosis Date    Anxiety     Back pain     Fracture of lumbar spine 2002 and 2015    lower lumbar 2002; L1 2015    Head injury     Injury of back     Nausea with vomiting 03/28/2023    Neck pain     Overweight (BMI 25.0-29.9)     Restless leg syndrome        Allergies   Allergen Reactions    Contrast Dye (Echo Or Unknown Ct/Mr) Hives, Rash,  Angioedema and Seizure     Allergy to CT dye, was given premedication by rad protocol (13,7,1 hr prednisone 50mg, 50mg benedryl 1 hr prior), still had reaction  Itching, tongue swelling, lips swelling, hives, sob    Ct Contrast Itching       Past Surgical History:   Procedure Laterality Date    COLONOSCOPY N/A 8/31/2023    Procedure: COLONOSCOPY WITH ANESTHESIA;  Surgeon: Kristopher Brush DO;  Location:  [facility];  Service: Gastroenterology;  Laterality: N/A;  preop; screening   postop  PCP Joseph Stewart    COLONOSCOPY N/A 9/1/2023    Procedure: COLONOSCOPY WITH ANESTHESIA;  Surgeon: Kristopher Brush DO;  Location:  [facility];  Service: Gastroenterology;  Laterality: N/A;  Pre: Encounter for screening for malignant neoplasm of colon;  Post: Inadequate prep;  Vivek Stewart DO    COLONOSCOPY N/A 5/31/2024    Procedure: COLONOSCOPY WITH ANESTHESIA;  Surgeon: Kristopher Brush DO;  Location:  [facility];  Service: Gastroenterology;  Laterality: N/A;  Pre: Encounter for screening for malignant neoplasm of colon, Left lower quadrant abdominal pain;  Post: Fair prep;  Vivek Stewart DO    FACIAL FRACTURE SURGERY      INCISION AND DRAINAGE LEG Right 12/1/2018    Procedure: INCISION AND DRAINAGE OF RIGHT UPPER INNER THIGH, PLACEMENT OF WOUND VAC;  Surgeon: René Dallas MD;  Location:  [facility];  Service: General    INGUINAL HERNIA REPAIR Bilateral 8/22/2023    Procedure: INGUINAL HERNIA BILATERAL REPAIR LAPAROSCOPIC WITH DAVINCI ROBOT WITH MESH;  Surgeon: Dina Boyd MD;  Location:  [facility];  Service: Robotics - DaVinci;  Laterality: Bilateral;    KNEE SURGERY Right 09/2021    SPLENECTOMY         Family History   Problem Relation Age of Onset    Fibromyalgia Mother     Lung cancer Father     Colon cancer Neg Hx     Colon polyps Neg Hx     Esophageal cancer Neg Hx        Social History     Socioeconomic History    Marital status: Single   Tobacco Use    Smoking status: Never     MM THRD AXSOS 3  [employer] [facility]  Left 1 Implanted   SCREW BNE LAXMI 6.5X80 MM 16 MM THRD AXSOS 3 - [id number]  SCREW BNE LAXMI 6.5X80 MM 16 MM THRD AXSOS 3  [employer] [facility]  Left 1 Implanted   ANCHOR SUTURE STRL CITREFIX XPRESS - [id number]  ANCHOR SUTURE STRL CITREFIX XPRESS  [employer] [facility] [id number] Left 1 Implanted   GRAFT BNE [id number] BICORT BATISTA WDG FOR OSTEOTMY - [id number]  GRAFT BNE [id number] BICORT BATISTA WDG FOR OSTEOTMY [id number] [facility] [id number] Left 1 Implanted   PLATE BNE L LAPIDUS POLYAX YURIDIA T10 CRSS ANCHORAGE - [id number]  PLATE BNE L LAPIDUS POLYAX YURIDIA T10 CRSS ANCHORAGE  [employer] [facility]  Left 1 Implanted   SCREW CP LAG 4.1X30 MM T10 - [id number]  SCREW CP LAG 4.1X30 MM T10  [employer] [facility]  Left 1 Implanted   SCREW BNE L14MM DIA3.5MM CANC TI YURIDIA FULL THRD T10 DRV FOR - [id number]  SCREW BNE L14MM DIA3.5MM CANC TI YURIDIA FULL THRD T10 DRV FOR  [employer] [facility]  Left 1 Implanted   SCREW BNE L16MM DIA3.5MM SNOW TI YURIDIA FULL THRD T10 DRV FOR - [id number]  SCREW BNE L16MM DIA3.5MM SNOW TI YURIDIA FULL THRD T10 DRV FOR  [employer] [facility]  Left 1 Implanted   SCREW BNE L16MM DIA3.5MM SNOW TI YURIDIA FULL THRD T10 DRV FOR - [id number]  SCREW BNE L16MM DIA3.5MM SNOW TI YURIDIA FULL THRD T10 DRV FOR  [employer] [facility]  Left 1 Implanted   SCREW BNE L16MM DIA3.5MM SNOW TI YURIDIA FULL THRD T10 DRV FOR - [id number]  SCREW BNE L16MM DIA3.5MM SNOW TI YURIDIA FULL THRD T10 DRV FOR  [employer] [facility]  Left 1 Implanted         Drains: * No LDAs found *    Findings:  Infection Present At Time Of Surgery (PATOS) (choose all levels that have infection present):  No infection present  Other Findings: Excellent compression of subtalar joint with good fixation. HAV deformity was corrected and length achieved with bone block. Posterior tibial tendon was attenuated and flattened with no apparent  "Smokeless tobacco: Never   Vaping Use    Vaping status: Never Used   Substance and Sexual Activity    Alcohol use: Not Currently    Drug use: No    Sexual activity: Yes     Partners: Female       Prior to Admission medications    Medication Sig Start Date End Date Taking? Authorizing Provider   albuterol sulfate  (90 Base) MCG/ACT inhaler Inhale 2 puffs Every 4 (Four) Hours As Needed for Shortness of Air or Wheezing. 2/3/25   Vivek Stewart DO   dexlansoprazole (DEXILANT) 60 MG capsule Take 1 capsule by mouth Daily. 1/9/25   Vivek Stewart DO   EPINEPHrine (EPIPEN) 0.3 MG/0.3ML solution auto-injector injection Inject 0.3 mL into the appropriate muscle as directed by prescriber 1 (One) Time. 8/5/24   Provider, MD Tiffanie   gabapentin (NEURONTIN) 300 MG capsule Take 1 capsule by mouth 3 (Three) Times a Day. 6/24/24   Vivek Stewart DO   rimegepant sulfate ODT (Nurtec) 75 MG tablet Take 1 tablet by mouth Daily As Needed (as needed for headache). 2/7/25   Angelica Almonte APRN   rOPINIRole (REQUIP) 0.25 MG tablet Take 1 tablet by mouth Every Night. Take 1 hour before bedtime. 1/9/25   Vivek Stewart DO       /81 (BP Location: Right arm, Patient Position: Sitting)   Pulse 90   Temp 97.4 °F (36.3 °C) (Oral)   Resp 20   Ht 185.4 cm (73\")   Wt 97.5 kg (215 lb)   SpO2 94%   BMI 28.37 kg/m²     Objective   Physical Exam  Vitals and nursing note reviewed.   Constitutional:       Appearance: He is well-developed.      Comments: Nontoxic-appearing.  No acute distress.   HENT:      Head: Normocephalic and atraumatic.   Eyes:      Conjunctiva/sclera: Conjunctivae normal.      Pupils: Pupils are equal, round, and reactive to light.   Cardiovascular:      Rate and Rhythm: Normal rate and regular rhythm.      Heart sounds: Normal heart sounds.   Pulmonary:      Effort: Pulmonary effort is normal.      Breath sounds: Normal breath sounds.   Abdominal:      General: Bowel sounds are normal.      " Palpations: Abdomen is soft.      Comments: Soft nondistended, tendernness llq abd. No guarding or rebound noted.  Bowel sounds   Musculoskeletal:         General: Normal range of motion.      Cervical back: Normal range of motion and neck supple.   Skin:     General: Skin is warm and dry.   Neurological:      Mental Status: He is alert and oriented to person, place, and time.      Deep Tendon Reflexes: Reflexes are normal and symmetric.   Psychiatric:         Behavior: Behavior normal.         Thought Content: Thought content normal.         Judgment: Judgment normal.         Procedures         Lab Results (last 24 hours)       Procedure Component Value Units Date/Time    Respiratory Panel PCR w/COVID-19(SARS-CoV-2) [facility] In-House, NP Swab in UTM/VTM, 2 HR TAT - Swab, Nasopharynx [[id number]]  (Normal) Collected: 02/17/25 0857    Specimen: Swab from Nasopharynx Updated: 02/17/25 1007     ADENOVIRUS, PCR Not Detected     Coronavirus 229E Not Detected     Coronavirus HKU1 Not Detected     Coronavirus NL63 Not Detected     Coronavirus OC43 Not Detected     COVID19 Not Detected     Human Metapneumovirus Not Detected     Human Rhinovirus/Enterovirus Not Detected     Influenza A PCR Not Detected     Influenza B PCR Not Detected     Parainfluenza Virus 1 Not Detected     Parainfluenza Virus 2 Not Detected     Parainfluenza Virus 3 Not Detected     Parainfluenza Virus 4 Not Detected     RSV, PCR Not Detected     Bordetella pertussis pcr Not Detected     Bordetella parapertussis PCR Not Detected     Chlamydophila pneumoniae PCR Not Detected     Mycoplasma pneumo by PCR Not Detected    Narrative:      In the setting of a positive respiratory panel with a viral infection PLUS a negative procalcitonin without other underlying concern for bacterial infection, consider observing off antibiotics or discontinuation of antibiotics and continue supportive care. If the respiratory panel is positive for atypical  bacterial infection (Bordetella pertussis, Chlamydophila pneumoniae, or Mycoplasma pneumoniae), consider antibiotic de-escalation to target atypical bacterial infection.    Comprehensive Metabolic Panel [[id number]]  (Abnormal) Collected: 02/17/25 0901    Specimen: Blood from Arm, Right Updated: 02/17/25 0939     Glucose 124 mg/dL      BUN 8 mg/dL      Creatinine 1.09 mg/dL      Sodium 136 mmol/L      Potassium 3.8 mmol/L      Chloride 105 mmol/L      CO2 21.0 mmol/L      Calcium 10.6 mg/dL      Total Protein 7.0 g/dL      Albumin 3.7 g/dL      ALT (SGPT) 15 U/L      AST (SGOT) 13 U/L      Alkaline Phosphatase 95 U/L      Total Bilirubin 0.6 mg/dL      Globulin 3.3 gm/dL      A/G Ratio 1.1 g/dL      BUN/Creatinine Ratio 7.3     Anion Gap 10.0 mmol/L      eGFR 83.7 mL/min/1.73     Narrative:      GFR Categories in Chronic Kidney Disease (CKD)      GFR Category          GFR (mL/min/1.73)    Interpretation  G1                     90 or greater         Normal or high (1)  G2                      60-89                Mild decrease (1)  G3a                   45-59                Mild to moderate decrease  G3b                   30-44                Moderate to severe decrease  G4                    15-29                Severe decrease  G5                    14 or less           Kidney failure          (1)In the absence of evidence of kidney disease, neither GFR category G1 or G2 fulfill the criteria for CKD.    eGFR calculation 2021 CKD-EPI creatinine equation, which does not include race as a factor    Lipase [[id number]]  (Normal) Collected: 02/17/25 0901    Specimen: Blood from Arm, Right Updated: 02/17/25 0934     Lipase 23 U/L     Lactic Acid, Plasma [[id number]]  (Normal) Collected: 02/17/25 0901    Specimen: Blood from Arm, Right Updated: 02/17/25 0943     Lactate 1.7 mmol/L     Urinalysis With Culture If Indicated - Urine, Clean Catch [[id number]] Collected: 02/17/25 1128    Specimen: Urine, Clean Catch Updated:  02/17/25 1137    Urinalysis, Microscopic Only - Urine, Clean Catch [[id number]] Collected: 02/17/25 1128    Specimen: Urine, Clean Catch Updated: 02/17/25 1204    CBC & Differential [[id number]]  (Abnormal) Collected: 02/17/25 1150    Specimen: Blood Updated: 02/17/25 1201    Narrative:      The following orders were created for panel order CBC & Differential.  Procedure                               Abnormality         Status                     ---------                               -----------         ------                     CBC Auto Differential[[id number]]        Abnormal            Final result                 Please view results for these tests on the individual orders.    CBC Auto Differential [[id number]]  (Abnormal) Collected: 02/17/25 1150    Specimen: Blood Updated: 02/17/25 1201     WBC 21.42 10*3/mm3      RBC 5.49 10*6/mm3      Hemoglobin 15.8 g/dL      Hematocrit 47.8 %      MCV 87.1 fL      MCH 28.8 pg      MCHC 33.1 g/dL      RDW 14.6 %      RDW-SD 46.8 fl      MPV 9.9 fL      Platelets 465 10*3/mm3      Neutrophil % 73.7 %      Lymphocyte % 16.2 %      Monocyte % 8.7 %      Eosinophil % 0.4 %      Basophil % 0.5 %      Immature Grans % 0.5 %      Neutrophils, Absolute 15.79 10*3/mm3      Lymphocytes, Absolute 3.47 10*3/mm3      Monocytes, Absolute 1.87 10*3/mm3      Eosinophils, Absolute 0.08 10*3/mm3      Basophils, Absolute 0.10 10*3/mm3      Immature Grans, Absolute 0.11 10*3/mm3      nRBC 0.0 /100 WBC             CT Abdomen Pelvis Without Contrast   Final Result   1. Evidence of acute sigmoid diverticulitis. It has not significantly   changed since the previous study dated 1/8/2025. This may represent   recurrent, evolving or resolving process.   2. A 4 mm nonobstructing calculus lower pole of the left kidney.   3. Nonacute stable findings of the remaining abdomen and pelvis similar   to the previous study.                                                           This report was signed and  finalized on 2/17/2025 10:14 AM by Dr. Artis Melo MD.              ED Course  ED Course as of 02/17/25 1208   Mon Feb 17, 2025   1203 CBC white count 21.42 hemoglobin is 15.8 hematocrit is 47.8 platelets 465 lactate is 1.7 CMP shows a glucose of 124 BUN 8 creatinine 1.09 sodium 136 potassium 3.8 respiratory panel is negative CT of the abdomen pelvis shows acute diverticulitis kidney stone in the left lower pole of the left kidney.  Patient's had no vomiting or fever.  He does feel comfortable going home and being treated as an outpatient for his symptoms.  Will send the patient home with Flagyl and Cipro.  He will follow-up with his primary care doctor this week.  He also has an appointment within the month with GI for his routine colonoscopy as well.  Advised patient to return emergency department if he has fever increased pain, vomiting or any of his symptoms worsen.  Patient is in agreement with the care plan and discharged shortly in stable condition. [CW]      ED Course User Index  [CW] Madalyn Cobb APRN        Medical Decision Making  Patient is a 49-year-old male presents the emergency department with left lower quadrant abdominal pain for the past 5 days.  He denies any vomiting or diarrhea.  He states the pain is gotten worse over the past few days.  He denies any fever or chills.  He states the pain is worse with movement.  He does have a history of diverticulitis.  No history of kidney stones.  No dysuria or hematuria.  He states his last bowel movement was yesterday and was normal for him.  No fever or chills.  Course Treatment in the ED: Nontoxic-appearing.  No distress.  Vitals are stable with a blood pressure 144/81, temp 97.4, heart rate 90, respirations 20, O2 sats 100% on room air.  Lungs clear to auscultation.  CV normal sinus rhythm.  Abdomen is soft, nondistended.  Patient has tenderness to left lower quadrant abdomen.  There is no guarding or rebound noted.  Patient denies  any black or tarry stools.  Laboratory studies CT of the abdomen pelvis have been ordered.  Differential diagnosis to include but limited to;: bowel obstruction; diverticulitis; kidney stone; uti; peylunephritis; and others  Labs Reviewed  COMPREHENSIVE METABOLIC PANEL - Abnormal; Notable for the following components:     Glucose                       124 (*)                CO2                           21.0 (*)               Calcium                       10.6 (*)            All other components within normal limits         Narrative: GFR Categories in Chronic Kidney Disease (CKD)                                      GFR Category          GFR (mL/min/1.73)    Interpretation                  G1                     90 or greater         Normal or high (1)                  G2                      60-89                Mild decrease (1)                  G3a                   45-59                Mild to moderate decrease                  G3b                   30-44                Moderate to severe decrease                  G4                    15-29                Severe decrease                  G5                    14 or less           Kidney failure                                          (1)In the absence of evidence of kidney disease, neither GFR category G1 or G2 fulfill the criteria for CKD.                                    eGFR calculation 2021 CKD-EPI creatinine equation, which does not include race as a factor  CBC WITH AUTO DIFFERENTIAL - Abnormal; Notable for the following components:     WBC                           21.42 (*)               Platelets                     465 (*)                Lymphocyte %                  16.2 (*)               Neutrophils, Absolute         15.79 (*)               Lymphocytes, Absolute         3.47 (*)               Monocytes, Absolute           1.87 (*)               Immature Grans, Absolute      0.11 (*)            All other components within normal  limits  RESPIRATORY PANEL PCR W/ COVID-19 (SARS-COV-2), NP SWAB IN UTM/VTP, 2 HR TAT - Normal         Narrative: In the setting of a positive respiratory panel with a viral infection PLUS a negative procalcitonin without other underlying concern for bacterial infection, consider observing off antibiotics or discontinuation of antibiotics and continue supportive care. If the respiratory panel is positive for atypical bacterial infection (Bordetella pertussis, Chlamydophila pneumoniae, or Mycoplasma pneumoniae), consider antibiotic de-escalation to target atypical bacterial infection.  LIPASE - Normal  LACTIC ACID, PLASMA - Normal  URINALYSIS W/ CULTURE IF INDICATED  URINALYSIS, MICROSCOPIC ONLY  CBC AND DIFFERENTIAL  CT Abdomen Pelvis Without Contrast   Final Result    1. Evidence of acute sigmoid diverticulitis. It has not significantly    changed since the previous study dated 1/8/2025. This may represent    recurrent, evolving or resolving process.    2. A 4 mm nonobstructing calculus lower pole of the left kidney.    3. Nonacute stable findings of the remaining abdomen and pelvis similar    to the previous study.                                                                          This report was signed and finalized on 2/17/2025 10:14 AM by Dr. Artis Melo MD.          CBC white count 21.42 hemoglobin is 15.8 hematocrit is 47.8 platelets 465 lactate is 1.7 CMP shows a glucose of 124 BUN 8 creatinine 1.09 sodium 136 potassium 3.8 respiratory panel is negative CT of the abdomen pelvis shows acute diverticulitis kidney stone in the left lower pole of the left kidney.  Patient's had no vomiting or fever.  He does feel comfortable going home and being treated as an outpatient for his symptoms.  Will send the patient home with Flagyl and Cipro.  He will follow-up with his primary care doctor this week.  He also has an appointment within the month with GI for his routine colonoscopy as well.  Advised  patient to return emergency department if he has fever increased pain, vomiting or any of his symptoms worsen.  Patient is in agreement with the care plan and discharged shortly in stable condition.      Problems Addressed:  Diverticulitis: complicated acute illness or injury  Kidney stone: complicated acute illness or injury    Amount and/or Complexity of Data Reviewed  Labs: ordered. Decision-making details documented in ED Course.  Radiology: ordered. Decision-making details documented in ED Course.    Risk  Prescription drug management.         Final diagnoses:   Diverticulitis   Kidney stone          Madalyn Cobb, APRN  02/17/25 1200

## 2025-02-17 NOTE — Clinical Note
[facility] EMERGENCY DEPARTMENT  [address]  [facility] [address]  Phone: [phone]    Saulo Shepard was seen and treated in our emergency department on 2/17/2025.  He may return to work on 02/21/2025.         Thank you for choosing [facility].    Madlayn Cobb APRN

## 2025-03-05 ENCOUNTER — TELEPHONE (OUTPATIENT)
Dept: FAMILY MEDICINE CLINIC | Facility: CLINIC | Age: 50
End: 2025-03-05
Payer: COMMERCIAL

## 2025-03-05 NOTE — TELEPHONE ENCOUNTER
Caller: TWILA    Relationship to patient: Other    Best call back number: [phone]     TWILA MARY [facility] CALLED TO INFORM PROVIDER PT IS BEING ENROLLED IN CASE MANAGEMENT DUE TO THE FREQUENCY OF ER VISITS.

## 2025-04-02 ENCOUNTER — HOSPITAL ENCOUNTER (EMERGENCY)
Facility: HOSPITAL | Age: 50
Discharge: HOME | End: 2025-04-02
Payer: COMMERCIAL

## 2025-04-02 ENCOUNTER — APPOINTMENT (OUTPATIENT)
Dept: GENERAL RADIOLOGY | Facility: HOSPITAL | Age: 50
End: 2025-04-02
Payer: COMMERCIAL

## 2025-04-02 VITALS
RESPIRATION RATE: 20 BRPM | BODY MASS INDEX: 29.42 KG/M2 | HEIGHT: 73 IN | DIASTOLIC BLOOD PRESSURE: 87 MMHG | WEIGHT: 222 LBS | SYSTOLIC BLOOD PRESSURE: 126 MMHG | TEMPERATURE: 97.8 F | HEART RATE: 95 BPM | OXYGEN SATURATION: 99 %

## 2025-04-02 DIAGNOSIS — R07.9 CHEST PAIN, UNSPECIFIED TYPE: Primary | ICD-10-CM

## 2025-04-02 LAB
ALBUMIN SERPL-MCNC: 3.8 G/DL (ref 3.5–5.2)
ALBUMIN/GLOB SERPL: 1.4 G/DL
ALP SERPL-CCNC: 89 U/L (ref 39–117)
ALT SERPL W P-5'-P-CCNC: 37 U/L (ref 1–41)
ANION GAP SERPL CALCULATED.3IONS-SCNC: 9 MMOL/L (ref 5–15)
APTT PPP: 29.9 SECONDS (ref 24.5–36)
AST SERPL-CCNC: 35 U/L (ref 1–40)
BASOPHILS # BLD AUTO: 0.18 10*3/MM3 (ref 0–0.2)
BASOPHILS NFR BLD AUTO: 1.4 % (ref 0–1.5)
BILIRUB SERPL-MCNC: 0.5 MG/DL (ref 0–1.2)
BUN SERPL-MCNC: 11 MG/DL (ref 6–20)
BUN/CREAT SERPL: 10.5 (ref 7–25)
CALCIUM SPEC-SCNC: 10.1 MG/DL (ref 8.6–10.5)
CHLORIDE SERPL-SCNC: 105 MMOL/L (ref 98–107)
CO2 SERPL-SCNC: 22 MMOL/L (ref 22–29)
CREAT SERPL-MCNC: 1.05 MG/DL (ref 0.76–1.27)
DEPRECATED RDW RBC AUTO: 47.1 FL (ref 37–54)
EGFRCR SERPLBLD CKD-EPI 2021: 87.6 ML/MIN/1.73
EOSINOPHIL # BLD AUTO: 0.46 10*3/MM3 (ref 0–0.4)
EOSINOPHIL NFR BLD AUTO: 3.5 % (ref 0.3–6.2)
ERYTHROCYTE [DISTWIDTH] IN BLOOD BY AUTOMATED COUNT: 15 % (ref 12.3–15.4)
GEN 5 1HR TROPONIN T REFLEX: <6 NG/L
GLOBULIN UR ELPH-MCNC: 2.8 GM/DL
GLUCOSE SERPL-MCNC: 97 MG/DL (ref 65–99)
HCT VFR BLD AUTO: 45 % (ref 37.5–51)
HGB BLD-MCNC: 15 G/DL (ref 13–17.7)
IMM GRANULOCYTES # BLD AUTO: 0.04 10*3/MM3 (ref 0–0.05)
IMM GRANULOCYTES NFR BLD AUTO: 0.3 % (ref 0–0.5)
INR PPP: 0.95 (ref 0.91–1.09)
LYMPHOCYTES # BLD AUTO: 3.92 10*3/MM3 (ref 0.7–3.1)
LYMPHOCYTES NFR BLD AUTO: 29.7 % (ref 19.6–45.3)
MAGNESIUM SERPL-MCNC: 2.2 MG/DL (ref 1.6–2.6)
MCH RBC QN AUTO: 28.6 PG (ref 26.6–33)
MCHC RBC AUTO-ENTMCNC: 33.3 G/DL (ref 31.5–35.7)
MCV RBC AUTO: 85.7 FL (ref 79–97)
MONOCYTES # BLD AUTO: 1.41 10*3/MM3 (ref 0.1–0.9)
MONOCYTES NFR BLD AUTO: 10.7 % (ref 5–12)
NEUTROPHILS NFR BLD AUTO: 54.4 % (ref 42.7–76)
NEUTROPHILS NFR BLD AUTO: 7.19 10*3/MM3 (ref 1.7–7)
NRBC BLD AUTO-RTO: 0 /100 WBC (ref 0–0.2)
PLATELET # BLD AUTO: 390 10*3/MM3 (ref 140–450)
PMV BLD AUTO: 10.5 FL (ref 6–12)
POTASSIUM SERPL-SCNC: 4.1 MMOL/L (ref 3.5–5.2)
PROT SERPL-MCNC: 6.6 G/DL (ref 6–8.5)
PROTHROMBIN TIME: 13.2 SECONDS (ref 11.8–14.8)
RBC # BLD AUTO: 5.25 10*6/MM3 (ref 4.14–5.8)
SODIUM SERPL-SCNC: 136 MMOL/L (ref 136–145)
TROPONIN T NUMERIC DELTA: NORMAL
TROPONIN T SERPL HS-MCNC: <6 NG/L
WBC NRBC COR # BLD AUTO: 13.2 10*3/MM3 (ref 3.4–10.8)

## 2025-04-02 PROCEDURE — 99284 EMERGENCY DEPT VISIT MOD MDM: CPT

## 2025-04-02 PROCEDURE — 80053 COMPREHEN METABOLIC PANEL: CPT

## 2025-04-02 PROCEDURE — 83735 ASSAY OF MAGNESIUM: CPT

## 2025-04-02 PROCEDURE — 25010000002 ONDANSETRON PER 1 MG

## 2025-04-02 PROCEDURE — 25010000002 FAMOTIDINE 10 MG/ML SOLUTION

## 2025-04-02 PROCEDURE — 93005 ELECTROCARDIOGRAM TRACING: CPT

## 2025-04-02 PROCEDURE — 96375 TX/PRO/DX INJ NEW DRUG ADDON: CPT

## 2025-04-02 PROCEDURE — 93010 ELECTROCARDIOGRAM REPORT: CPT | Performed by: INTERNAL MEDICINE

## 2025-04-02 PROCEDURE — 85025 COMPLETE CBC W/AUTO DIFF WBC: CPT

## 2025-04-02 PROCEDURE — 84484 ASSAY OF TROPONIN QUANT: CPT

## 2025-04-02 PROCEDURE — 85730 THROMBOPLASTIN TIME PARTIAL: CPT

## 2025-04-02 PROCEDURE — 36415 COLL VENOUS BLD VENIPUNCTURE: CPT

## 2025-04-02 PROCEDURE — 96374 THER/PROPH/DIAG INJ IV PUSH: CPT

## 2025-04-02 PROCEDURE — 85610 PROTHROMBIN TIME: CPT

## 2025-04-02 PROCEDURE — 71045 X-RAY EXAM CHEST 1 VIEW: CPT

## 2025-04-02 RX ORDER — ASPIRIN 81 MG/1
324 TABLET, CHEWABLE ORAL ONCE
Status: COMPLETED | OUTPATIENT
Start: 2025-04-02 | End: 2025-04-02

## 2025-04-02 RX ORDER — ALUMINA, MAGNESIA, AND SIMETHICONE 2400; 2400; 240 MG/30ML; MG/30ML; MG/30ML
15 SUSPENSION ORAL ONCE
Status: COMPLETED | OUTPATIENT
Start: 2025-04-02 | End: 2025-04-02

## 2025-04-02 RX ORDER — ONDANSETRON 2 MG/ML
4 INJECTION INTRAMUSCULAR; INTRAVENOUS ONCE
Status: COMPLETED | OUTPATIENT
Start: 2025-04-02 | End: 2025-04-02

## 2025-04-02 RX ORDER — LIDOCAINE HYDROCHLORIDE 20 MG/ML
10 SOLUTION OROPHARYNGEAL
Status: DISCONTINUED | OUTPATIENT
Start: 2025-04-02 | End: 2025-04-02 | Stop reason: HOSPADM

## 2025-04-02 RX ORDER — FAMOTIDINE 10 MG/ML
20 INJECTION, SOLUTION INTRAVENOUS ONCE
Status: COMPLETED | OUTPATIENT
Start: 2025-04-02 | End: 2025-04-02

## 2025-04-02 RX ADMIN — LIDOCAINE HYDROCHLORIDE 10 ML: 20 SOLUTION ORAL at 19:55

## 2025-04-02 RX ADMIN — ONDANSETRON 4 MG: 2 INJECTION INTRAMUSCULAR; INTRAVENOUS at 18:29

## 2025-04-02 RX ADMIN — ALUMINUM HYDROXIDE, MAGNESIUM HYDROXIDE, AND DIMETHICONE 15 ML: 400; 400; 40 SUSPENSION ORAL at 19:55

## 2025-04-02 RX ADMIN — ASPIRIN 324 MG: 81 TABLET, CHEWABLE ORAL at 18:29

## 2025-04-02 RX ADMIN — FAMOTIDINE 20 MG: 10 INJECTION INTRAVENOUS at 18:29

## 2025-04-02 NOTE — ED PROVIDER NOTES
Subjective   History of Present Illness  Patient is a 49-year-old male who presents emergency department today for complaint of chest pain.  He states that it began this morning.  He states that he does also feel nauseated and feels like there is something caught in his throat.  He also endorses lip tingling.  He states that he feels like there is elephant sitting on his chest.  He does have some shortness of breath.  He denies any smoking history.  He denies any significant medical family history as well.  Denies any weakness, unilateral deficits, headache, fever, chills, vomiting, diarrhea, and any other symptoms.  Patient has past medical history of anxiety, back pain, fracture of lumbar spine, head injury, injury of back, nausea with vomiting, neck pain, overweight, and restless leg syndrome.        Review of Systems   Respiratory:  Positive for shortness of breath.    Cardiovascular:  Positive for chest pain.   Gastrointestinal:  Positive for nausea.   All other systems reviewed and are negative.      Past Medical History:   Diagnosis Date    Anxiety     Back pain     Fracture of lumbar spine 2002 and 2015    lower lumbar 2002; L1 2015    Head injury     Injury of back     Nausea with vomiting 03/28/2023    Neck pain     Overweight (BMI 25.0-29.9)     Restless leg syndrome        Allergies   Allergen Reactions    Contrast Dye (Echo Or Unknown Ct/Mr) Hives, Rash, Angioedema and Seizure     Allergy to CT dye, was given premedication by rad protocol (13,7,1 hr prednisone 50mg, 50mg benedryl 1 hr prior), still had reaction  Itching, tongue swelling, lips swelling, hives, sob    Ct Contrast Itching       Past Surgical History:   Procedure Laterality Date    COLONOSCOPY N/A 8/31/2023    Procedure: COLONOSCOPY WITH ANESTHESIA;  Surgeon: Kristopher Brush DO;  Location: [facility] ENDOSCOPY;  Service: Gastroenterology;  Laterality: N/A;  preop; screening   postop  PCP Joseph Stewart    COLONOSCOPY N/A 9/1/2023     Procedure: COLONOSCOPY WITH ANESTHESIA;  Surgeon: Kristopher Brush DO;  Location:  [facility] ENDOSCOPY;  Service: Gastroenterology;  Laterality: N/A;  Pre: Encounter for screening for malignant neoplasm of colon;  Post: Inadequate prep;  Vivek Stewart DO    COLONOSCOPY N/A 5/31/2024    Procedure: COLONOSCOPY WITH ANESTHESIA;  Surgeon: Kristopher Brush DO;  Location:  [facility] ENDOSCOPY;  Service: Gastroenterology;  Laterality: N/A;  Pre: Encounter for screening for malignant neoplasm of colon, Left lower quadrant abdominal pain;  Post: Fair prep;  Vivek Stewart DO    FACIAL FRACTURE SURGERY      INCISION AND DRAINAGE LEG Right 12/1/2018    Procedure: INCISION AND DRAINAGE OF RIGHT UPPER INNER THIGH, PLACEMENT OF WOUND VAC;  Surgeon: René Dallas MD;  Location: [facility] OR;  Service: General    INGUINAL HERNIA REPAIR Bilateral 8/22/2023    Procedure: INGUINAL HERNIA BILATERAL REPAIR LAPAROSCOPIC WITH DAVINCI ROBOT WITH MESH;  Surgeon: Dina Boyd MD;  Location:  [facility] OR;  Service: Robotics - DaVinci;  Laterality: Bilateral;    KNEE SURGERY Right 09/2021    SPLENECTOMY         Family History   Problem Relation Age of Onset    Fibromyalgia Mother     Lung cancer Father     Colon cancer Neg Hx     Colon polyps Neg Hx     Esophageal cancer Neg Hx        Social History     Socioeconomic History    Marital status: Single   Tobacco Use    Smoking status: Never    Smokeless tobacco: Never   Vaping Use    Vaping status: Never Used   Substance and Sexual Activity    Alcohol use: Not Currently    Drug use: No    Sexual activity: Yes     Partners: Female           Objective   Physical Exam  Vitals and nursing note reviewed.   Constitutional:       General: He is not in acute distress.     Appearance: Normal appearance. He is normal weight. He is not ill-appearing, toxic-appearing or diaphoretic.   HENT:      Head: Normocephalic and atraumatic.      Right Ear: External ear normal.      Left Ear: External ear  normal.      Nose: Nose normal. No congestion or rhinorrhea.      Mouth/Throat:      Mouth: Mucous membranes are moist.      Pharynx: Oropharynx is clear. No oropharyngeal exudate or posterior oropharyngeal erythema.   Eyes:      Extraocular Movements: Extraocular movements intact.      Conjunctiva/sclera: Conjunctivae normal.   Neck:      Vascular: No carotid bruit.   Cardiovascular:      Rate and Rhythm: Normal rate and regular rhythm.      Pulses: Normal pulses.      Heart sounds: Normal heart sounds. No murmur heard.     No gallop.   Pulmonary:      Effort: Pulmonary effort is normal. No respiratory distress.      Breath sounds: Normal breath sounds. No stridor. No wheezing, rhonchi or rales.   Chest:      Chest wall: No tenderness.   Abdominal:      General: Abdomen is flat. Bowel sounds are normal. There is no distension.      Palpations: Abdomen is soft. There is no mass.      Tenderness: There is no abdominal tenderness. There is no right CVA tenderness, left CVA tenderness, guarding or rebound.      Hernia: No hernia is present.   Musculoskeletal:         General: No swelling, tenderness, deformity or signs of injury. Normal range of motion.      Cervical back: Normal range of motion and neck supple. No rigidity or tenderness.      Right lower leg: No edema.      Left lower leg: No edema.   Lymphadenopathy:      Cervical: No cervical adenopathy.   Skin:     General: Skin is warm and dry.      Capillary Refill: Capillary refill takes less than 2 seconds.      Coloration: Skin is not jaundiced or pale.      Findings: No bruising, erythema, lesion or rash.   Neurological:      General: No focal deficit present.      Mental Status: He is alert and oriented to person, place, and time. Mental status is at baseline.      Cranial Nerves: No cranial nerve deficit.      Sensory: No sensory deficit.      Motor: No weakness.      Coordination: Coordination normal.      Gait: Gait normal.      Deep Tendon Reflexes:  Reflexes normal.   Psychiatric:         Mood and Affect: Mood normal.         Behavior: Behavior normal.         Thought Content: Thought content normal.         Judgment: Judgment normal.         Procedures           ED Course  ED Course as of 04/02/25 2123 Wed Apr 02, 2025   1742 CBC, CMP, PT, INR, troponin, magnesium, EKG, chest x-ray, 324 mg aspirin, Pepcid, and Zofran ordered. [BS]   1743 PERC Rule for Pulmonary Embolism - MDCalc  Calculated on Apr 02 2025 6:43 PM  0 criteria -> No need for further workup, as <2% chance of PE. If no criteria are positive and clinician's pre-test probability is <15%, PERC Rule criteria are satisfied. [BS]   1743 EKG shows normal sinus rhythm, rate 87 [BS]   1834 Initial troponin less than 6, CBC shows white cell count of 13.20. [BS]   1837 CMP and magnesium were unremarkable. [BS]   1904 Chest xray  IMPRESSION:  Shallow inspiration, no acute cardiopulmonary abnormality.   [BS]   1905 Awaiting repeat troponin. [BS]   1922 HEART Score for Major Cardiac Events - MDCalc  Calculated on Apr 02 2025 8:22 PM  1 points -> Low Score (0-3 points) Risk of MACE of 0.9-1.7%. [BS]   1924 Patient has had some relief of symptoms with 20 mg IV Pepcid and 4 mg IV Zofran.  He states he does have some burning in his throat still.  I will try a GI cocktail. [BS]   1924 Repeat troponin less than 6. [BS]   1929 Spoke with Dr. Castillo, instructed to dc with f/u with PCP.  [BS]   1935 Patient is on dexlansoprazole daily.  Will not prescribe any PPIs [BS]      ED Course User Index  [BS] Kyleigh Jeffrey, APRN                  HEART Score: 1   Shared Decision Making  I discussed the findings with the patient/patient representative who is in agreement with the treatment plan and the final disposition.  Risks and benefits of discharge and/or observation/admission were discussed: Yes                                      Medical Decision Making  Problems Addressed:  Chest pain, unspecified type: complicated  acute illness or injury    Amount and/or Complexity of Data Reviewed  Labs: ordered.  Radiology: ordered.    Risk  OTC drugs.  Prescription drug management.    Patient is a 49-year-old male who presents emergency department today for complaint of chest pain.  He states that it began this morning.  He states that he does also feel nauseated and feels like there is something caught in his throat.  He also endorses lip tingling.  He states that he feels like there is elephant sitting on his chest.  He does have some shortness of breath.  He denies any smoking history.  He denies any significant medical family history as well.  Denies any weakness, unilateral deficits, headache, fever, chills, vomiting, diarrhea, and any other symptoms.  Patient has past medical history of anxiety, back pain, fracture of lumbar spine, head injury, injury of back, nausea with vomiting, neck pain, overweight, and restless leg syndrome.  Differential diagnosis include but are not limited to MI, NSTEMI, pneumonia, stroke, TIA, GERD, pulmonary embolism, anxiety, and other etiologies.    Labs Reviewed   CBC WITH AUTO DIFFERENTIAL - Abnormal; Notable for the following components:       Result Value    WBC 13.20 (*)     Neutrophils, Absolute 7.19 (*)     Lymphocytes, Absolute 3.92 (*)     Monocytes, Absolute 1.41 (*)     Eosinophils, Absolute 0.46 (*)     All other components within normal limits   PROTIME-INR - Normal   APTT - Normal    Narrative:     PTT = The equivalent PTT values for the therapeutic range of heparin levels at 0.3 to 0.7 U/ml are 77 - 99 seconds.   TROPONIN - Normal    Narrative:     High Sensitive Troponin T Reference Range:  <14.0 ng/L- Negative Female for AMI  <22.0 ng/L- Negative Male for AMI  >=14 - Abnormal Female indicating possible myocardial injury.  >=22 - Abnormal Male indicating possible myocardial injury.   Clinicians would have to utilize clinical acumen, EKG, Troponin, and serial changes to determine if it is  an Acute Myocardial Infarction or myocardial injury due to an underlying chronic condition.        MAGNESIUM - Normal   COMPREHENSIVE METABOLIC PANEL    Narrative:     GFR Categories in Chronic Kidney Disease (CKD)      GFR Category          GFR (mL/min/1.73)    Interpretation  G1                     90 or greater         Normal or high (1)  G2                      60-89                Mild decrease (1)  G3a                   45-59                Mild to moderate decrease  G3b                   30-44                Moderate to severe decrease  G4                    15-29                Severe decrease  G5                    14 or less           Kidney failure          (1)In the absence of evidence of kidney disease, neither GFR category G1 or G2 fulfill the criteria for CKD.    eGFR calculation 2021 CKD-EPI creatinine equation, which does not include race as a factor   HIGH SENSITIVITIY TROPONIN T 1HR    Narrative:     High Sensitive Troponin T Reference Range:  <14.0 ng/L- Negative Female for AMI  <22.0 ng/L- Negative Male for AMI  >=14 - Abnormal Female indicating possible myocardial injury.  >=22 - Abnormal Male indicating possible myocardial injury.   Clinicians would have to utilize clinical acumen, EKG, Troponin, and serial changes to determine if it is an Acute Myocardial Infarction or myocardial injury due to an underlying chronic condition.        CBC AND DIFFERENTIAL    Narrative:     The following orders were created for panel order CBC & Differential.  Procedure                               Abnormality         Status                     ---------                               -----------         ------                     CBC Auto Differential[[id number]]        Abnormal            Final result                 Please view results for these tests on the individual orders.      XR Chest 1 View   Final Result   Shallow inspiration, no acute cardiopulmonary abnormality.           This report was signed and  finalized on 4/2/2025 6:41 PM by Dr. Zachary Giles MD.             Patient presents emergency department today for complaint of chest pain.  States it began this morning, he feels like something is sitting on his chest, feels that there is something stuck in his throat, he does have a nausea.  He also endorses lip tingling.  He does have some shortness of breath.  Patient is neurologically intact, no unilateral weakness or deficits.  NIH is 0.  Abdomen is soft, nontender, nondistended, no guarding, no rebound, no tenderness.  Upon review of labs patient has a WBC 13.20, trending down from his normal white blood cells.  Both troponins were less than 6.  CMP normal.  Magnesium normal.  Patient was given 4 mg IV Zofran, 20 mg IV Pepcid.  He was also given a GI cocktail.  He reports that his symptoms improved following medication.  He was also given 324 mg aspirin.  Heart score was 1.  PERC score is 0.  Vital signs were intact.  EKGs were normal, no ischemia, sinus rhythm rate of 87..  Chest x-ray shows shallow inspiration, no acute cardiopulmonary abnormality.  Discussed case with Dr. Castillo, instructed to discharge patient home with follow-up with PCP for further testing if warranted.  Instructed to follow-up with PCP in the next couple of days.  Instructed to come back to the ER with any new or worsening symptoms.  Patient will be discharged home in a stable condition.    Final diagnoses:   Chest pain, unspecified type       ED Disposition  ED Disposition       ED Disposition   Discharge    Condition   Stable    Comment   --               Vivek Stewart,   [address]  [address]  [facility] [id number] [phone]    Schedule an appointment as soon as possible for a visit in 2 days  follow up    [facility] EMERGENCY DEPARTMENT  [address] [place] [id number] [phone]    If symptoms worsen, As needed         Medication List      No changes were made to your  prescriptions during this visit.            Kyleigh Jeffrey, APRN  04/02/25 [id number]

## 2025-04-03 NOTE — DISCHARGE INSTRUCTIONS
It was very nice to meet you, Saulo. Thank you for allowing us to take care of you today at [facility].    Please understand that an ER evaluation is just the start of your evaluation. We will do what we can, but we are often unable to fully figure out what is causing your symptoms from one evaluation. Thus, our primary goal is to determine whether you need to be evaluated in the hospital or if it is safe for you to go home and see other doctors such as a primary care physician or a specialist on an outpatient basis.     Like we discussed, it is VERY IMPORTANT that you follow up with your primary care doctor (call them to set up an appointment) within the next few days or as soon as possible so that you can be re-evaluated for improvement in your symptoms or for any other questions.     Please return to the emergency room within 12-48 hours if you experience fever, chills, chest pain or shortness of breath, pain with inspiration/expiration, pain that travels to your arms, neck or back, nausea, vomiting, severe headache, tearing pain in your chest, dizziness, feel as though you are about to pass out, have any worsening symptoms, or any other concerns.

## 2025-04-05 LAB
QT INTERVAL: 354 MS
QTC INTERVAL: 425 MS

## 2025-04-09 ENCOUNTER — OFFICE VISIT (OUTPATIENT)
Dept: FAMILY MEDICINE CLINIC | Facility: CLINIC | Age: 50
End: 2025-04-09
Payer: COMMERCIAL

## 2025-04-09 VITALS
DIASTOLIC BLOOD PRESSURE: 82 MMHG | OXYGEN SATURATION: 98 % | BODY MASS INDEX: 29.55 KG/M2 | TEMPERATURE: 97.2 F | WEIGHT: 223 LBS | HEART RATE: 85 BPM | SYSTOLIC BLOOD PRESSURE: 120 MMHG | HEIGHT: 73 IN

## 2025-04-09 DIAGNOSIS — H92.01 RIGHT EAR PAIN: Primary | ICD-10-CM

## 2025-04-10 RX ORDER — AZITHROMYCIN 500 MG/1
500 TABLET, FILM COATED ORAL DAILY
Qty: 5 TABLET | Refills: 0 | Status: SHIPPED | OUTPATIENT
Start: 2025-04-10 | End: 2025-04-15

## 2025-04-10 RX ORDER — METHYLPREDNISOLONE 4 MG/1
TABLET ORAL
Qty: 21 TABLET | Refills: 0 | Status: SHIPPED | OUTPATIENT
Start: 2025-04-10

## 2025-04-15 NOTE — PROGRESS NOTES
"Chief Complaint  Earache    Subjective        Saulo Shepard presents to [facility]  Earache       2 days of R sided earache and neck pain as well as sore throat without fever    Objective   Vital Signs:  /82   Pulse 85   Temp 97.2 °F (36.2 °C)   Ht 185.4 cm (73\")   Wt 101 kg (223 lb)   SpO2 98%   BMI 29.42 kg/m²   Estimated body mass index is 29.42 kg/m² as calculated from the following:    Height as of this encounter: 185.4 cm (73\").    Weight as of this encounter: 101 kg (223 lb).            Physical Exam  Vitals and nursing note reviewed.   Constitutional:       General: He is not in acute distress.     Appearance: He is not diaphoretic.   HENT:      Head: Normocephalic and atraumatic.      Right Ear: Tympanic membrane normal.      Nose: Nose normal. Congestion present.      Mouth/Throat:      Pharynx: No oropharyngeal exudate.      Comments: R side of neck tender to palpation without warmth or erythema, no lymphadenopathy  Eyes:      General: No scleral icterus.        Right eye: No discharge.         Left eye: No discharge.      Conjunctiva/sclera: Conjunctivae normal.   Neck:      Trachea: No tracheal deviation.   Pulmonary:      Effort: Pulmonary effort is normal.   Skin:     General: Skin is warm and dry.      Coloration: Skin is not pale.   Neurological:      Mental Status: He is alert and oriented to person, place, and time.   Psychiatric:         Behavior: Behavior normal.         Thought Content: Thought content normal.         Judgment: Judgment normal.        Result Review :                Assessment and Plan   Diagnoses and all orders for this visit:    1. Right ear pain (Primary)  -     azithromycin (Zithromax) 500 MG tablet; Take 1 tablet by mouth Daily for 5 days.  Dispense: 5 tablet; Refill: 0  -     methylPREDNISolone (MEDROL) 4 MG dose pack; Take as directed on package instructions.  Dispense: 21 tablet; Refill: 0             Follow Up   Return " if symptoms worsen or fail to improve.  Patient was given instructions and counseling regarding his condition or for health maintenance advice. Please see specific information pulled into the AVS if appropriate.

## 2025-05-20 ENCOUNTER — APPOINTMENT (OUTPATIENT)
Dept: CT IMAGING | Facility: HOSPITAL | Age: 50
End: 2025-05-20
Payer: COMMERCIAL

## 2025-05-20 ENCOUNTER — HOSPITAL ENCOUNTER (EMERGENCY)
Facility: HOSPITAL | Age: 50
Discharge: HOME | End: 2025-05-20
Admitting: STUDENT IN AN ORGANIZED HEALTH CARE EDUCATION/TRAINING PROGRAM
Payer: COMMERCIAL

## 2025-05-20 VITALS
TEMPERATURE: 98.1 F | BODY MASS INDEX: 29.69 KG/M2 | RESPIRATION RATE: 18 BRPM | HEART RATE: 69 BPM | OXYGEN SATURATION: 96 % | WEIGHT: 224 LBS | SYSTOLIC BLOOD PRESSURE: 137 MMHG | HEIGHT: 73 IN | DIASTOLIC BLOOD PRESSURE: 81 MMHG

## 2025-05-20 DIAGNOSIS — S33.5XXA LUMBAR SPRAIN, INITIAL ENCOUNTER: Primary | ICD-10-CM

## 2025-05-20 DIAGNOSIS — M51.360 DEGENERATION OF INTERVERTEBRAL DISC OF LUMBAR REGION WITH DISCOGENIC BACK PAIN: ICD-10-CM

## 2025-05-20 PROCEDURE — 25010000002 DEXAMETHASONE PER 1 MG

## 2025-05-20 PROCEDURE — 25010000002 ORPHENADRINE CITRATE PER 60 MG

## 2025-05-20 PROCEDURE — 72131 CT LUMBAR SPINE W/O DYE: CPT

## 2025-05-20 PROCEDURE — 96372 THER/PROPH/DIAG INJ SC/IM: CPT

## 2025-05-20 PROCEDURE — 99284 EMERGENCY DEPT VISIT MOD MDM: CPT

## 2025-05-20 RX ORDER — ORPHENADRINE CITRATE 30 MG/ML
60 INJECTION INTRAMUSCULAR; INTRAVENOUS ONCE
Status: COMPLETED | OUTPATIENT
Start: 2025-05-20 | End: 2025-05-20

## 2025-05-20 RX ORDER — DEXAMETHASONE SODIUM PHOSPHATE 10 MG/ML
10 INJECTION, SOLUTION INTRA-ARTICULAR; INTRALESIONAL; INTRAMUSCULAR; INTRAVENOUS; SOFT TISSUE ONCE
Status: COMPLETED | OUTPATIENT
Start: 2025-05-20 | End: 2025-05-20

## 2025-05-20 RX ORDER — LIDOCAINE 50 MG/G
1 PATCH TOPICAL EVERY 24 HOURS
Qty: 15 PATCH | Refills: 0 | Status: SHIPPED | OUTPATIENT
Start: 2025-05-20

## 2025-05-20 RX ORDER — LIDOCAINE 4 G/G
1 PATCH TOPICAL ONCE
Status: DISCONTINUED | OUTPATIENT
Start: 2025-05-20 | End: 2025-05-20 | Stop reason: HOSPADM

## 2025-05-20 RX ORDER — METHYLPREDNISOLONE 4 MG/1
TABLET ORAL
Qty: 21 TABLET | Refills: 0 | Status: SHIPPED | OUTPATIENT
Start: 2025-05-20

## 2025-05-20 RX ADMIN — ORPHENADRINE CITRATE 60 MG: 60 INJECTION INTRAMUSCULAR; INTRAVENOUS at 18:02

## 2025-05-20 RX ADMIN — LIDOCAINE PAIN RELIEF 1 PATCH: 560 PATCH TOPICAL at 18:01

## 2025-05-20 RX ADMIN — DEXAMETHASONE SODIUM PHOSPHATE 10 MG: 10 INJECTION INTRAMUSCULAR; INTRAVENOUS at 18:02

## 2025-05-20 NOTE — ED PROVIDER NOTES
Subjective   History of Present Illness  Patient is a 50-year-old male who presents emergency department today for complaint of lower back pain that is been there for the last couple of days.  He states that he was holding something and twisted wrong and he feels like he torqued his back.  He has not had any other injuries or falls.  He denies any loss of bowel or bladder, radiculopathy, or paresthesias.  Denies any chest pain, shortness of breath, fever, chills, nausea, vomiting, diarrhea, any other symptoms.  Patient has a past medical history of anxiety, back pain, fracture of the lumbar spine, head injury, injury of back, nausea with vomiting, neck pain, overweight, and restless leg syndrome.        Review of Systems   Musculoskeletal:  Positive for back pain.   All other systems reviewed and are negative.      Past Medical History:   Diagnosis Date    Anxiety     Back pain     Fracture of lumbar spine 2002 and 2015    lower lumbar 2002; L1 2015    Head injury     Injury of back     Nausea with vomiting 03/28/2023    Neck pain     Overweight (BMI 25.0-29.9)     Restless leg syndrome        Allergies   Allergen Reactions    Contrast Dye (Echo Or Unknown Ct/Mr) Hives, Rash, Angioedema and Seizure     Allergy to CT dye, was given premedication by rad protocol (13,7,1 hr prednisone 50mg, 50mg benedryl 1 hr prior), still had reaction  Itching, tongue swelling, lips swelling, hives, sob    Ct Contrast Itching       Past Surgical History:   Procedure Laterality Date    COLONOSCOPY N/A 8/31/2023    Procedure: COLONOSCOPY WITH ANESTHESIA;  Surgeon: Kristopher Brush DO;  Location: [facility] ENDOSCOPY;  Service: Gastroenterology;  Laterality: N/A;  preop; screening   postop  PCP Joseph Stewart    COLONOSCOPY N/A 9/1/2023    Procedure: COLONOSCOPY WITH ANESTHESIA;  Surgeon: Kristopher Brush DO;  Location: [facility] ENDOSCOPY;  Service: Gastroenterology;  Laterality: N/A;  Pre: Encounter for screening for malignant neoplasm of  colon;  Post: Inadequate prep;  Vivek Stewart DO    COLONOSCOPY N/A 5/31/2024    Procedure: COLONOSCOPY WITH ANESTHESIA;  Surgeon: Kristopher Brush DO;  Location: [facility] ENDOSCOPY;  Service: Gastroenterology;  Laterality: N/A;  Pre: Encounter for screening for malignant neoplasm of colon, Left lower quadrant abdominal pain;  Post: Fair prep;  Vivek Stewart DO    FACIAL FRACTURE SURGERY      INCISION AND DRAINAGE LEG Right 12/1/2018    Procedure: INCISION AND DRAINAGE OF RIGHT UPPER INNER THIGH, PLACEMENT OF WOUND VAC;  Surgeon: René Dallas MD;  Location: [facility] OR;  Service: General    INGUINAL HERNIA REPAIR Bilateral 8/22/2023    Procedure: INGUINAL HERNIA BILATERAL REPAIR LAPAROSCOPIC WITH DAVINCI ROBOT WITH MESH;  Surgeon: Dina Boyd MD;  Location: [facility] OR;  Service: Robotics - DaVinci;  Laterality: Bilateral;    KNEE SURGERY Right 09/2021    SPLENECTOMY         Family History   Problem Relation Age of Onset    Fibromyalgia Mother     Lung cancer Father     Colon cancer Neg Hx     Colon polyps Neg Hx     Esophageal cancer Neg Hx        Social History     Socioeconomic History    Marital status: Single   Tobacco Use    Smoking status: Never    Smokeless tobacco: Never   Vaping Use    Vaping status: Never Used   Substance and Sexual Activity    Alcohol use: Not Currently    Drug use: No    Sexual activity: Yes     Partners: Female           Objective   Physical Exam  Constitutional:       General: He is not in acute distress.     Appearance: Normal appearance. He is obese. He is not ill-appearing, toxic-appearing or diaphoretic.   HENT:      Head: Normocephalic and atraumatic.      Right Ear: External ear normal.      Left Ear: External ear normal.      Nose: Nose normal. No congestion or rhinorrhea.      Mouth/Throat:      Mouth: Mucous membranes are moist.      Pharynx: Oropharynx is clear. No oropharyngeal exudate.   Eyes:      Extraocular Movements: Extraocular movements intact.       Conjunctiva/sclera: Conjunctivae normal.      Pupils: Pupils are equal, round, and reactive to light.   Neck:      Vascular: No carotid bruit.   Cardiovascular:      Rate and Rhythm: Normal rate and regular rhythm.      Pulses: Normal pulses.      Heart sounds: Normal heart sounds. No murmur heard.     No gallop.   Pulmonary:      Effort: Pulmonary effort is normal. No respiratory distress.      Breath sounds: Normal breath sounds. No stridor. No wheezing, rhonchi or rales.   Chest:      Chest wall: No tenderness.   Abdominal:      General: Abdomen is flat. Bowel sounds are normal. There is no distension.      Palpations: Abdomen is soft. There is no mass.      Tenderness: There is no abdominal tenderness. There is no right CVA tenderness, left CVA tenderness, guarding or rebound.      Hernia: No hernia is present.   Musculoskeletal:         General: Tenderness present. No swelling, deformity or signs of injury. Normal range of motion.      Cervical back: Normal range of motion and neck supple. No rigidity, tenderness or bony tenderness. No pain with movement.      Thoracic back: No lacerations, tenderness or bony tenderness. Normal range of motion.      Lumbar back: Tenderness and bony tenderness present. Normal range of motion.      Right lower leg: No edema.      Left lower leg: No edema.      Comments: Neurologically intact, GCS 15, no loss of bowel or bladder, no radiculopathy, no paresthesias, no step-off, no rigidity.  Tenderness noted to palpation in lumbar spine and soft tissue and bony areas.  No thoracic tenderness or cervical tenderness.   Lymphadenopathy:      Cervical: No cervical adenopathy.   Skin:     General: Skin is warm and dry.      Capillary Refill: Capillary refill takes less than 2 seconds.      Coloration: Skin is not jaundiced or pale.      Findings: No bruising, erythema, lesion or rash.   Neurological:      General: No focal deficit present.      Mental Status: He is alert and oriented  to person, place, and time. Mental status is at baseline.      Cranial Nerves: No cranial nerve deficit.      Sensory: No sensory deficit.      Motor: No weakness.      Coordination: Coordination normal.      Gait: Gait normal.      Deep Tendon Reflexes: Reflexes normal.   Psychiatric:         Mood and Affect: Mood normal.         Behavior: Behavior normal.         Thought Content: Thought content normal.         Judgment: Judgment normal.         Procedures           ED Course  ED Course as of 05/20/25 1924 Tue May 20, 2025   1746 CT lumbar spine without contrast, Decadron 10 mg, lidocaine patch, and Norflex 60 mg IM ordered. [BS]   1905 CT lumbar spine without contrast  IMPRESSION:  Impression:  1. Chronic mild wedge compression deformity at L1. No acute fracture.  2. Mild bulging of the disc as well as mild facet and ligamentous  hypertrophy present at several lumbar disc levels. No central stenosis  present. There is mild foraminal narrowing at several levels as  described above.   [BS]   1914 Spoke with Dr. Burciaga regarding patient, instructed patient to follow-up with primary care doctor outpatient.  Instructed him to follow-up with him within the next couple of days to discuss pain management and or follow-up with neurosurgery.  I did send him in some lidocaine patches, Medrol Dosepak and Voltaren.  Instructed to come back to the ER with any new or worsening symptoms.  Patient will be discharged home in a stable condition. [BS]      ED Course User Index  [BS] Kyleigh Jeffrey, KYLER                                                       Medical Decision Making  Amount and/or Complexity of Data Reviewed  Radiology: ordered.    Risk  OTC drugs.  Prescription drug management.    Patient is a 50-year-old male who presents emergency department today for complaint of lower back pain that is been there for the last couple of days.  He states that he was holding something and twisted wrong and he feels like he torqued  his back.  He has not had any other injuries or falls.  He denies any loss of bowel or bladder, radiculopathy, or paresthesias.  Denies any chest pain, shortness of breath, fever, chills, nausea, vomiting, diarrhea, any other symptoms.  Patient has a past medical history of anxiety, back pain, fracture of the lumbar spine, head injury, injury of back, nausea with vomiting, neck pain, overweight, and restless leg syndrome.    Differential diagnoses include but are not limited to lumbar back strain, lumbar back fracture, cauda equina, and other etiologies.    CT Lumbar Spine Without Contrast   Final Result   Impression:   1. Chronic mild wedge compression deformity at L1. No acute fracture.   2. Mild bulging of the disc as well as mild facet and ligamentous   hypertrophy present at several lumbar disc levels. No central stenosis   present. There is mild foraminal narrowing at several levels as   described above.               This report was signed and finalized on 5/20/2025 7:00 PM by Dr. Lucas Roberts MD.               Patient is a 50-year-old male who presents emergency department today for complaint of lower back pain that is been there for the last couple of days.  He states that he was holding something and twisted wrong and he feels like he torqued his back.  He has not had any other injuries or falls.  He denies any loss of bowel or bladder, radiculopathy, or paresthesias.  On exam he is in no acute distress, normal appearance, not ill-appearing, nontoxic-appearing, nondiaphoretic. Neurologically intact, GCS 15, no loss of bowel or bladder, no radiculopathy, no paresthesias, no step-off, no rigidity.  Tenderness noted to palpation in lumbar spine and soft tissue and bony areas.  No thoracic tenderness or cervical tenderness.  Otherwise exam is unremarkable.  CT lumbar spine without contrast, Decadron 10 mg, lidocaine patch, and Norflex 60 mg IM ordered.  CT lumbar spine without contrast shows Chronic mild  wedge compression deformity at L1. No acute fracture. Mild bulging of the disc as well as mild facet and ligamentous hypertrophy present at several lumbar disc levels. No central stenosis present. There is mild foraminal narrowing at several levels as described above. Patient states that his symptoms improved with medications. Spoke with Dr. Burciaga regarding patient, instructed patient to follow-up with primary care doctor outpatient.  Instructed him to follow-up with him within the next couple of days to discuss pain management and or follow-up with neurosurgery.  I did send him in some lidocaine patches, Medrol Dosepak and Voltaren.  Instructed to come back to the ER with any new or worsening symptoms.  Patient will be discharged home in a stable condition.      Final diagnoses:   Degeneration of intervertebral disc of lumbar region with discogenic back pain   Lumbar sprain, initial encounter       ED Disposition  ED Disposition       ED Disposition   Discharge    Condition   Stable    Comment   --               Vivek Stewart,   [address]  [address]  [place] [address]  [phone]    Schedule an appointment as soon as possible for a visit in 2 days  follow up    [facility] EMERGENCY DEPARTMENT  [facility] [facility] [address] [phone]    If symptoms worsen, As needed         Medication List        New Prescriptions      diclofenac 50 MG EC tablet  Commonly known as: VOLTAREN  Take 1 tablet by mouth 3 (Three) Times a Day.     lidocaine 5 %  Commonly known as: LIDODERM  Place 1 patch on the skin as directed by provider Daily. Remove & Discard patch within 12 hours or as directed by MD     methylPREDNISolone 4 MG dose pack  Commonly known as: MEDROL  Take as directed on package instructions.               Where to Get Your Medications        These medications were sent to [id number] - [place], [place] - [address]  [address] - [phone] [facility] [phone] [place]  [address], [facility] [address]      Phone: [phone]   diclofenac 50 MG EC tablet  lidocaine 5 %  methylPREDNISolone 4 MG dose pack            Kyleigh Jeffrey, KYLER  05/20/25 1924

## 2025-05-21 NOTE — DISCHARGE INSTRUCTIONS
It was very nice to meet you, Saulo. Thank you for allowing us to take care of you today at [facility].    You do not have any acute fracture noted on your CAT scan today.  I do want you to follow-up with your primary care doctor regarding follow-up with pain management or neurosurgery.  I did send you in some medication to the pharmacy to help until you can see him.  Come back to the ER with any new or worsening symptoms.    Please understand that an ER evaluation is just the start of your evaluation. We will do what we can, but we are often unable to fully figure out what is causing your symptoms from one evaluation. Thus, our primary goal is to determine whether you need to be evaluated in the hospital or if it is safe for you to go home and see other doctors such as a primary care physician or a specialist on an outpatient basis.     Like we discussed, it is VERY IMPORTANT that you follow up with your primary care doctor (call them to set up an appointment) within the next few days or as soon as possible so that you can be re-evaluated for improvement in your symptoms or for any other questions.     Please return to the emergency room within 12-48 hours if you experience fever, chills, chest pain or shortness of breath, pain with inspiration/expiration, pain that travels to your arms, neck or back, nausea, vomiting, severe headache, tearing pain in your chest, dizziness, feel as though you are about to pass out, have any worsening symptoms, or any other concerns.

## 2025-05-28 ENCOUNTER — OFFICE VISIT (OUTPATIENT)
Dept: FAMILY MEDICINE CLINIC | Facility: CLINIC | Age: 50
End: 2025-05-28
Payer: COMMERCIAL

## 2025-05-28 ENCOUNTER — TELEPHONE (OUTPATIENT)
Dept: FAMILY MEDICINE CLINIC | Facility: CLINIC | Age: 50
End: 2025-05-28

## 2025-05-28 VITALS
DIASTOLIC BLOOD PRESSURE: 83 MMHG | WEIGHT: 222.6 LBS | BODY MASS INDEX: 29.5 KG/M2 | HEART RATE: 85 BPM | HEIGHT: 73 IN | OXYGEN SATURATION: 98 % | SYSTOLIC BLOOD PRESSURE: 130 MMHG

## 2025-05-28 DIAGNOSIS — M54.50 LUMBAR PAIN: Primary | ICD-10-CM

## 2025-05-28 RX ORDER — HYDROCODONE BITARTRATE AND ACETAMINOPHEN 5; 325 MG/1; MG/1
1 TABLET ORAL EVERY 6 HOURS PRN
Qty: 15 TABLET | Refills: 0 | Status: SHIPPED | OUTPATIENT
Start: 2025-05-28

## 2025-05-28 RX ORDER — IBUPROFEN 800 MG/1
800 TABLET, FILM COATED ORAL EVERY 8 HOURS PRN
Qty: 180 TABLET | Refills: 1 | Status: SHIPPED | OUTPATIENT
Start: 2025-05-28

## 2025-05-28 RX ORDER — BACLOFEN 10 MG/1
10 TABLET ORAL 3 TIMES DAILY
Qty: 60 TABLET | Refills: 0 | Status: SHIPPED | OUTPATIENT
Start: 2025-05-28

## 2025-05-28 NOTE — TELEPHONE ENCOUNTER
Caller: Saulo Shepard    Relationship to patient: Self    Best call back number:    [phone]       Patient is needing: PT JUST HAD AN APPT WITH DR. HU AND FORGOT TO ASK HIM A QUESTION. HE IS REQUESTING A CALL BACK FROM DR. MANRIQUE NURSE.     REGARDING A LETTER FOR CHRONIC HEADACHES TO GET [employer] WINDOWS TINTED.

## 2025-05-29 ENCOUNTER — OFFICE VISIT (OUTPATIENT)
Dept: FAMILY MEDICINE CLINIC | Facility: CLINIC | Age: 50
End: 2025-05-29
Payer: COMMERCIAL

## 2025-05-29 VITALS
OXYGEN SATURATION: 95 % | TEMPERATURE: 98 F | SYSTOLIC BLOOD PRESSURE: 131 MMHG | BODY MASS INDEX: 29.51 KG/M2 | WEIGHT: 222.66 LBS | HEIGHT: 73 IN | HEART RATE: 85 BPM | DIASTOLIC BLOOD PRESSURE: 74 MMHG

## 2025-05-29 DIAGNOSIS — R51.9 ACUTE INTRACTABLE HEADACHE, UNSPECIFIED HEADACHE TYPE: Primary | ICD-10-CM

## 2025-05-29 PROCEDURE — 99213 OFFICE O/P EST LOW 20 MIN: CPT | Performed by: NURSE PRACTITIONER

## 2025-05-29 PROCEDURE — 1125F AMNT PAIN NOTED PAIN PRSNT: CPT | Performed by: NURSE PRACTITIONER

## 2025-05-29 NOTE — TELEPHONE ENCOUNTER
Is there a reason the patient is not using protective eyewear like sunglasses? If still having headaches recommend optometry visit

## 2025-05-29 NOTE — LETTER
May 29, 2025     Patient: Saulo Shepard   YOB: 1975   Date of Visit: 5/29/2025       To Whom It May Concern:    It is my medical opinion that Saulo Shepard  would benefit from tinted windows due to acute, intractable headaches that are exacerbated by bright light .           Sincerely,        KYLER Thorpe    CC: No Recipients

## 2025-05-30 NOTE — PROGRESS NOTES
KYLER Thorpe  [facility]   Family Medicine  [address]  [place] [address]  Phone: [phone]  Fax: [phone]         Chief Complaint:  Chief Complaint   Patient presents with    Letter for School/Work        History:  Saulo Shepard is a 50 y.o. male that is an established patient. He  is here for evaluation of the above complaint.    HPI   History of Present Illness  The patient presents for evaluation of headaches.    He continues to experience severe headaches, describing them as feeling like his head is on the verge of exploding. These headaches are triggered by exposure to light, particularly when transitioning from a dark environment to a bright one. The pain is localized behind his eyes and persists until he falls asleep. He is currently managing these headaches with Nurtec 75 mg, which he reports as effective in alleviating the pain.     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      Results         ROS:  Review of Systems   Constitutional: Negative.    Respiratory: Negative.     Neurological:  Positive for headaches.         reports that he has never smoked. He has never used smokeless tobacco. He reports that he does not currently use alcohol. He reports that he does not use drugs.    Current Outpatient Medications   Medication Instructions    albuterol sulfate  (90 Base) MCG/ACT inhaler 2 puffs, Inhalation, Every 4 Hours PRN    baclofen (LIORESAL) 10 mg, Oral, 3 Times Daily    dexlansoprazole (DEXILANT) 60 mg, Oral, Daily    diclofenac (VOLTAREN) 50 mg, Oral, 3 Times Daily    EPINEPHrine (EPIPEN) 0.3 mg, Once    gabapentin (NEURONTIN) 300 mg, Oral, 3 Times Daily    HYDROcodone-acetaminophen (NORCO) 5-325 MG per tablet 1 tablet, Oral, Every 6 Hours PRN    ibuprofen (ADVIL,MOTRIN) 800 mg, Oral, Every 8 Hours PRN     "lidocaine (LIDODERM) 5 % 1 patch, Transdermal, Every 24 Hours, Remove & Discard patch within 12 hours or as directed by MD    methylPREDNISolone (MEDROL) 4 MG dose pack Take as directed on package instructions.    rimegepant sulfate ODT (NURTEC) 75 mg, Oral, Daily PRN    rOPINIRole (REQUIP) 0.25 mg, Oral, Nightly, Take 1 hour before bedtime.       OBJECTIVE:  /74 (BP Location: Left arm, Patient Position: Sitting, Cuff Size: Adult)   Pulse 85   Temp 98 °F (36.7 °C) (Infrared)   Ht 185.4 cm (72.99\")   Wt 101 kg (222 lb 10.6 oz)   SpO2 95%   BMI 29.38 kg/m²    Physical Exam  Vitals and nursing note reviewed.   Constitutional:       Appearance: Normal appearance.   HENT:      Head: Normocephalic and atraumatic.      Nose: Nose normal.   Eyes:      Conjunctiva/sclera: Conjunctivae normal.   Cardiovascular:      Rate and Rhythm: Normal rate and regular rhythm.   Pulmonary:      Effort: Pulmonary effort is normal. No respiratory distress.      Breath sounds: Normal breath sounds. No wheezing or rales.   Neurological:      General: No focal deficit present.      Mental Status: He is alert and oriented to person, place, and time.   Psychiatric:         Mood and Affect: Mood normal.         Behavior: Behavior normal.       Physical Exam  Neurological: Awake, alert, oriented x4, no focal deficit  Head: Normocephalic, atraumatic  Eyes: Pupils equal and round, conjunctivae clear    Procedures    Assessment/Plan:     Diagnoses and all orders for this visit:    1. Acute intractable headache, unspecified headache type (Primary)           Assessment & Plan  1. Headaches.  - Reports experiencing severe headaches, particularly triggered by light exposure, which last until he sleeps.  - Describes the headaches as being behind the eyes and feeling like pressure.  - Currently taking Nurtec 75 mg, which helps alleviate the symptoms.  - A letter will be provided to his employer to justify the installation of tinted windows on " his work truck to help manage his light sensitivity. Samples of Nurtec 75 mg will be provided to him today.    An After Visit Summary was printed and given to the patient at discharge.  No follow-ups on file.       There are no Patient Instructions on file for this visit.      Discussion:     I spent 25 minutes caring for Saulo on this date of service. This time includes time spent by me in the following activities: preparing for the visit, reviewing tests, performing a medically appropriate examination and/or evaluation, counseling and educating the patient/family/caregiver, documenting information in the medical record, independently interpreting results and communicating that information with the patient/family/caregiver, care coordination, obtaining a separately obtained history, and reviewing a separately obtained history   Patient or patient representative verbalized consent for the use of Ambient Listening during the visit with  KYLER Thorpe for chart documentation. 5/30/2025  10:58 CDT    Angelica CHÁVEZ 5/30/2025   Electronically signed.

## 2025-06-06 NOTE — PROGRESS NOTES
"Chief Complaint  Follow-up (Patient presents to clinic for a follow up from being seen at [facility] ED on 05/20/25 for Lumbar back pain. Patient was diagnosed with degenerative disc disease )    Subjective        Saulo Shepard presents to [facility]  History of Present Illness  Seen in ED on 5/20/25 for low back pain without sciatica  Pain is still severe enough to keep him from sleeping despite muscle relaxers and NSAIDs    Objective   Vital Signs:  /83 (BP Location: Right arm, Patient Position: Sitting, Cuff Size: Adult)   Pulse 85   Ht 185.4 cm (73\")   Wt 101 kg (222 lb 9.6 oz)   SpO2 98%   BMI 29.37 kg/m²   Estimated body mass index is 29.37 kg/m² as calculated from the following:    Height as of this encounter: 185.4 cm (73\").    Weight as of this encounter: 101 kg (222 lb 9.6 oz).            Physical Exam  Vitals and nursing note reviewed.   Constitutional:       General: He is not in acute distress.     Appearance: He is not diaphoretic.   HENT:      Head: Normocephalic and atraumatic.      Nose: Nose normal.   Eyes:      General: No scleral icterus.        Right eye: No discharge.         Left eye: No discharge.      Conjunctiva/sclera: Conjunctivae normal.   Neck:      Trachea: No tracheal deviation.   Pulmonary:      Effort: Pulmonary effort is normal.   Musculoskeletal:      Comments: Antalgic gait and transitions   Skin:     General: Skin is warm and dry.      Coloration: Skin is not pale.   Neurological:      Mental Status: He is alert and oriented to person, place, and time.   Psychiatric:         Behavior: Behavior normal.         Thought Content: Thought content normal.         Judgment: Judgment normal.        Result Review :                Assessment and Plan   Diagnoses and all orders for this visit:    1. Lumbar pain (Primary)  -     Ambulatory Referral to Physical Therapy for Evaluation & Treatment  -     baclofen (LIORESAL) 10 MG tablet; Take 1 " tablet by mouth 3 (Three) Times a Day.  Dispense: 60 tablet; Refill: 0  -     HYDROcodone-acetaminophen (NORCO) 5-325 MG per tablet; Take 1 tablet by mouth Every 6 (Six) Hours As Needed for Severe Pain.  Dispense: 15 tablet; Refill: 0  -     ibuprofen (ADVIL,MOTRIN) 800 MG tablet; Take 1 tablet by mouth Every 8 (Eight) Hours As Needed for Mild Pain.  Dispense: 180 tablet; Refill: 1             Follow Up   Return if symptoms worsen or fail to improve.  Patient was given instructions and counseling regarding his condition or for health maintenance advice. Please see specific information pulled into the AVS if appropriate.

## 2025-07-24 ENCOUNTER — OFFICE VISIT (OUTPATIENT)
Dept: FAMILY MEDICINE CLINIC | Facility: CLINIC | Age: 50
End: 2025-07-24
Payer: COMMERCIAL

## 2025-07-24 VITALS
SYSTOLIC BLOOD PRESSURE: 130 MMHG | WEIGHT: 221 LBS | HEART RATE: 77 BPM | DIASTOLIC BLOOD PRESSURE: 80 MMHG | BODY MASS INDEX: 29.29 KG/M2 | OXYGEN SATURATION: 98 % | HEIGHT: 73 IN | TEMPERATURE: 97.4 F

## 2025-07-24 DIAGNOSIS — M54.50 LUMBAR PAIN: Primary | ICD-10-CM

## 2025-07-24 DIAGNOSIS — Z79.899 MEDICATION MANAGEMENT: ICD-10-CM

## 2025-07-24 DIAGNOSIS — Z12.11 ENCOUNTER FOR SCREENING FOR MALIGNANT NEOPLASM OF COLON: ICD-10-CM

## 2025-07-24 LAB
AMPHET+METHAMPHET UR QL: NEGATIVE
AMPHETAMINES UR QL: NEGATIVE
BARBITURATES UR QL SCN: NEGATIVE
BENZODIAZ UR QL SCN: NEGATIVE
BUPRENORPHINE SERPL-MCNC: NEGATIVE NG/ML
CANNABINOIDS SERPL QL: NEGATIVE
COCAINE UR QL: NEGATIVE
FENTANYL UR-MCNC: NEGATIVE NG/ML
METHADONE UR QL SCN: NEGATIVE
OPIATES UR QL: NEGATIVE
OXYCODONE UR QL SCN: NEGATIVE
PCP UR QL SCN: NEGATIVE
TRICYCLICS UR QL SCN: NEGATIVE

## 2025-07-24 PROCEDURE — 80307 DRUG TEST PRSMV CHEM ANLYZR: CPT | Performed by: FAMILY MEDICINE

## 2025-07-24 RX ORDER — IBUPROFEN 800 MG/1
800 TABLET, FILM COATED ORAL EVERY 8 HOURS PRN
Qty: 180 TABLET | Refills: 1 | Status: SHIPPED | OUTPATIENT
Start: 2025-07-24

## 2025-07-28 NOTE — PROGRESS NOTES
CC:   Chief Complaint   Patient presents with    Back Pain       History:  Saulo Shepard is a 50 y.o. male who presents today for follow-up for evaluation of the above:    History of Present Illness  F/u of back pain getting better with PT with PRN NSAIDs. Feels well otherwise       Mr. Shepard  reports that he has never smoked. He has never used smokeless tobacco. He reports that he does not currently use alcohol. He reports that he does not use drugs.      Current Outpatient Medications:     albuterol sulfate  (90 Base) MCG/ACT inhaler, Inhale 2 puffs Every 4 (Four) Hours As Needed for Shortness of Air or Wheezing., Disp: 18 g, Rfl: 11    baclofen (LIORESAL) 10 MG tablet, Take 1 tablet by mouth 3 (Three) Times a Day., Disp: 60 tablet, Rfl: 0    dexlansoprazole (DEXILANT) 60 MG capsule, Take 1 capsule by mouth Daily., Disp: 90 capsule, Rfl: 1    diclofenac (VOLTAREN) 50 MG EC tablet, Take 1 tablet by mouth 3 (Three) Times a Day., Disp: 20 tablet, Rfl: 0    EPINEPHrine (EPIPEN) 0.3 MG/0.3ML solution auto-injector injection, Inject 0.3 mL into the appropriate muscle as directed by prescriber 1 (One) Time., Disp: , Rfl:     gabapentin (NEURONTIN) 300 MG capsule, Take 1 capsule by mouth 3 (Three) Times a Day., Disp: 90 capsule, Rfl: 2    HYDROcodone-acetaminophen (NORCO) 5-325 MG per tablet, Take 1 tablet by mouth Every 6 (Six) Hours As Needed for Severe Pain., Disp: 15 tablet, Rfl: 0    ibuprofen (ADVIL,MOTRIN) 800 MG tablet, Take 1 tablet by mouth Every 8 (Eight) Hours As Needed for Mild Pain., Disp: 180 tablet, Rfl: 1    lidocaine (LIDODERM) 5 %, Place 1 patch on the skin as directed by provider Daily. Remove & Discard patch within 12 hours or as directed by MD, Disp: 15 patch, Rfl: 0    methylPREDNISolone (MEDROL) 4 MG dose pack, Take as directed on package instructions., Disp: 21 tablet, Rfl: 0    rimegepant sulfate ODT (Nurtec) 75 MG tablet, Take 1 tablet by mouth Daily As Needed (as needed for  "headache)., Disp: 16 tablet, Rfl: 2    rOPINIRole (REQUIP) 0.25 MG tablet, Take 1 tablet by mouth Every Night. Take 1 hour before bedtime., Disp: 90 tablet, Rfl: 3      OBJECTIVE:  /80 (BP Location: Right arm, Patient Position: Sitting, Cuff Size: Adult)   Pulse 77   Temp 97.4 °F (36.3 °C) (Temporal)   Ht 185.4 cm (72.99\")   Wt 100 kg (221 lb)   SpO2 98%   BMI 29.17 kg/m²    Physical Exam  Vitals and nursing note reviewed.   Constitutional:       General: He is not in acute distress.     Appearance: He is not diaphoretic.   HENT:      Head: Normocephalic and atraumatic.      Nose: Nose normal.   Eyes:      General: No scleral icterus.        Right eye: No discharge.         Left eye: No discharge.      Conjunctiva/sclera: Conjunctivae normal.   Neck:      Trachea: No tracheal deviation.   Pulmonary:      Effort: Pulmonary effort is normal.   Skin:     General: Skin is warm and dry.      Coloration: Skin is not pale.   Neurological:      Mental Status: He is alert and oriented to person, place, and time.   Psychiatric:         Behavior: Behavior normal.         Thought Content: Thought content normal.         Judgment: Judgment normal.         Assessment/Plan     Diagnosis Plan   1. Lumbar pain  ibuprofen (ADVIL,MOTRIN) 800 MG tablet      2. Encounter for screening for malignant neoplasm of colon  Ambulatory Referral For Screening Colonoscopy      3. Medication management  Urine Drug Screen - Urine, Clean Catch    Fentanyl, Urine - Urine, Clean Catch        Assessment & Plan    Continue PT with med support above  F/u PRN      Vivek Stewart D.O.  [facility]  [facility] Neuromus[facility]      "

## 2025-08-17 ENCOUNTER — HOSPITAL ENCOUNTER (EMERGENCY)
Facility: HOSPITAL | Age: 50
Discharge: HOME | End: 2025-08-17
Attending: EMERGENCY MEDICINE | Admitting: EMERGENCY MEDICINE
Payer: COMMERCIAL

## 2025-08-17 ENCOUNTER — APPOINTMENT (OUTPATIENT)
Dept: CT IMAGING | Facility: HOSPITAL | Age: 50
End: 2025-08-17
Payer: COMMERCIAL

## 2025-08-17 VITALS
WEIGHT: 220 LBS | RESPIRATION RATE: 20 BRPM | BODY MASS INDEX: 29.16 KG/M2 | DIASTOLIC BLOOD PRESSURE: 95 MMHG | SYSTOLIC BLOOD PRESSURE: 126 MMHG | OXYGEN SATURATION: 97 % | TEMPERATURE: 98.3 F | HEART RATE: 74 BPM | HEIGHT: 73 IN

## 2025-08-17 DIAGNOSIS — K57.92 DIVERTICULITIS: Primary | ICD-10-CM

## 2025-08-17 LAB
ALBUMIN SERPL-MCNC: 4.1 G/DL (ref 3.5–5.2)
ALBUMIN/GLOB SERPL: 1.2 G/DL
ALP SERPL-CCNC: 112 U/L (ref 39–117)
ALT SERPL W P-5'-P-CCNC: 21 U/L (ref 1–41)
ANION GAP SERPL CALCULATED.3IONS-SCNC: 9 MMOL/L (ref 5–15)
AST SERPL-CCNC: 20 U/L (ref 1–40)
BASOPHILS # BLD MANUAL: 0 10*3/MM3 (ref 0–0.2)
BASOPHILS NFR BLD MANUAL: 0 % (ref 0–1.5)
BILIRUB SERPL-MCNC: 0.4 MG/DL (ref 0–1.2)
BILIRUB UR QL STRIP: NEGATIVE
BUN SERPL-MCNC: 10.4 MG/DL (ref 6–20)
BUN/CREAT SERPL: 8.5 (ref 7–25)
BURR CELLS BLD QL SMEAR: ABNORMAL
CALCIUM SPEC-SCNC: 11.2 MG/DL (ref 8.6–10.5)
CHLORIDE SERPL-SCNC: 107 MMOL/L (ref 98–107)
CLARITY UR: CLEAR
CO2 SERPL-SCNC: 24 MMOL/L (ref 22–29)
COLOR UR: YELLOW
CREAT SERPL-MCNC: 1.22 MG/DL (ref 0.76–1.27)
D-LACTATE SERPL-SCNC: 0.7 MMOL/L (ref 0.5–2)
DEPRECATED RDW RBC AUTO: 47.5 FL (ref 37–54)
EGFRCR SERPLBLD CKD-EPI 2021: 72.7 ML/MIN/1.73
EOSINOPHIL # BLD MANUAL: 0.58 10*3/MM3 (ref 0–0.4)
EOSINOPHIL NFR BLD MANUAL: 3 % (ref 0.3–6.2)
ERYTHROCYTE [DISTWIDTH] IN BLOOD BY AUTOMATED COUNT: 15.1 % (ref 12.3–15.4)
GLOBULIN UR ELPH-MCNC: 3.4 GM/DL
GLUCOSE SERPL-MCNC: 96 MG/DL (ref 65–99)
GLUCOSE UR STRIP-MCNC: NEGATIVE MG/DL
HCT VFR BLD AUTO: 49.4 % (ref 37.5–51)
HGB BLD-MCNC: 16.6 G/DL (ref 13–17.7)
HGB UR QL STRIP.AUTO: NEGATIVE
HOLD SPECIMEN: NORMAL
HOLD SPECIMEN: NORMAL
KETONES UR QL STRIP: NEGATIVE
LEUKOCYTE ESTERASE UR QL STRIP.AUTO: NEGATIVE
LIPASE SERPL-CCNC: 37 U/L (ref 13–60)
LYMPHOCYTES # BLD MANUAL: 4.06 10*3/MM3 (ref 0.7–3.1)
LYMPHOCYTES NFR BLD MANUAL: 7 % (ref 5–12)
MCH RBC QN AUTO: 29.2 PG (ref 26.6–33)
MCHC RBC AUTO-ENTMCNC: 33.6 G/DL (ref 31.5–35.7)
MCV RBC AUTO: 86.8 FL (ref 79–97)
MONOCYTES # BLD: 1.35 10*3/MM3 (ref 0.1–0.9)
NEUTROPHILS # BLD AUTO: 13.33 10*3/MM3 (ref 1.7–7)
NEUTROPHILS NFR BLD MANUAL: 69 % (ref 42.7–76)
NITRITE UR QL STRIP: NEGATIVE
PH UR STRIP.AUTO: 6 [PH] (ref 5–8)
PLAT MORPH BLD: NORMAL
PLATELET # BLD AUTO: 404 10*3/MM3 (ref 140–450)
PMV BLD AUTO: 10.5 FL (ref 6–12)
POIKILOCYTOSIS BLD QL SMEAR: ABNORMAL
POLYCHROMASIA BLD QL SMEAR: ABNORMAL
POTASSIUM SERPL-SCNC: 4.7 MMOL/L (ref 3.5–5.2)
PROT SERPL-MCNC: 7.5 G/DL (ref 6–8.5)
PROT UR QL STRIP: NEGATIVE
RBC # BLD AUTO: 5.69 10*6/MM3 (ref 4.14–5.8)
SODIUM SERPL-SCNC: 140 MMOL/L (ref 136–145)
SP GR UR STRIP: 1.02 (ref 1–1.03)
UROBILINOGEN UR QL STRIP: NORMAL
VARIANT LYMPHS NFR BLD MANUAL: 12 % (ref 19.6–45.3)
VARIANT LYMPHS NFR BLD MANUAL: 9 % (ref 0–5)
WBC MORPH BLD: NORMAL
WBC NRBC COR # BLD AUTO: 19.32 10*3/MM3 (ref 3.4–10.8)
WHOLE BLOOD HOLD COAG: NORMAL
WHOLE BLOOD HOLD SPECIMEN: NORMAL

## 2025-08-17 PROCEDURE — 80053 COMPREHEN METABOLIC PANEL: CPT | Performed by: EMERGENCY MEDICINE

## 2025-08-17 PROCEDURE — 25010000002 PIPERACILLIN SOD-TAZOBACTAM PER 1 G: Performed by: EMERGENCY MEDICINE

## 2025-08-17 PROCEDURE — 25810000003 SODIUM CHLORIDE 0.9 % SOLUTION: Performed by: EMERGENCY MEDICINE

## 2025-08-17 PROCEDURE — 74176 CT ABD & PELVIS W/O CONTRAST: CPT

## 2025-08-17 PROCEDURE — 25010000002 MORPHINE PER 10 MG: Performed by: EMERGENCY MEDICINE

## 2025-08-17 PROCEDURE — 96375 TX/PRO/DX INJ NEW DRUG ADDON: CPT

## 2025-08-17 PROCEDURE — 96365 THER/PROPH/DIAG IV INF INIT: CPT

## 2025-08-17 PROCEDURE — 87040 BLOOD CULTURE FOR BACTERIA: CPT | Performed by: EMERGENCY MEDICINE

## 2025-08-17 PROCEDURE — 25010000002 ONDANSETRON PER 1 MG: Performed by: EMERGENCY MEDICINE

## 2025-08-17 PROCEDURE — 83690 ASSAY OF LIPASE: CPT

## 2025-08-17 PROCEDURE — 85007 BL SMEAR W/DIFF WBC COUNT: CPT | Performed by: EMERGENCY MEDICINE

## 2025-08-17 PROCEDURE — 81003 URINALYSIS AUTO W/O SCOPE: CPT

## 2025-08-17 PROCEDURE — 83605 ASSAY OF LACTIC ACID: CPT | Performed by: EMERGENCY MEDICINE

## 2025-08-17 PROCEDURE — 36415 COLL VENOUS BLD VENIPUNCTURE: CPT

## 2025-08-17 PROCEDURE — 99284 EMERGENCY DEPT VISIT MOD MDM: CPT | Performed by: EMERGENCY MEDICINE

## 2025-08-17 PROCEDURE — 85025 COMPLETE CBC W/AUTO DIFF WBC: CPT | Performed by: EMERGENCY MEDICINE

## 2025-08-17 RX ORDER — HYDROCODONE BITARTRATE AND ACETAMINOPHEN 5; 325 MG/1; MG/1
1 TABLET ORAL EVERY 6 HOURS PRN
Qty: 8 TABLET | Refills: 0 | Status: SHIPPED | OUTPATIENT
Start: 2025-08-17

## 2025-08-17 RX ORDER — SODIUM CHLORIDE 0.9 % (FLUSH) 0.9 %
10 SYRINGE (ML) INJECTION AS NEEDED
Status: DISCONTINUED | OUTPATIENT
Start: 2025-08-17 | End: 2025-08-18 | Stop reason: HOSPADM

## 2025-08-17 RX ORDER — ONDANSETRON 4 MG/1
4 TABLET, ORALLY DISINTEGRATING ORAL EVERY 6 HOURS PRN
Qty: 8 TABLET | Refills: 0 | Status: SHIPPED | OUTPATIENT
Start: 2025-08-17

## 2025-08-17 RX ORDER — ONDANSETRON 2 MG/ML
4 INJECTION INTRAMUSCULAR; INTRAVENOUS ONCE
Status: COMPLETED | OUTPATIENT
Start: 2025-08-17 | End: 2025-08-17

## 2025-08-17 RX ADMIN — SODIUM CHLORIDE 1000 ML: 9 INJECTION, SOLUTION INTRAVENOUS at 19:33

## 2025-08-17 RX ADMIN — PIPERACILLIN AND TAZOBACTAM 3.38 G: 3; .375 INJECTION, POWDER, FOR SOLUTION INTRAVENOUS at 20:16

## 2025-08-17 RX ADMIN — MORPHINE SULFATE 4 MG: 4 INJECTION, SOLUTION INTRAMUSCULAR; INTRAVENOUS at 19:34

## 2025-08-17 RX ADMIN — ONDANSETRON 4 MG: 2 INJECTION INTRAMUSCULAR; INTRAVENOUS at 19:34

## 2025-08-18 ENCOUNTER — OFFICE VISIT (OUTPATIENT)
Dept: FAMILY MEDICINE CLINIC | Facility: CLINIC | Age: 50
End: 2025-08-18
Payer: COMMERCIAL

## 2025-08-18 ENCOUNTER — PATIENT ROUNDING (BHMG ONLY) (OUTPATIENT)
Dept: FAMILY MEDICINE CLINIC | Facility: CLINIC | Age: 50
End: 2025-08-18
Payer: COMMERCIAL

## 2025-08-18 VITALS
BODY MASS INDEX: 29.29 KG/M2 | HEART RATE: 86 BPM | WEIGHT: 221 LBS | DIASTOLIC BLOOD PRESSURE: 66 MMHG | SYSTOLIC BLOOD PRESSURE: 108 MMHG | OXYGEN SATURATION: 94 % | TEMPERATURE: 98.1 F | HEIGHT: 73 IN

## 2025-08-18 DIAGNOSIS — K57.92 DIVERTICULITIS: Primary | ICD-10-CM

## 2025-08-18 DIAGNOSIS — G25.81 RLS (RESTLESS LEGS SYNDROME): ICD-10-CM

## 2025-08-18 DIAGNOSIS — Z76.89 REFERRED BY EMERGENCY DEPARTMENT PHYSICIAN: ICD-10-CM

## 2025-08-18 DIAGNOSIS — R51.9 ACUTE INTRACTABLE HEADACHE, UNSPECIFIED HEADACHE TYPE: ICD-10-CM

## 2025-08-18 PROCEDURE — 1125F AMNT PAIN NOTED PAIN PRSNT: CPT | Performed by: NURSE PRACTITIONER

## 2025-08-18 PROCEDURE — 99214 OFFICE O/P EST MOD 30 MIN: CPT | Performed by: NURSE PRACTITIONER

## 2025-08-18 RX ORDER — ROPINIROLE 0.25 MG/1
0.25 TABLET, FILM COATED ORAL NIGHTLY
Qty: 90 TABLET | Refills: 3 | Status: SHIPPED | OUTPATIENT
Start: 2025-08-18

## 2025-08-19 ENCOUNTER — HOSPITAL ENCOUNTER (OUTPATIENT)
Facility: HOSPITAL | Age: 50
Setting detail: OBSERVATION
Discharge: HOME | End: 2025-08-22
Attending: INTERNAL MEDICINE | Admitting: INTERNAL MEDICINE
Payer: COMMERCIAL

## 2025-08-19 ENCOUNTER — OFFICE VISIT (OUTPATIENT)
Dept: FAMILY MEDICINE CLINIC | Facility: CLINIC | Age: 50
End: 2025-08-19
Payer: COMMERCIAL

## 2025-08-19 ENCOUNTER — APPOINTMENT (OUTPATIENT)
Dept: CT IMAGING | Facility: HOSPITAL | Age: 50
End: 2025-08-19
Payer: COMMERCIAL

## 2025-08-19 VITALS
DIASTOLIC BLOOD PRESSURE: 72 MMHG | WEIGHT: 218 LBS | OXYGEN SATURATION: 98 % | BODY MASS INDEX: 28.89 KG/M2 | HEIGHT: 73 IN | TEMPERATURE: 96.9 F | SYSTOLIC BLOOD PRESSURE: 118 MMHG | HEART RATE: 99 BPM

## 2025-08-19 DIAGNOSIS — K57.92 DIVERTICULITIS: Primary | ICD-10-CM

## 2025-08-19 PROCEDURE — 1160F RVW MEDS BY RX/DR IN RCRD: CPT

## 2025-08-19 PROCEDURE — 1125F AMNT PAIN NOTED PAIN PRSNT: CPT

## 2025-08-19 PROCEDURE — 1159F MED LIST DOCD IN RCRD: CPT

## 2025-08-19 PROCEDURE — 99214 OFFICE O/P EST MOD 30 MIN: CPT

## 2025-08-22 ENCOUNTER — READMISSION MANAGEMENT (OUTPATIENT)
Dept: CALL CENTER | Facility: HOSPITAL | Age: 50
End: 2025-08-22
Payer: COMMERCIAL

## 2025-08-22 LAB
BACTERIA SPEC AEROBE CULT: NORMAL
BACTERIA SPEC AEROBE CULT: NORMAL

## 2025-08-25 ENCOUNTER — TRANSITIONAL CARE MANAGEMENT TELEPHONE ENCOUNTER (OUTPATIENT)
Dept: CALL CENTER | Facility: HOSPITAL | Age: 50
End: 2025-08-25
Payer: COMMERCIAL

## (undated) DEVICE — PK POSTN TRENGUARD450 PROC

## (undated) DEVICE — MASK,OXYGEN,MED CONC,ADLT,7' TUB, UC: Brand: PENDING

## (undated) DEVICE — YANKAUER,BULB TIP WITH VENT: Brand: ARGYLE

## (undated) DEVICE — SENSR O2 OXIMAX FNGR A/ 18IN NONSTR

## (undated) DEVICE — CANNULA SEAL

## (undated) DEVICE — THE CHANNEL CLEANING BRUSH IS A NYLON FLEXI BRUSH ATTACHED TO A FLEXIBLE PLASTIC SHEATH DESIGNED TO SAFELY REMOVE DEBRIS FROM FLEXIBLE ENDOSCOPES.

## (undated) DEVICE — Device: Brand: DEFENDO AIR/WATER/SUCTION AND BIOPSY VALVE

## (undated) DEVICE — ADHS LIQ MASTISOL 2/3ML

## (undated) DEVICE — ADHS SKIN PREMIERPRO EXOFIN TOPICAL HI/VISC .5ML

## (undated) DEVICE — 3M™ IOBAN™ 2 ANTIMICROBIAL INCISE DRAPE 6650EZ: Brand: IOBAN™ 2

## (undated) DEVICE — KT CANSTR VAC WND W/ISOLYSER SENSATRAC 500CC 5CS

## (undated) DEVICE — GLV SURG TRIUMPH MICRO PF LTX 7.5 STRL

## (undated) DEVICE — SUT MNCRYL 4/0 PS2 27IN UD MCP426H

## (undated) DEVICE — PAD MINOR UNIVERSAL: Brand: MEDLINE INDUSTRIES, INC.

## (undated) DEVICE — BAPTIST TURNOVER KIT: Brand: MEDLINE INDUSTRIES, INC.

## (undated) DEVICE — DAVINCI: Brand: MEDLINE INDUSTRIES, INC.

## (undated) DEVICE — BLADELESS OBTURATOR: Brand: WECK VISTA

## (undated) DEVICE — ARM DRAPE

## (undated) DEVICE — 2, DISPOSABLE SUCTION/IRRIGATOR WITHOUT DISPOSABLE TIP: Brand: STRYKEFLOW

## (undated) DEVICE — PAD GRND REM POLYHESIVE A/ DISP

## (undated) DEVICE — DRSNG SURESITE WNDW 4X4.5

## (undated) DEVICE — GLV SURG SENSICARE W/ALOE PF LF SZ6 STRL

## (undated) DEVICE — ELECTRD BLD EZ CLN MOD XLNG 2.75IN

## (undated) DEVICE — KT CLN CLEANOR SCPE

## (undated) DEVICE — ELECTRD BLD EDGE/INSUL1P 2.4X5.1MM STRL

## (undated) DEVICE — PK TURNOVER RM ADV

## (undated) DEVICE — DRSNG WND VAC GRANUFOAM SENSATRAC SM

## (undated) DEVICE — LEGGINGS, PAIR, 33X51 XL, STERILE: Brand: MEDLINE

## (undated) DEVICE — ST TBG AIRSEAL FLTR TRI LUM

## (undated) DEVICE — ENDOPATH XCEL WITH OPTIVIEW TECHNOLOGY BLADELESS TROCARS WITH STABILITY SLEEVES: Brand: ENDOPATH XCEL OPTIVIEW

## (undated) DEVICE — NDL HYPO PRECISIONGLIDE REG 22G 1 1/2

## (undated) DEVICE — SYR LL TP 10ML STRL

## (undated) DEVICE — TRY PREP SCRB VAG PVP

## (undated) DEVICE — NDL HYPO PRECISIONGLIDE REG 25G 1 1/2

## (undated) DEVICE — SPNG GZ WOVN 4X4IN 12PLY 10/BX STRL

## (undated) DEVICE — 4-PORT MANIFOLD: Brand: NEPTUNE 2